# Patient Record
Sex: MALE | Race: WHITE | NOT HISPANIC OR LATINO | Employment: OTHER | URBAN - METROPOLITAN AREA
[De-identification: names, ages, dates, MRNs, and addresses within clinical notes are randomized per-mention and may not be internally consistent; named-entity substitution may affect disease eponyms.]

---

## 2018-02-05 ENCOUNTER — APPOINTMENT (EMERGENCY)
Dept: RADIOLOGY | Facility: HOSPITAL | Age: 83
End: 2018-02-05
Payer: MEDICARE

## 2018-02-05 ENCOUNTER — HOSPITAL ENCOUNTER (EMERGENCY)
Facility: HOSPITAL | Age: 83
Discharge: HOME | End: 2018-02-05
Attending: EMERGENCY MEDICINE | Admitting: EMERGENCY MEDICINE
Payer: MEDICARE

## 2018-02-05 VITALS
RESPIRATION RATE: 16 BRPM | OXYGEN SATURATION: 96 % | HEART RATE: 76 BPM | BODY MASS INDEX: 29.97 KG/M2 | SYSTOLIC BLOOD PRESSURE: 102 MMHG | DIASTOLIC BLOOD PRESSURE: 60 MMHG | TEMPERATURE: 98.8 F | WEIGHT: 221 LBS

## 2018-02-05 DIAGNOSIS — B34.9 VIRAL SYNDROME: Primary | ICD-10-CM

## 2018-02-05 LAB
ALBUMIN SERPL BCP-MCNC: 3.6 G/DL (ref 3.5–5)
ALP SERPL-CCNC: 73 U/L (ref 46–116)
ALT SERPL W P-5'-P-CCNC: 19 U/L (ref 12–78)
ANION GAP SERPL CALCULATED.3IONS-SCNC: 9 MMOL/L (ref 4–13)
APTT PPP: 29 SECONDS (ref 24–33)
AST SERPL W P-5'-P-CCNC: 16 U/L (ref 5–45)
BASOPHILS # BLD AUTO: 0 THOUSANDS/ΜL (ref 0–0.1)
BASOPHILS NFR BLD AUTO: 0 % (ref 0–1)
BILIRUB SERPL-MCNC: 0.7 MG/DL (ref 0.2–1)
BILIRUB UR QL STRIP: NEGATIVE
BUN SERPL-MCNC: 16 MG/DL (ref 5–25)
CALCIUM SERPL-MCNC: 9 MG/DL (ref 8.3–10.1)
CHLORIDE SERPL-SCNC: 100 MMOL/L (ref 100–108)
CLARITY UR: CLEAR
CO2 SERPL-SCNC: 26 MMOL/L (ref 21–32)
COLOR UR: YELLOW
CREAT SERPL-MCNC: 1.46 MG/DL (ref 0.6–1.3)
EOSINOPHIL # BLD AUTO: 0.1 THOUSAND/ΜL (ref 0–0.61)
EOSINOPHIL NFR BLD AUTO: 1 % (ref 0–6)
ERYTHROCYTE [DISTWIDTH] IN BLOOD BY AUTOMATED COUNT: 14.3 % (ref 11.6–15.1)
FLUAV AG SPEC QL IA: NEGATIVE
FLUBV AG SPEC QL IA: NEGATIVE
GFR SERPL CREATININE-BSD FRML MDRD: 45 ML/MIN/1.73SQ M
GLUCOSE SERPL-MCNC: 119 MG/DL (ref 65–140)
GLUCOSE UR STRIP-MCNC: NEGATIVE MG/DL
HCT VFR BLD AUTO: 38.7 % (ref 42–52)
HGB BLD-MCNC: 12.8 G/DL (ref 14–18)
HGB UR QL STRIP.AUTO: NEGATIVE
INR PPP: 1.04 (ref 0.86–1.16)
KETONES UR STRIP-MCNC: NEGATIVE MG/DL
LACTATE SERPL-SCNC: 1 MMOL/L (ref 0.5–2)
LEUKOCYTE ESTERASE UR QL STRIP: NEGATIVE
LIPASE SERPL-CCNC: 61 U/L (ref 73–393)
LYMPHOCYTES # BLD AUTO: 1.3 THOUSANDS/ΜL (ref 0.6–4.47)
LYMPHOCYTES NFR BLD AUTO: 13 % (ref 14–44)
MAGNESIUM SERPL-MCNC: 1.4 MG/DL (ref 1.6–2.6)
MCH RBC QN AUTO: 28 PG (ref 27–31)
MCHC RBC AUTO-ENTMCNC: 32.9 G/DL (ref 31.4–37.4)
MCV RBC AUTO: 85 FL (ref 82–98)
MONOCYTES # BLD AUTO: 0.8 THOUSAND/ΜL (ref 0.17–1.22)
MONOCYTES NFR BLD AUTO: 9 % (ref 4–12)
NEUTROPHILS # BLD AUTO: 7.4 THOUSANDS/ΜL (ref 1.85–7.62)
NEUTS SEG NFR BLD AUTO: 77 % (ref 43–75)
NITRITE UR QL STRIP: NEGATIVE
NRBC BLD AUTO-RTO: 0 /100 WBCS
NT-PROBNP SERPL-MCNC: 521 PG/ML
PH UR STRIP.AUTO: 5.5 [PH] (ref 5–9)
PHOSPHATE SERPL-MCNC: 2.4 MG/DL (ref 2.3–4.1)
PLATELET # BLD AUTO: 229 THOUSANDS/UL (ref 130–400)
PMV BLD AUTO: 7.3 FL (ref 8.9–12.7)
POTASSIUM SERPL-SCNC: 4.4 MMOL/L (ref 3.5–5.3)
PROT SERPL-MCNC: 7.8 G/DL (ref 6.4–8.2)
PROT UR STRIP-MCNC: NEGATIVE MG/DL
PROTHROMBIN TIME: 10.9 SECONDS (ref 9.4–11.7)
RBC # BLD AUTO: 4.56 MILLION/UL (ref 4.7–6.1)
SODIUM SERPL-SCNC: 135 MMOL/L (ref 136–145)
SP GR UR STRIP.AUTO: 1.01 (ref 1–1.03)
TROPONIN I SERPL-MCNC: 0.02 NG/ML
UROBILINOGEN UR QL STRIP.AUTO: 0.2 E.U./DL
WBC # BLD AUTO: 9.6 THOUSAND/UL (ref 4.8–10.8)

## 2018-02-05 PROCEDURE — 84100 ASSAY OF PHOSPHORUS: CPT | Performed by: EMERGENCY MEDICINE

## 2018-02-05 PROCEDURE — 96375 TX/PRO/DX INJ NEW DRUG ADDON: CPT

## 2018-02-05 PROCEDURE — 83880 ASSAY OF NATRIURETIC PEPTIDE: CPT | Performed by: EMERGENCY MEDICINE

## 2018-02-05 PROCEDURE — 85610 PROTHROMBIN TIME: CPT | Performed by: EMERGENCY MEDICINE

## 2018-02-05 PROCEDURE — 36415 COLL VENOUS BLD VENIPUNCTURE: CPT | Performed by: EMERGENCY MEDICINE

## 2018-02-05 PROCEDURE — 84484 ASSAY OF TROPONIN QUANT: CPT | Performed by: EMERGENCY MEDICINE

## 2018-02-05 PROCEDURE — 87400 INFLUENZA A/B EACH AG IA: CPT | Performed by: EMERGENCY MEDICINE

## 2018-02-05 PROCEDURE — 83690 ASSAY OF LIPASE: CPT | Performed by: EMERGENCY MEDICINE

## 2018-02-05 PROCEDURE — 99284 EMERGENCY DEPT VISIT MOD MDM: CPT

## 2018-02-05 PROCEDURE — 83605 ASSAY OF LACTIC ACID: CPT | Performed by: EMERGENCY MEDICINE

## 2018-02-05 PROCEDURE — 85025 COMPLETE CBC W/AUTO DIFF WBC: CPT | Performed by: EMERGENCY MEDICINE

## 2018-02-05 PROCEDURE — 87086 URINE CULTURE/COLONY COUNT: CPT | Performed by: EMERGENCY MEDICINE

## 2018-02-05 PROCEDURE — 71046 X-RAY EXAM CHEST 2 VIEWS: CPT

## 2018-02-05 PROCEDURE — 80053 COMPREHEN METABOLIC PANEL: CPT | Performed by: EMERGENCY MEDICINE

## 2018-02-05 PROCEDURE — 83735 ASSAY OF MAGNESIUM: CPT | Performed by: EMERGENCY MEDICINE

## 2018-02-05 PROCEDURE — 93005 ELECTROCARDIOGRAM TRACING: CPT

## 2018-02-05 PROCEDURE — 96361 HYDRATE IV INFUSION ADD-ON: CPT

## 2018-02-05 PROCEDURE — 85730 THROMBOPLASTIN TIME PARTIAL: CPT | Performed by: EMERGENCY MEDICINE

## 2018-02-05 PROCEDURE — 87798 DETECT AGENT NOS DNA AMP: CPT | Performed by: EMERGENCY MEDICINE

## 2018-02-05 PROCEDURE — 96365 THER/PROPH/DIAG IV INF INIT: CPT

## 2018-02-05 PROCEDURE — 81003 URINALYSIS AUTO W/O SCOPE: CPT | Performed by: EMERGENCY MEDICINE

## 2018-02-05 RX ORDER — ONDANSETRON 4 MG/1
4 TABLET, FILM COATED ORAL EVERY 6 HOURS
Qty: 12 TABLET | Refills: 0 | Status: SHIPPED | OUTPATIENT
Start: 2018-02-05 | End: 2018-12-26

## 2018-02-05 RX ORDER — SIMVASTATIN 10 MG
40 TABLET ORAL
COMMUNITY
End: 2020-08-31

## 2018-02-05 RX ORDER — ONDANSETRON 2 MG/ML
4 INJECTION INTRAMUSCULAR; INTRAVENOUS ONCE
Status: COMPLETED | OUTPATIENT
Start: 2018-02-05 | End: 2018-02-05

## 2018-02-05 RX ORDER — ACETAMINOPHEN 325 MG/1
650 TABLET ORAL ONCE
Status: DISCONTINUED | OUTPATIENT
Start: 2018-02-05 | End: 2018-02-06 | Stop reason: HOSPADM

## 2018-02-05 RX ORDER — ASPIRIN 81 MG/1
81 TABLET, CHEWABLE ORAL DAILY
COMMUNITY

## 2018-02-05 RX ORDER — MAGNESIUM SULFATE HEPTAHYDRATE 40 MG/ML
2 INJECTION, SOLUTION INTRAVENOUS ONCE
Status: COMPLETED | OUTPATIENT
Start: 2018-02-05 | End: 2018-02-05

## 2018-02-05 RX ADMIN — MAGNESIUM SULFATE HEPTAHYDRATE 2 G: 40 INJECTION, SOLUTION INTRAVENOUS at 21:54

## 2018-02-05 RX ADMIN — ONDANSETRON 4 MG: 2 INJECTION INTRAMUSCULAR; INTRAVENOUS at 20:32

## 2018-02-05 RX ADMIN — SODIUM CHLORIDE 500 ML: 0.9 INJECTION, SOLUTION INTRAVENOUS at 20:32

## 2018-02-06 LAB
ATRIAL RATE: 83 BPM
FLUAV AG SPEC QL: NORMAL
FLUBV AG SPEC QL: NORMAL
P AXIS: -16 DEGREES
PR INTERVAL: 176 MS
QRS AXIS: -52 DEGREES
QRSD INTERVAL: 104 MS
QT INTERVAL: 386 MS
QTC INTERVAL: 453 MS
RSV B RNA SPEC QL NAA+PROBE: NORMAL
T WAVE AXIS: 61 DEGREES
VENTRICULAR RATE: 83 BPM

## 2018-02-06 PROCEDURE — 93010 ELECTROCARDIOGRAM REPORT: CPT | Performed by: INTERNAL MEDICINE

## 2018-02-06 NOTE — DISCHARGE INSTRUCTIONS
Please take a list of all of your medications and discharge paperwork with you to all of your follow-up medical visits  Please take all of your medications as directed  Please call your family doctor or return to the ER if you have increased shortness of breath, chest pain, fevers, chills, nausea, vomiting, diarrhea, or any other worsening symptoms  Viral Syndrome, Ambulatory Care   GENERAL INFORMATION:   Viral syndrome  is a term healthcare providers use for general symptoms of a viral infection that has no clear cause  Common symptoms include the following:   · Fever and chills, or a rash    · Runny or stuffy nose     · Cough, sore throat, or hoarseness     · Headache, or pain and pressure around your eyes     · Muscle aches and joint pain     · Shortness of breath or wheezing     · Abdominal pain, cramps, and diarrhea     · Nausea, vomiting, or loss of appetite  Seek immediate care for the following symptoms:   · Continued vomiting and diarrhea    · Chest pain or trouble breathing  Treatment for a viral syndrome  may include medicines to decrease fever, cough, or stuffy nose  You may also need medicines to relieve a rash, itching, or help treat the viral infection  Manage your symptoms:  Drink liquids as directed to help prevent dehydration  Ask how much liquid to drink each day and which liquids are best for you  You may need to drink an oral rehydration solution (ORS)  An ORS has the right amounts of water, salts, and sugar you need to replace body fluids  Prevent the spread of viral syndrome:   · Wash your hands often  Use soap and water  Wash your hands after you use the bathroom, change a child's diapers, or sneeze  Wash your hands before you prepare or eat food  Use a gel hand  if soap and water are not available  · Wear a mask  to help prevent spreading the virus to others  · Cook and handle food properly  Cook food completely through   Clean food preparation surfaces with a disinfectant  · Ask about vaccinations  You may need an influenza (flu) vaccine, pneumococcal vaccine, or meningococcal vaccine  These vaccines help prevent the flu, pneumonia, and meningococcal disease  Follow up with your healthcare provider as directed:  Write down your questions so you remember to ask them during your visits  CARE AGREEMENT:   You have the right to help plan your care  Learn about your health condition and how it may be treated  Discuss treatment options with your caregivers to decide what care you want to receive  You always have the right to refuse treatment  The above information is an  only  It is not intended as medical advice for individual conditions or treatments  Talk to your doctor, nurse or pharmacist before following any medical regimen to see if it is safe and effective for you  © 2014 [address] is for End User's use only and may not be sold, redistributed or otherwise used for commercial purposes  All illustrations and images included in CareNotes® are the copyrighted property of [employer]  or [employer]

## 2018-02-06 NOTE — ED PROVIDER NOTES
History  Chief Complaint   Patient presents with    Flu Symptoms     states had hard time going to the bathroom this morning, then became nauseated, had chills developed fever, took tylenol about one and a half hours ago c/o muscle aches     44-year-old male with past medical history of diabetes, hyperlipidemia, hypertension, mi, presents to the ER with acute onset subjective fever, chills, nausea, vomiting, generalized diffuse body ache since this morning  Patient came to the ER to rule out influenza  Patient denies any sick contact  Patient denies any chest pain, shortness of breath, weakness, dizziness  History provided by:  Patient and relative  Flu Symptoms   Presenting symptoms: fever, nausea and vomiting    Presenting symptoms: no cough, no diarrhea, no fatigue, no headaches, no shortness of breath and no sore throat    Associated symptoms: chills    Associated symptoms: no congestion        Prior to Admission Medications   Prescriptions Last Dose Informant Patient Reported? Taking? METOPROLOL TARTRATE PO   Yes Yes   Sig: Take by mouth daily   Omega-3 Fatty Acids (FISH OIL PO)   Yes Yes   Sig: Take by mouth 3 (three) times a day   aspirin 81 mg chewable tablet   Yes Yes   Sig: Chew 81 mg daily   metFORMIN (GLUCOPHAGE) 1000 MG tablet   Yes Yes   Sig: Take 1,000 mg by mouth 2 (two) times a day with meals   simvastatin (ZOCOR) 10 mg tablet   Yes Yes   Sig: Take 10 mg by mouth daily at bedtime      Facility-Administered Medications: None       Past Medical History:   Diagnosis Date    Acute MI     Cardiac disease     Diabetes mellitus ([facility] [address] )     Hyperlipidemia     Hypertension        Past Surgical History:   Procedure Laterality Date    JOINT REPLACEMENT         History reviewed  No pertinent family history  I have reviewed and agree with the history as documented      Social History   Substance Use Topics    Smoking status: Never Smoker    Smokeless tobacco: Never Used    Alcohol use Yes Comment: rare        Review of Systems   Constitutional: Positive for chills and fever  Negative for activity change and fatigue  Body ache   HENT: Negative for congestion, ear discharge and sore throat  Eyes: Negative for pain and redness  Respiratory: Negative for cough, chest tightness, shortness of breath and wheezing  Cardiovascular: Negative for chest pain  Gastrointestinal: Positive for nausea and vomiting  Negative for abdominal pain and diarrhea  Endocrine: Negative for cold intolerance  Genitourinary: Negative for dysuria and urgency  Musculoskeletal: Negative for arthralgias and back pain  Neurological: Negative for dizziness, weakness and headaches  Psychiatric/Behavioral: Negative for agitation and behavioral problems  Physical Exam  ED Triage Vitals [02/05/18 2008]   Temperature Pulse Respirations Blood Pressure SpO2   (!) 100 7 °F (38 2 °C) 85 20 115/69 96 %      Temp Source Heart Rate Source Patient Position - Orthostatic VS BP Location FiO2 (%)   Tympanic Monitor Sitting Right arm --      Pain Score       5           Orthostatic Vital Signs  Vitals:    02/05/18 2008 02/05/18 2205 02/05/18 2243   BP: 115/69 101/66 102/60   Pulse: 85 73 76   Patient Position - Orthostatic VS: Sitting Lying Sitting       Physical Exam   Constitutional: He is oriented to person, place, and time  He appears well-developed and well-nourished  HENT:   Head: Normocephalic and atraumatic  Nose: Nose normal    Mouth/Throat: Oropharynx is clear and moist    Eyes: Conjunctivae and EOM are normal    Neck: Normal range of motion  Neck supple  Cardiovascular: Normal rate, regular rhythm and normal heart sounds  Pulmonary/Chest: Effort normal and breath sounds normal    Abdominal: Soft  Bowel sounds are normal  He exhibits no distension  There is no tenderness  Musculoskeletal: Normal range of motion  Neurological: He is alert and oriented to person, place, and time     Skin: Skin is warm  Psychiatric: He has a normal mood and affect  His behavior is normal  Judgment and thought content normal    Nursing note and vitals reviewed  ED Medications  Medications   sodium chloride 0 9 % bolus 500 mL (0 mL Intravenous Stopped 2/5/18 2152)   ondansetron (ZOFRAN) injection 4 mg (4 mg Intravenous Given 2/5/18 2032)   magnesium sulfate 2 g/50 mL IVPB (premix) 2 g (0 g Intravenous Stopped 2/5/18 2305)       Diagnostic Studies  Results Reviewed     Procedure Component Value Units Date/Time    Urine culture [[id number]] Collected:  02/05/18 2218    Lab Status:   In process Specimen:  Urine from Urine, Clean Catch Updated:  02/05/18 2224    UA w Reflex to Microscopic w Reflex to Culture [[id number]]  (Normal) Collected:  02/05/18 2218    Lab Status:  Final result Specimen:  Urine from Urine, Clean Catch Updated:  02/05/18 2223     Color, UA Yellow     Clarity, UA Clear     Specific Gravity, UA 1 015     pH, UA 5 5     Leukocytes, UA Negative     Nitrite, UA Negative     Protein, UA Negative mg/dl      Glucose, UA Negative mg/dl      Ketones, UA Negative mg/dl      Urobilinogen, UA 0 2 E U /dl      Bilirubin, UA Negative     Blood, UA Negative    Phosphorus [[id number]]  (Normal) Collected:  02/05/18 2027    Lab Status:  Final result Specimen:  Blood from Arm, Left Updated:  02/05/18 2057     Phosphorus 2 4 mg/dL     Magnesium [[id number]]  (Abnormal) Collected:  02/05/18 2027    Lab Status:  Final result Specimen:  Blood from Arm, Left Updated:  02/05/18 2057     Magnesium 1 4 (L) mg/dL     Lipase [[id number]]  (Abnormal) Collected:  02/05/18 2027    Lab Status:  Final result Specimen:  Blood from Arm, Left Updated:  02/05/18 2057     Lipase 61 (L) u/L     BNP [[id number]]  (Abnormal) Collected:  02/05/18 2027    Lab Status:  Final result Specimen:  Blood from Arm, Left Updated:  02/05/18 2057     NT-proBNP 521 (H) pg/mL     Troponin I [[id number]]  (Normal) Collected:  02/05/18 2027    Lab Status:  Final result Specimen:  Blood from Arm, Left Updated:  02/05/18 2054     Troponin I 0 02 ng/mL     Narrative:         [employer] Chemistry analyzer 99% cutoff is > 0 04 ng/mL in network labs    o cTnI 99% cutoff is useful only when applied to patients in the clinical setting of myocardial ischemia  o cTnI 99% cutoff should be interpreted in the context of clinical history, ECG findings and possibly cardiac imaging to establish correct diagnosis  o cTnI 99% cutoff may be suggestive but clearly not indicative of a coronary event without the clinical setting of myocardial ischemia  Lactic acid, plasma [[id number]]  (Normal) Collected:  02/05/18 2027    Lab Status:  Final result Specimen:  Blood from Arm, Left Updated:  02/05/18 2054     LACTIC ACID 1 0 mmol/L     Narrative:         Result may be elevated if tourniquet was used during collection  Bryan Rankin [[id number]]  (Normal) Collected:  02/05/18 2027    Lab Status:  Final result Specimen:  Blood from Arm, Left Updated:  02/05/18 2052     Protime 10 9 seconds      INR 1 04    APTT [[id number]]  (Normal) Collected:  02/05/18 2027    Lab Status:  Final result Specimen:  Blood from Arm, Left Updated:  02/05/18 2051     PTT 29 seconds     Narrative:          Therapeutic Heparin Range = 60-90 seconds    Comprehensive metabolic panel [[id number]]  (Abnormal) Collected:  02/05/18 2027    Lab Status:  Final result Specimen:  Blood from Arm, Left Updated:  02/05/18 2051     Sodium 135 (L) mmol/L      Potassium 4 4 mmol/L      Chloride 100 mmol/L      CO2 26 mmol/L      Anion Gap 9 mmol/L      BUN 16 mg/dL      Creatinine 1 46 (H) mg/dL      Glucose 119 mg/dL      Calcium 9 0 mg/dL      AST 16 U/L      ALT 19 U/L      Alkaline Phosphatase 73 U/L      Total Protein 7 8 g/dL      Albumin 3 6 g/dL      Total Bilirubin 0 70 mg/dL      eGFR 45 ml/min/1 73sq m     Narrative:         National Kidney Disease Education Program recommendations are as follows:  GFR calculation is accurate only with a steady state creatinine  Chronic Kidney disease less than 60 ml/min/1 73 sq  meters  Kidney failure less than 15 ml/min/1 73 sq  meters  Rapid Influenza Screen with Reflex PCR (indicated for patients <2mo of age) [[id number]]  (Normal) Collected:  02/05/18 2029    Lab Status:  Final result Specimen:  Nasopharyngeal from Nasopharyngeal Swab Updated:  02/05/18 2051     Rapid Influenza A Ag Negative     Rapid Influenza B Ag Negative    Influenza A/B and RSV by PCR (Indicated for patients > 2 mo of age) [[id number]] Collected:  02/05/18 2029    Lab Status:   In process Specimen:  Nasopharyngeal from Nasopharyngeal Swab Updated:  02/05/18 2051    CBC and differential [[id number]]  (Abnormal) Collected:  02/05/18 2027    Lab Status:  Final result Specimen:  Blood from Arm, Left Updated:  02/05/18 2035     WBC 9 60 Thousand/uL      RBC 4 56 (L) Million/uL      Hemoglobin 12 8 (L) g/dL      Hematocrit 38 7 (L) %      MCV 85 fL      MCH 28 0 pg      MCHC 32 9 g/dL      RDW 14 3 %      MPV 7 3 (L) fL      Platelets 006 Thousands/uL      nRBC 0 /100 WBCs      Neutrophils Relative 77 (H) %      Lymphocytes Relative 13 (L) %      Monocytes Relative 9 %      Eosinophils Relative 1 %      Basophils Relative 0 %      Neutrophils Absolute 7 40 Thousands/µL      Lymphocytes Absolute 1 30 Thousands/µL      Monocytes Absolute 0 80 Thousand/µL      Eosinophils Absolute 0 10 Thousand/µL      Basophils Absolute 0 00 Thousands/µL                  XR chest 2 views    (Results Pending)              Procedures  ECG 12 Lead Documentation  Date/Time: 2/5/2018 8:24 PM  Performed by: Jeanne Hutchinson  Authorized by: Jeanne Hutchinson     Indications / Diagnosis:   body ache  ECG reviewed by me, the ED Provider: yes    Patient location:  ED  Previous ECG:     Previous ECG:  Unavailable  Interpretation:     Interpretation: abnormal    Comments:       Sinus rhythm, rate 83, left axis deviation, Q-waves noted to lead 2, 3, AVF, no acute ST elevations noted, left anterior fascicular block, inferior infarct of undetermined age, no previous EKG available for comparison  Phone Contacts  ED Phone Contact    ED Course  ED Course                                MDM  Number of Diagnoses or Management Options  Viral syndrome: new and requires workup  Diagnosis management comments:   Obtain blood work, a rapid influenza, UA, EKG, chest x-ray   give IV fluids, antiemetics,  Tylenol for fever  Amount and/or Complexity of Data Reviewed  Clinical lab tests: ordered and reviewed  Tests in the radiology section of CPT®: ordered and reviewed  Tests in the medicine section of CPT®: ordered and reviewed  Independent visualization of images, tracings, or specimens: yes    Risk of Complications, Morbidity, and/or Mortality  General comments:   Patient's magnesium was low that was repleted in the ER  Patient felt significantly better with IV fluids, antiemetics, Tylenol in the ER  No acute abnormalities noted on UA or chest x-ray  Rapid flu is negative  Cultures are pending  At this point patient's symptoms are most likely secondary to viral syndrome  Discussed supportive care, brat diet, p r n  Tylenol/NSAIDs as needed for fevers  Patient is also discharged home on p r n  Zofran for nausea and vomiting  Patient to follow up with PCP in 2-3 days  Close return instructions given to return to the ER for any worsening symptoms  Patient agrees with discharge plan  Patient well appearing at time of discharge      Patient Progress  Patient progress: improved    CritCare Time    Disposition  Final diagnoses:   Viral syndrome     Time reflects when diagnosis was documented in both MDM as applicable and the Disposition within this note     Time User Action Codes Description Comment    2/5/2018 10:39 PM Vanda Ryan Add [B34 9] Viral syndrome       ED Disposition     ED Disposition Condition Comment    Discharge  Vandana Orellana discharge to home/self care     Condition at discharge: Good        Follow-up Information     Follow up With Specialties Details Why Contact Info    Marilia Colby MD  In 2 days  [address]  [phone]          Discharge Medication List as of 2/5/2018 10:40 PM      START taking these medications    Details   ondansetron (ZOFRAN) 4 mg tablet Take 1 tablet (4 mg total) by mouth every 6 (six) hours, Starting Mon 2/5/2018, Print         CONTINUE these medications which have NOT CHANGED    Details   aspirin 81 mg chewable tablet Chew 81 mg daily, Historical Med      metFORMIN (GLUCOPHAGE) 1000 MG tablet Take 1,000 mg by mouth 2 (two) times a day with meals, Historical Med      METOPROLOL TARTRATE PO Take by mouth daily, Historical Med      Omega-3 Fatty Acids (FISH OIL PO) Take by mouth 3 (three) times a day, Historical Med      simvastatin (ZOCOR) 10 mg tablet Take 10 mg by mouth daily at bedtime, Historical Med           No discharge procedures on file      ED Provider  Electronically Signed by           Juan Sandoval DO  02/06/18 [id number]

## 2018-02-07 LAB — BACTERIA UR CULT: ABNORMAL

## 2018-02-09 ENCOUNTER — APPOINTMENT (EMERGENCY)
Dept: RADIOLOGY | Facility: HOSPITAL | Age: 83
DRG: 682 | End: 2018-02-09
Payer: MEDICARE

## 2018-02-09 ENCOUNTER — HOSPITAL ENCOUNTER (INPATIENT)
Facility: HOSPITAL | Age: 83
LOS: 1 days | Discharge: HOME | DRG: 682 | End: 2018-02-10
Attending: EMERGENCY MEDICINE | Admitting: INTERNAL MEDICINE
Payer: MEDICARE

## 2018-02-09 DIAGNOSIS — N17.9 ACUTE KIDNEY INJURY (HCC): ICD-10-CM

## 2018-02-09 DIAGNOSIS — I48.91 NEW ONSET A-FIB (HCC): Primary | ICD-10-CM

## 2018-02-09 DIAGNOSIS — E86.0 DEHYDRATION: ICD-10-CM

## 2018-02-09 PROBLEM — K21.9 GERD (GASTROESOPHAGEAL REFLUX DISEASE): Status: ACTIVE | Noted: 2018-02-09

## 2018-02-09 PROBLEM — I10 ESSENTIAL HYPERTENSION: Status: ACTIVE | Noted: 2018-02-09

## 2018-02-09 PROBLEM — E13.9 DIABETES 1.5, MANAGED AS TYPE 2 (HCC): Status: ACTIVE | Noted: 2018-02-09

## 2018-02-09 PROBLEM — I25.10 CAD (CORONARY ARTERY DISEASE): Status: ACTIVE | Noted: 2018-02-09

## 2018-02-09 PROBLEM — E78.5 HLD (HYPERLIPIDEMIA): Status: ACTIVE | Noted: 2018-02-09

## 2018-02-09 LAB
ALBUMIN SERPL BCP-MCNC: 3.2 G/DL (ref 3.5–5)
ALP SERPL-CCNC: 74 U/L (ref 46–116)
ALT SERPL W P-5'-P-CCNC: 28 U/L (ref 12–78)
ANION GAP SERPL CALCULATED.3IONS-SCNC: 11 MMOL/L (ref 4–13)
APTT PPP: 31 SECONDS (ref 24–33)
AST SERPL W P-5'-P-CCNC: 29 U/L (ref 5–45)
BACTERIA UR QL AUTO: ABNORMAL /HPF
BASOPHILS # BLD AUTO: 0 THOUSANDS/ΜL (ref 0–0.1)
BASOPHILS NFR BLD AUTO: 0 % (ref 0–1)
BILIRUB SERPL-MCNC: 0.6 MG/DL (ref 0.2–1)
BILIRUB UR QL STRIP: ABNORMAL
BUN SERPL-MCNC: 35 MG/DL (ref 5–25)
CALCIUM SERPL-MCNC: 9.1 MG/DL (ref 8.3–10.1)
CHLORIDE SERPL-SCNC: 96 MMOL/L (ref 100–108)
CLARITY UR: ABNORMAL
CO2 SERPL-SCNC: 24 MMOL/L (ref 21–32)
COLOR UR: ABNORMAL
CREAT SERPL-MCNC: 2.13 MG/DL (ref 0.6–1.3)
EOSINOPHIL # BLD AUTO: 0.1 THOUSAND/ΜL (ref 0–0.61)
EOSINOPHIL NFR BLD AUTO: 1 % (ref 0–6)
ERYTHROCYTE [DISTWIDTH] IN BLOOD BY AUTOMATED COUNT: 14.9 % (ref 11.6–15.1)
EST. AVERAGE GLUCOSE BLD GHB EST-MCNC: 154 MG/DL
GFR SERPL CREATININE-BSD FRML MDRD: 28 ML/MIN/1.73SQ M
GLUCOSE SERPL-MCNC: 104 MG/DL (ref 65–140)
GLUCOSE SERPL-MCNC: 150 MG/DL (ref 65–140)
GLUCOSE SERPL-MCNC: 176 MG/DL (ref 65–140)
GLUCOSE UR STRIP-MCNC: NEGATIVE MG/DL
HBA1C MFR BLD: 7 % (ref 4.2–6.3)
HCT VFR BLD AUTO: 40.9 % (ref 42–52)
HGB BLD-MCNC: 13.5 G/DL (ref 14–18)
HGB UR QL STRIP.AUTO: NEGATIVE
HYALINE CASTS #/AREA URNS LPF: ABNORMAL /LPF
INR PPP: 1.08 (ref 0.86–1.16)
KETONES UR STRIP-MCNC: ABNORMAL MG/DL
LEUKOCYTE ESTERASE UR QL STRIP: NEGATIVE
LYMPHOCYTES # BLD AUTO: 0.9 THOUSANDS/ΜL (ref 0.6–4.47)
LYMPHOCYTES NFR BLD AUTO: 12 % (ref 14–44)
MCH RBC QN AUTO: 28 PG (ref 27–31)
MCHC RBC AUTO-ENTMCNC: 32.9 G/DL (ref 31.4–37.4)
MCV RBC AUTO: 85 FL (ref 82–98)
MONOCYTES # BLD AUTO: 0.5 THOUSAND/ΜL (ref 0.17–1.22)
MONOCYTES NFR BLD AUTO: 7 % (ref 4–12)
NEUTROPHILS # BLD AUTO: 5.9 THOUSANDS/ΜL (ref 1.85–7.62)
NEUTS SEG NFR BLD AUTO: 79 % (ref 43–75)
NITRITE UR QL STRIP: NEGATIVE
NON-SQ EPI CELLS URNS QL MICRO: ABNORMAL /HPF
NRBC BLD AUTO-RTO: 0 /100 WBCS
PH UR STRIP.AUTO: 5 [PH] (ref 5–9)
PLATELET # BLD AUTO: 175 THOUSANDS/UL (ref 130–400)
PMV BLD AUTO: 8.1 FL (ref 8.9–12.7)
POTASSIUM SERPL-SCNC: 4.6 MMOL/L (ref 3.5–5.3)
PROT SERPL-MCNC: 7.5 G/DL (ref 6.4–8.2)
PROT UR STRIP-MCNC: ABNORMAL MG/DL
PROTHROMBIN TIME: 11.3 SECONDS (ref 9.4–11.7)
RBC # BLD AUTO: 4.81 MILLION/UL (ref 4.7–6.1)
RBC #/AREA URNS AUTO: ABNORMAL /HPF
SODIUM SERPL-SCNC: 131 MMOL/L (ref 136–145)
SP GR UR STRIP.AUTO: >=1.03 (ref 1–1.03)
TROPONIN I SERPL-MCNC: 0.04 NG/ML
TROPONIN I SERPL-MCNC: 0.04 NG/ML
TROPONIN I SERPL-MCNC: 0.05 NG/ML
TSH SERPL DL<=0.05 MIU/L-ACNC: 3.14 UIU/ML (ref 0.36–3.74)
UROBILINOGEN UR QL STRIP.AUTO: 1 E.U./DL
WBC # BLD AUTO: 7.4 THOUSAND/UL (ref 4.8–10.8)
WBC #/AREA URNS AUTO: ABNORMAL /HPF

## 2018-02-09 PROCEDURE — 85025 COMPLETE CBC W/AUTO DIFF WBC: CPT | Performed by: EMERGENCY MEDICINE

## 2018-02-09 PROCEDURE — 84484 ASSAY OF TROPONIN QUANT: CPT | Performed by: EMERGENCY MEDICINE

## 2018-02-09 PROCEDURE — 99285 EMERGENCY DEPT VISIT HI MDM: CPT

## 2018-02-09 PROCEDURE — 80053 COMPREHEN METABOLIC PANEL: CPT | Performed by: EMERGENCY MEDICINE

## 2018-02-09 PROCEDURE — 83036 HEMOGLOBIN GLYCOSYLATED A1C: CPT | Performed by: NURSE PRACTITIONER

## 2018-02-09 PROCEDURE — 71045 X-RAY EXAM CHEST 1 VIEW: CPT

## 2018-02-09 PROCEDURE — 70450 CT HEAD/BRAIN W/O DYE: CPT

## 2018-02-09 PROCEDURE — 93005 ELECTROCARDIOGRAM TRACING: CPT | Performed by: EMERGENCY MEDICINE

## 2018-02-09 PROCEDURE — 87081 CULTURE SCREEN ONLY: CPT | Performed by: NURSE PRACTITIONER

## 2018-02-09 PROCEDURE — 96360 HYDRATION IV INFUSION INIT: CPT

## 2018-02-09 PROCEDURE — 36415 COLL VENOUS BLD VENIPUNCTURE: CPT | Performed by: EMERGENCY MEDICINE

## 2018-02-09 PROCEDURE — 93005 ELECTROCARDIOGRAM TRACING: CPT

## 2018-02-09 PROCEDURE — 84443 ASSAY THYROID STIM HORMONE: CPT | Performed by: NURSE PRACTITIONER

## 2018-02-09 PROCEDURE — 82948 REAGENT STRIP/BLOOD GLUCOSE: CPT

## 2018-02-09 PROCEDURE — 99223 1ST HOSP IP/OBS HIGH 75: CPT | Performed by: NURSE PRACTITIONER

## 2018-02-09 PROCEDURE — 85610 PROTHROMBIN TIME: CPT | Performed by: EMERGENCY MEDICINE

## 2018-02-09 PROCEDURE — 84484 ASSAY OF TROPONIN QUANT: CPT | Performed by: NURSE PRACTITIONER

## 2018-02-09 PROCEDURE — 96361 HYDRATE IV INFUSION ADD-ON: CPT

## 2018-02-09 PROCEDURE — 85730 THROMBOPLASTIN TIME PARTIAL: CPT | Performed by: EMERGENCY MEDICINE

## 2018-02-09 PROCEDURE — 81001 URINALYSIS AUTO W/SCOPE: CPT | Performed by: EMERGENCY MEDICINE

## 2018-02-09 RX ORDER — LOSARTAN POTASSIUM 25 MG/1
25 TABLET ORAL DAILY
COMMUNITY
End: 2018-12-26

## 2018-02-09 RX ORDER — ACETAMINOPHEN 325 MG/1
650 TABLET ORAL EVERY 6 HOURS PRN
Status: DISCONTINUED | OUTPATIENT
Start: 2018-02-09 | End: 2018-02-10 | Stop reason: HOSPADM

## 2018-02-09 RX ORDER — SODIUM CHLORIDE 9 MG/ML
50 INJECTION, SOLUTION INTRAVENOUS CONTINUOUS
Status: DISCONTINUED | OUTPATIENT
Start: 2018-02-09 | End: 2018-02-10 | Stop reason: HOSPADM

## 2018-02-09 RX ORDER — HEPARIN SODIUM 5000 [USP'U]/ML
5000 INJECTION, SOLUTION INTRAVENOUS; SUBCUTANEOUS EVERY 8 HOURS SCHEDULED
Status: DISCONTINUED | OUTPATIENT
Start: 2018-02-09 | End: 2018-02-10 | Stop reason: HOSPADM

## 2018-02-09 RX ORDER — ONDANSETRON 2 MG/ML
4 INJECTION INTRAMUSCULAR; INTRAVENOUS EVERY 6 HOURS PRN
Status: DISCONTINUED | OUTPATIENT
Start: 2018-02-09 | End: 2018-02-10 | Stop reason: HOSPADM

## 2018-02-09 RX ORDER — PIOGLITAZONEHYDROCHLORIDE 45 MG/1
45 TABLET ORAL DAILY
COMMUNITY
End: 2018-12-26

## 2018-02-09 RX ORDER — PANTOPRAZOLE SODIUM 40 MG/1
40 TABLET, DELAYED RELEASE ORAL DAILY
Status: DISCONTINUED | OUTPATIENT
Start: 2018-02-10 | End: 2018-02-10 | Stop reason: HOSPADM

## 2018-02-09 RX ORDER — PANTOPRAZOLE SODIUM 40 MG/1
40 TABLET, DELAYED RELEASE ORAL DAILY
COMMUNITY
End: 2020-06-02

## 2018-02-09 RX ORDER — LOSARTAN POTASSIUM 25 MG/1
25 TABLET ORAL DAILY
Status: DISCONTINUED | OUTPATIENT
Start: 2018-02-10 | End: 2018-02-10 | Stop reason: HOSPADM

## 2018-02-09 RX ORDER — METOPROLOL TARTRATE 5 MG/5ML
5 INJECTION INTRAVENOUS ONCE
Status: DISCONTINUED | OUTPATIENT
Start: 2018-02-09 | End: 2018-02-09

## 2018-02-09 RX ORDER — ASPIRIN 81 MG/1
81 TABLET, CHEWABLE ORAL DAILY
Status: DISCONTINUED | OUTPATIENT
Start: 2018-02-10 | End: 2018-02-10 | Stop reason: HOSPADM

## 2018-02-09 RX ADMIN — HEPARIN SODIUM 5000 UNITS: 5000 INJECTION, SOLUTION INTRAVENOUS; SUBCUTANEOUS at 21:17

## 2018-02-09 RX ADMIN — SODIUM CHLORIDE 50 ML/HR: 0.9 INJECTION, SOLUTION INTRAVENOUS at 18:28

## 2018-02-09 RX ADMIN — SODIUM CHLORIDE 1000 ML: 0.9 INJECTION, SOLUTION INTRAVENOUS at 14:55

## 2018-02-09 RX ADMIN — HEPARIN SODIUM 5000 UNITS: 5000 INJECTION, SOLUTION INTRAVENOUS; SUBCUTANEOUS at 18:23

## 2018-02-09 RX ADMIN — SODIUM CHLORIDE 1000 ML: 0.9 INJECTION, SOLUTION INTRAVENOUS at 13:29

## 2018-02-09 NOTE — ED PROCEDURE NOTE
PROCEDURE  ECG 12 Lead Documentation  Date/Time: 2/9/2018 3:51 PM  Performed by: Amisha Friend by: Kimi Billy     ECG reviewed by me, the ED Provider: yes    Patient location:  ED  Interpretation:     Interpretation: abnormal    Rate:     ECG rate:  75    ECG rate assessment: normal    Rhythm:     Rhythm: sinus rhythm    Ectopy:     Ectopy: none    QRS:     QRS axis:  Left  Conduction:     Conduction: abnormal      Abnormal conduction: LAFB    ST segments:     ST segments:  Normal  T waves:     T waves: normal           Liz Goode DO  02/09/18 1552

## 2018-02-09 NOTE — ASSESSMENT & PLAN NOTE
· Patient states seen at [facility] ER on Monday with fever and diagnosed with stomach flu was given IVF and sent home  · He has continued with poor appetite and decreased p o   Intake  · He received NSS 2000CC in ED  · Ordered orthostatic vitals

## 2018-02-09 NOTE — PLAN OF CARE
CARDIOVASCULAR - ADULT     Maintains optimal cardiac output and hemodynamic stability Progressing     Absence of cardiac dysrhythmias or at baseline rhythm Progressing        DISCHARGE PLANNING     Discharge to home or other facility with appropriate resources Progressing        GASTROINTESTINAL - ADULT     Minimal or absence of nausea and/or vomiting Progressing     Maintains or returns to baseline bowel function Progressing     Maintains adequate nutritional intake Progressing     Establish and maintain optimal ostomy function Progressing        INFECTION - ADULT     Absence or prevention of progression during hospitalization Progressing        Knowledge Deficit     Patient/family/caregiver demonstrates understanding of disease process, treatment plan, medications, and discharge instructions Progressing        MUSCULOSKELETAL - ADULT     Maintain or return mobility to safest level of function Progressing     Maintain proper alignment of affected body part Progressing        PAIN - ADULT     Verbalizes/displays adequate comfort level or baseline comfort level Progressing        RESPIRATORY - ADULT     Achieves optimal ventilation and oxygenation Progressing        SAFETY ADULT     Patient will remain free of falls Progressing     Maintain or return to baseline ADL function Progressing     Maintain or return mobility status to optimal level Progressing

## 2018-02-09 NOTE — ASSESSMENT & PLAN NOTE
· Likely 2/2 to dehydration  · Initial EKG in the ED showed Afib  which converted to NSR, he did receive a total of NSS 2000cc  · CT of head, no acute intracranial abnormality  · PCXR, no active pulmonary disease    · Continue home medication of metoprolol 25mg PO bid  · First troponin 0 04, complete serial troponins  · Ordered echocardiogram  · Consult Cardiology  · Ordered lipid profile, hemoglobin A1c, TSH  · Continue telemetry

## 2018-02-09 NOTE — ASSESSMENT & PLAN NOTE
· Ordered hemoglobin A1c  · Hold metformin    Continue Actos  · Ordered Lispro SSI tid with Accu-Cheks

## 2018-02-09 NOTE — H&P
H&P- Kittson Chute 1935, [de-identified] y o  male MRN: [id number]    Unit/Bed#: [address] Encounter: [id number]    Primary Care Provider: Elly Hernandez MD   Date and time admitted to hospital: 2/9/2018  1:07 PM        * New onset a-fib [facility])   Assessment & Plan    · Likely 2/2 to dehydration  · Initial EKG in the ED showed Afib  which converted to NSR, he did receive a total of NSS 2000cc  · CT of head, no acute intracranial abnormality  · PCXR, no active pulmonary disease  · Continue home medication of metoprolol 25mg PO bid  · First troponin 0 04, complete serial troponins  · Ordered echocardiogram  · Consult Cardiology  · Ordered lipid profile, hemoglobin A1c, TSH  · Continue telemetry        CAD (coronary artery disease)   Assessment & Plan    · Continue aspirin and metoprolol  · Hold simvastatin        GERD (gastroesophageal reflux disease)   Assessment & Plan    · Continue PPI        HLD (hyperlipidemia)   Assessment & Plan    · Ordered lipid profile  · Holding simvastatin 2/2 CHARLES        Essential hypertension   Assessment & Plan    · Current blood pressure 120/70  · Continue losartan and metoprolol with holding parameters        Diabetes 1 5, managed as type 2 (HCC)   Assessment & Plan    · Ordered hemoglobin A1c  · Hold metformin  Continue Actos  · Ordered Lispro SSI tid with Accu-Cheks        Acute kidney injury ([facility] [address] )   Assessment & Plan    · Likely prerenal from dehydration  · Current creatinine 2  13  After reviewing records from [facility] baseline creatinine 0 81-1 03   · He received NSS 2000cc in ED, check BMP in a m   · Ordered NSS 50cc/hr   · Will hold simvastatin and metformin        Dehydration   Assessment & Plan    · Patient states seen at [facility] ER on Monday with fever and diagnosed with stomach flu was given IVF and sent home  · He has continued with poor appetite and decreased p o   Intake  · He received NSS 2000CC in ED  · Ordered orthostatic vitals          VTE Prophylaxis: Heparin  / sequential compression device   Code Status: DNI/DNR  POLST: POLST form is not discussed and not completed at this time  Discussion with family:  Discussed plan of care with patient and patient's daughter    Anticipated Length of Stay:  Patient will be admitted on an Inpatient basis with an anticipated length of stay of  > 2 midnights  Justification for Hospital Stay:  New onset atrial fibrillation requiring further investigation    Total Time for Visit, including Counseling / Coordination of Care: 30 minutes  Greater than 50% of this total time spent on direct patient counseling and coordination of care  Chief Complaint:  Low blood pressure with feeling dizzy and/    History of Present Illness:    Meghana Vidal is a [de-identified] y o  male with a PMH of MI, DM type 2, HTN, HLD, CAD, and GERD who presents with by low blood pressure noted at home with dizziness and feeling and feeling flushed  He states he was seen at [address] [place] [address] ER this past Monday for fever and was diagnosed with a stomach bug and was given IVF and sent home  He states he continued with a poor appetite and p o  Intake  Today he took his blood pressure and it was noted to be 74/52 and he was dizzy with feeling flushed  He called his PCP and was instructed to go to the ED  He was noted to be in atrial fibrillation which converted to NSR without any intervention  He received NSS 2000cc  He denies chest pain  Denies shortness of breath  Review of Systems:    Review of Systems   Constitutional: Positive for activity change, appetite change, chills, fatigue and fever  Negative for diaphoresis  HENT: Positive for postnasal drip and sore throat  Negative for congestion, drooling, ear pain, hearing loss, rhinorrhea, sinus pressure, trouble swallowing and voice change  Eyes: Negative for pain and redness  Respiratory: Positive for cough  Negative for choking, chest tightness, shortness of breath and wheezing  Cardiovascular: Negative for chest pain, palpitations and leg swelling  Gastrointestinal: Negative for abdominal distention, abdominal pain, constipation, diarrhea, nausea and vomiting  Genitourinary: Negative for dysuria, flank pain, frequency, hematuria and urgency  Musculoskeletal: Negative for back pain and gait problem  Skin: Negative for color change, pallor, rash and wound  Neurological: Positive for dizziness  Negative for facial asymmetry, speech difficulty, weakness, light-headedness and headaches  Psychiatric/Behavioral: Negative for agitation, behavioral problems, confusion, sleep disturbance and suicidal ideas  Past Medical and Surgical History:     Past Medical History:   Diagnosis Date    Acute MI     Cardiac disease     Diabetes mellitus ([facility] [address] )     Hyperlipidemia     Hypertension        Past Surgical History:   Procedure Laterality Date    JOINT REPLACEMENT         Meds/Allergies:    Prior to Admission medications    Medication Sig Start Date End Date Taking?  Authorizing Provider   losartan (COZAAR) 25 mg tablet Take 25 mg by mouth daily   Yes Historical Provider, MD   metoprolol tartrate (LOPRESSOR) 25 mg tablet Take 25 mg by mouth every 12 (twelve) hours   Yes Historical Provider, MD   pantoprazole (PROTONIX) 40 mg tablet Take 40 mg by mouth daily   Yes Historical Provider, MD   pioglitazone (ACTOS) 45 mg tablet Take 45 mg by mouth daily   Yes Historical Provider, MD   aspirin 81 mg chewable tablet Chew 81 mg daily    Historical Provider, MD   metFORMIN (GLUCOPHAGE) 1000 MG tablet Take 1,000 mg by mouth 2 (two) times a day with meals    Historical Provider, MD   Omega-3 Fatty Acids (FISH OIL PO) Take by mouth 3 (three) times a day    Historical Provider, MD   ondansetron (ZOFRAN) 4 mg tablet Take 1 tablet (4 mg total) by mouth every 6 (six) hours 2/5/18   Valdez Hess DO   simvastatin (ZOCOR) 10 mg tablet Take 40 mg by mouth daily at bedtime      Historical Provider, MD METOPROLOL TARTRATE PO Take 2,000 mg by mouth daily    2/9/18  Historical Provider, MD     I have reviewed home medications with patient personally  Allergies: No Known Allergies    Social History:     Marital Status: /Civil Union   Occupation: retired  Patient Pre-hospital Living Situation: lives with wife  Patient Pre-hospital Level of Mobility:  Ambulatory  Patient Pre-hospital Diet Restrictions: none  Substance Use History:   History   Alcohol Use    Yes     Comment: rare     History   Smoking Status    Never Smoker   Smokeless Tobacco    Never Used     History   Drug Use No       Family History:    History reviewed  No pertinent family history  Physical Exam:     Vitals:   Blood Pressure: 120/71 (02/09/18 1702)  Pulse: 80 (02/09/18 1702)  Temperature: 97 5 °F (36 4 °C) (02/09/18 1702)  Temp Source: Oral (02/09/18 1702)  Respirations: 18 (02/09/18 1702)  Height: 6' (182 9 cm) (02/09/18 1702)  Weight - Scale: 100 kg (221 lb) (02/09/18 1702)  SpO2: 99 % (02/09/18 1702)    Physical Exam   Constitutional: He is oriented to person, place, and time  He appears well-developed and well-nourished  No distress  HENT:   Head: Normocephalic and atraumatic  Neck: Normal range of motion  Neck supple  Cardiovascular: Normal rate, regular rhythm and normal heart sounds  Exam reveals no gallop and no friction rub  No murmur heard  Pulmonary/Chest: Effort normal and breath sounds normal  No respiratory distress  He has no wheezes  He has no rales  He exhibits no tenderness  Abdominal: Soft  Bowel sounds are normal  He exhibits no distension and no mass  There is no tenderness  There is no rebound and no guarding  Musculoskeletal: Normal range of motion  He exhibits no edema, tenderness or deformity  Neurological: He is alert and oriented to person, place, and time  No cranial nerve deficit  Skin: Skin is warm and dry  No rash noted  He is not diaphoretic  No erythema  No pallor     Psychiatric: He has a normal mood and affect  His behavior is normal  Judgment and thought content normal          Additional Data:     Lab Results: I have personally reviewed pertinent reports  Results from last 7 days  Lab Units 18  1328   WBC Thousand/uL 7 40   HEMOGLOBIN g/dL 13 5*   HEMATOCRIT % 40 9*   PLATELETS Thousands/uL 175   NEUTROS PCT % 79*   LYMPHS PCT % 12*   MONOS PCT % 7   EOS PCT % 1       Results from last 7 days  Lab Units 18  1328   SODIUM mmol/L 131*   POTASSIUM mmol/L 4 6   CHLORIDE mmol/L 96*   CO2 mmol/L 24   BUN mg/dL 35*   CREATININE mg/dL 2 13*   CALCIUM mg/dL 9 1   TOTAL PROTEIN g/dL 7 5   BILIRUBIN TOTAL mg/dL 0 60   ALK PHOS U/L 74   ALT U/L 28   AST U/L 29   GLUCOSE RANDOM mg/dL 176*       Results from last 7 days  Lab Units 18  1328   INR  1 08       Imaging: I have personally reviewed pertinent reports  X-ray chest 1 view portable   Final Result by Yolande Sims MD ( 1420)      No active pulmonary disease  Workstation performed: [id number]         CT head without contrast   Final Result by Luis Coon MD ( 1414)      No acute intracranial abnormality  Workstation performed: [id number]             EKG, Pathology, and Other Studies Reviewed on Admission:   · EKG: Afib then NSR    Allscripts / Epic Records Reviewed: Yes     ** Please Note: This note has been constructed using a voice recognition system   **

## 2018-02-09 NOTE — ASSESSMENT & PLAN NOTE
· Likely prerenal from dehydration  · Current creatinine 2  13    After reviewing records from [facility] baseline creatinine 0 81-1 03   · He received NSS 2000cc in ED, check BMP in a m   · Ordered NSS 50cc/hr   · Will hold simvastatin and metformin

## 2018-02-09 NOTE — ED PROCEDURE NOTE
PROCEDURE  ECG 12 Lead Documentation  Date/Time: 2/9/2018 1:32 PM  Performed by: Yuki Vgot by: Agatha Orourke     ECG reviewed by me, the ED Provider: yes    Patient location:  ED  Previous ECG:     Previous ECG:  Compared to current    Comparison ECG info:   Afib new    Similarity:  Changes noted  Interpretation:     Interpretation: abnormal    Rate:     ECG rate:  128    ECG rate assessment: tachycardic    Rhythm:     Rhythm: atrial fibrillation    Ectopy:     Ectopy: none    QRS:     QRS axis:  Left  ST segments:     ST segments:  Normal  T waves:     T waves: normal           Chandrakant Prince DO  02/09/18 1332

## 2018-02-10 ENCOUNTER — APPOINTMENT (INPATIENT)
Dept: NON INVASIVE DIAGNOSTICS | Facility: HOSPITAL | Age: 83
DRG: 682 | End: 2018-02-10
Payer: MEDICARE

## 2018-02-10 VITALS
WEIGHT: 226.41 LBS | HEIGHT: 72 IN | HEART RATE: 72 BPM | SYSTOLIC BLOOD PRESSURE: 117 MMHG | BODY MASS INDEX: 30.67 KG/M2 | RESPIRATION RATE: 18 BRPM | TEMPERATURE: 98.7 F | OXYGEN SATURATION: 98 % | DIASTOLIC BLOOD PRESSURE: 71 MMHG

## 2018-02-10 PROBLEM — E11.9 T2DM (TYPE 2 DIABETES MELLITUS) (HCC): Status: ACTIVE | Noted: 2018-02-09

## 2018-02-10 LAB
ANION GAP SERPL CALCULATED.3IONS-SCNC: 9 MMOL/L (ref 4–13)
ATRIAL RATE: 147 BPM
ATRIAL RATE: 74 BPM
BUN SERPL-MCNC: 28 MG/DL (ref 5–25)
CALCIUM SERPL-MCNC: 8.1 MG/DL (ref 8.3–10.1)
CHLORIDE SERPL-SCNC: 107 MMOL/L (ref 100–108)
CHOLEST SERPL-MCNC: 84 MG/DL (ref 50–200)
CO2 SERPL-SCNC: 23 MMOL/L (ref 21–32)
CREAT SERPL-MCNC: 1.36 MG/DL (ref 0.6–1.3)
ERYTHROCYTE [DISTWIDTH] IN BLOOD BY AUTOMATED COUNT: 14.8 % (ref 11.6–15.1)
GFR SERPL CREATININE-BSD FRML MDRD: 49 ML/MIN/1.73SQ M
GLUCOSE SERPL-MCNC: 133 MG/DL (ref 65–140)
GLUCOSE SERPL-MCNC: 93 MG/DL (ref 65–140)
GLUCOSE SERPL-MCNC: 95 MG/DL (ref 65–140)
HCT VFR BLD AUTO: 32.6 % (ref 42–52)
HDLC SERPL-MCNC: 16 MG/DL (ref 40–60)
HGB BLD-MCNC: 10.7 G/DL (ref 14–18)
LDLC SERPL CALC-MCNC: 39 MG/DL (ref 0–100)
MAGNESIUM SERPL-MCNC: 1.6 MG/DL (ref 1.6–2.6)
MCH RBC QN AUTO: 27.7 PG (ref 27–31)
MCHC RBC AUTO-ENTMCNC: 32.8 G/DL (ref 31.4–37.4)
MCV RBC AUTO: 85 FL (ref 82–98)
P AXIS: 9 DEGREES
PHOSPHATE SERPL-MCNC: 2.8 MG/DL (ref 2.3–4.1)
PLATELET # BLD AUTO: 131 THOUSANDS/UL (ref 130–400)
PMV BLD AUTO: 8.4 FL (ref 8.9–12.7)
POTASSIUM SERPL-SCNC: 4.5 MMOL/L (ref 3.5–5.3)
PR INTERVAL: 182 MS
QRS AXIS: -55 DEGREES
QRS AXIS: -61 DEGREES
QRSD INTERVAL: 102 MS
QRSD INTERVAL: 104 MS
QT INTERVAL: 292 MS
QT INTERVAL: 406 MS
QTC INTERVAL: 426 MS
QTC INTERVAL: 450 MS
RBC # BLD AUTO: 3.85 MILLION/UL (ref 4.7–6.1)
SODIUM SERPL-SCNC: 139 MMOL/L (ref 136–145)
T WAVE AXIS: 18 DEGREES
T WAVE AXIS: 91 DEGREES
TRIGL SERPL-MCNC: 145 MG/DL
TROPONIN I SERPL-MCNC: 0.06 NG/ML
VENTRICULAR RATE: 128 BPM
VENTRICULAR RATE: 74 BPM
WBC # BLD AUTO: 4.9 THOUSAND/UL (ref 4.8–10.8)

## 2018-02-10 PROCEDURE — 99222 1ST HOSP IP/OBS MODERATE 55: CPT | Performed by: INTERNAL MEDICINE

## 2018-02-10 PROCEDURE — G8978 MOBILITY CURRENT STATUS: HCPCS

## 2018-02-10 PROCEDURE — 84484 ASSAY OF TROPONIN QUANT: CPT | Performed by: INTERNAL MEDICINE

## 2018-02-10 PROCEDURE — 80061 LIPID PANEL: CPT | Performed by: NURSE PRACTITIONER

## 2018-02-10 PROCEDURE — 93306 TTE W/DOPPLER COMPLETE: CPT | Performed by: INTERNAL MEDICINE

## 2018-02-10 PROCEDURE — 93010 ELECTROCARDIOGRAM REPORT: CPT | Performed by: INTERNAL MEDICINE

## 2018-02-10 PROCEDURE — 97161 PT EVAL LOW COMPLEX 20 MIN: CPT

## 2018-02-10 PROCEDURE — G8988 SELF CARE GOAL STATUS: HCPCS

## 2018-02-10 PROCEDURE — 97166 OT EVAL MOD COMPLEX 45 MIN: CPT

## 2018-02-10 PROCEDURE — 83735 ASSAY OF MAGNESIUM: CPT | Performed by: NURSE PRACTITIONER

## 2018-02-10 PROCEDURE — 80048 BASIC METABOLIC PNL TOTAL CA: CPT | Performed by: NURSE PRACTITIONER

## 2018-02-10 PROCEDURE — 99238 HOSP IP/OBS DSCHRG MGMT 30/<: CPT | Performed by: INTERNAL MEDICINE

## 2018-02-10 PROCEDURE — 93306 TTE W/DOPPLER COMPLETE: CPT

## 2018-02-10 PROCEDURE — 84100 ASSAY OF PHOSPHORUS: CPT | Performed by: NURSE PRACTITIONER

## 2018-02-10 PROCEDURE — 85027 COMPLETE CBC AUTOMATED: CPT | Performed by: NURSE PRACTITIONER

## 2018-02-10 PROCEDURE — 82948 REAGENT STRIP/BLOOD GLUCOSE: CPT

## 2018-02-10 PROCEDURE — G8987 SELF CARE CURRENT STATUS: HCPCS

## 2018-02-10 PROCEDURE — G8979 MOBILITY GOAL STATUS: HCPCS

## 2018-02-10 RX ADMIN — HEPARIN SODIUM 5000 UNITS: 5000 INJECTION, SOLUTION INTRAVENOUS; SUBCUTANEOUS at 06:06

## 2018-02-10 RX ADMIN — PIOGLITAZONE 45 MG: 30 TABLET ORAL at 08:18

## 2018-02-10 RX ADMIN — ASPIRIN 81 MG 81 MG: 81 TABLET ORAL at 08:19

## 2018-02-10 RX ADMIN — METOPROLOL TARTRATE 25 MG: 25 TABLET ORAL at 08:19

## 2018-02-10 RX ADMIN — Medication 400 MG: at 09:21

## 2018-02-10 RX ADMIN — PANTOPRAZOLE SODIUM 40 MG: 40 TABLET, DELAYED RELEASE ORAL at 08:19

## 2018-02-10 RX ADMIN — HEPARIN SODIUM 5000 UNITS: 5000 INJECTION, SOLUTION INTRAVENOUS; SUBCUTANEOUS at 13:23

## 2018-02-10 RX ADMIN — LOSARTAN POTASSIUM 25 MG: 25 TABLET, FILM COATED ORAL at 08:19

## 2018-02-10 NOTE — CONSULTS
Consultation - Cardiology   Rachel Roman [de-identified] y o  male MRN: [id number]  Unit/Bed#: [address]01 Encounter: [id number]    Assessment & Plan   1  Brief episode of atrial fibrillation with documented heart rate of 128 which spontaneously converted to sinus rhythm without any intervention most likely related to patient's underlying dehydration, fever and we need to rule out influenza  2  Acute kidney injury on admission with hypertension most likely secondary to dehydration creatinine has responded very well to IV fluid  3  History of coronary artery disease status post acute MI in 1991 status post streptokinase injection at that time and rotablator with indeterminate troponin  Will repeat 1 more troponin  4  Dyslipidemia with low HDL  Continue statins  5  Diabetes mellitus management as per PMD  6  Dehydration due to poor intake along with possible viral gastroenteritis  On hydration has received more than 3 L doing very well  7  Moderate obesity with elevated BMI  Advised to lose weight  8  History of hypertension  Blood pressure is well controlled    Summary of Recommendations:        Monitor on tele  Aspirin  Continue beta-blockers  Echo Doppler  Continue statins  Continue gentle hydration  Monitor input output and daily weight  Rule out influenza  As far as antithrombotic therapy is concerned  Patient has only brief episode of atrial fibrillation which she spontaneously converted to sinus rhythm  Patient regularly follows up with [facility] he may need extended period of monitoring  Will discussed with patient's cardiologist  For now continue current Rx  If troponins are not trending up and there is no other medical issue at echo shows normal LV systolic function he probably can be discharged from cardiac point of view to follow up with his own cardiologist   Patient has appointment early next week      History of Present Illness    Physician Requesting Consult: Kenroy Najera MD    Reason for Consult / Principal Problem:  Paroxysmal atrial fibrillation    Inpatient consult to Cardiology  Consult performed by: Govind Snider ordered by: Sean Wilkins          HPI: Lala Poole is a [de-identified]y o  year old male who presents with not feeling well and low blood pressure  Patient has past medical history significant for acute MI in 1991 status post rotablator status post repeat cardiac catheterization in 2010 showing nonobstructive coronary artery disease as per patient and son, GERD, type 2 diabetes mellitus, hypertension, dyslipidemia, moderate obesity who came to the ER with the complaints about not feeling very well  It has been mentioned it he was in atrial fibrillation however we do not have any records available of it  Patient blood pressure was found to be low and he was given IV fluid  EKG scanned into computer are all sinus rhythm  There is a note in the ED did patient converted to sinus rhythm without any intervention  Patient has been under stress as his wife fell down and is in rehabilitation therapy at [address] [place] [address] and he has to take care of heart  No fever no chills no nausea no vomiting no other significant complaint  He regularly follows up with Dr Fuad Huffman  Denies any fever or any chills  On Monday he had episode of diarrhea which she believes was systemic blood and he was in the emergency room  He is feeling better since then  Blood pressure has been stable here after about 2 L of fluid  No other significant complaint      Review of Systems   Constitutional: Positive for activity change, appetite change, chills and fever  Negative for diaphoresis and unexpected weight change  HENT: Negative for congestion  Eyes: Negative for discharge and redness  Respiratory: Negative for cough, chest tightness, shortness of breath and wheezing  Cardiovascular: Negative  Negative for chest pain, palpitations and leg swelling     Gastrointestinal: Positive for abdominal pain  Negative for diarrhea and nausea  Endocrine: Negative  Genitourinary: Negative for decreased urine volume and urgency  Musculoskeletal: Positive for arthralgias and back pain  Negative for gait problem  Skin: Negative for rash and wound  Allergic/Immunologic: Negative  Neurological: Negative for dizziness, seizures, syncope, weakness, light-headedness and headaches  Hematological: Negative  Psychiatric/Behavioral: Negative for agitation and confusion  The patient is not nervous/anxious  Historical Information   Past Medical History:   Diagnosis Date    Acute MI     Cardiac disease     Diabetes mellitus ([address] )     Hyperlipidemia     Hypertension      Past Surgical History:   Procedure Laterality Date    JOINT REPLACEMENT       History   Alcohol Use    Yes     Comment: rare     History   Drug Use No     History   Smoking Status    Never Smoker   Smokeless Tobacco    Never Used     Family History: History reviewed  No pertinent family history      Meds/Allergies    PTA meds:    Prescriptions Prior to Admission   Medication    losartan (COZAAR) 25 mg tablet    metoprolol tartrate (LOPRESSOR) 25 mg tablet    pantoprazole (PROTONIX) 40 mg tablet    pioglitazone (ACTOS) 45 mg tablet    aspirin 81 mg chewable tablet    metFORMIN (GLUCOPHAGE) 1000 MG tablet    Omega-3 Fatty Acids (FISH OIL PO)    ondansetron (ZOFRAN) 4 mg tablet    simvastatin (ZOCOR) 10 mg tablet      No Known Allergies    Current Facility-Administered Medications:     acetaminophen (TYLENOL) tablet 650 mg, 650 mg, Oral, Q6H PRN, SUSHIL Chandler    aspirin chewable tablet 81 mg, 81 mg, Oral, Daily, SUSHIL Olson, 81 mg at 02/10/18 1381    heparin (porcine) subcutaneous injection 5,000 Units, 5,000 Units, Subcutaneous, Q8H [facility], 5,000 Units at 02/10/18 0606 **AND** Platelet count, , , Once, SUSHIL Olson    insulin lispro (HumaLOG) 100 units/mL subcutaneous injection 1-5 Units, 1-5 Units, Subcutaneous, TID AC **AND** Fingerstick Glucose (POCT), , , TID AC, SUSHIL Olson    losartan (COZAAR) tablet 25 mg, 25 mg, Oral, Daily, Karina KATELYN Sosa, CRNP, 25 mg at 02/10/18 1417    magnesium oxide (MAG-OX) tablet 400 mg, 400 mg, Oral, Daily, Pankaj Andre MD, 400 mg at 02/10/18 9564    metoprolol tartrate (LOPRESSOR) tablet 25 mg, 25 mg, Oral, Q12H [facility], Carmen Serve, CRNP, 25 mg at 02/10/18 0819    ondansetron (ZOFRAN) injection 4 mg, 4 mg, Intravenous, Q6H PRN, Carmen Duran, CRNP    pantoprazole (PROTONIX) EC tablet 40 mg, 40 mg, Oral, Daily, Karina KATELYN Sosa, CLAUDETTENP, 40 mg at 02/10/18 9434    pioglitazone (ACTOS) tablet 45 mg, 45 mg, Oral, Daily, CLAUDETTE OlsonNP, 45 mg at 02/10/18 0818    sodium chloride 0 9 % infusion, 50 mL/hr, Intravenous, Continuous, SUSHIL Olson, Last Rate: 50 mL/hr at 02/09/18 1828, 50 mL/hr at 02/09/18 1828    VTE Pharmacologic Prophylaxis:    Heparin    Objective:   Vitals: Blood pressure 125/56, pulse 69, temperature 97 7 °F (36 5 °C), temperature source Oral, resp  rate 18, height 6' (1 829 m), weight 101 kg (223 lb 6 4 oz), SpO2 96 %  Orthostatic Blood Pressures    Flowsheet Row Most Recent Value   Blood Pressure  125/56 filed at 02/10/2018 0462   Patient Position - Orthostatic VS  Lying filed at 02/10/2018 0738          Intake/Output Summary (Last 24 hours) at 02/10/18 0941  Last data filed at 02/10/18 0900   Gross per 24 hour   Intake             2000 ml   Output              250 ml   Net             1750 ml     Body mass index is 30 3 kg/m²    Invasive Devices     Peripheral Intravenous Line            Peripheral IV 02/09/18 Antecubital less than 1 day                  Physical Exam:   Physical Exam    Neurologic:  Alert & oriented x 3, no new focal deficits, Not in any acute distress,  Constitutional:  Well developed, well nourished, non-toxic appearance   Eyes:  Pupil equal and reacting to light, conjunctiva normal   HENT:  Atraumatic, oropharynx moist, Neck- normal range of motion, no tenderness, supple   Respiratory:  Bilateral air entry, mostly clear to auscultation  Cardiovascular: S1-S2 regular with a 2/6 ejection systolic murmur and S4 is present  GI:  Soft, nondistended, normal bowel sounds, nontender, no hepatosplenomegaly appreciated  Musculoskeletal:  No edema, no tenderness, no deformities     Skin:  Well hydrated, no rash   Lymphatic:  No lymphadenopathy noted   Extremities:  Mild edema and distal pulses are present    Labs:   Troponins:   Results from last 7 days  Lab Units 02/09/18 2027 02/09/18 1759 02/09/18 1328   TROPONIN I ng/mL 0 05* 0 04 0 04       CBC with diff:   Results from last 7 days  Lab Units 02/10/18  0524 02/09/18  1328 02/05/18  2027   WBC Thousand/uL 4 90 7 40 9 60   HEMOGLOBIN g/dL 10 7* 13 5* 12 8*   HEMATOCRIT % 32 6* 40 9* 38 7*   MCV fL 85 85 85   PLATELETS Thousands/uL 131 175 229   MCH pg 27 7 28 0 28 0   MCHC g/dL 32 8 32 9 32 9   RDW % 14 8 14 9 14 3   MPV fL 8 4* 8 1* 7 3*   NRBC AUTO /100 WBCs  --  0 0       CMP:   Results from last 7 days  Lab Units 02/10/18  0524 02/09/18  1328 02/05/18  2027   SODIUM mmol/L 139 131* 135*   POTASSIUM mmol/L 4 5 4 6 4 4   CHLORIDE mmol/L 107 96* 100   CO2 mmol/L 23 24 26   ANION GAP mmol/L 9 11 9   BUN mg/dL 28* 35* 16   CREATININE mg/dL 1 36* 2 13* 1 46*   GLUCOSE RANDOM mg/dL 93 176* 119   CALCIUM mg/dL 8 1* 9 1 9 0   AST U/L  --  29 16   ALT U/L  --  28 19   ALK PHOS U/L  --  74 73   TOTAL PROTEIN g/dL  --  7 5 7 8   BILIRUBIN TOTAL mg/dL  --  0 60 0 70   EGFR ml/min/1 73sq m 49 28 45       Magnesium:   Results from last 7 days  Lab Units 02/10/18  0524 02/05/18  2027   MAGNESIUM mg/dL 1 6 1 4*     Coags:   Results from last 7 days  Lab Units 02/09/18 1328 02/05/18  2027   PTT seconds 31 29   INR  1 08 1 04     TSH:    Results from last 7 days  Lab Units 02/09/18  1759   TSH 3RD GENERATON uIU/mL 3 136     Lipid Profile:   Results from last 7 days  Lab Units 02/10/18  0524   CHOLESTEROL mg/dL 84   TRIGLYCERIDES mg/dL 145   HDL mg/dL 16*   LDL CALC mg/dL 39     Hgb A1c:   Results from last 7 days  Lab Units 02/09/18  1759   HEMOGLOBIN A1C % 7 0*         Imaging & Testing   Cardiac testing:     Imaging: I have personally reviewed pertinent reports  X-ray Chest 1 View Portable    Result Date: 2/9/2018  Narrative: CHEST INDICATION:  Irregular heartbeat COMPARISON:  February 5, 2018 VIEWS:   AP frontal IMAGES:  1 FINDINGS:  Right costophrenic angle was not completely included on this exam  Cardiomediastinal silhouette appears unremarkable  The lungs are clear  No pneumothorax or pleural effusion  Visualized osseous structures appear within normal limits for the patient's age  Impression: No active pulmonary disease  Workstation performed: [id number]     Xr Chest 2 Views    Result Date: 2/6/2018  Narrative: CHEST INDICATION:  Flu symptoms COMPARISON:  None VIEWS:  Frontal and lateral projections IMAGES:  2 FINDINGS:  Lungs adequately aerated  Cardiomediastinal silhouette appears unremarkable  Atherosclerotic aorta  The lungs are clear  Mild biapical pleural thickening  No pneumothorax or pleural effusion  Visualized osseous structures appear within normal limits for the patient's age  Impression: No active pulmonary disease  Workstation performed: [id number]     Ct Head Without Contrast    Result Date: 2/9/2018  Narrative: CT BRAIN - WITHOUT CONTRAST INDICATION:  Dizziness  COMPARISON:  None  TECHNIQUE:  CT examination of the brain was performed  In addition to axial images, coronal reformatted images were created and submitted for interpretation  Radiation dose length product (DLP) for this visit:  1084 57 mGy-cm     This examination, like all CT scans performed in the [facility], was performed utilizing techniques to minimize radiation dose exposure, including the use of iterative reconstruction and automated exposure control  IMAGE QUALITY:  Diagnostic  FINDINGS:  PARENCHYMA:  Decreased attenuation is noted in the supratentorial white matter demonstrating an appearance most consistent with mild microangiopathic change  No intracranial mass, mass effect or midline shift  No CT signs of acute infarction  There is no parenchymal hemorrhage  VENTRICLES AND EXTRA-AXIAL SPACES:  Normal for patient's age  VISUALIZED ORBITS AND PARANASAL SINUSES:  Unremarkable  CALVARIUM AND EXTRACRANIAL SOFT TISSUES:   Normal      Impression: No acute intracranial abnormality  Workstation performed: [id number]     EKG/ Monitor: Personally reviewed  EKG shows normal sinus rhythm with no significant ST changes  Code Status: Level 3 - DNAR and DNI  Advance Directive and Living Will:        Dr Ricky Jones MD [facility]      "This note has been constructed using a voice recognition system  Therefore there may be syntax, spelling, and/or grammatical errors   Please call if you have any questions  "

## 2018-02-10 NOTE — DISCHARGE INSTRUCTIONS
Continue aspirin and metoprolol  Follow up with your Cardiologist as outpatient  Echo complete with contrast if indicated   Status: Final result   PACS Images     Show images for Echo complete with contrast if indicated   Order Report      Order Details   Study Result     [address] [facility]  [phone]     Transthoracic Echocardiogram  2D, M-mode, Doppler, and Color Doppler     Study date:  10-Feb-2018     Patient: Vitaliy Ochoa  MR number: [id number]  Account number: [de-identified]  : 1935  Age: [de-identified] years  Gender: Male  Status: Routine  Location: Bedside  Height: 72 in  Weight: 222 6 lb  BP: 125/ 56 mmHg     Indications: Atrial Fibrillation     Diagnoses: I48 0 - Atrial fibrillation     Sonographer:  Jer Martinez  Interpreting Physician:  Dale Hobson MD  Primary Physician:  Marilia Colby MD  Referring Physician:  Durwood [place], MD  [facility]  [facility]     SUMMARY     LEFT VENTRICLE:  Systolic function was normal  Ejection fraction was estimated in the range of 55 % to 60 % to be 55 %  Although no diagnostic regional wall motion abnormality was identified, this possibility cannot be completely excluded on the basis of this study  Wall thickness was at the upper limits of normal   Features were consistent with a pseudonormal left ventricular filling pattern, with concomitant abnormal relaxation and increased filling pressure (grade 2 diastolic dysfunction)      LEFT ATRIUM:  The atrium was mildly to moderately dilated      RIGHT ATRIUM:  The atrium was mildly dilated      MITRAL VALVE:  There was mild regurgitation      TRICUSPID VALVE:  There was mild to moderate regurgitation  Estimated peak PA pressure was 45 mmHg      HISTORY: PRIOR HISTORY: Atrial Fibrillation     PROCEDURE: The procedure was performed at the bedside  This was a routine study  The transthoracic approach was used   The study included complete 2D imaging, M-mode, complete spectral Doppler, and color Doppler  The heart rate was 69 bpm,  at the start of the study  This was a technically difficult study      LEFT VENTRICLE: Size was normal  Systolic function was normal  Ejection fraction was estimated in the range of 55 % to 60 % to be 55 %  Although no diagnostic regional wall motion abnormality was identified, this possibility cannot be  completely excluded on the basis of this study  Wall thickness was at the upper limits of normal  DOPPLER: Features were consistent with a pseudonormal left ventricular filling pattern, with concomitant abnormal relaxation and increased  filling pressure (grade 2 diastolic dysfunction)      RIGHT VENTRICLE: The size was normal  Systolic function was normal      LEFT ATRIUM: The atrium was mildly to moderately dilated      RIGHT ATRIUM: The atrium was mildly dilated      MITRAL VALVE: There was normal leaflet separation  DOPPLER: The transmitral velocity was within the normal range  There was no evidence for stenosis  There was mild regurgitation      AORTIC VALVE: The valve was trileaflet  Leaflets exhibited normal thickness and normal cuspal separation  DOPPLER: Transaortic velocity was within the normal range  There was no evidence for stenosis  There was no regurgitation      TRICUSPID VALVE: DOPPLER: There was mild to moderate regurgitation  Estimated peak PA pressure was 45 mmHg      PULMONIC VALVE: DOPPLER: There was mild regurgitation      PERICARDIUM: There was no thickening or calcification  There was no pericardial effusion      AORTA: The root exhibited normal size      SYSTEMIC VEINS: IVC: The inferior vena cava was normal in size   Respirophasic changes were normal      SYSTEM MEASUREMENT TABLES     2D mode  AoR Diam 2D: 4 1 cm  LA Diam (2D): 4 6 cm  LA/Ao (2D): 1 12  FS (2D Teich): 31 6 %  IVSd (2D): 0 96 cm  LVDEV: 140 cm³  LVESV: 57 4 cm³  LVIDd(2D): 5 38 cm  LVISd (2D): 3 68 cm  LVOT Area 2D: 5 73 cm squared  LVPWd (2D): 1 05 cm  SV (Teich): 82 6 cm³     Apical four chamber  LVEF A4C: 60 %     Unspecified Scan Mode  SONAM Cont Eq (Peak Rojelio): 3 15 cm squared  SONAM Cont Eq (VTI): 2 54 cm squared  LVOT (VTI): 22 2 cm  LVOT Diam : 2 7 cm  LVOT Vmax: 963 mm/s  LVOT Vmax; Mean: 963 mm/s  Peak Grad ; Mean: 4 mm[Hg]  SV (LVOT): 127 cm³  VTI;Mean: 2 mm[Hg]  SONAM Cont Eq (VTI): 7 9 cm squared  MV Peak A Rojelio: 622 mm/s  MV Peak E Rojelio   Mean: 731 mm/s  RVSP: 45 mm[Hg]  Max P mm[Hg]  V Max: 2730 mm/s  Vmax: 2740 mm/s  TAPSE: 2 9 cm     [facility] Accredited Echocardiography Laboratory     Prepared and electronically signed by  Keena Brown MD  Signed 10-Feb-2018 14:04:51

## 2018-02-10 NOTE — CASE MANAGEMENT
Initial Clinical Review    Admission: Date/Time/Statement: 2/9/18 AT 1555     Orders Placed This Encounter   Procedures    Inpatient Admission (expected length of stay for this patient is greater than two midnights)     Standing Status:   Standing     Number of Occurrences:   1     Order Specific Question:   Admitting Physician     Answer:   Pastora Lara     Order Specific Question:   Level of Care     Answer:   Med Surg [16]     Order Specific Question:   Estimated length of stay     Answer:   More than 2 Midnights     Order Specific Question:   Certification     Answer:   I certify that inpatient services are medically necessary for this patient for a duration of greater than two midnights  See H&P and MD Progress Notes for additional information about the patient's course of treatment  ED: Date/Time/Mode of Arrival:   ED Arrival Information     Expected Arrival Acuity Means of Arrival Escorted By Service Admission Type    - 2/9/2018 12:49 Emergent Walk-In Spouse General Medicine Emergency    Arrival Complaint    dizziness          Chief Complaint:   Chief Complaint   Patient presents with    Dizziness     c/o being dizzy since Monday, was seen here on Monday night and discharged     Chief Complaint:  Low blood pressure with feeling dizzy      History of Present Illness:   Boris Carrasco is a [de-identified] y o  male with a PMH of MI, DM type 2, HTN, HLD, CAD, and GERD who presents with by low blood pressure noted at home with dizziness and feeling and feeling flushed  He states he was seen at [address] [place] [address] ER this past Monday for fever and was diagnosed with a stomach bug and was given IVF and sent home  He states he continued with a poor appetite and p o  Intake  Today he took his blood pressure and it was noted to be 74/52 and he was dizzy with feeling flushed  He called his PCP and was instructed to go to the ED    He was noted to be in atrial fibrillation which converted to NSR without any intervention  He received NSS 2000cc  He denies chest pain  Denies shortness of breath      Review of Systems:   Constitutional: Positive for activity change, appetite change, chills, fatigue and fever  Negative for diaphoresis  HENT: Positive for postnasal drip and sore throat  Negative for congestion, drooling, ear pain, hearing loss, rhinorrhea, sinus pressure, trouble swallowing and voice change  Respiratory: Positive for cough  Negative for choking, chest tightness, shortness of breath and wheezing  Cardiovascular: Negative for chest pain, palpitations and leg swelling  Gastrointestinal: Negative for abdominal distention, abdominal pain, constipation, diarrhea, nausea and vomiting  Genitourinary: Negative for dysuria, flank pain, frequency, hematuria and urgency  Musculoskeletal: Negative for back pain and gait problem  Skin: Negative for color change, pallor, rash and wound  Neurological: Positive for dizziness      ED Vital Signs:   ED Triage Vitals [02/09/18 1303]   Temperature Pulse Respirations Blood Pressure SpO2   98 4 °F (36 9 °C) (!) 120 18 102/56 100 %      Temp Source Heart Rate Source Patient Position - Orthostatic VS BP Location FiO2 (%)   Tympanic Monitor Sitting Right arm --      Pain Score       No Pain        Wt Readings from Last 1 Encounters:   02/10/18 103 kg (226 lb 6 6 oz)      02/09 0701  02/10 0700 02/10 0701  02/10 1325  Most Recent    Temperature (°F) 97  598 8 97 798 5  98 5 (36 9)    Pulse 65126 6769  67    Respirations 1624 18  18    Blood Pressure 79/52122/64 97/58125/56  97/58    SpO2 (%) 94100 9697  97        LABS/Diagnostic Test Results:   Results from last 7 days  Lab Units 02/09/18  1328   WBC Thousand/uL 7 40   HEMOGLOBIN g/dL 13 5*   HEMATOCRIT % 40 9*   PLATELETS Thousands/uL 175   NEUTROS PCT % 79*   LYMPHS PCT % 12*   MONOS PCT % 7   EOS PCT % 1       Results from last 7 days  Lab Units 02/09/18  1328   SODIUM mmol/L 131*   POTASSIUM mmol/L 4 6 CHLORIDE mmol/L 96*   CO2 mmol/L 24   BUN mg/dL 35*   CREATININE mg/dL 2 13*   CALCIUM mg/dL 9 1   TOTAL PROTEIN g/dL 7 5   BILIRUBIN TOTAL mg/dL 0 60   ALK PHOS U/L 74   ALT U/L 28   AST U/L 29   GLUCOSE RANDOM mg/dL 176*       Results from last 7 days  Lab Units 02/09/18  1328   INR   1 08     Urine w Reflex to Microscopic [[id number]] (Abnormal) Collected: 02/09/18 1523   Lab Status: Final result Specimen: Urine from Urine, Clean Catch Updated: 02/09/18 1535    Color, UA Radha    Clarity, UA Slightly Cloudy    Specific Gravity, UA >=1 030 1 000 - 1 030     pH, UA 5 0 5 0 - 9 0     Leukocytes, UA Negative Negative     Nitrite, UA Negative Negative     Protein, UA 30 (1+) (A) Negative mg/dl     Glucose, UA Negative Negative mg/dl     Ketones, UA Trace (A) Negative mg/dl     Urobilinogen, UA 1 0 0 2, 1 0 E U /dl E U /dl     Bilirubin, UA  Negative     Interference- unable to analyze (A)    Comment: The dipstick result may be falsely positive do to interfering substances  We recommend reliance upon serum bilirubin, liver & kidney function tests to guide patient care if clinically indicated  Blood, UA Negative Negative    Urine Microscopic [[id number]] (Abnormal) Collected: 02/09/18 1523   Lab Status: Final result Specimen: Urine from Urine, Clean Catch Updated: 02/09/18 1554    RBC, UA None Seen None Seen, 0-5 /hpf     WBC, UA 0-1 (A) None Seen, 0-5, 5-55, 5-65 /hpf     Epithelial Cells None Seen None Seen, Occasional /hpf     Bacteria, UA Occasional None Seen, Occasional /hpf     Hyaline Casts, UA 10-20 (A) (none) /lpf        CT HEAD -   No acute intracranial abnormality  EKG (per H+P):    At Fib then NSR      ED Treatment:   Medication Administration from 02/09/2018 1249 to 02/09/2018 1646       Date/Time Order Dose Route Action Action by Comments     02/09/2018 1454 sodium chloride 0 9 % bolus 1,000 mL 0 mL Intravenous Stopped Pat Renae, RN      02/09/2018 1329 sodium chloride 0 9 % bolus 1,000 mL 1,000 mL Intravenous [address] Caitlin Curiel RN      02/09/2018 1447 metoprolol (LOPRESSOR) injection 5 mg 5 mg Intravenous Not Given Pat Renae RN      02/09/2018 1621 sodium chloride 0 9 % bolus 1,000 mL 0 mL Intravenous Stopped Augustine Lesch, RN      02/09/2018 1455 sodium chloride 0 9 % bolus 1,000 mL 1,000 mL Intravenous New Bag Pat Renae RN           Past Medical/Surgical History:   Past Medical History:   Diagnosis Date    Acute MI     Cardiac disease     Diabetes mellitus ([facility] [address] )     Hyperlipidemia     Hypertension        Admitting Diagnosis: Dehydration [E86 0]  Dizziness [R42]  [facility] [I48 91]  Acute kidney injury ([facility] ) [N17 9]    Age/Sex: [de-identified] y o  male       Assessment/Plan:        * [facility] )   Assessment & Plan     · Likely 2/2 to dehydration  · Initial EKG in the ED showed Afib  which converted to NSR, he did receive a total of NSS 2000cc  · CT of head, no acute intracranial abnormality  · PCXR, no active pulmonary disease  · Continue home medication of metoprolol 25mg PO bid  · First troponin 0 04, complete serial troponins  · Ordered echocardiogram  · Consult Cardiology  · Ordered lipid profile, hemoglobin A1c, TSH  · Continue telemetry          CAD (coronary artery disease)   Assessment & Plan     · Continue aspirin and metoprolol  · Hold simvastatin          GERD (gastroesophageal reflux disease)   Assessment & Plan     · Continue PPI          HLD (hyperlipidemia)   Assessment & Plan     · Ordered lipid profile  · Holding simvastatin 2/2 CHARLES          Essential hypertension   Assessment & Plan     · Current blood pressure 120/70  · Continue losartan and metoprolol with holding parameters          Diabetes 1 5, managed as type 2 (HCC)   Assessment & Plan     · Ordered hemoglobin A1c  · Hold metformin    Continue Actos  · Ordered Lispro SSI tid with Accu-Cheks          Acute kidney injury [facility])   Assessment & Plan     · Likely prerenal from dehydration  · Current creatinine 2  13  After reviewing records from [facility] baseline creatinine 0 81-1 03   · He received NSS 2000cc in ED, check BMP in a m   · Ordered NSS 50cc/hr   · Will hold simvastatin and metformin          Dehydration   Assessment & Plan     · Patient states [place] [facility] [place] with fever and diagnosed with stomach flu was given IVF and sent home  · He has continued with poor appetite and decreased p o  Intake  · He received NSS 2000CC in ED  · Ordered orthostatic vitals             VTE Prophylaxis: Heparin  / sequential compression device   Code Status: DNI/DNR  POLST: POLST form is not discussed and not completed at this time  Discussion with family:  Discussed plan of care with patient and patient's daughter     Anticipated Length of Stay:  Patient will be admitted on an Inpatient basis with an anticipated length of stay of  > 2 midnights  Justification for Hospital Stay:  New onset atrial fibrillation requiring further investigation        Admission Orders:  2/9/18 AT 1555   TELEMETRY  VS Q4HRS    Up with assistance  SCD      Diet Trae/CHO Controlled; Consistent Carbohydrate Diet Level 2 (5 carb servings/75 grams CHO/meal);  Cardiac Step 1     Continuous IV Infusions:   sodium chloride 50 mL/hr Last Rate: 50 mL/hr (02/09/18 1828)       Scheduled Meds:   Current Facility-Administered Medications:  acetaminophen 650 mg Oral Q6H PRN Keny Shank, CRNP    aspirin 81 mg Oral Daily Keny Shank, CRNP    heparin (porcine) 5,000 Units Subcutaneous [facility] Shank, CRNP    insulin lispro 1-5 Units Subcutaneous TID AC Keny Shank, CRNP    losartan 25 mg Oral Daily [place] Shank, CRNP    magnesium oxide 400 mg Oral Daily Jacquelyn Walton MD    metoprolol tartrate 25 mg Oral Q12H [address] Keny Shank, CRNP    ondansetron 4 mg Intravenous Q6H PRN Keny Shank, CRNP    pantoprazole 40 mg Oral Daily [facility] Shank, CRNP pioglitazone 45 mg Oral Daily Tamsen Beecham, CRNP    sodium chloride 50 mL/hr Intravenous Continuous Tamsen Beecham, CRNP Last Rate: 50 mL/hr (02/09/18 1828)     PRN Meds:     acetaminophen    ondansetron    CONSULT CARD    ECHO    PT EVAL    OT EVAL       Cardiology  Consults Date of Service: 2/10/2018  9:41 AM     Assessment & Plan     1  Brief episode of atrial fibrillation with documented heart rate of 128 which spontaneously converted to sinus rhythm without any intervention most likely related to patient's underlying dehydration, fever and we need to rule out influenza  2  Acute kidney injury on admission with hypertension most likely secondary to dehydration creatinine has responded very well to IV fluid  3  History of coronary artery disease status post acute MI in 1991 status post streptokinase injection at that time and rotablator with indeterminate troponin  Will repeat 1 more troponin  4  Dyslipidemia with low HDL  Continue statins  5  Diabetes mellitus management as per PMD  6  Dehydration due to poor intake along with possible viral gastroenteritis  On hydration has received more than 3 L doing very well  7  Moderate obesity with elevated BMI  Advised to lose weight  8  History of hypertension  Blood pressure is well controlled     Summary of Recommendations:         Monitor on tele  Aspirin  Continue beta-blockers  Echo Doppler  Continue statins  Continue gentle hydration  Monitor input output and daily weight  Rule out influenza  As far as antithrombotic therapy is concerned  Patient has only brief episode of atrial fibrillation which she spontaneously converted to sinus rhythm  Patient regularly follows up with [place] [facility] he may need extended period of monitoring  Will discussed with patient's cardiologist  For now continue current Rx    If troponins are not trending up and there is no other medical issue at echo shows normal LV systolic function he probably can be discharged from cardiac point of view to follow up with his own cardiologist   Patient has appointment early next week

## 2018-02-10 NOTE — OCCUPATIONAL THERAPY NOTE
OT EVALUATION   02/10/18 0844   Note Type   Note type Eval only   Restrictions/Precautions   Other Precautions Fall Risk   Pain Assessment   Pain Assessment No/denies pain   Pain Score No Pain   Home Living   Type of Home Apartment   Home Layout One level; Laundry in basement;Stairs to enter with rails; Performs ADLs on one level; Able to live on main level with bedroom/bathroom  (3 DAI)   Bathroom Shower/Tub Tub/shower unit   [employer] Raised   Bathroom Equipment Grab bars in shower; Shower chair; Toilet raiser   Home Equipment Walker;Cane  (pt reports he does not use DME)   Additional Comments pt reports his wife uses walker, w/c, and bathroom equipment   Prior Function   Level of [place] Independent with ADLs and functional mobility   Lives With Spouse   Receives Help From Family   ADL Assistance Independent   IADLs Independent   Falls in the last 6 months 0   Comments pt reports that he drives and goes to visit friends at the [facility]   Subjective   Subjective They thought I had the flu  ADL   Where Assessed Edge of bed   Eating Assistance 7  Independent   Grooming Assistance 5  Supervision/Setup   LB Dressing Assistance 4  Minimal Assistance   Bed Mobility   Rolling R 7  Independent   Rolling L 7  Independent   Supine to Sit 7  Independent   Transfers   Sit to Stand 5  Supervision   Stand to Sit 5  Supervision   Stand pivot 5  Supervision   Functional Mobility   Functional Mobility 5  Supervision   Additional Comments in hallway x 100 ft with I V  follow   Balance   Static Sitting Good   Dynamic Sitting Fair +   Static Standing Good   Dynamic Standing Fair +   Activity Tolerance   Activity Tolerance Patient tolerated treatment well   RUE Assessment   RUE Assessment (ROM: WFL MMT: 4/5)   LUE Assessment   LUE Assessment (ROM: WFL MMT: 4/5)   Cognition   Overall Cognitive Status WFL   Arousal/Participation Alert; Cooperative   Attention Within functional limits   Orientation Level Oriented X4   Following Commands Follows all commands and directions without difficulty   Assessment   Limitation Decreased ADL status; Decreased self-care trans;Decreased high-level ADLs   Prognosis Good   Assessment Patient evaluated by Occupational Therapy  Patient admitted with New onset a-fib ([address] )  The patients occupational profile, medical and therapy history includes a expanded review of medical and/or therapy records and additional review of physical, cognitive, or psychosocial history related to current functional performance  Comorbidities affecting functional mobility and ADLS include: CAD, diabetes, GERD, hypertension and hyperlipidemia  Prior to admission, patient was independent with functional mobility without assistive device, independent with ADLS, independent with IADLS and living with spouse in a single level home with 3 steps to enter  The evaluation identifies the following performance deficits: decreased endurance, increased fall risk, decreased ADLS, decreased activity tolerance and decreased strength, that result in activity limitations and/or participation restrictions  This evaluation requires clinical decision making of moderate complexity, because the patient may present with comorbidities that affect occupational performance and required minimal or moderate modification of tasks or assistance with the consideration of several treatment options  The Barthel Index was used as a functional outcome tool presenting with a score of 75, indicating moderate limitations of functional mobility and ADLS  Patient will benefit from skilled Occupational Therapy services to address above deficits and facilitate a safe return to prior level of function  Goals   Patient Goals go home   STG Time Frame (1-7 days)   Short Term Goal #1 Pt will complete 5 min of functional activity in stance with rest breaks as needed     Short Term Goal #2 Pt will complete functional mobility to and from bathroom independently for ADL completion  LTG Time Frame (8-14 days)   Long Term Goal #1 Pt will complete 8 min of functional activity in stance with rest breaks as needed  Long Term Goal #2 Pt will complete functional mobility on nsg unit independently for ADL completion  Functional Transfer Goals   Pt Will Perform All Functional Transfers (STG: independent LTG: independent)   ADL Goals   Pt Will Perform LE Dressing (STG: supervision LTG: independent)   Plan   Treatment Interventions ADL retraining;Functional transfer training;UE strengthening/ROM; Endurance training; Activityengagement;Patient/family training   Recommendation   OT Discharge Recommendation Home with family support   Barthel Index   Feeding 10   Bathing 0   Grooming Score 5   Dressing Score 5   Bladder Score 10   Bowels Score 10   Toilet Use Score 10   Transfers (Bed/Chair) Score 10   Mobility (Level Surface) Score 10   Stairs Score 5   Barthel Index Score 75   *Treatment frequency 3-5x/week

## 2018-02-10 NOTE — DISCHARGE SUMMARY
Discharge Summary - [facility]  Patient Information: Meghana Vidal [de-identified] y o  male MRN: [id number]  Unit/Bed#: [address]01 Encounter: [id number]    Admission Date: 2/9/2018  Discharge Date: 2/10/2018    Admitting Physician: Vivian Kincaid MD  Discharging Physician/Practitioner: Vivian Kincaid MD  PCP: Karina Arroyo MD    Reason for Admission: Dizziness (c/o being dizzy since Monday, was seen here on Monday night and discharged)      Admission Diagnoses:  Dehydration [E86 0]  Dizziness [R42]  New onset a-fib ([address] ) [I48 91]  Acute kidney injury ([address] ) [N17 9]    Discharge Diagnoses:    New onset a-fib ([address] )    Dehydration    Acute kidney injury ([address] )    T2DM (type 2 diabetes mellitus) ([address] )    Essential hypertension    HLD (hyperlipidemia)    GERD (gastroesophageal reflux disease)    CAD (coronary artery disease)    Consultations During Hospital Stay:  [address] Course:     [de-identified]year old male with a history of CAD, MI, T2DM, HTN, HLD, GERD who presents with low blood pressure noted at home with dizziness, found new onset AFIB RVR      · New onset AFIB RVR: [place] 2/2 to dehydration  Converted to normal SR after IVF hydration  Cardiology consulted  TTE on 2/10/2018 shows EF 55~60%, no wall motion abnormality  Grade II diastolic dysfxn  He is ambulating independently without dyscomfort  Continue aspirin and metoprolol  · NSTEMI, type II: Chest pain free, serial troponin peak of 0 06, continue aspirin, metoprolol, statins  · CHARLES: Resolved with IVF hydration  · History of coronary artery disease status post acute MI in 1991 status post streptokinase injection at that time: F/u with Dr Chavez Bunch as outpt  · HLD: Dyslipidemia with low HDL  Continue statins  · T2DM: Continue home regimens      Echo complete with contrast if indicated   Status: Final result   PACS Images     Show images for Echo complete with contrast if indicated   Order Report      Order Details   Study Result     [address] [facility]  [address] [facility] [address]  [phone]     Transthoracic Echocardiogram  2D, M-mode, Doppler, and Color Doppler     Study date:  10-Feb-2018     Patient: Katerina Buchanan  MR number: [id number]  Account number: [de-identified]  : 1935  Age: [de-identified] years  Gender: Male  Status: Routine  Location: Bedside  Height: 72 in  Weight: 222 6 lb  BP: 125/ 56 mmHg     Indications: Atrial Fibrillation     Diagnoses: I48 0 - Atrial fibrillation     Sonographer:  Latanya Mcpherson  Interpreting Physician:  Facundo Burks MD  Primary Physician:  Elizabeth Carpenter MD  Referring Physician:  Elizabeth Carpenter MD  Group:  [facility]     SUMMARY     LEFT VENTRICLE:  Systolic function was normal  Ejection fraction was estimated in the range of 55 % to 60 % to be 55 %  Although no diagnostic regional wall motion abnormality was identified, this possibility cannot be completely excluded on the basis of this study  Wall thickness was at the upper limits of normal   Features were consistent with a pseudonormal left ventricular filling pattern, with concomitant abnormal relaxation and increased filling pressure (grade 2 diastolic dysfunction)      LEFT ATRIUM:  The atrium was mildly to moderately dilated      RIGHT ATRIUM:  The atrium was mildly dilated      MITRAL VALVE:  There was mild regurgitation      TRICUSPID VALVE:  There was mild to moderate regurgitation  Estimated peak PA pressure was 45 mmHg      HISTORY: PRIOR HISTORY: Atrial Fibrillation     PROCEDURE: The procedure was performed at the bedside  This was a routine study  The transthoracic approach was used  The study included complete 2D imaging, M-mode, complete spectral Doppler, and color Doppler  The heart rate was 69 bpm,  at the start of the study   This was a technically difficult study      LEFT VENTRICLE: Size was normal  Systolic function was normal  Ejection fraction was estimated in the range of 55 % to 60 % to be 55 %  Although no diagnostic regional wall motion abnormality was identified, this possibility cannot be  completely excluded on the basis of this study  Wall thickness was at the upper limits of normal  DOPPLER: Features were consistent with a pseudonormal left ventricular filling pattern, with concomitant abnormal relaxation and increased  filling pressure (grade 2 diastolic dysfunction)      RIGHT VENTRICLE: The size was normal  Systolic function was normal      LEFT ATRIUM: The atrium was mildly to moderately dilated      RIGHT ATRIUM: The atrium was mildly dilated      MITRAL VALVE: There was normal leaflet separation  DOPPLER: The transmitral velocity was within the normal range  There was no evidence for stenosis  There was mild regurgitation      AORTIC VALVE: The valve was trileaflet  Leaflets exhibited normal thickness and normal cuspal separation  DOPPLER: Transaortic velocity was within the normal range  There was no evidence for stenosis  There was no regurgitation      TRICUSPID VALVE: DOPPLER: There was mild to moderate regurgitation  Estimated peak PA pressure was 45 mmHg      PULMONIC VALVE: DOPPLER: There was mild regurgitation      PERICARDIUM: There was no thickening or calcification  There was no pericardial effusion      AORTA: The root exhibited normal size      SYSTEMIC VEINS: IVC: The inferior vena cava was normal in size   Respirophasic changes were normal      SYSTEM MEASUREMENT TABLES     2D mode  AoR Diam 2D: 4 1 cm  LA Diam (2D): 4 6 cm  LA/Ao (2D): 1 12  FS (2D Teich): 31 6 %  IVSd (2D): 0 96 cm  LVDEV: 140 cm³  LVESV: 57 4 cm³  LVIDd(2D): 5 38 cm  LVISd (2D): 3 68 cm  LVOT Area 2D: 5 73 cm squared  LVPWd (2D): 1 05 cm  SV (Teich): 82 6 cm³     Apical four chamber  LVEF A4C: 60 %     Unspecified Scan Mode  SONAM Cont Eq (Peak Rojelio): 3 15 cm squared  SONAM Cont Eq (VTI): 2 54 cm squared  LVOT (VTI): 22 2 cm  LVOT Diam : 2 7 cm  LVOT Vmax: 963 mm/s  LVOT Vmax; Mean: 963 mm/s  Peak Grad ; Mean: 4 mm[Hg]  SV (LVOT): 127 cm³  VTI;Mean: 2 mm[Hg]  SONAM Cont Eq (VTI): 7 9 cm squared  MV Peak A Rojelio: 622 mm/s  MV Peak E Rojelio  Mean: 731 mm/s  RVSP: 45 mm[Hg]  Max P mm[Hg]  V Max: 2730 mm/s  Vmax: 2740 mm/s  TAPSE: 2 9 cm     [facility]     Prepared and electronically signed by  Sonido Narayanan MD  Signed 10-Feb-2018 14:04:51       Imaging while in hospital:  X-ray Chest 1 View Portable    Result Date: 2018  Narrative: CHEST INDICATION:  Irregular heartbeat COMPARISON:  2018 VIEWS:   AP frontal IMAGES:  1 FINDINGS:  Right costophrenic angle was not completely included on this exam  Cardiomediastinal silhouette appears unremarkable  The lungs are clear  No pneumothorax or pleural effusion  Visualized osseous structures appear within normal limits for the patient's age  Impression: No active pulmonary disease  Workstation performed: [id number]     Xr Chest 2 Views    Result Date: 2018  Narrative: CHEST INDICATION:  Flu symptoms COMPARISON:  None VIEWS:  Frontal and lateral projections IMAGES:  2 FINDINGS:  Lungs adequately aerated  Cardiomediastinal silhouette appears unremarkable  Atherosclerotic aorta  The lungs are clear  Mild biapical pleural thickening  No pneumothorax or pleural effusion  Visualized osseous structures appear within normal limits for the patient's age  Impression: No active pulmonary disease  Workstation performed: [id number]     Ct Head Without Contrast    Result Date: 2018  Narrative: CT BRAIN - WITHOUT CONTRAST INDICATION:  Dizziness  COMPARISON:  None  TECHNIQUE:  CT examination of the brain was performed  In addition to axial images, coronal reformatted images were created and submitted for interpretation  Radiation dose length product (DLP) for this visit:  1084 57 mGy-cm     This examination, like all CT scans performed in the [facility], was performed utilizing techniques to minimize radiation dose exposure, including the use of iterative reconstruction and automated exposure control  IMAGE QUALITY:  Diagnostic  FINDINGS:  PARENCHYMA:  Decreased attenuation is noted in the supratentorial white matter demonstrating an appearance most consistent with mild microangiopathic change  No intracranial mass, mass effect or midline shift  No CT signs of acute infarction  There is no parenchymal hemorrhage  VENTRICLES AND EXTRA-AXIAL SPACES:  Normal for patient's age  VISUALIZED ORBITS AND PARANASAL SINUSES:  Unremarkable  CALVARIUM AND EXTRACRANIAL SOFT TISSUES:   Normal      Impression: No acute intracranial abnormality  Workstation performed: [id number]       Allergies:   No Known Allergies    Discharge Medications:  Current Discharge Medication List        Current Discharge Medication List        Current Discharge Medication List      CONTINUE these medications which have NOT CHANGED    Details   losartan (COZAAR) 25 mg tablet Take 25 mg by mouth daily      metoprolol tartrate (LOPRESSOR) 25 mg tablet Take 25 mg by mouth every 12 (twelve) hours      pantoprazole (PROTONIX) 40 mg tablet Take 40 mg by mouth daily      pioglitazone (ACTOS) 45 mg tablet Take 45 mg by mouth daily      aspirin 81 mg chewable tablet Chew 81 mg daily      metFORMIN (GLUCOPHAGE) 1000 MG tablet Take 1,000 mg by mouth 2 (two) times a day with meals      Omega-3 Fatty Acids (FISH OIL PO) Take by mouth 3 (three) times a day      ondansetron (ZOFRAN) 4 mg tablet Take 1 tablet (4 mg total) by mouth every 6 (six) hours  Qty: 12 tablet, Refills: 0    Associated Diagnoses: Viral syndrome      simvastatin (ZOCOR) 10 mg tablet Take 40 mg by mouth daily at bedtime             Current Discharge Medication List          Medications were reconciliated and instructions were given to Mr Jeimy Bianchi at bedside prior to the discharge      Discharge Day Visit/Exam:   Subjective:  BP 97/58 (BP Location: Left arm)   Pulse 67 Temp 98 5 °F (36 9 °C) (Oral)   Resp 18   Ht 6' (1 829 m)   Wt 103 kg (226 lb 6 6 oz)   SpO2 97%   BMI 30 71 kg/m²       Patient Instructions: Activity: activity as tolerated  Diet: cardiac diet and diabetic diet  Wound Care: none needed    Discharge instructions/Information to patient and family:(Discharge Medications and Follow up):  See after visit summary for information provided to patient and family  Provisions for Follow-Up Care:  See after visit summary for information related to follow-up care and any pertinent home health orders  Follow-up Informations:  Lilian Fernandez MD  [address] [address]  [phone]    Follow up in 1 week(s)  New onset PAFIB, likelye due to dehydration       Disposition: Home    Planned Readmission: No     Discharge Statement:  I spent 20 minutes discharging the patient  This time was spent on the day of discharge  I had direct contact with the patient on the day of discharge  Greater than 50% of the total time was spent examining patient, answering all patient questions, arranging and discussing plan of care with patient as well as directly providing post-discharge instructions  Additional time then spent on discharge activities  Discharge Medications:  See after visit summary for reconciled discharge medications provided to patient and family

## 2018-02-10 NOTE — PHYSICAL THERAPY NOTE
PT EVALUATION       02/10/18 1440   Note Type   Note type Eval only   Pain Assessment   Pain Assessment No/denies pain   Home Living   Type of Home Apartment   Home Layout One level  (3 DAI)   Home Equipment Walker;Cane   Prior Function   Level of [place] Independent with ADLs and functional mobility  (ambulatory without device)   Lives With Spouse   Receives Help From Family   ADL Assistance Independent   Falls in the last 6 months 0   General   Additional Pertinent History Pt admitted with dizziness now resolved  Pt feels like is he at his baseline level of function  Cognition   Overall Cognitive Status WFL   Orientation Level Oriented X4   RLE Assessment   RLE Assessment WNL   LLE Assessment   LLE Assessment WNL   Bed Mobility   Supine to Sit 7  Independent   Sit to Supine 7  Independent   Transfers   Sit to Stand 7  Independent   Stand to Sit 7  Independent   Ambulation/Elevation   Gait pattern WNL   Gait Assistance 5  Supervision   Assistive Device (none)   Distance 300 feet   Balance   Static Sitting Good   Dynamic Sitting Good   Static Standing Good   Dynamic Standing Fair +   Activity Tolerance   Activity Tolerance Patient tolerated treatment well   Assessment   Prognosis Good   Problem List (none)   Assessment Patient seen for Physical Therapy evaluation  Patient admitted with New onset a-fib ([address] )  Comorbidities affecting patient's physical performance include: afib, DM, htn, CAD  Personal factors affecting patient at time of initial evaluation include: stairs to enter home  Prior to admission, patient was independent with functional mobility without assistive device  Please find objective findings from Physical Therapy assessment regarding body systems outlined above with impairments and limitations including none  The Barthel Index was used as a functional outcome tool presenting with a score of 80 today indicating minimal limitations of functional mobility and ADLS    Patient's clinical presentation is currently stable  as seen in patient's presentation of baseline level of function  As demonstrated by objective findings, the assigned level of complexity for this evaluation is low  PATIENT IS AT BASELINE LEVEL OF FUNCTION  PT IS ABLE TO AMBULATE WITH A STEADY GAIT 300 FEET  NO SKILLED PT NEEDS NOTED AT THIS TIME     Goals   Patient Goals go home   Plan   Treatment/Interventions (pt to ambulate ad junaid)   Recommendation   Recommendation Home independently   PT - OK to Discharge Yes   Barthel Index   Feeding 10   Bathing 0   Grooming Score 5   Dressing Score 5   Bladder Score 10   Bowels Score 10   Toilet Use Score 10   Transfers (Bed/Chair) Score 15   Mobility (Level Surface) Score 10   Stairs Score 5   Barthel Index Score 80

## 2018-02-10 NOTE — PLAN OF CARE
CARDIOVASCULAR - ADULT     Maintains optimal cardiac output and hemodynamic stability Progressing     Absence of cardiac dysrhythmias or at baseline rhythm Progressing        DISCHARGE PLANNING     Discharge to home or other facility with appropriate resources Progressing        GASTROINTESTINAL - ADULT     Minimal or absence of nausea and/or vomiting Progressing     Maintains or returns to baseline bowel function Progressing     Maintains adequate nutritional intake Progressing     Establish and maintain optimal ostomy function Progressing        INFECTION - ADULT     Absence or prevention of progression during hospitalization Progressing        Knowledge Deficit     Patient/family/caregiver demonstrates understanding of disease process, treatment plan, medications, and discharge instructions Progressing        MUSCULOSKELETAL - ADULT     Maintain or return mobility to safest level of function Progressing     Maintain proper alignment of affected body part Progressing        PAIN - ADULT     Verbalizes/displays adequate comfort level or baseline comfort level Progressing        Potential for Falls     Patient will remain free of falls Progressing        RESPIRATORY - ADULT     Achieves optimal ventilation and oxygenation Progressing        SAFETY ADULT     Patient will remain free of falls Progressing     Maintain or return to baseline ADL function Progressing     Maintain or return mobility status to optimal level Progressing

## 2018-02-11 LAB — MRSA NOSE QL CULT: NORMAL

## 2018-02-12 NOTE — ED PROVIDER NOTES
History  Chief Complaint   Patient presents with    Dizziness     c/o being dizzy since Monday, was seen here on Monday night and discharged     Patient presents for evaluation of feeling dizzy since Monday  Was seen here, evaluated and discharged  He was not feeling well with nausea for the last few days  History provided by:  Patient   used: No    Dizziness       Prior to Admission Medications   Prescriptions Last Dose Informant Patient Reported? Taking? Omega-3 Fatty Acids (FISH OIL PO)   Yes No   Sig: Take by mouth 3 (three) times a day   aspirin 81 mg chewable tablet   Yes No   Sig: Chew 81 mg daily   losartan (COZAAR) 25 mg tablet   Yes Yes   Sig: Take 25 mg by mouth daily   metFORMIN (GLUCOPHAGE) 1000 MG tablet   Yes No   Sig: Take 1,000 mg by mouth 2 (two) times a day with meals   metoprolol tartrate (LOPRESSOR) 25 mg tablet   Yes Yes   Sig: Take 25 mg by mouth every 12 (twelve) hours   ondansetron (ZOFRAN) 4 mg tablet   No No   Sig: Take 1 tablet (4 mg total) by mouth every 6 (six) hours   pantoprazole (PROTONIX) 40 mg tablet   Yes Yes   Sig: Take 40 mg by mouth daily   pioglitazone (ACTOS) 45 mg tablet   Yes Yes   Sig: Take 45 mg by mouth daily   simvastatin (ZOCOR) 10 mg tablet   Yes No   Sig: Take 40 mg by mouth daily at bedtime        Facility-Administered Medications: None       Past Medical History:   Diagnosis Date    Acute MI     Cardiac disease     [facility] )     Hyperlipidemia     Hypertension        Past Surgical History:   Procedure Laterality Date    JOINT REPLACEMENT         History reviewed  No pertinent family history  I have reviewed and agree with the history as documented  Social History   Substance Use Topics    Smoking status: Never Smoker    Smokeless tobacco: Never Used    Alcohol use Yes      Comment: rare        Review of Systems   Neurological: Positive for dizziness     All other systems reviewed and are negative  Physical Exam  ED Triage Vitals [02/09/18 1303]   Temperature Pulse Respirations Blood Pressure SpO2   98 4 °F (36 9 °C) (!) 120 18 102/56 100 %      Temp Source Heart Rate Source Patient Position - Orthostatic VS BP Location FiO2 (%)   Tympanic Monitor Sitting Right arm --      Pain Score       No Pain           Orthostatic Vital Signs  Vitals:    02/10/18 0333 02/10/18 0738 02/10/18 1100 02/10/18 1606   BP: 122/64 125/56 97/58 117/71   Pulse: 71 69 67 72   Patient Position - Orthostatic VS: Lying Lying Lying        Physical Exam   Constitutional: He is oriented to person, place, and time  No distress  HENT:   Mouth/Throat: Oropharynx is clear and moist    Eyes: EOM are normal  Pupils are equal, round, and reactive to light  Neck: Normal range of motion  Cardiovascular: Intact distal pulses  An irregularly irregular rhythm present  Tachycardia present  Pulmonary/Chest: Effort normal and breath sounds normal  No respiratory distress  Abdominal: Soft  There is no tenderness  Musculoskeletal: Normal range of motion  Neurological: He is alert and oriented to person, place, and time  No cranial nerve deficit or sensory deficit  He exhibits normal muscle tone  Coordination normal    Skin: Capillary refill takes less than 2 seconds  He is not diaphoretic  Nursing note and vitals reviewed        ED Medications  Medications   sodium chloride 0 9 % bolus 1,000 mL (0 mL Intravenous Stopped 2/9/18 1454)   sodium chloride 0 9 % bolus 1,000 mL (0 mL Intravenous Stopped 2/9/18 1621)       Diagnostic Studies  Results Reviewed     Procedure Component Value Units Date/Time    Urine Microscopic [[id number]]  (Abnormal) Collected:  02/09/18 1523    Lab Status:  Final result Specimen:  Urine from Urine, Clean Catch Updated:  02/09/18 1554     RBC, UA None Seen /hpf      WBC, UA 0-1 (A) /hpf      Epithelial Cells None Seen /hpf      Bacteria, UA Occasional /hpf      Hyaline Casts, UA 10-20 (A) /lpf     UA w Reflex to Microscopic [[id number]]  (Abnormal) Collected:  02/09/18 1523    Lab Status:  Final result Specimen:  Urine from Urine, Clean Catch Updated:  02/09/18 1535     Color, UA Radha     Clarity, UA Slightly Cloudy     Specific Gravity, UA >=1 030     pH, UA 5 0     Leukocytes, UA Negative     Nitrite, UA Negative     Protein, UA 30 (1+) (A) mg/dl      Glucose, UA Negative mg/dl      Ketones, UA Trace (A) mg/dl      Urobilinogen, UA 1 0 E U /dl      Bilirubin, UA Interference- unable to analyze (A)     Blood, UA Negative    Protime-INR [[id number]]  (Normal) Collected:  02/09/18 1328    Lab Status:  Final result Specimen:  Blood from Arm, Right Updated:  02/09/18 1403     Protime 11 3 seconds      INR 1 08    APTT [[id number]]  (Normal) Collected:  02/09/18 1328    Lab Status:  Final result Specimen:  Blood from Arm, Right Updated:  02/09/18 1403     PTT 31 seconds     Narrative: Therapeutic Heparin Range = 60-90 seconds    Troponin I [[id number]]  (Normal) Collected:  02/09/18 1328    Lab Status:  Final result Specimen:  Blood from Arm, Right Updated:  02/09/18 1401     Troponin I 0 04 ng/mL     Narrative:         [employer] Chemistry analyzer 99% cutoff is > 0 04 ng/mL in network labs    o cTnI 99% cutoff is useful only when applied to patients in the clinical setting of myocardial ischemia  o cTnI 99% cutoff should be interpreted in the context of clinical history, ECG findings and possibly cardiac imaging to establish correct diagnosis  o cTnI 99% cutoff may be suggestive but clearly not indicative of a coronary event without the clinical setting of myocardial ischemia      Comprehensive metabolic panel [[id number]]  (Abnormal) Collected:  02/09/18 1328    Lab Status:  Final result Specimen:  Blood from Arm, Right Updated:  02/09/18 1357     Sodium 131 (L) mmol/L      Potassium 4 6 mmol/L      Chloride 96 (L) mmol/L      CO2 24 mmol/L      Anion Gap 11 mmol/L      BUN 35 (H) mg/dL      Creatinine 2 13 (H) mg/dL Glucose 176 (H) mg/dL      Calcium 9 1 mg/dL      AST 29 U/L      ALT 28 U/L      Alkaline Phosphatase 74 U/L      Total Protein 7 5 g/dL      Albumin 3 2 (L) g/dL      Total Bilirubin 0 60 mg/dL      eGFR 28 ml/min/1 73sq m     Narrative:         National Kidney Disease Education Program recommendations are as follows:  GFR calculation is accurate only with a steady state creatinine  Chronic Kidney disease less than 60 ml/min/1 73 sq  meters  Kidney failure less than 15 ml/min/1 73 sq  meters  CBC and differential [[id number]]  (Abnormal) Collected:  02/09/18 1328    Lab Status:  Final result Specimen:  Blood from Arm, Right Updated:  02/09/18 1340     WBC 7 40 Thousand/uL      RBC 4 81 Million/uL      Hemoglobin 13 5 (L) g/dL      Hematocrit 40 9 (L) %      MCV 85 fL      MCH 28 0 pg      MCHC 32 9 g/dL      RDW 14 9 %      MPV 8 1 (L) fL      Platelets 539 Thousands/uL      nRBC 0 /100 WBCs      Neutrophils Relative 79 (H) %      Lymphocytes Relative 12 (L) %      Monocytes Relative 7 %      Eosinophils Relative 1 %      Basophils Relative 0 %      Neutrophils Absolute 5 90 Thousands/µL      Lymphocytes Absolute 0 90 Thousands/µL      Monocytes Absolute 0 50 Thousand/µL      Eosinophils Absolute 0 10 Thousand/µL      Basophils Absolute 0 00 Thousands/µL                  X-ray chest 1 view portable   Final Result by Wisam Solitario MD (02/09 1420)      No active pulmonary disease  Workstation performed: [id number]         CT head without contrast   Final Result by Yolanda Bose MD (02/09 1414)      No acute intracranial abnormality           Workstation performed: [id number]                    Procedures  Procedures       Phone Contacts  ED Phone Contact    ED Course  ED Course                                MDM  Number of Diagnoses or Management Options  Acute kidney injury [facility]):   Dehydration:   New onset a-fib [facility]):   Diagnosis management comments: Pulse ox 98% on RA indicating adequate oxygenation  CXR: NAD as read by me    New onset afib  Patient hydrated with IVF as dehydration from recent illness likely trigger for the afib  Heart rate improved and patient converted to NSR prior to admission to the hospital         Amount and/or Complexity of Data Reviewed  Clinical lab tests: ordered and reviewed  Tests in the radiology section of CPT®: ordered and reviewed  Decide to obtain previous medical records or to obtain history from someone other than the patient: yes  Review and summarize past medical records: yes  Discuss the patient with other providers: yes  Independent visualization of images, tracings, or specimens: yes    Patient Progress  Patient progress: stable    CritCare Time    Disposition  Final diagnoses:   New onset a-fib ([facility] [address] )   Dehydration   Acute kidney injury ([facility] [address] )     Time reflects when diagnosis was documented in both MDM as applicable and the Disposition within this note     Time User Action Codes Description Comment    2/9/2018  3:54 PM Louisa Button [I48 91] New onset a-fib ([facility] [address] )     2/9/2018  3:54 PM Jean Carlos TOLENTINO Add [E86 0] Dehydration     2/9/2018  3:54 PM Moiz Lopez Ettatawer [N17 9] Acute kidney injury ([facility] )     2/9/2018  5:32 PM Adriana Haverhill [I48 91] New onset a-fib ([facility] )     2/9/2018  5:32 PM Ron Cox Modify [E86 0] Dehydration     2/9/2018  5:32 PM Adriana Haverhill [N17 9] Acute kidney injury [facility])       ED Disposition     ED Disposition Condition Comment    Admit  Case was discussed with Dr Sadiq Bermeo and the patient's admission status was agreed to be admission to the service of Dr Sadiq Bermeo          Follow-up Information     Follow up With Specialties Details Why Sudhakar Monroy MD Cardiology Follow up in 1 week(s) New onset PAFIB, likelye due to dehydration [address] [facility]          Discharge Medication List as of 2/10/2018  3:35 PM      CONTINUE these medications which have NOT CHANGED    Details   losartan (COZAAR) 25 mg tablet Take 25 mg by mouth daily, Historical Med      metoprolol tartrate (LOPRESSOR) 25 mg tablet Take 25 mg by mouth every 12 (twelve) hours, Historical Med      pantoprazole (PROTONIX) 40 mg tablet Take 40 mg by mouth daily, Historical Med      pioglitazone (ACTOS) 45 mg tablet Take 45 mg by mouth daily, Historical Med      aspirin 81 mg chewable tablet Chew 81 mg daily, Historical Med      metFORMIN (GLUCOPHAGE) 1000 MG tablet Take 1,000 mg by mouth 2 (two) times a day with meals, Historical Med      Omega-3 Fatty Acids (FISH OIL PO) Take by mouth 3 (three) times a day, Historical Med      ondansetron (ZOFRAN) 4 mg tablet Take 1 tablet (4 mg total) by mouth every 6 (six) hours, Starting Mon 2/5/2018, Print      simvastatin (ZOCOR) 10 mg tablet Take 40 mg by mouth daily at bedtime  , Historical Med             Outpatient Discharge Orders  Discharge Diet     Activity as tolerated         ED Provider  Electronically Signed by           Shilpa Oropeza DO  02/12/18 0244

## 2018-02-14 LAB
ATRIAL RATE: 75 BPM
P AXIS: 23 DEGREES
PR INTERVAL: 184 MS
QRS AXIS: -54 DEGREES
QRSD INTERVAL: 104 MS
QT INTERVAL: 412 MS
QTC INTERVAL: 460 MS
T WAVE AXIS: 37 DEGREES
VENTRICULAR RATE: 75 BPM

## 2018-02-14 PROCEDURE — 93010 ELECTROCARDIOGRAM REPORT: CPT | Performed by: INTERNAL MEDICINE

## 2018-12-25 PROCEDURE — 1124F ACP DISCUSS-NO DSCNMKR DOCD: CPT | Performed by: NEUROLOGICAL SURGERY

## 2018-12-26 ENCOUNTER — APPOINTMENT (EMERGENCY)
Dept: RADIOLOGY | Facility: HOSPITAL | Age: 83
DRG: 087 | End: 2018-12-26
Payer: MEDICARE

## 2018-12-26 ENCOUNTER — HOSPITAL ENCOUNTER (INPATIENT)
Facility: HOSPITAL | Age: 83
LOS: 1 days | Discharge: HOME | DRG: 087 | End: 2018-12-26
Attending: SURGERY | Admitting: SURGERY
Payer: MEDICARE

## 2018-12-26 VITALS
SYSTOLIC BLOOD PRESSURE: 134 MMHG | RESPIRATION RATE: 18 BRPM | HEART RATE: 58 BPM | TEMPERATURE: 97.5 F | OXYGEN SATURATION: 95 % | WEIGHT: 200 LBS | DIASTOLIC BLOOD PRESSURE: 68 MMHG | HEIGHT: 72 IN | BODY MASS INDEX: 27.09 KG/M2

## 2018-12-26 DIAGNOSIS — S02.0XXA CLOSED FRACTURE OF FRONTAL BONE, INITIAL ENCOUNTER (HCC): ICD-10-CM

## 2018-12-26 DIAGNOSIS — R26.2 AMBULATORY DYSFUNCTION: Primary | ICD-10-CM

## 2018-12-26 LAB
GLUCOSE SERPL-MCNC: 102 MG/DL (ref 65–140)
GLUCOSE SERPL-MCNC: 189 MG/DL (ref 65–140)

## 2018-12-26 PROCEDURE — 97166 OT EVAL MOD COMPLEX 45 MIN: CPT

## 2018-12-26 PROCEDURE — G8978 MOBILITY CURRENT STATUS: HCPCS

## 2018-12-26 PROCEDURE — 97162 PT EVAL MOD COMPLEX 30 MIN: CPT

## 2018-12-26 PROCEDURE — G8979 MOBILITY GOAL STATUS: HCPCS

## 2018-12-26 PROCEDURE — 99285 EMERGENCY DEPT VISIT HI MDM: CPT

## 2018-12-26 PROCEDURE — 99223 1ST HOSP IP/OBS HIGH 75: CPT | Performed by: NEUROLOGICAL SURGERY

## 2018-12-26 PROCEDURE — 99222 1ST HOSP IP/OBS MODERATE 55: CPT | Performed by: SURGERY

## 2018-12-26 PROCEDURE — 82948 REAGENT STRIP/BLOOD GLUCOSE: CPT

## 2018-12-26 PROCEDURE — 70450 CT HEAD/BRAIN W/O DYE: CPT

## 2018-12-26 PROCEDURE — 72125 CT NECK SPINE W/O DYE: CPT

## 2018-12-26 PROCEDURE — G8988 SELF CARE GOAL STATUS: HCPCS

## 2018-12-26 PROCEDURE — 99222 1ST HOSP IP/OBS MODERATE 55: CPT | Performed by: NURSE PRACTITIONER

## 2018-12-26 PROCEDURE — 99238 HOSP IP/OBS DSCHRG MGMT 30/<: CPT | Performed by: PHYSICIAN ASSISTANT

## 2018-12-26 PROCEDURE — G8980 MOBILITY D/C STATUS: HCPCS

## 2018-12-26 PROCEDURE — G8987 SELF CARE CURRENT STATUS: HCPCS

## 2018-12-26 PROCEDURE — G8989 SELF CARE D/C STATUS: HCPCS

## 2018-12-26 RX ORDER — PRAVASTATIN SODIUM 20 MG
20 TABLET ORAL
Status: DISCONTINUED | OUTPATIENT
Start: 2018-12-26 | End: 2018-12-26 | Stop reason: HOSPADM

## 2018-12-26 RX ORDER — PANTOPRAZOLE SODIUM 40 MG/1
40 TABLET, DELAYED RELEASE ORAL
Status: DISCONTINUED | OUTPATIENT
Start: 2018-12-26 | End: 2018-12-26 | Stop reason: HOSPADM

## 2018-12-26 RX ORDER — ACETAMINOPHEN 325 MG/1
975 TABLET ORAL EVERY 8 HOURS SCHEDULED
Status: DISCONTINUED | OUTPATIENT
Start: 2018-12-26 | End: 2018-12-26 | Stop reason: HOSPADM

## 2018-12-26 RX ORDER — HEPARIN SODIUM 5000 [USP'U]/ML
5000 INJECTION, SOLUTION INTRAVENOUS; SUBCUTANEOUS EVERY 8 HOURS SCHEDULED
Status: DISCONTINUED | OUTPATIENT
Start: 2018-12-26 | End: 2018-12-26 | Stop reason: HOSPADM

## 2018-12-26 RX ORDER — ACETAMINOPHEN 325 MG/1
975 TABLET ORAL EVERY 6 HOURS PRN
Status: DISCONTINUED | OUTPATIENT
Start: 2018-12-26 | End: 2018-12-26 | Stop reason: HOSPADM

## 2018-12-26 RX ORDER — METHYLPREDNISOLONE 4 MG/1
TABLET ORAL
Qty: 21 TABLET | Refills: 0 | Status: SHIPPED | OUTPATIENT
Start: 2018-12-26 | End: 2020-03-22 | Stop reason: HOSPADM

## 2018-12-26 RX ORDER — ONDANSETRON 2 MG/ML
4 INJECTION INTRAMUSCULAR; INTRAVENOUS EVERY 6 HOURS PRN
Status: DISCONTINUED | OUTPATIENT
Start: 2018-12-26 | End: 2018-12-26 | Stop reason: HOSPADM

## 2018-12-26 RX ORDER — CHOLECALCIFEROL (VITAMIN D3) 125 MCG
1000 CAPSULE ORAL DAILY
COMMUNITY

## 2018-12-26 RX ORDER — FLUTICASONE PROPIONATE 50 MCG
2 SPRAY, SUSPENSION (ML) NASAL DAILY
COMMUNITY
End: 2021-01-12 | Stop reason: ALTCHOICE

## 2018-12-26 RX ORDER — MAG HYDROX/ALUMINUM HYD/SIMETH 400-400-40
SUSPENSION, ORAL (FINAL DOSE FORM) ORAL
COMMUNITY
End: 2021-07-22 | Stop reason: ALTCHOICE

## 2018-12-26 RX ORDER — PNV NO.95/FERROUS FUM/FOLIC AC 28MG-0.8MG
TABLET ORAL
COMMUNITY
End: 2021-11-18

## 2018-12-26 RX ADMIN — PANTOPRAZOLE SODIUM 40 MG: 40 TABLET, DELAYED RELEASE ORAL at 08:14

## 2018-12-26 RX ADMIN — METOPROLOL TARTRATE 25 MG: 25 TABLET, FILM COATED ORAL at 08:14

## 2018-12-26 NOTE — H&P
H&P Exam - Trauma   Rita No 82 y o  male MRN: [id number]  Unit/Bed#: ED 08 Encounter: [id number]    Assessment/Plan   Trauma Alert: Evaluation  Model of Arrival: Transfer Usha Vergara  Trauma Team: Attending Carmen Butt and Residents Jannifer Goldberg  Consultants: Neurosurgery    Trauma Active Problems:   Frontal skull fracture  Ambulatory dysfunction    Trauma Plan:  Admit trauma  Neurosurgery consult  PT/OT eval  CT head  CT Cspine  F/u CT read of outside films    Chief Complaint: s/p fall 2 days ago    History of Present Illness   HPI:  Rita No is a 82 y o  male who presents following a fall on ASA 2 days prior  Patient states that he was working in his garage around 10:00 a m , at that time he lost his balance, falling forward with head strike and no LOC  Patient states that he had a similar episode yesterday, went to fall again and caught his daughter to regain his balance  States he uses a cane to ambulate in consistently  Lives with his wife and is very independent  Does endorse a history of more frequent falls over the past 2-3 weeks accompanied with feeling of generalized weakness  Patient was initially seen at [facility] where CT imaging was concerning for a skull fracture and subsequently transferred here for further evaluation  Mechanism:Fall, +LOC:  no    Review of Systems   Constitutional: Negative for chills and fever  HENT: Negative for hearing loss  Eyes: Negative for visual disturbance  Respiratory: Negative for shortness of breath  Cardiovascular: Negative for chest pain, palpitations and leg swelling  Gastrointestinal: Negative for abdominal pain, constipation, diarrhea, nausea and vomiting  Endocrine: Negative  Genitourinary: Negative for difficulty urinating and hematuria  Musculoskeletal: Positive for back pain and gait problem  Negative for neck pain and neck stiffness  Skin:        Knee and forehead abrasion   Allergic/Immunologic: Negative      Neurological: Positive for weakness  Negative for dizziness, syncope, speech difficulty, light-headedness, numbness and headaches  Hematological: Negative  Psychiatric/Behavioral: Negative  Historical Information     Past Medical History:   Diagnosis Date    Acute MI ([facility] )     Cardiac disease     Diabetes mellitus ([facility] )     Hyperlipidemia     Hypertension      Past Surgical History:   Procedure Laterality Date    JOINT REPLACEMENT       Social History   History   Alcohol Use    Yes     Comment: rare     History   Drug Use No     History   Smoking Status    Never Smoker   Smokeless Tobacco    Never Used       There is no immunization history on file for this patient  Last Tetanus: unknown  Family History: Non-contributory    Meds/Allergies   PTA meds:   Prior to Admission Medications   Prescriptions Last Dose Informant Patient Reported? Taking?    Cholecalciferol (VITAMIN D3) 5000 units CAPS 2018 at Unknown time  Yes Yes   Sig: Take by mouth   Ferrous Sulfate (IRON) 325 (65 Fe) MG TABS 2018 at Unknown time  Yes Yes   Sig: Take by mouth   aspirin 81 mg chewable tablet 2018 at Unknown time  Yes Yes   Sig: Chew 81 mg daily   cyanocobalamin (VITAMIN B-12) 500 mcg tablet 2018 at Unknown time  Yes Yes   Sig: Take 1,000 mcg by mouth daily   fluticasone (FLONASE ALLERGY RELIEF) 50 mcg/act nasal spray 2018 at Unknown time  Yes Yes   Si sprays into each nostril daily   metFORMIN (GLUCOPHAGE) 1000 MG tablet 2018 at Unknown time  Yes Yes   Sig: Take 1,000 mg by mouth 2 (two) times a day with meals   metoprolol tartrate (LOPRESSOR) 25 mg tablet 2018 at Unknown time  Yes Yes   Sig: Take 25 mg by mouth every 12 (twelve) hours   pantoprazole (PROTONIX) 40 mg tablet 2018 at Unknown time  Yes Yes   Sig: Take 40 mg by mouth daily   simvastatin (ZOCOR) 10 mg tablet 2018 at Unknown time  Yes Yes   Sig: Take 40 mg by mouth daily at bedtime        Facility-Administered Medications: None       No Known Allergies      PHYSICAL EXAM      Objective   Vitals:   First set: Temperature: 97 5 °F (36 4 °C) (12/26/18 0107)  Pulse: 58 (12/26/18 0107)  Respirations: 20 (12/26/18 0107)  Blood Pressure: 153/77 (12/26/18 0107)    Primary Survey:   (A) Airway: Intact  (B) Breathing: Breath sounds equal bilaterally  (C) Circulation: Pulses:   pedal  2/4 and radial  2/4  (D) Disabliity:  GCS Total:  15, Eye Opening:   Spontaneous = 4, Motor Response: Obeys commands = 6 and Verbal Response:  Oriented = 5  (E) Expose:  Completed    Secondary Survey: (Click on Physical Exam tab above)  Physical Exam   Constitutional: He is oriented to person, place, and time  He appears well-developed and well-nourished  No distress  HENT:   Head: Normocephalic and atraumatic  Right Ear: External ear normal    Left Ear: External ear normal    Small abrasion over left forehead   Eyes:   3 mm and reactive bilaterally   Neck: Normal range of motion  Cardiovascular: Normal rate, regular rhythm and intact distal pulses  2+ radial/DP pulses bilaterally   Pulmonary/Chest: Effort normal and breath sounds normal  No respiratory distress  He exhibits tenderness  No crepitus  Abdominal: Soft  Bowel sounds are normal  He exhibits no distension  There is no tenderness  There is no rebound and no guarding  Musculoskeletal: Normal range of motion  He exhibits no edema, tenderness or deformity  No C/T/L-spine tenderness to palpation  No step-offs  Neurological: He is alert and oriented to person, place, and time  Moving all 4 extremities equally  Motor/sensory grossly intact  Lower extremity strength 5/5 bilaterally  Skin: Skin is warm and dry  Psychiatric: He has a normal mood and affect         Invasive Devices          No matching active lines, drains, or airways          Lab Results: BMP/CMP: No results found for: SODIUM, K, CL, CO2, ANIONGAP, BUN, CREATININE, GLUCOSE, CALCIUM, AST, ALT, ALKPHOS, PROT, BILITOT, EGFR, CBC: No results found for: WBC, HGB, HCT, MCV, PLT, ADJUSTEDWBC, MCH, MCHC, RDW, MPV, NRBC, Coagulation: No results found for: PT, INR, APTT and ISTAT: No components found for: VBG     Imaging/EKG Studies:   CT head wo contrast   Final Result by Tacos Cardenas DO (12/26 0402)      Comminuted and depressed fracture of the anterior wall of the left frontal sinus with associated small to moderate sized left frontal scalp hematoma  The posterior wall of the left frontal sinus appears intact  No acute intracranial process is seen  Other findings as above  Workstation performed: [id number]         CT spine cervical wo contrast   Final Result by Tacos Cardenas DO (12/26 0401)      Degenerative changes as described but no evidence of acute cervical spine injury              Workstation performed: [id number]         CT outside images need dictation    (Results Pending)   CT outside images need dictation    (Results Pending)       Code Status: Prior  Advance Directive and Living Will:      Power of :    POLST:

## 2018-12-26 NOTE — PHYSICIAN ADVISOR
Current patient class: Inpatient  The patient is currently on Hospital Day: 1 at [facility]      The patient was admitted to the hospital  on 12/26/18 at [address] [place] [address] for the following diagnosis:  Frontal skull fracture ([facility] ) [S02  0XXA]  Ambulatory dysfunction [R26 2]  Closed fracture of frontal bone, initial encounter ([facility] [S02  0XXA]     After review of the relevant documentation, labs, vital signs and test results, the patient is a provider liable case and is most appropriate for OBSERVATION STATUS  In this particular case the patient was admitted to the hospital as an inpatient  The patient however fails to satisfy the 2 midnight benchmark and closer scrutiny of the case is warranted  After review of the patient presentation and relevant labs the patient was most appropriate for observation status on admission  Given that this patient has already been discharged prior to this review they become a provider liable case  Rationale is as follows: The patient is a 82 yrs   Male who presented to the ED at 12/26/2018 12:58 AM with a chief complaint of Fall with frontal skull fracture  There is no documented expected length of stay and there is no documentation of unexpected early recovery  Based on the documentation present in the H&P the patient was most appropriate for observation status      The patients vitals on arrival were ED Triage Vitals   Temperature Pulse Respirations Blood Pressure SpO2   12/26/18 0107 12/26/18 0107 12/26/18 0107 12/26/18 0107 12/26/18 0207   97 5 °F (36 4 °C) 58 20 153/77 96 %      Temp Source Heart Rate Source Patient Position - Orthostatic VS BP Location FiO2 (%)   12/26/18 0107 12/26/18 0107 12/26/18 0107 -- --   Oral Monitor Lying        Pain Score       12/26/18 1108       No Pain           Past Medical History:   Diagnosis Date    Acute MI ([facility] )     Cardiac disease     Diabetes mellitus ([facility] )     Hyperlipidemia     Hypertension Past Surgical History:   Procedure Laterality Date    JOINT REPLACEMENT             Consults have been placed to:   IP CONSULT TO GERONTOLOGY  IP CONSULT TO NEUROSURGERY    Vitals:    12/26/18 0207 12/26/18 0307 12/26/18 0407 12/26/18 0607   BP: 136/92 143/77 134/68    Pulse: 58 58 58    Resp: 20 18 18    Temp:       TempSrc:       SpO2: 96% 95% 95% 95%   Weight:    90 7 kg (200 lb)   Height:    6' (1 829 m)       Most recent labs:    No results for input(s): WBC, HGB, HCT, PLT, K, NA, CALCIUM, BUN, CREATININE, LIPASE, AMYLASE, INR, TROPONINI, CKTOTAL, AST, ALT, ALKPHOS, BILITOT in the last 72 hours  Scheduled Meds:  Continuous Infusions:  No current facility-administered medications for this encounter  PRN Meds:      Surgical procedures (if appropriate):

## 2018-12-26 NOTE — PHYSICAL THERAPY NOTE
Physical Therapy Evaluation     Patient's Name: Helga Boateng    Admitting Diagnosis  Frontal skull fracture ([place] [address] ) [S02  0XXA]  Ambulatory dysfunction [R26 2]  Closed fracture of frontal bone, initial encounter ([place] [address] ) [S02  0XXA]    Problem List  Patient Active Problem List   Diagnosis    Dehydration    New onset a-fib ([place] [address] )    Acute kidney injury ([place] [address] )    T2DM (type 2 diabetes mellitus) ([place] [address] )    Essential hypertension    HLD (hyperlipidemia)    GERD (gastroesophageal reflux disease)    CAD (coronary artery disease)    Frontal skull fracture ([place] [address] )    Ambulatory dysfunction       Past Medical History  Past Medical History:   Diagnosis Date    Acute MI ([place] [address] )     Cardiac disease     Diabetes mellitus ([place] [address] )     Hyperlipidemia     Hypertension        Past Surgical History  Past Surgical History:   Procedure Laterality Date    JOINT REPLACEMENT          12/26/18 1108   Note Type   Note type Eval only   Pain Assessment   Pain Assessment No/denies pain   Pain Score No Pain   Home Living   Type of [address] [place] [address] One level  (0 DAI)   Bathroom Shower/Tub Walk-in shower   Bathroom Toilet Raised   Bathroom Equipment Grab bars in shower;Built-in shower seat; Shower chair;Grab bars around toilet; Tub transfer bench   Bathroom Accessibility Accessible   Home Equipment Walker;Cane   Additional Comments Pt resides in a 54 and older community in a handicap accessible home   Prior Function   Level of [place] Independent with ADLs and functional mobility   Lives With Spouse   Receives Help From Family   ADL Assistance Independent   IADLs Independent   Falls in the last 6 months 1 to 4  (at least 2)   Vocational Retired   Comments PTA, pt was I with ADLs and functional mobility  He had recently started using a rolling walker due to multiple falls  His wife has a recent SCI with sensation deficits and he aides her in ADLs and ambulation  Their daughter provides meals and family checks in on them daily    He enjoys working in his garage on projects  Restrictions/Precautions   Weight Bearing Precautions Per Order No   Other Precautions Fall Risk;Pain   General   Family/Caregiver Present Yes  (daugther)   Cognition   Overall Cognitive Status WFL   Arousal/Participation Alert   Attention Within functional limits   Orientation Level Oriented X4   Memory Within functional limits   Following Commands Follows all commands and directions without difficulty   RLE Assessment   RLE Assessment WFL   LLE Assessment   LLE Assessment WFL   Bed Mobility   Supine to Sit 5  Supervision   Additional items Assist x 1; Increased time required;Verbal cues  (VC for deep breathing to prevent valsalva)   Transfers   Sit to Stand 5  Supervision   Additional items Trapeze bar;Verbal cues   Stand to Sit 5  Supervision   Additional items Increased time required;Verbal cues   Ambulation/Elevation   Gait pattern Wide DESIREE; Improper Weight shift; Short stride;Decreased foot clearance   Gait Assistance 5  Supervision   Assistive Device [facility]   Distance 120'   Balance   Static Sitting Fair +   Dynamic Sitting Fair +   Static Standing Fair   Dynamic Standing Fair   Ambulatory Fair -   Endurance Deficit   Endurance Deficit No   Activity Tolerance   Activity Tolerance Patient tolerated treatment well   Nurse Made Aware Yes, RN cleared pt for PT eval   Assessment   Prognosis Good   Problem List Pain;Decreased safety awareness; Impaired balance;Decreased mobility   Assessment Pt is 82 y o  male seen for moderate complexity PT evaluation s/p admit to [facility] on 12/26/2018 w/ Frontal skull fracture ([address] )  Pt presented following a fall in his garage with a head strike and no LOC  PT consulted to assess pt's functional mobility and d/c needs  Order placed for PT eval and tx, w/ up w/ A order  Comorbidities affecting pt's physical performance at time of assessment include: DM II, CAD, GERD, HLD, HTN   PTA, pt was independent w/ all functional mobility w/ SPC PRN, 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  Personal factors affecting pt at time of IE include: ambulating w/ assistive device, inability to navigate community distances, limited home support, positive fall history, inability to perform IADLs, inability to perform ADLs and inability to live alone  Please find objective findings from PT assessment regarding body systems outlined above with impairments and limitations including weakness, impaired balance, decreased endurance, gait deviations, decreased activity tolerance, decreased functional mobility tolerance and fall risk  The following objective measures performed on IE also reveal limitations: Barthel Index: 80/100  Pt tolerated ambulation short distances with the use of a SPC and demonstrated head turning without LOB  From PT/mobility standpoint, recommendation at time of d/c would be Home PT with family support pending progress in order to facilitate return to PLOF  Barriers to Discharge None   Goals   Patient Goals To return home today   Recommendation   Recommendation Home PT; Home with family support   Equipment Recommended Cane   PT - OK to Discharge Yes   Additional Comments When medically stable   Barthel Index   Feeding 10   Bathing 5   Grooming Score 5   Dressing Score 10   Bladder Score 10   Bowels Score 10   Toilet Use Score 10   Transfers (Bed/Chair) Score 10   Mobility (Level Surface) Score 10   Stairs Score 0   Barthel Index Score 80         Giancarlo Hughes, PT, DPT

## 2018-12-26 NOTE — CONSULTS
Consultation - Neurosurgery   Sumaya Ibrahim 82 y o  male MRN: [id number]  Unit/Bed#: [facility] 912-01 Encounter: [id number]      Inpatient consult to Neurosurgery  Consult performed by: Rhianna Lung  Consult ordered by: Leonarda Domingo          Assessment/Plan     Assessment:  1  Left comminuted and depressed frontal sinus fracture  2  S/p fall  3  Hypertension  4  Diabetes  5  Previous myocardial infarction  6  Hyperlipidemia    Plan:  · Exam AAO x3, 5/5 strength throughout with sensation intact to light touch and pinprick  Reflexes 1+ and symmetric  Cranial nerves intact  · Imaging reviewed personally and by attending  Final results as below  · CT head w/o contrast 12/26/18:  Left comminuted and displaced frontal sinus fracture  No posterior or inner table involvement  No intracranial abnormality seen or pneumocephalus present     · Pain control and medical management per primary team  · Mobilize with PT/OT as a with assistance  · DVT PPX:  Heparin and SCDs  · Could trial steroids for gait dysfunction  Risk vs benefit  · Patient known to [facility] for history of lumbar spine injections and "extra lumbar vertebrae"  For which they will plan follow up there for possible further imaging for gait instability work up  · No neurosurgical intervention at this time due to intact posterior elements of frontal sinus and no intracranial abnormality  We will follow-up as needed during remainder of hospitalization  · No outpatient follow-up needed due to no planned surgical intervention to be done  Patient and patients family agree and understand plan and have no further questions  · History of Present Illness   HPI: Sumaya Ibrahim is a 82 y o  male with PMH including HTN, HLD, DM, myocardial infarction who presents s/p fall 2 days prior  Patient states he takes ASA 81 mg daily  This time he was working in his garage when lost his balance and fell striking the front of his head    Daughter states she works for [employer] in the medical field and knew the signs to watch for with a head injury  This is why he had a delayed presentation  Denies a pain that makes his symptoms better or worse  Now he has no complaints of headaches, vision changes, auditory changes  States he at no point had any headaches present  Denies any neck pains, chest pains, shortness of breath, abdominal pain, nausea, vomiting, diarrhea, constipation  Denies any tingling or numbness in the upper extremities  States he has been having some weakness in his lower extremities that have resulted in falls recently  States he had will want something fall and his body will fall with that  When it happens he feels as though he cannot catch himself and falls  He admits to this occurring for possibly the past 1 month with it increasing in the last few weeks and more shuffling in his gait  States he are he has a spine doctor associated with [employer] that he is known to and follows up with due to chronic lumbar spine issues  Review of Systems   Constitutional: Negative for appetite change, diaphoresis and fatigue  HENT: Negative for congestion, facial swelling and voice change  Eyes: Negative for visual disturbance  Respiratory: Negative for cough and chest tightness  Cardiovascular: Negative for chest pain and leg swelling  Gastrointestinal: Negative for abdominal pain, diarrhea, nausea and vomiting  Genitourinary: Negative for difficulty urinating  Musculoskeletal: Positive for back pain (chronic low back pain)  Negative for neck pain and neck stiffness  Skin: Negative for rash  Neurological: Positive for weakness (Lower extremities right>left)  Negative for dizziness, tremors, light-headedness, numbness and headaches  Psychiatric/Behavioral: Negative for agitation, behavioral problems and confusion         Historical Information   Past Medical History:   Diagnosis Date    Acute MI [facility])     Cardiac disease     Diabetes mellitus ([facility] [address] )     Hyperlipidemia     Hypertension      Past Surgical History:   Procedure Laterality Date    JOINT REPLACEMENT       History   Alcohol Use    Yes     Comment: rare     History   Drug Use No     History   Smoking Status    Never Smoker   Smokeless Tobacco    Never Used     History reviewed  No pertinent family history  Meds/Allergies   all current active meds have been reviewed, current meds:   Current Facility-Administered Medications   Medication Dose Route Frequency    acetaminophen (TYLENOL) tablet 975 mg  975 mg Oral Q6H PRN    heparin (porcine) subcutaneous injection 5,000 Units  5,000 Units Subcutaneous Q8H [facility]    metoprolol tartrate (LOPRESSOR) tablet 25 mg  25 mg Oral Q12H [facility]    ondansetron (ZOFRAN) injection 4 mg  4 mg Intravenous Q6H PRN    pantoprazole (PROTONIX) EC tablet 40 mg  40 mg Oral Early Morning    pravastatin (PRAVACHOL) tablet 20 mg  20 mg Oral Daily With Dinner    and PTA meds:   Prior to Admission Medications   Prescriptions Last Dose Informant Patient Reported? Taking?    Cholecalciferol (VITAMIN D3) 5000 units CAPS 2018 at Unknown time  Yes Yes   Sig: Take by mouth   Ferrous Sulfate (IRON) 325 (65 Fe) MG TABS 2018 at Unknown time  Yes Yes   Sig: Take by mouth   aspirin 81 mg chewable tablet 2018 at Unknown time  Yes Yes   Sig: Chew 81 mg daily   cyanocobalamin (VITAMIN B-12) 500 mcg tablet 2018 at Unknown time  Yes Yes   Sig: Take 1,000 mcg by mouth daily   fluticasone (FLONASE ALLERGY RELIEF) 50 mcg/act nasal spray 2018 at Unknown time  Yes Yes   Si sprays into each nostril daily   metFORMIN (GLUCOPHAGE) 1000 MG tablet 2018 at Unknown time  Yes Yes   Sig: Take 1,000 mg by mouth 2 (two) times a day with meals   metoprolol tartrate (LOPRESSOR) 25 mg tablet 2018 at Unknown time  Yes Yes   Sig: Take 25 mg by mouth every 12 (twelve) hours   pantoprazole (PROTONIX) 40 mg tablet 2018 at Unknown time  Yes Yes   Sig: Take 40 mg by mouth daily   simvastatin (ZOCOR) 10 mg tablet 12/25/2018 at Unknown time  Yes Yes   Sig: Take 40 mg by mouth daily at bedtime        Facility-Administered Medications: None     No Known Allergies    Objective   I/O       12/24 0701 - 12/25 0700 12/25 0701 - 12/26 0700 12/26 0701 - 12/27 0700    P  O    240    Total Intake(mL/kg)   240 (2 6)    Net     +240                 Physical Exam   Constitutional: He is oriented to person, place, and time  He appears well-developed and well-nourished  HENT:   Head: Normocephalic  Eyes: Pupils are equal, round, and reactive to light  Conjunctivae and EOM are normal  Right eye exhibits no discharge  Neck: Normal range of motion  Neck supple  No JVD present  Cardiovascular: Normal rate  Pulmonary/Chest: Effort normal    Abdominal: Soft  He exhibits no distension  There is no tenderness  Musculoskeletal: Normal range of motion  He exhibits no edema or deformity  Neurological: He is alert and oriented to person, place, and time  No cranial nerve deficit  He has a normal Finger-Nose-Finger Test    Reflex Scores:       Bicep reflexes are 1+ on the right side and 1+ on the left side  Brachioradialis reflexes are 1+ on the right side and 1+ on the left side  Patellar reflexes are 1+ on the right side and 1+ on the left side  Achilles reflexes are 1+ on the right side and 1+ on the left side  Skin: Skin is warm and dry  Psychiatric: He has a normal mood and affect  His speech is normal and behavior is normal  Thought content normal      Neurologic Exam     Mental Status   Oriented to person, place, and time  Registration: recalls 3 of 3 objects  Follows 2 step commands  Attention: normal    Speech: speech is normal   Level of consciousness: alert  Knowledge: good  Able to name object  Normal comprehension  Cranial Nerves     CN II   Visual fields full to confrontation       CN III, IV, VI   Pupils are equal, round, and reactive to light  Extraocular motions are normal    CN III: no CN III palsy  CN VI: no CN VI palsy  Nystagmus: none   Diplopia: none  Ophthalmoparesis: none  Upgaze: normal  Downgaze: normal    CN V   Facial sensation intact  CN VII   Facial expression full, symmetric  CN VIII   CN VIII normal    Hearing: intact    CN IX, X   CN IX normal    CN X normal      CN XI   CN XI normal      CN XII   CN XII normal    Tongue: not atrophic  Tongue deviation: none    Motor Exam   Muscle bulk: normal  Overall muscle tone: normal  Right arm tone: normal  Left arm tone: normal  Right arm pronator drift: absent  Left arm pronator drift: absent  Right leg tone: normal  Left leg tone: normal    Strength   Right deltoid: 5/5  Left deltoid: 5/5  Right biceps: 5/5  Left biceps: 5/5  Right triceps: 5/5  Left triceps: 5/5  Right wrist flexion: 5/5  Left wrist flexion: 5/5  Right wrist extension: 5/5  Left wrist extension: 5/5  Right iliopsoas: 5/5  Left iliopsoas: 5/5  Right quadriceps: 5/5  Left quadriceps: 5/5  Right hamstrin/5  Left hamstrin/5  Right glutei: 5/5  Left glutei: 5/5  Right anterior tibial: 5/5  Left anterior tibial: 5/5  Right gastroc: 5/5  Left gastroc: 5/5    Sensory Exam   Light touch normal    Proprioception normal    Pinprick normal      Gait, Coordination, and Reflexes     Coordination   Finger to nose coordination: normal    Tremor   Resting tremor: absent  Action tremor: absent    Reflexes   Right brachioradialis: 1+  Left brachioradialis: 1+  Right biceps: 1+  Left biceps: 1+  Right patellar: 1+  Left patellar: 1+  Right achilles: 1+  Left achilles: 1+  Right Sifuentes: absent  Left Sifuentes: absent  Right ankle clonus: absent  Left ankle clonus: absent      Vitals:Blood pressure 134/68, pulse 58, temperature 97 5 °F (36 4 °C), temperature source Oral, resp  rate 18, height 6' (1 829 m), weight 90 7 kg (200 lb), SpO2 95 %  ,Body mass index is 27 12 kg/m²       Lab Results: Invalid input(s):  EOSPCT        Invalid input(s): LABALBU              No results found for: TROPONINT  ABG:No results found for: PHART, TQK6RHF, PO2ART, SKQ0POB, W5THKMRB, BEART, SOURCE    Imaging Studies: I have personally reviewed pertinent reports  and I have personally reviewed pertinent films in PACS    Ct Head Wo Contrast    Result Date: 12/26/2018  Impression: Comminuted and depressed fracture of the anterior wall of the left frontal sinus with associated small to moderate sized left frontal scalp hematoma  The posterior wall of the left frontal sinus appears intact  No acute intracranial process is seen  Other findings as above  Workstation performed: [id number]     Ct Spine Cervical Wo Contrast    Result Date: 12/26/2018  Impression: Degenerative changes as described but no evidence of acute cervical spine injury  Workstation performed: [id number]       EKG, Pathology, and Other Studies: I have personally reviewed pertinent reports  VTE Prophylaxis: Heparin    Code Status: Level 1 - Full Code  Advance Directive and Living Will:      Power of :    POLST:      Counseling / Coordination of Care  I spent 30 minutes with the patient

## 2018-12-26 NOTE — UTILIZATION REVIEW
Initial Clinical Review    Admission: Date/Time/Statement: 12/26/18 @ 0423     Orders Placed This Encounter   Procedures    Inpatient Admission     Standing Status:   Standing     Number of Occurrences:   1     Order Specific Question:   Admitting Physician     Answer:   Roman Ramos     Order Specific Question:   Level of Care     Answer:   Med Surg [16]     Order Specific Question:   Bed Type     Answer:   Trauma [7]     Order Specific Question:   Estimated length of stay     Answer:   More than 2 Midnights     Order Specific Question:   Certification     Answer:   I certify that inpatient services are medically necessary for this patient for a duration of greater than two midnights  See H&P and MD Progress Notes for additional information about the patient's course of treatment  ED: Date/Time/Mode of Arrival:   ED Arrival Information     Expected Arrival Acuity Means of Arrival Escorted By Service Admission Type    12/26/2018 01:00 12/26/2018 00:58 Emergent Ambulance [facility] Trauma Urgent    Arrival Complaint    Fall with head injury          Chief Complaint:   Chief Complaint   Patient presents with    Fall       History of Illness: 82 y o  male who presents following a fall on ASA 2 days prior  ED Vital Signs:   ED Triage Vitals   Temperature Pulse Respirations Blood Pressure SpO2   12/26/18 0107 12/26/18 0107 12/26/18 0107 12/26/18 0107 12/26/18 0207   97 5 °F (36 4 °C) 58 20 153/77 96 %      Temp Source Heart Rate Source Patient Position - Orthostatic VS BP Location FiO2 (%)   12/26/18 0107 12/26/18 0107 12/26/18 0107 -- --   Oral Monitor Lying        Pain Score       12/26/18 1115       No Pain        Wt Readings from Last 1 Encounters:   12/26/18 90 7 kg (200 lb)       Vital Signs (abnormal):  WNL    Abnormal Labs/Diagnostic Test Results:   CT Head - Comminuted and depressed fracture of the anterior wall of the left frontal sinus with associated small to moderate sized left frontal scalp hematoma  ED Treatment:   Medication Administration from 12/25/2018 2351 to 12/26/2018 0541     None          Past Medical/Surgical History: Active Ambulatory Problems     Diagnosis Date Noted    Dehydration 02/09/2018    New onset a-fib ([place] [address] ) 02/09/2018    Acute kidney injury ([place] [address] ) 02/09/2018    T2DM (type 2 diabetes mellitus) ([place] [address] ) 02/09/2018    Essential hypertension 02/09/2018    HLD (hyperlipidemia) 02/09/2018    GERD (gastroesophageal reflux disease) 02/09/2018    CAD (coronary artery disease) 02/09/2018     Resolved Ambulatory Problems     Diagnosis Date Noted    No Resolved Ambulatory Problems     Past Medical History:   Diagnosis Date    Acute MI [facility])     Cardiac disease     Diabetes mellitus ([place] [address] )     Hyperlipidemia     Hypertension        Admitting Diagnosis: Frontal skull fracture ([place] [address] ) [S02  0XXA]  Ambulatory dysfunction [R26 2]  Closed fracture of frontal bone, initial encounter ([place] [address] ) [S02  0XXA]    Age/Sex: 82 y o  male    Assessment/Plan:   82y Male to ED with S/P Fall 2 days ago  Patient states that he was working in his garage around 10:00 a m , at that time he lost his balance, falling forward with head strike and no LOC  Patient states that he had a similar episode yesterday, went to fall again and caught his daughter to regain his balance  States he uses a cane to ambulate in consistently  Lives with his wife and is very independent  Does endorse a history of more frequent falls over the past 2-3 weeks accompanied with feeling of generalized weakness     Patient was initially seen at [facility] where CT imaging was concerning for a skull fracture and subsequently transferred here for further evaluation    Trauma Active Problems:   Frontal skull fracture  Ambulatory dysfunction     Plan:  Neurosurgery consult  Imaging  PT/OT eval and treat    12/26 Per Neurosurgery:  No neurosurgical intervention at this time due to intact posterior elements of frontal sinus and no intracranial abnormality          Admission Orders:  Gerontology cons  Neurosurgery cons  PT/OT eval and treat    Scheduled Meds:   Current Facility-Administered Medications:  acetaminophen 975 mg Oral Q8H [address]   heparin (porcine) 5,000 Units Subcutaneous Q8H [address]   metoprolol tartrate 25 mg Oral Q12H [facility]   pantoprazole 40 mg Oral Early Morning   pravastatin 20 mg Oral Daily With Dinner     Continuous Infusions:    PRN Meds:   acetaminophen    ondansetron

## 2018-12-26 NOTE — PROGRESS NOTES
Trauma Tertiary Progress Note Kelly Pace 1935, 82 y o  male MRN: [id number]    Unit/Bed#: [facility] 912-01 Encounter: [id number]    Primary Care Provider: Mirlande Daniels MD   Date and time admitted to hospital: 12/26/2018 12:58 AM        Ambulatory dysfunction   Assessment & Plan    - PT/OT cleared for discharge     * Frontal skull fracture [facility])   Assessment & Plan    - no surgical intervention per Neurosurgery  - discharge to home         Subjective:  Patient is currently comfortable  Denies headache, nausea, dizziness  Patient has had some recent weakness in his lower extremities  Patient's daughter states that he has chronic Degenerative disc disease  She is inquiring about a medrol patel for his symptoms    Summary of Diagnosed Injuries:   See above    Clinical Plan:   Trial medrol dose patel as discussed with patient's daughter, for his DDD and lower extremity weakness  Mechanism of Injury: Fall    Transfer from: n/a  Outside Films Received: not applicable  Tertiary Exam Due on: today    Vitals: Blood pressure 134/68, pulse 58, temperature 97 5 °F (36 4 °C), temperature source Oral, resp  rate 18, height 6' (1 829 m), weight 90 7 kg (200 lb), SpO2 95 %  ,Body mass index is 27 12 kg/m²  Ct Head Wo Contrast    Result Date: 12/26/2018  Impression: Comminuted and depressed fracture of the anterior wall of the left frontal sinus with associated small to moderate sized left frontal scalp hematoma  The posterior wall of the left frontal sinus appears intact  No acute intracranial process is seen  Other findings as above  Workstation performed: [id number]     Ct Spine Cervical Wo Contrast    Result Date: 12/26/2018  Impression: Degenerative changes as described but no evidence of acute cervical spine injury   Workstation performed: [id number]       Consultants - List Service/ Faculty and Date: Neurosurgery    Active medications:           Current Facility-Administered Medications:     acetaminophen (TYLENOL) tablet 975 mg, 975 mg, Oral, Q6H PRN    acetaminophen (TYLENOL) tablet 975 mg, 975 mg, Oral, Q8H DOLORES    heparin (porcine) subcutaneous injection 5,000 Units, 5,000 Units, Subcutaneous, Q8H [facility], Stopped at 12/26/18 0807    metoprolol tartrate (LOPRESSOR) tablet 25 mg, 25 mg, Oral, Q12H DOLORES, 25 mg at 12/26/18 0814    ondansetron (ZOFRAN) injection 4 mg, 4 mg, Intravenous, Q6H PRN    pantoprazole (PROTONIX) EC tablet 40 mg, 40 mg, Oral, Early Morning, 40 mg at 12/26/18 8777    pravastatin (PRAVACHOL) tablet 20 mg, 20 mg, Oral, Daily With Dinner      Intake/Output Summary (Last 24 hours) at 12/26/18 1212  Last data filed at 12/26/18 0900   Gross per 24 hour   Intake              240 ml   Output                0 ml   Net              240 ml       Invasive Devices     Peripheral Intravenous Line            Peripheral IV 12/25/18 Left Antecubital less than 1 day                CAGE-AID Questionnaire:    Was the patient able to participate in the CAGE-AID screening questions on admission? Yes    Is the patient 65 years or older: YES:    1  Before the illness or injury that brought you to the Emergency, did you need someone to help you on a regular basis? 1=Yes   2  Since the illness or injury that brought you to the Emergency, have you needed more help than usual to take care of yourself? 1=Yes   3  Have you been hospitalized for one or more nights during the past 6 months (excluding a stay in the Emergency Department)? 0=No   4  In general, do you see well? 0=Yes   5  In general, do you have serious problems with your memory? 0=No   6  Do you take more than three different medications everyday? 1=Yes   TOTAL   3     Did you order a geriatric consult if the score was 2 or greater?: yes    1  GCS:  GCS Total:  15, Eye Opening:   Spontaneous = 4, Motor Response: Obeys commands = 6 and Verbal Response:  Oriented = 5  2  Head:   WNL  3  Neck:   WNL  4  Chest:   WNL  5  Abdomen/Pelvis:   WNL  6   Back (log roll with spinal immobilization unless cleared radiographically): WNL  7  Extremities:   Lacs, abrasions, swelling, ecchymosis: none   Tenderness, pain with motor, instability: none  8  Peripheral Nerves: WNL    Do NOT use the following abbreviations: DTO, gr, Neena, MS, MSO4, MgSO4, Nitro, QD, QID, QOD, u, , ?, ?g or trailing zeros   Always use a zero before a decimal     Nurse provider rounds completed with Neri Rice

## 2018-12-26 NOTE — OCCUPATIONAL THERAPY NOTE
[address] Evaluation     Patient Name: Ting Nowak  NDGOD'V Date: 12/26/2018  Problem List  Patient Active Problem List   Diagnosis    Dehydration    [facility] onset [facility]fib ([facility] )    Acute kidney injury ([facility] )    T2DM (type 2 diabetes mellitus) ([place] [address] )    Essential hypertension    HLD (hyperlipidemia)    GERD (gastroesophageal reflux disease)    CAD (coronary artery disease)    Frontal skull fracture ([place] [address] )    Ambulatory dysfunction     Past Medical History  Past Medical History:   Diagnosis Date    Acute MI ([facility] )     Cardiac disease     Diabetes mellitus ([place] [address] )     Hyperlipidemia     Hypertension      Past Surgical History  Past Surgical History:   Procedure Laterality Date    JOINT REPLACEMENT           12/26/18 1115   Note Type   Note type Eval only   Restrictions/Precautions   Weight Bearing Precautions Per Order No   Other Precautions Pain; Fall Risk  (OCCASIONAL PAIN)   Pain Assessment   Pain Assessment No/denies pain   Pain Score No Pain   Home Living   Type of Home House   Home Layout One level  (0 DAI )   Bathroom Shower/Tub Walk-in shower   Bathroom Toilet Raised   Bathroom Equipment Grab bars in shower;Built-in shower seat;Grab bars around toilet; Tub transfer bench   Bathroom Accessibility Accessible   Home Equipment Walker;Cane   Additional Comments PT AND DAUGHTER REPORTS PT LIVES IN A 55+ COMMUNITY WITH A HANDICAP ACCESSIBLE HOME  RECENTLY USING SPC    Prior Function   Level of [place] Independent with ADLs and functional mobility   Lives With Spouse   Receives Help From Family   ADL Assistance Independent   IADLs Independent   Falls in the last 6 months 1 to 4  (AT LEAST 2)   Vocational Retired   Lifestyle   Autonomy  [facility] ADLS/LT IADLS/DRIVING PTA  PT IS THE PRIMARY CAREGIVER FOR HIS SPOUSE WHO HAS A MILD SCI  FAMILY PROVIDES MOST MEALS AND ASSISTS WITH HEAVY IADLS  Reciprocal Relationships LIVES WITH SPOUSE WHO IS DISABLED   SUPPORTIVE DAUGHTER PRESENT WHO REPORTS FAMILY IS ABLE TO ASSIST MORE WITH PT'S SPOUSE AND IADLS- DAUGHTER ALSO REPORTS FAMILY IS ABLE OT CHECK IN DAILY TO ASSIST AS NEEDED  Service to Others RETIRED   Intrinsic Gratification ENJOYS "TINKERING AROUND IN THE GARAGE"- DAUGHTER REPORTS THIS WILL NOW BE "OFF LIMITS"    Psychosocial   Psychosocial (WDL) WDL   ADL   Eating Assistance 7  Independent   Grooming Assistance 6  Modified Independent   UB Bathing Assistance 5  Supervision/Setup   LB Bathing Assistance 5  Supervision/Setup   UB Dressing Assistance 5  Supervision/Setup   LB Dressing Assistance 5  Supervision/Setup   Toileting Assistance  5  Supervision/Setup   Functional Assistance 5  Supervision/Setup   Bed Mobility   Supine to Sit 5  Supervision   Additional items Assist x 1; Increased time required;Verbal cues   Sit to Supine Unable to assess  (PT LEFT OOB WITH ALL NEEDS IN REACH + DAUGHTER PRESENT )   Transfers   Sit to Stand 5  Supervision   Additional items Assist x 1; Increased time required;Verbal cues   Stand to Sit 5  Supervision   Additional items Assist x 1; Increased time required;Verbal cues   Functional Mobility   Functional Mobility 5  Supervision   Additional items [facility]   Balance   Static Sitting Good   Static Standing Fair   Ambulatory Fair -   Activity Tolerance   Activity Tolerance Patient tolerated treatment well   Medical Staff Made Aware CM/TRAUMA AWARE OF D/C PLAN    RUE Assessment   RUE Assessment WFL   LUE Assessment   LUE Assessment WFL   Hand Function   Gross Motor Coordination Functional   Fine Motor Coordination Functional   Sensation   Light Touch No apparent deficits   Cognition   Overall Cognitive Status WFL   Arousal/Participation Alert; Cooperative   Attention Within functional limits   Orientation Level Oriented X4   Memory Within functional limits   Following Commands Follows all commands and directions without difficulty   Comments PT IS PLEASANT, COOPERATIVE AND A+OX4   PT/DAUGHTER REPORTS NO COGNITIVE CHANGES COMPARED TO BASELINE  NO CONCERNS FOR SAFETY DUIRNG EVALUATION  Assessment   Assessment 81 YO Male SEEN FOR INITIAL OCCUPATIONAL THERAPY EVALUATION FOLLOWING TRANSFER TO [facility] S/P FALL X2 WITH +HEADSTRIKE, -LOC  CT OF HEAD  [facility] Se REQUIRED AT THIS TIME  DAUGHTER PRESENT FOR EVALUATION AND REPORTS PT WITH RECENT OVER-WORK AND REPORTS THIS IS THE CAUSE OF PT'S FALL  PROBLEMS LIST INCLUDES H/O MULTIPLE FALLS, LUMBAR STENOSIS, HTN  DM AND DANITA MI  PT IS FROM HANDICAP ACCESSIBLE HOME WITH DISABLED SPOUSE WHERE  REPORTS BEING INDEPENDENT WITH ADLS/LT IADLS/DRIVING PTA  PT IS THE PRIMARY CAREGIVER FOR SPOUSE HOWEVER DAUGHTER REPORTS FAMILY IS ABLE TO PROVIDE ADDITIONAL ASSIST TO PT/PT'S SPOUSE UPON D/C  PT CURRENTLY REQUIRES OVERALL SUPERVISION WITH ADLS, TRANSFERS AND FUNCTIONAL MOBILITY WITH USE OF SPC  PT IS LIMITED 2' OCCASIONAL PAIN, IMPAIRED BALANCE, FALL RISK , OVERALL WEAKNESS/DECONDITIONING  and OVERALL LIMITED ACTIVITY TOLERANCE  PT DENIES DIZZINESS AND PT/DAUGHTER DENY ANY COGNITIVE CHANGES COMPARED TO BASELINE  PT EDUCATED ON DEEP BREATHING TECHNIQUES T/O ACTIVITY, SLOWING OF PACE, ENERGY CONSERVATION TECHNIQUES FOR CARRY OVER UPON D/C, INCREASED FAMILY SUPPORT and CONTINUE PARTICIPATION IN SELF-CARE/MOBILITY WITH STAFF  [facility]  FROM AN OCCUPATIONAL THERAPY PERSPECTIVE, PT CAN RETURN HOME WITH INCREASED FAMILY SUPPORT + HOME OT SERVICES UPON D/C  ALL CURRENT QUESTIONS/CONCERNS ADDRESSED  NO ADDITIONAL ACUTE CARE OT NEEDS  D/C OT      Goals   Patient Goals TO RETURN HOME TODAY    Recommendation   OT Discharge Recommendation Home OT  (+ INCREASED FAMILY SUPPORT )   Equipment Recommended (REC USE OF SPC/SC/RAISED TOILET-PT AGREEABLE )   OT - OK to Discharge Yes   Barthel Index   Feeding 10   Bathing 5   Grooming Score 5   Dressing Score 10   Bladder Score 10   Bowels Score 10   Toilet Use Score 10   Transfers (Bed/Chair) Score 10   Mobility (Level Surface) Score 0   Stairs Score 0   Barthel Index Score 70   Modified [place] Scale   Modified Joe Scale 3       Documentation completed by Dano Dias, VICKEY, OTR/L

## 2018-12-26 NOTE — CONSULTS
Consultation - Geriatrics   Sabino Benson 82 y o  male MRN: [id number]  Unit/Bed#: [facility] 912-01 Encounter: [id number]      Assessment/Plan  1  Ambulatory dysfunction/fall  Multifactorial  Secondary to underlying chronic illnesses, visual impairment, pain, impairment with ADLs,   poor balance, deconditioning, anemia,  environmental  Fall precautions  PT/OT  Use of assistive device  Recommend rehab post hospitalization  continue Vitamin D3 1000 iu daily    2  Pain  Will schedule acetaminophen 875 mg pt q8 hr  Refer to geriatric pain protocol for further reccomendations    3  Weight loss  Patient states he lost 30 pounds in the last year intentionally through diet and excersize  Albumin 3 2  Recommend adding additional protein in diet    4  Anemia  Continue vitamin b12 supplementation  Follow up with PCP    5  Lumbar stenosis  Patient with hx of stenosis   Neuro surgery following  Started on prenisone    6  Frontal skull fx  Trauma following            History of Present Illness   Physician Requesting Consult: Rafael Patel MD  Reason for Consult / Principal Problem:  fall  Hx and PE limited by: n/a  HPI: Sabino Benson is a 82y o  year old male who presents with Fall 2 days ago  Patient has a history of hypertension, hyperlipidemia, DM, myocardial infarction  Patient states he was working in his garage when he lost his balance and hit the front of his head  He states that last couple weeks he has had increase in feeling weak in his legs  Per his daughter, they are moving and he has had an increased in activity with more lifting and feels that her dad may have aggravated an old injury to his back  He is alert and oriented x3 he lives at home with his wife  He still drives  He is able to perform all this executive in independent activities of daily living  He wears glasses and dentures, denies issues with hearing  He denies constipation, urinary frequency, difficulty, insomnia   He states that he has had an intentional weight loss of 30 pounds in the last year with diet and exercise  Inpatient consult to Gerontology  Consult performed by: Deo Stark  Consult ordered by: Fadia Kaur          Review of Systems   Constitutional: Negative for activity change and unexpected weight change  HENT: Negative for dental problem and hearing loss  Eyes: Positive for visual disturbance  Wears glasses   Respiratory: Negative for cough  Cardiovascular: Negative for chest pain  Gastrointestinal: Negative for constipation, diarrhea and nausea  Genitourinary: Negative for difficulty urinating and urgency  Musculoskeletal: Positive for gait problem  Skin: Negative for color change  Neurological: Negative for dizziness, seizures, light-headedness and numbness  Psychiatric/Behavioral: Negative for agitation and sleep disturbance         Historical Information   Past Medical History:   Diagnosis Date    [facility] )     Cardiac disease     Diabetes mellitus ([facility] )     Hyperlipidemia     Hypertension      Past Surgical History:   Procedure Laterality Date    JOINT REPLACEMENT       Social History   History   Alcohol Use    Yes     Comment: rare     History   Drug Use No     History   Smoking Status    Never Smoker   Smokeless Tobacco    Never Used         Family History: non-contributory    Meds/Allergies   Current meds:   Current Facility-Administered Medications   Medication Dose Route Frequency    acetaminophen (TYLENOL) tablet 975 mg  975 mg Oral Q6H PRN    heparin (porcine) subcutaneous injection 5,000 Units  5,000 Units Subcutaneous Q8H [facility]    metoprolol tartrate (LOPRESSOR) tablet 25 mg  25 mg Oral Q12H [facility]    ondansetron (ZOFRAN) injection 4 mg  4 mg Intravenous Q6H PRN    pantoprazole (PROTONIX) EC tablet 40 mg  40 mg Oral Early Morning    pravastatin (PRAVACHOL) tablet 20 mg  20 mg Oral Daily With Dinner      Current PTA meds:  Prescriptions Prior to Admission Medication    aspirin 81 mg chewable tablet    Cholecalciferol (VITAMIN D3) 5000 units CAPS    cyanocobalamin (VITAMIN B-12) 500 mcg tablet    Ferrous Sulfate (IRON) 325 (65 Fe) MG TABS    fluticasone (FLONASE ALLERGY RELIEF) 50 mcg/act nasal spray    metFORMIN (GLUCOPHAGE) 1000 MG tablet    metoprolol tartrate (LOPRESSOR) 25 mg tablet    pantoprazole (PROTONIX) 40 mg tablet    simvastatin (ZOCOR) 10 mg tablet        No Known Allergies    Objective   Vitals: Blood pressure 134/68, pulse 58, temperature 97 5 °F (36 4 °C), temperature source Oral, resp  rate 18, height 6' (1 829 m), weight 90 7 kg (200 lb), SpO2 95 %  ,Body mass index is 27 12 kg/m²  Physical Exam   Constitutional: He is oriented to person, place, and time  He appears well-developed and well-nourished  HENT:   Head: Normocephalic  Eyes: Right eye exhibits no discharge  Left eye exhibits no discharge  Neck: Normal range of motion  Cardiovascular: Normal rate, regular rhythm and normal heart sounds  Exam reveals no gallop and no friction rub  No murmur heard  Abdominal: Soft  Bowel sounds are normal  He exhibits no distension  There is no tenderness  There is no rebound  Musculoskeletal: Normal range of motion  Neurological: He is alert and oriented to person, place, and time  Skin: Skin is warm and dry  Psychiatric: He has a normal mood and affect  Nursing note and vitals reviewed  Lab Results:            Invalid input(s): LABALBU    Imaging Studies: I have personally reviewed pertinent reports  EKG, Pathology, and Other Studies: I have personally reviewed pertinent reports  VTE Prophylaxis: Sequential compression device (Venodyne)     Code Status: Level 1 - Full Code      Counseling/Coordination of Care: Total floor / unit time spent today 35 minutes  Greater than 50% of total time was spent with the patient and / or family counseling and / or coordination of care   A description of the counseling / coordination of care: Geriatric consult

## 2018-12-26 NOTE — DISCHARGE SUMMARY
Discharge Summary - Zak Jensen 82 y o  male MRN: [id number]    Unit/Bed#: [facility] 388-74 Encounter: [id number]    Admission Date:   Admission Orders     Ordered        12/26/18 0423  Inpatient Admission  Once               Admitting Diagnosis: Frontal skull fracture ([facility] ) [S02  0XXA]  Ambulatory dysfunction [R26 2]  Closed fracture of frontal bone, initial encounter ([facility] ) [S02  0XXA]    HPI: Per resident Socorro Mosley MD, "Zak Jensen is a 82 y o  male who presents following a fall on ASA 2 days prior  Patient states that he was working in his garage around 10:00 a m , at that time he lost his balance, falling forward with head strike and no LOC  Patient states that he had a similar episode yesterday, went to fall again and caught his daughter to regain his balance  States he uses a cane to ambulate in consistently  Lives with his wife and is very independent  Does endorse a history of more frequent falls over the past 2-3 weeks accompanied with feeling of generalized weakness       Patient was initially seen at [facility] where CT imaging was concerning for a skull fracture and subsequently transferred here for further evaluation "    Procedures Performed: No orders of the defined types were placed in this encounter  Summary of Hospital Course:   Patient was admitted to the Trauma service on 12/26/18 with frontal skull fracture  Neurosurgery was consulted and no surgical intervention was required  Patient worked with physical and occupational therapy and was cleared for home PT  He was discharged to home later that day  Significant Findings, Care, Treatment and Services Provided:     Ct Head Wo Contrast    Result Date: 12/26/2018  Impression: Comminuted and depressed fracture of the anterior wall of the left frontal sinus with associated small to moderate sized left frontal scalp hematoma  The posterior wall of the left frontal sinus appears intact  No acute intracranial process is seen  Other findings as above  Workstation performed: [id number]     Ct Spine Cervical Wo Contrast    Result Date: 12/26/2018  Impression: Degenerative changes as described but no evidence of acute cervical spine injury  Workstation performed: [id number]     Complications:   none    Discharge Diagnosis:   1  Left comminuted and depressed frontal sinus fracture    Resolved Problems  Date Reviewed: 12/26/2018    None          Condition at Discharge: good         Discharge instructions/Information to patient and family:   See after visit summary for information provided to patient and family  Provisions for Follow-Up Care:  See after visit summary for information related to follow-up care and any pertinent home health orders  PCP: Heri Soler MD    Disposition: Home    Planned Readmission: No    Discharge Statement   I spent 22 minutes discharging the patient  This time was spent on the day of discharge  I had direct contact with the patient on the day of discharge  Additional documentation is required if more than 30 minutes were spent on discharge  Discharge Medications:  See after visit summary for reconciled discharge medications provided to patient and family

## 2018-12-26 NOTE — DISCHARGE INSTRUCTIONS
Facial Fracture   WHAT YOU NEED TO KNOW:   A facial fracture is a break in one or more of the bones in your face  The bones in your face include those around your eye, your cheekbones, and the bones of your nose and jaw  A facial fracture may also cause damage to nearby tissue  DISCHARGE INSTRUCTIONS:   Medicines:   · Decongestant medicine:  Decongestants help decrease swelling in your nose and sinuses  This medicine may also help you breathe easier  · Pain medicine: You may be given a prescription medicine to decrease pain  Do not wait until the pain is severe before you take this medicine  · Steroid medicine: This medicine helps decrease swelling in your face  · Antibiotic medicine:  Antibiotic medicine helps treat an infection caused by bacteria  This medicine may be given if you have an open wound  · Take your medicine as directed  Contact your healthcare provider if you think your medicine is not helping or if you have side effects  Tell him of her if you are allergic to any medicine  Keep a list of the medicines, vitamins, and herbs you take  Include the amounts, and when and why you take them  Bring the list or the pill bottles to follow-up visits  Carry your medicine list with you in case of an emergency  Follow up with your healthcare provider as directed:  Write down your questions so you remember to ask them during your visits  Nutrition:  You may not be able to eat solid food for a period of time  You may first be started on a liquid diet  Examples of liquids you may be able to have include, water, broth, gelatin, apple juice, or lemon-lime soda pop  After a few days, you may be allowed to eat soft foods, such as applesauce, bananas, cooked cereal, cottage cheese, pudding, and yogurt  Ask for more information about the type of foods you can eat  Rehabilitation:  If you had surgery to fix your facial fracture, you may need oral and facial rehabilitation   This is done to restore normal use and movement of your facial muscles  Ask for more information about rehabilitation  Help prevent a facial fracture:   · Wear a helmet when you ride a bicycle or a motorcycle  · Wear a seatbelt at all times when you are inside a motor vehicle  · Wear protective headgear and eyewear during sporting activities  Self-care:   · Apply ice:  Ice helps decrease swelling and pain  Ice may also help prevent tissue damage  Use an ice pack or put crushed ice in a plastic bag  Cover it with a towel and place it on your face for 15 to 20 minutes every hour as directed  · Keep your head elevated:  Keep you head above the level of your heart as often as you can  This will help decrease swelling and pain  Prop your head on pillows or blankets to keep it elevated comfortably  · Avoid putting pressure on your face:      ¨ Do not sleep on the injured side of your face  Pressure on the area of your injury may cause further damage  ¨ Sneeze with your mouth open to decrease pressure on your broken facial bones  Too much pressure from a sneeze may cause your broken bones to move and cause more damage  ¨ Try not to blow your nose because it may cause more damage if you have a fracture near your eye  The pressure from blowing your nose may pinch the nerve of your eye and cause permanent damage  · Clean your mouth carefully: It may be hard to clean your teeth if have an injury or fracture near your mouth  Your will be shown the best way to do this so you do not hurt yourself  A water pick or a child-sized soft toothbrush may work well to clean your mouth  Contact your healthcare provider if:   · You are bleeding from a wound on your face  · You have double vision or you suddenly have problems with your eyesight  · You have questions or concerns about your condition or care  Return to the emergency department if:   · You have clear or pinkish fluid draining from your nose or mouth      · You have numbness in your face  · You have worsening pain in your eye or face  · You suddenly have trouble chewing or swallowing  · You suddenly feel lightheaded and short of breath  · You have chest pain when you take a deep breath or cough  You may cough up blood  · Your arm or leg feels warm, tender, and painful  It may look swollen and red  © 2017 [address] [employer] Information is for End User's use only and may not be sold, redistributed or otherwise used for commercial purposes  All illustrations and images included in CareNotes® are the copyrighted property of [employer] , [employer]  or [employer]  The above information is an  only  It is not intended as medical advice for individual conditions or treatments  Talk to your doctor, nurse or pharmacist before following any medical regimen to see if it is safe and effective for you

## 2018-12-26 NOTE — SOCIAL WORK
CM met with the patient to review the CM role and discuss possible dc needs  CM met with pt at bedside who is AAOx4 recently moved to [place] and lives in a 1 story ranch home with his wife with whom he cares for  Pt states he is independent with all ADL and IADL  States that up until 1 day ago he never used a cane but has been feeling like his "equilibrium" has been off and got a cane x1 day ago  Pt states he has a tub/ shower and grab bars  Pt denies any use of DME  Denies any use of VNA and IP Rehab  He denies any ETOH or Drug abuse he also denies any mental illness  Pt states he does not have a POA/ LW  Main contact: Lj Masterson (daughter) [phone]  Preferred [facility]acy: [facility] but will change at the beginning of the year  PCP: Rolando Gutierrez    CM to follow for discharge disposition  CM reviewed d/c planning process including the following: identifying help at home, patient preference for d/c planning needs, Discharge Lounge, [facility] Meds to Bed program, availability of treatment team to discuss questions or concerns patient and/or family may have regarding understanding medications and recognizing signs and symptoms once discharged  CM also encouraged patient to follow up with all recommended appointments after discharge  Patient advised of importance for patient and family to participate in managing patients medical well being

## 2020-03-20 ENCOUNTER — HOSPITAL ENCOUNTER (INPATIENT)
Facility: HOSPITAL | Age: 85
LOS: 1 days | Discharge: HOME HEALTH | DRG: 552 | End: 2020-03-22
Attending: EMERGENCY MEDICINE | Admitting: SURGERY
Payer: MEDICARE

## 2020-03-20 ENCOUNTER — APPOINTMENT (EMERGENCY)
Dept: RADIOLOGY | Facility: HOSPITAL | Age: 85
DRG: 552 | End: 2020-03-20
Payer: MEDICARE

## 2020-03-20 DIAGNOSIS — S22.089A CLOSED T12 FRACTURE (HCC): Primary | ICD-10-CM

## 2020-03-20 DIAGNOSIS — R26.2 AMBULATORY DYSFUNCTION: ICD-10-CM

## 2020-03-20 DIAGNOSIS — S01.01XA LACERATION OF SCALP, INITIAL ENCOUNTER: ICD-10-CM
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
## 2020-03-20 LAB
ANION GAP SERPL CALCULATED.3IONS-SCNC: 8 MMOL/L (ref 4–13)
BASOPHILS # BLD AUTO: 0.03 THOUSANDS/ΜL (ref 0–0.1)
BASOPHILS NFR BLD AUTO: 0 % (ref 0–1)
BUN SERPL-MCNC: 18 MG/DL (ref 5–25)
CALCIUM SERPL-MCNC: 8.7 MG/DL (ref 8.3–10.1)
CHLORIDE SERPL-SCNC: 109 MMOL/L (ref 100–108)
CO2 SERPL-SCNC: 23 MMOL/L (ref 21–32)
CREAT SERPL-MCNC: 1.54 MG/DL (ref 0.6–1.3)
EOSINOPHIL # BLD AUTO: 0.26 THOUSAND/ΜL (ref 0–0.61)
EOSINOPHIL NFR BLD AUTO: 3 % (ref 0–6)
ERYTHROCYTE [DISTWIDTH] IN BLOOD BY AUTOMATED COUNT: 14.8 % (ref 11.6–15.1)
GFR SERPL CREATININE-BSD FRML MDRD: 41 ML/MIN/1.73SQ M
GLUCOSE SERPL-MCNC: 147 MG/DL (ref 65–140)
HCT VFR BLD AUTO: 37.1 % (ref 36.5–49.3)
HGB BLD-MCNC: 11.8 G/DL (ref 12–17)
IMM GRANULOCYTES # BLD AUTO: 0.06 THOUSAND/UL (ref 0–0.2)
IMM GRANULOCYTES NFR BLD AUTO: 1 % (ref 0–2)
LYMPHOCYTES # BLD AUTO: 1.95 THOUSANDS/ΜL (ref 0.6–4.47)
LYMPHOCYTES NFR BLD AUTO: 23 % (ref 14–44)
MCH RBC QN AUTO: 27.3 PG (ref 26.8–34.3)
MCHC RBC AUTO-ENTMCNC: 31.8 G/DL (ref 31.4–37.4)
MCV RBC AUTO: 86 FL (ref 82–98)
MONOCYTES # BLD AUTO: 0.71 THOUSAND/ΜL (ref 0.17–1.22)
MONOCYTES NFR BLD AUTO: 8 % (ref 4–12)
NEUTROPHILS # BLD AUTO: 5.52 THOUSANDS/ΜL (ref 1.85–7.62)
NEUTS SEG NFR BLD AUTO: 65 % (ref 43–75)
NRBC BLD AUTO-RTO: 0 /100 WBCS
PLATELET # BLD AUTO: 190 THOUSANDS/UL (ref 149–390)
PMV BLD AUTO: 10.2 FL (ref 8.9–12.7)
POTASSIUM SERPL-SCNC: 5 MMOL/L (ref 3.5–5.3)
RBC # BLD AUTO: 4.33 MILLION/UL (ref 3.88–5.62)
SODIUM SERPL-SCNC: 140 MMOL/L (ref 136–145)
WBC # BLD AUTO: 8.53 THOUSAND/UL (ref 4.31–10.16)

## 2020-03-20 PROCEDURE — 72131 CT LUMBAR SPINE W/O DYE: CPT

## 2020-03-20 PROCEDURE — 99285 EMERGENCY DEPT VISIT HI MDM: CPT

## 2020-03-20 PROCEDURE — 99222 1ST HOSP IP/OBS MODERATE 55: CPT | Performed by: EMERGENCY MEDICINE

## 2020-03-20 PROCEDURE — 72128 CT CHEST SPINE W/O DYE: CPT

## 2020-03-20 PROCEDURE — 0HQ0XZZ REPAIR SCALP SKIN, EXTERNAL APPROACH: ICD-10-PCS | Performed by: EMERGENCY MEDICINE

## 2020-03-20 PROCEDURE — 85025 COMPLETE CBC W/AUTO DIFF WBC: CPT | Performed by: EMERGENCY MEDICINE

## 2020-03-20 PROCEDURE — 93005 ELECTROCARDIOGRAM TRACING: CPT

## 2020-03-20 PROCEDURE — 70450 CT HEAD/BRAIN W/O DYE: CPT

## 2020-03-20 PROCEDURE — 36415 COLL VENOUS BLD VENIPUNCTURE: CPT | Performed by: EMERGENCY MEDICINE

## 2020-03-20 PROCEDURE — 80048 BASIC METABOLIC PNL TOTAL CA: CPT | Performed by: EMERGENCY MEDICINE

## 2020-03-20 RX ORDER — ACETAMINOPHEN 325 MG/1
975 TABLET ORAL ONCE
Status: COMPLETED | OUTPATIENT
Start: 2020-03-21 | End: 2020-03-21

## 2020-03-21 ENCOUNTER — APPOINTMENT (INPATIENT)
Dept: RADIOLOGY | Facility: HOSPITAL | Age: 85
DRG: 552 | End: 2020-03-21
Payer: MEDICARE

## 2020-03-21 PROBLEM — S22.089A CLOSED T12 FRACTURE (HCC): Status: ACTIVE | Noted: 2020-03-21

## 2020-03-21 PROBLEM — R29.6 FREQUENT FALLS: Status: ACTIVE | Noted: 2020-03-21

## 2020-03-21 PROBLEM — N17.9 ACUTE KIDNEY INJURY (HCC): Status: RESOLVED | Noted: 2018-02-09 | Resolved: 2020-03-21

## 2020-03-21 LAB
ANION GAP SERPL CALCULATED.3IONS-SCNC: 5 MMOL/L (ref 4–13)
ATRIAL RATE: 76 BPM
BASOPHILS # BLD AUTO: 0.01 THOUSANDS/ΜL (ref 0–0.1)
BASOPHILS NFR BLD AUTO: 0 % (ref 0–1)
BUN SERPL-MCNC: 18 MG/DL (ref 5–25)
CALCIUM SERPL-MCNC: 8.2 MG/DL (ref 8.3–10.1)
CHLORIDE SERPL-SCNC: 108 MMOL/L (ref 100–108)
CO2 SERPL-SCNC: 26 MMOL/L (ref 21–32)
CREAT SERPL-MCNC: 1.29 MG/DL (ref 0.6–1.3)
EOSINOPHIL # BLD AUTO: 0.13 THOUSAND/ΜL (ref 0–0.61)
EOSINOPHIL NFR BLD AUTO: 2 % (ref 0–6)
ERYTHROCYTE [DISTWIDTH] IN BLOOD BY AUTOMATED COUNT: 14.6 % (ref 11.6–15.1)
GFR SERPL CREATININE-BSD FRML MDRD: 51 ML/MIN/1.73SQ M
GLUCOSE SERPL-MCNC: 109 MG/DL (ref 65–140)
GLUCOSE SERPL-MCNC: 117 MG/DL (ref 65–140)
GLUCOSE SERPL-MCNC: 139 MG/DL (ref 65–140)
GLUCOSE SERPL-MCNC: 146 MG/DL (ref 65–140)
HCT VFR BLD AUTO: 34.9 % (ref 36.5–49.3)
HGB BLD-MCNC: 11 G/DL (ref 12–17)
IMM GRANULOCYTES # BLD AUTO: 0.04 THOUSAND/UL (ref 0–0.2)
IMM GRANULOCYTES NFR BLD AUTO: 1 % (ref 0–2)
LYMPHOCYTES # BLD AUTO: 1.42 THOUSANDS/ΜL (ref 0.6–4.47)
LYMPHOCYTES NFR BLD AUTO: 18 % (ref 14–44)
MCH RBC QN AUTO: 27.1 PG (ref 26.8–34.3)
MCHC RBC AUTO-ENTMCNC: 31.5 G/DL (ref 31.4–37.4)
MCV RBC AUTO: 86 FL (ref 82–98)
MONOCYTES # BLD AUTO: 0.67 THOUSAND/ΜL (ref 0.17–1.22)
MONOCYTES NFR BLD AUTO: 9 % (ref 4–12)
NEUTROPHILS # BLD AUTO: 5.54 THOUSANDS/ΜL (ref 1.85–7.62)
NEUTS SEG NFR BLD AUTO: 70 % (ref 43–75)
NRBC BLD AUTO-RTO: 0 /100 WBCS
P AXIS: 32 DEGREES
PLATELET # BLD AUTO: 168 THOUSANDS/UL (ref 149–390)
PMV BLD AUTO: 10 FL (ref 8.9–12.7)
POTASSIUM SERPL-SCNC: 4.7 MMOL/L (ref 3.5–5.3)
PR INTERVAL: 196 MS
QRS AXIS: -53 DEGREES
QRSD INTERVAL: 106 MS
QT INTERVAL: 380 MS
QTC INTERVAL: 427 MS
RBC # BLD AUTO: 4.06 MILLION/UL (ref 3.88–5.62)
SODIUM SERPL-SCNC: 139 MMOL/L (ref 136–145)
T WAVE AXIS: 47 DEGREES
VENTRICULAR RATE: 76 BPM
WBC # BLD AUTO: 7.81 THOUSAND/UL (ref 4.31–10.16)

## 2020-03-21 PROCEDURE — 97167 OT EVAL HIGH COMPLEX 60 MIN: CPT

## 2020-03-21 PROCEDURE — 80048 BASIC METABOLIC PNL TOTAL CA: CPT | Performed by: ORTHOPAEDIC SURGERY

## 2020-03-21 PROCEDURE — 93010 ELECTROCARDIOGRAM REPORT: CPT | Performed by: INTERNAL MEDICINE

## 2020-03-21 PROCEDURE — 99222 1ST HOSP IP/OBS MODERATE 55: CPT | Performed by: PHYSICIAN ASSISTANT

## 2020-03-21 PROCEDURE — 72080 X-RAY EXAM THORACOLMB 2/> VW: CPT

## 2020-03-21 PROCEDURE — 99222 1ST HOSP IP/OBS MODERATE 55: CPT | Performed by: SURGERY

## 2020-03-21 PROCEDURE — NC001 PR NO CHARGE: Performed by: SURGERY

## 2020-03-21 PROCEDURE — 97163 PT EVAL HIGH COMPLEX 45 MIN: CPT

## 2020-03-21 PROCEDURE — 82948 REAGENT STRIP/BLOOD GLUCOSE: CPT

## 2020-03-21 PROCEDURE — 85025 COMPLETE CBC W/AUTO DIFF WBC: CPT | Performed by: ORTHOPAEDIC SURGERY

## 2020-03-21 RX ORDER — DOCUSATE SODIUM 100 MG/1
100 CAPSULE, LIQUID FILLED ORAL 2 TIMES DAILY
Status: DISCONTINUED | OUTPATIENT
Start: 2020-03-21 | End: 2020-03-22 | Stop reason: HOSPADM

## 2020-03-21 RX ORDER — ASPIRIN 81 MG/1
81 TABLET, CHEWABLE ORAL DAILY
Status: DISCONTINUED | OUTPATIENT
Start: 2020-03-21 | End: 2020-03-22 | Stop reason: HOSPADM

## 2020-03-21 RX ORDER — ONDANSETRON 2 MG/ML
4 INJECTION INTRAMUSCULAR; INTRAVENOUS EVERY 6 HOURS PRN
Status: DISCONTINUED | OUTPATIENT
Start: 2020-03-21 | End: 2020-03-22

## 2020-03-21 RX ORDER — SODIUM CHLORIDE, SODIUM GLUCONATE, SODIUM ACETATE, POTASSIUM CHLORIDE, MAGNESIUM CHLORIDE, SODIUM PHOSPHATE, DIBASIC, AND POTASSIUM PHOSPHATE .53; .5; .37; .037; .03; .012; .00082 G/100ML; G/100ML; G/100ML; G/100ML; G/100ML; G/100ML; G/100ML
75 INJECTION, SOLUTION INTRAVENOUS CONTINUOUS
Status: DISPENSED | OUTPATIENT
Start: 2020-03-21 | End: 2020-03-21

## 2020-03-21 RX ORDER — HYDROMORPHONE HCL/PF 1 MG/ML
0.2 SYRINGE (ML) INJECTION EVERY 4 HOURS PRN
Status: DISCONTINUED | OUTPATIENT
Start: 2020-03-21 | End: 2020-03-22 | Stop reason: HOSPADM

## 2020-03-21 RX ORDER — ACETAMINOPHEN 325 MG/1
650 TABLET ORAL EVERY 6 HOURS SCHEDULED
Status: DISCONTINUED | OUTPATIENT
Start: 2020-03-21 | End: 2020-03-22 | Stop reason: HOSPADM

## 2020-03-21 RX ORDER — OXYCODONE HYDROCHLORIDE 5 MG/1
2.5 TABLET ORAL EVERY 4 HOURS PRN
Status: DISCONTINUED | OUTPATIENT
Start: 2020-03-21 | End: 2020-03-22 | Stop reason: HOSPADM

## 2020-03-21 RX ORDER — PANTOPRAZOLE SODIUM 40 MG/1
40 TABLET, DELAYED RELEASE ORAL
Status: DISCONTINUED | OUTPATIENT
Start: 2020-03-21 | End: 2020-03-22 | Stop reason: HOSPADM

## 2020-03-21 RX ORDER — OXYCODONE HYDROCHLORIDE 5 MG/1
5 TABLET ORAL EVERY 4 HOURS PRN
Status: DISCONTINUED | OUTPATIENT
Start: 2020-03-21 | End: 2020-03-22 | Stop reason: HOSPADM

## 2020-03-21 RX ORDER — ATORVASTATIN CALCIUM 40 MG/1
40 TABLET, FILM COATED ORAL
Status: DISCONTINUED | OUTPATIENT
Start: 2020-03-21 | End: 2020-03-22 | Stop reason: HOSPADM

## 2020-03-21 RX ADMIN — ACETAMINOPHEN 650 MG: 325 TABLET ORAL at 17:30

## 2020-03-21 RX ADMIN — METOPROLOL TARTRATE 25 MG: 25 TABLET, FILM COATED ORAL at 21:12

## 2020-03-21 RX ADMIN — ENOXAPARIN SODIUM 30 MG: 30 INJECTION SUBCUTANEOUS at 21:12

## 2020-03-21 RX ADMIN — ATORVASTATIN CALCIUM 40 MG: 40 TABLET, FILM COATED ORAL at 17:30

## 2020-03-21 RX ADMIN — ACETAMINOPHEN 975 MG: 325 TABLET ORAL at 01:33

## 2020-03-21 RX ADMIN — PANTOPRAZOLE SODIUM 40 MG: 40 TABLET, DELAYED RELEASE ORAL at 06:03

## 2020-03-21 RX ADMIN — ACETAMINOPHEN 650 MG: 325 TABLET ORAL at 12:03

## 2020-03-21 RX ADMIN — DOCUSATE SODIUM 100 MG: 100 CAPSULE, LIQUID FILLED ORAL at 08:36

## 2020-03-21 RX ADMIN — METOPROLOL TARTRATE 25 MG: 25 TABLET, FILM COATED ORAL at 06:06

## 2020-03-21 RX ADMIN — SODIUM CHLORIDE, SODIUM GLUCONATE, SODIUM ACETATE, POTASSIUM CHLORIDE, MAGNESIUM CHLORIDE, SODIUM PHOSPHATE, DIBASIC, AND POTASSIUM PHOSPHATE 75 ML/HR: .53; .5; .37; .037; .03; .012; .00082 INJECTION, SOLUTION INTRAVENOUS at 02:33

## 2020-03-21 RX ADMIN — ASPIRIN 81 MG 81 MG: 81 TABLET ORAL at 08:36

## 2020-03-21 RX ADMIN — ENOXAPARIN SODIUM 30 MG: 30 INJECTION SUBCUTANEOUS at 12:03

## 2020-03-21 RX ADMIN — DOCUSATE SODIUM 100 MG: 100 CAPSULE, LIQUID FILLED ORAL at 17:30

## 2020-03-21 RX ADMIN — CYANOCOBALAMIN TAB 500 MCG 1000 MCG: 500 TAB at 08:36

## 2020-03-21 RX ADMIN — METFORMIN HYDROCHLORIDE 1000 MG: 500 TABLET ORAL at 17:30

## 2020-03-21 RX ADMIN — ACETAMINOPHEN 650 MG: 325 TABLET ORAL at 06:03

## 2020-03-21 NOTE — PROGRESS NOTES
Progress Note - Perfecto Carter 1935, 82 y o  male MRN: [id number]    Unit/Bed#: [facility] 886-17 Encounter: [id number]    Primary Care Provider: Fabio Colon MD   Date and time admitted to hospital: 3/20/2020 10:14 PM        Closed T12 fracture ([facility] [address] )  Assessment & Plan  T12 fracture through vertebral body noted on imaging  TLSO brace when sitting up in bed and when standing/walking  Neurologically intact in lower extremities       Ambulatory dysfunction  Assessment & Plan  PT/OT evaluation    CAD (coronary artery disease)  Assessment & Plan  Continue home aspirin    HLD (hyperlipidemia)  Assessment & Plan  Continue home statin    Essential hypertension  Assessment & Plan  Continue home metoprolol    T2DM (type 2 diabetes mellitus) ([facility] [address] )  Assessment & Plan  Sliding-scale insulin algorithm 3        Disposition: Med Surg      SUBJECTIVE:  Chief Complaint:  Back pain    Subjective:  Patient presented overnight with mechanical fall found have T12 vertebral body fracture  No other traumatic injuries were noted  Patient does endorse mid thoracic back pain  He denies any neurologic deficits  Denies any headache, altered mental status, nausea vomiting or chest pain, dyspnea abdominal pain associated with symptoms  Patient's pain is well controlled when he is not moving but is exacerbated by sitting up and moving        OBJECTIVE:     Meds/Allergies     Current Facility-Administered Medications:     acetaminophen (TYLENOL) tablet 650 mg, 650 mg, Oral, Q6H [facility], Jame Blankenship MD, 650 mg at 03/21/20 0603    aspirin chewable tablet 81 mg, 81 mg, Oral, Daily, Jame Blankenship MD, 81 mg at 03/21/20 0836    atorvastatin (LIPITOR) tablet 40 mg, 40 mg, Oral, Daily With Dinner, Jame Blankenship MD    cyanocobalamin (VITAMIN B-12) tablet 1,000 mcg, 1,000 mcg, Oral, Daily, Jame Blankenship MD, 1,000 mcg at 03/21/20 0836    docusate sodium (COLACE) capsule 100 mg, 100 mg, Oral, BID, Jame Blankenship MD, 100 mg at 03/21/20 0836    enoxaparin (LOVENOX) subcutaneous injection 30 mg, 30 mg, Subcutaneous, Q12H [facility], Chandra Marino MD    HYDROmorphone (DILAUDID) injection 0 2 mg, 0 2 mg, Intravenous, Q4H PRN, Pan Baez MD    metFORMIN (GLUCOPHAGE) tablet 1,000 mg, 1,000 mg, Oral, BID With Meals, Chandra Marino MD    metoprolol tartrate (LOPRESSOR) tablet 25 mg, 25 mg, Oral, Q12H [facility], Pan Baez MD, 25 mg at 03/21/20 0606    ondansetron (ZOFRAN) injection 4 mg, 4 mg, Intravenous, Q6H PRN, Pan Baez MD    oxyCODONE (ROXICODONE) IR tablet 2 5 mg, 2 5 mg, Oral, Q4H PRN, Pan Baez MD    oxyCODONE (ROXICODONE) IR tablet 5 mg, 5 mg, Oral, Q4H PRN, Pan Baez MD    pantoprazole (PROTONIX) EC tablet 40 mg, 40 mg, Oral, Early Morning, Pan Baez MD, 40 mg at 03/21/20 0603     Vitals:   Vitals:    03/21/20 0606   BP: 132/80   Pulse: 74   Resp:    Temp:    SpO2: 93%       Intake/Output:  I/O       03/19 0701 - 03/20 0700 03/20 0701 - 03/21 0700 03/21 0701 - 03/22 0700    P  O   0 240    Total Intake(mL/kg)  0 (0) 240 (2 6)    Urine (mL/kg/hr)  500     Total Output  500     Net  -500 +240                  Nutrition/GI Proph/Bowel Reg: colace    Physical Exam:   GENERAL APPEARANCE:  No acute distress  NEURO:  Alert oriented x3, GCS 15, following commands in all 4 extremities  Strength 5/5 in all 4 extremities  HEENT:  Atraumatic  CV:  No murmurs auscultated  LUNGS:  Lungs are clear bilaterally  GI: Abdomen is soft, nondistended, nontender  No rebound tenderness or guarding is noted  No masses palpated  Normal bowel sounds  MSK:  Patient with thoracic point tenderness  No step-offs or deformity noted  SKIN:  No rashes noted    Invasive Devices     Peripheral Intravenous Line            Peripheral IV 03/20/20 Right Forearm less than 1 day                 Lab Results: Results: I have personally reviewed pertinent reports      Imaging/EKG Studies: Results: I have personally reviewed pertinent reports      Other Studies: NA  VTE Prophylaxis: Enoxaparin (Lovenox)

## 2020-03-21 NOTE — H&P
H&P Exam - Trauma   Bing Blackwell 82 y o  male MRN: [id number]  Unit/Bed#: ED 11 Encounter: [id number]    Assessment/Plan   Trauma Alert: Evaluation  Model of Arrival: Ambulance  Trauma Team: Attending Eddy Medellin and Residents Jatin  Consultants: Neurosurgery    Trauma Active Problems:   -status post mechanical fall  -T12 vertebral body fracture  -scalp laceration  -CHARLES  -right knee abrasion    Trauma Plan:   -Admit to medsurg  -Neurosurgery consult  -PT OT  -neuro checks  -Pain control  -Local wound care  -Case management  -geriatrics consult    Chief Complaint:      History of Present Illness   HPI:  Bing Blackwell is a 82 y o  male who presents after a fall at home  Today he slipped and hit his head against the refrigerator and then fell to the ground  He states he had a fall 2 days ago Wednesday and since then has been having increased back pain  He states he has been feeling with an unsteady gait and off balance for a while  Has mild headache, denies chest pain, SOB, nausea, vomiting, leg pain coma bowel incontinence, urinary incontinence  He takes aspirin daily, no blood thinners  Mechanism:Fall    Review of Systems   Constitutional: Negative  HENT:        Headache and scalp laceration   Eyes: Negative  Respiratory: Negative  Cardiovascular: Negative  Gastrointestinal: Negative  Endocrine: Negative  Genitourinary: Negative  Nocturia   Musculoskeletal: Positive for back pain and gait problem  Negative for myalgias, neck pain and neck stiffness  Skin: Negative  Allergic/Immunologic: Negative  Neurological: Positive for headaches  Hematological: Negative  Psychiatric/Behavioral: Negative  All other systems reviewed and are negative  12-point, complete review of systems was reviewed and negative except as stated above         Historical Information   Efforts to obtain history included the following sources: other medical personnel, Patient    Past Medical History: Diagnosis Date    Acute MI [facility])     Cardiac disease     Diabetes mellitus ([address] )     Hyperlipidemia     Hypertension      Past Surgical History:   Procedure Laterality Date    JOINT REPLACEMENT       Social History   Social History     Substance and Sexual Activity   Alcohol Use Yes    Comment: rare     Social History     Substance and Sexual Activity   Drug Use No     Social History     Tobacco Use   Smoking Status Never Smoker   Smokeless Tobacco Never Used     E-Cigarette/Vaping     E-Cigarette/Vaping Substances       There is no immunization history on file for this patient  Last Tetanus: Will update  Family History: Non-contributory      Meds/Allergies   PTA meds:   Prior to Admission Medications   Prescriptions Last Dose Informant Patient Reported? Taking?    Cholecalciferol (VITAMIN D3) 5000 units CAPS   Yes No   Sig: Take by mouth   Ferrous Sulfate (IRON) 325 (65 Fe) MG TABS   Yes No   Sig: Take by mouth   Methylprednisolone 4 MG TBPK   No No   Sig: Use as directed on package   aspirin 81 mg chewable tablet   Yes No   Sig: Chew 81 mg daily   cyanocobalamin (VITAMIN B-12) 500 mcg tablet   Yes No   Sig: Take 1,000 mcg by mouth daily   fluticasone (FLONASE ALLERGY RELIEF) 50 mcg/act nasal spray   Yes No   Si sprays into each nostril daily   metFORMIN (GLUCOPHAGE) 1000 MG tablet   Yes No   Sig: Take 1,000 mg by mouth 2 (two) times a day with meals   metoprolol tartrate (LOPRESSOR) 25 mg tablet   Yes No   Sig: Take 25 mg by mouth every 12 (twelve) hours   pantoprazole (PROTONIX) 40 mg tablet   Yes No   Sig: Take 40 mg by mouth daily   simvastatin (ZOCOR) 10 mg tablet   Yes No   Sig: Take 40 mg by mouth daily at bedtime        Facility-Administered Medications: None       No Known Allergies      PHYSICAL EXAM        Objective   Vitals:   First set: Temperature: 97 9 °F (36 6 °C) (20)  Pulse: 73 (20)  Respirations: 18 (20)  Blood Pressure: 154/86 (20 7942)    Primary Survey:   (A) Airway:  Intact  (B) Breathing:  Clear to auscultation bilaterally  (C) Circulation: Pulses:   pedal  2/4 and radial  2/4  (D) Disabliity:  GCS Total:  15  (E) Expose:  Completed    Secondary Survey: (Click on Physical Exam tab above)  Physical Exam   Constitutional: He is oriented to person, place, and time  He appears well-developed and well-nourished  No distress  HENT:   Head: Normocephalic  Right Ear: External ear normal    Left Ear: External ear normal    Nose: Nose normal    Mouth/Throat: Oropharynx is clear and moist    Scalp laceration with 3 staples, no active bleeding   Eyes: Pupils are equal, round, and reactive to light  Conjunctivae and EOM are normal    Neck: Normal range of motion  Neck supple  No C-spine tenderness   Cardiovascular: Normal rate and intact distal pulses  Pulmonary/Chest: Effort normal and breath sounds normal    Abdominal: Soft  Bowel sounds are normal  There is no tenderness  Musculoskeletal: He exhibits tenderness  T spine tenderness  Right knee abrasion   Neurological: He is alert and oriented to person, place, and time  Skin: Skin is warm and dry  He is not diaphoretic  Nursing note and vitals reviewed        Invasive Devices     Peripheral Intravenous Line            Peripheral IV 03/20/20 Right Forearm less than 1 day                Lab Results:   BMP/CMP:   Lab Results   Component Value Date    SODIUM 140 03/20/2020    K 5 0 03/20/2020     (H) 03/20/2020    CO2 23 03/20/2020    BUN 18 03/20/2020    CREATININE 1 54 (H) 03/20/2020    CALCIUM 8 7 03/20/2020    EGFR 41 03/20/2020    and CBC:   Lab Results   Component Value Date    WBC 8 53 03/20/2020    HGB 11 8 (L) 03/20/2020    HCT 37 1 03/20/2020    MCV 86 03/20/2020     03/20/2020    MCH 27 3 03/20/2020    MCHC 31 8 03/20/2020    RDW 14 8 03/20/2020    MPV 10 2 03/20/2020    NRBC 0 03/20/2020     Imaging/EKG Studies: Results: I have personally reviewed pertinent reports  and I have personally reviewed pertinent films in PACS  Other Studies: Trauma - Ct Head Wo Contrast    Result Date: 3/20/2020  Impression: No intracranial hemorrhage or calvarial fracture  Workstation performed: [id number]     Ct Spine Thoracic & Lumbar Wo Contrast    Result Date: 3/20/2020  Impression: Transverse fracture through the T12 vertebral body   Workstation performed: [id number]       Code Status: Level 1 - Full Code  Advance Directive and Living Will:      Power of :    POLST:

## 2020-03-21 NOTE — PLAN OF CARE
Problem: Potential for Falls  Goal: Patient will remain free of falls  Description  INTERVENTIONS:  - Assess patient frequently for physical needs  -  Identify cognitive and physical deficits and behaviors that affect risk of falls    -  [place] fall precautions as indicated by assessment   - Educate patient/family on patient safety including physical limitations  - Instruct patient to call for assistance with activity based on assessment  - Modify environment to reduce risk of injury  - Consider OT/PT consult to assist with strengthening/mobility  Outcome: Progressing     Problem: PAIN - ADULT  Goal: Verbalizes/displays adequate comfort level or baseline comfort level  Description  Interventions:  - Encourage patient to monitor pain and request assistance  - Assess pain using appropriate pain scale  - Administer analgesics based on type and severity of pain and evaluate response  - Implement non-pharmacological measures as appropriate and evaluate response  - Consider cultural and social influences on pain and pain management  - Notify physician/advanced practitioner if interventions unsuccessful or patient reports new pain  Outcome: Progressing     Problem: INFECTION - ADULT  Goal: Absence or prevention of progression during hospitalization  Description  INTERVENTIONS:  - Assess and monitor for signs and symptoms of infection  - Monitor lab/diagnostic results  - Monitor all insertion sites, i e  indwelling lines, tubes, and drains  - Monitor endotracheal if appropriate and nasal secretions for changes in amount and color  - [place] appropriate cooling/warming therapies per order  - Administer medications as ordered  - Instruct and encourage patient and family to use good hand hygiene technique  - Identify and instruct in appropriate isolation precautions for identified infection/condition  Outcome: Progressing     Problem: SAFETY ADULT  Goal: Maintain or return to baseline ADL function  Description  INTERVENTIONS:  -  Assess patient's ability to carry out ADLs; assess patient's baseline for ADL function and identify physical deficits which impact ability to perform ADLs (bathing, care of mouth/teeth, toileting, grooming, dressing, etc )  - Assess/evaluate cause of self-care deficits   - Assess range of motion  - Assess patient's mobility; develop plan if impaired  - Assess patient's need for assistive devices and provide as appropriate  - Encourage maximum independence but intervene and supervise when necessary  - Involve family in performance of ADLs  - Assess for home care needs following discharge   - Consider OT consult to assist with ADL evaluation and planning for discharge  - Provide patient education as appropriate  Outcome: Progressing  Goal: Maintain or return mobility status to optimal level  Description  INTERVENTIONS:  - Assess patient's baseline mobility status (ambulation, transfers, stairs, etc )    - Identify cognitive and physical deficits and behaviors that affect mobility  - Identify mobility aids required to assist with transfers and/or ambulation (gait belt, sit-to-stand, lift, walker, cane, etc )  - [place] fall precautions as indicated by assessment  - Record patient progress and toleration of activity level on Mobility SBAR; progress patient to next Phase/Stage  - Instruct patient to call for assistance with activity based on assessment  - Consider rehabilitation consult to assist with strengthening/weightbearing, etc   Outcome: Progressing     Problem: DISCHARGE PLANNING  Goal: Discharge to home or other facility with appropriate resources  Description  INTERVENTIONS:  - Identify barriers to discharge w/patient and caregiver  - Arrange for needed discharge resources and transportation as appropriate  - Identify discharge learning needs (meds, wound care, etc )  - Arrange for interpretive services to assist at discharge as needed  - Refer to Case Management Department for coordinating discharge planning if the patient needs post-hospital services based on physician/advanced practitioner order or complex needs related to functional status, cognitive ability, or social support system  Outcome: Progressing

## 2020-03-21 NOTE — ED ATTENDING ATTESTATION
Marian Gastelum MD, saw and evaluated the patient  All available labs and X-rays were ordered by me or the resident and have been reviewed by myself  I discussed the patient with the resident / non-physician and agree with the resident's / non-physician practitioner's findings and plan as documented in the resident's / non-physician practicitioner's note, except where noted  At this point, I agree with the current assessment done in the ED  I was present during key portions of all procedures performed unless otherwise stated  Chief Complaint   Patient presents with    Trauma     see trauma documentaiton     This is a 82 y o  male presenting for evaluation of fall  The patient is chronically unstable on his feet  He is supposed to use a cane and walk around but doesn't do so  He had a fall yesterday; he had another today but during this fall when he wasn't using his cane, he hit his head  EMS called  Found with a "pint of blood" near the lac  No double vision / blurring vision  No f/ch/n/v/cp/sob  Back pain mildly  Denies any urinary tract infection symptoms (burning, itching, pain, blood, frequency)  No sick contacts  +ASA;  No other AP/AC    Trauma Alert: Trauma Acuity: C  Model of Arrival: Mode of Arrival: BLS via Tra[facility] and [facility]  Trauma Team: Current Providers  Attending Provider: Kimberly Marinelli MD  Attending Provider: Jonas Cheadle, MD  Resident: Vimal Stiles MD  ED Technician: Rodriguez Hobson  Registered Nurse: Geraldine Pagan RN  Registered Nurse: Nicole Zeng, RN  Resident: Kira Xavier MD  Registered Nurse: Colonel Lucia, RN  Patient Care Assistant: Keren Arreola  Registered Nurse: Anahi Trujillo, ZARINA  Occupational Therapist: Wili Manning OT  Consulting Physician: Kapil Cowan MD  Consulting Physician: Crispin Jade MD  Patient Care Assistant: Jae Funk  Registered Nurse: Maria Esther Herrera, ZARINA  Registered Nurse: Jonas Mondragon Clara Nash, RN  Registered Nurse: Ting Mercado, RN  Patient Care Assistant: Jim Espinal  Registered Nurse: Aldo Angel, RN  Patient Care Assistant: Evy Atkins  Patient Care Assistant: Scotty Lazar    Chief Complaint   Patient presents with    Trauma     see trauma documentaiton       Mechanism:  Details of Incident: slipped and fell in kitchen from standing height  Negative LOC  ASA daily  per ems "pint of blood on the floor"  Injury Date: 03/20/20          Temperature: 97 9 °F (36 6 °C) (03/20/20 2214)  Pulse: 73 (03/20/20 2214)  Respirations: 18 (03/20/20 2214)  Blood Pressure: 154/86 (03/20/20 2214)  SpO2: 96 % (03/20/20 2214)    Primary Survey:   (A) Airway: intact  (B) Breathing: bilateral breath sounds   (C) Circulation: Pulses:   carotid  4/4, pedal  4/4 and radial  4/4  (D) Disabliity:  GCS Total:  15  (E) Expose:  Completed    Secondary Survey:  General: VSS, NAD, awake, alert  Well-nourished, well-developed  Appears stated age  Speaking normally in full sentences  Head: Normocephalic, traumatic, nontender  Eyes: PERRL, EOM-I  No diplopia  No hyphema  No subconjunctival hemorrhages  Symmetrical lids  ENT: Atraumatic external nose and ears  MMM  No malocclusion  No stridor  Normal phonation  No drooling  Normal swallowing  Neck: Symmetric, trachea midline  No JVD  No midline C-spine tenderness  CV: RRR  +S1/S2  No murmurs or gallops  Peripheral pulses +2 throughout  No chest wall tenderness  Lungs:   Unlabored No retractions  CTAB, lungs sounds equal bilateral    No tachypnea  Abd: +BS, soft, NT/ND    MSK:   FROM   Back:   No rashes  Skin: Dry, laceration on occiput on right side  Squeezing it projects blood, doesn't seem like arterial bleed  Will need 2-3 staples  Neuro: AAOx3, GCS 15, CN II-XII grossly intact  Motor grossly intact    Sensory grossly intact  Psychiatric/Behavioral: Appropriate mood and affect   Exam: deferred  Vitals:    03/21/20 1915 03/21/20 2109 20 2203 20 0636   BP:  133/80  117/67   BP Location:       Pulse:  71 73 67   Resp:    18   Temp:   98 3 °F (36 8 °C) 98 4 °F (36 9 °C)   TempSrc:       SpO2: 94% 98% 93% 97%   Weight:       Height:       A:  - Fall  - Head strike  - Laceration  P:  - local wound care  - CTH ? fails [place]  - The patient has none of the followin  Focal neurologic deficit  2  Midline spinal tenderness  3  AMS  4  Intoxication  5  Distracting injury  The C-Spine can be cleared clinically by these criteria; imaging is not required  - Will do imaging of areas that hurt  - small abrasion on the right anterior knee  - 13 point ROS was performed and all are normal unless stated in the history above  - Nursing note reviewed  Vitals reviewed  - Orders placed by myself and/or advanced practitioner / resident     - Previous chart was reviewed  - No language barrier    - History obtained from patient EMS crew  - There are no limitations to the history obtained  - Critical care time: Not applicable for this patient  Code Status: Prior  Advance Directive and Living Will:      Power of :    POLST:      Final Diagnosis:  1  Closed T12 fracture ([facility] [address] )    2  Ambulatory dysfunction    3  Fall, initial encounter    4  Laceration of scalp, initial encounter           Medications   multi-electrolyte (PLASMALYTE-A/ISOLYTE-S PH 7 4) IV solution (0 mL/hr Intravenous Stopped 3/21/20 1037)   acetaminophen (TYLENOL) tablet 975 mg (975 mg Oral Given 3/21/20 0133)   senna (SENOKOT) tablet 8 6 mg (8 6 mg Oral Given 3/22/20 0849)     XR spine thoracolumbar 2 vw   Final Result      1  T12 vertebral body fracture as noted above  Workstation performed: [id number]         TRAUMA - CT head wo contrast   Final Result      No intracranial hemorrhage or calvarial fracture                    Workstation performed: [id number]         CT spine thoracic & lumbar wo contrast   Final Result      Transverse fracture through the T12 vertebral body              Workstation performed: [id number]         XR spine thoracolumbar 2 vw    (Results Pending)     Orders Placed This Encounter   Procedures    Walker Rolling    TRAUMA - CT head wo contrast    CT spine thoracic & lumbar wo contrast    XR spine thoracolumbar 2 vw    XR spine thoracolumbar 2 vw    CBC and differential    Basic metabolic panel    Basic metabolic panel    CBC and differential    CBC and differential    Basic metabolic panel    Ambulatory Referral to [address] [place] [address] [address] [place] Discharge Diet    Notify admitting physician    Notify admitting physician on arrival    Lifting restrictions    No strenuous exercise    Shower Daily    No driving until   Marlene Seller Call provider for:  persistent nausea or vomiting    Call provider for:  severe uncontrolled pain    Call provider for:  difficulty breathing, headache or visual disturbances    Call provider for:  hives    Call provider for:  persistent dizziness or light-headedness    Call provider for:  extreme fatigue    No dressing needed    Other restrictions (specify):    Inpatient consult to Neurosurgery    Inpatient consult to Case Management    EKG RESULTS    ECG 12 lead    ECG 12 lead    Inpatient Admission     Labs Reviewed   CBC AND DIFFERENTIAL - Abnormal       Result Value Ref Range Status    WBC 8 53  4 31 - 10 16 Thousand/uL Final    RBC 4 33  3 88 - 5 62 Million/uL Final    Hemoglobin 11 8 (*) 12 0 - 17 0 g/dL Final    Hematocrit 37 1  36 5 - 49 3 % Final    MCV 86  82 - 98 fL Final    MCH 27 3  26 8 - 34 3 pg Final    MCHC 31 8  31 4 - 37 4 g/dL Final    RDW 14 8  11 6 - 15 1 % Final    MPV 10 2  8 9 - 12 7 fL Final    Platelets 274  855 - 390 Thousands/uL Final    nRBC 0  /100 WBCs Final    Neutrophils Relative 65  43 - 75 % Final    Immat GRANS % 1  0 - 2 % Final    Lymphocytes Relative 23  14 - 44 % Final    Monocytes Relative 8  4 - 12 % Final    Eosinophils Relative 3  0 - 6 % Final    Basophils Relative 0  0 - 1 % Final    Neutrophils Absolute 5 52  1 85 - 7 62 Thousands/µL Final    Immature Grans Absolute 0 06  0 00 - 0 20 Thousand/uL Final    Lymphocytes Absolute 1 95  0 60 - 4 47 Thousands/µL Final    Monocytes Absolute 0 71  0 17 - 1 22 Thousand/µL Final    Eosinophils Absolute 0 26  0 00 - 0 61 Thousand/µL Final    Basophils Absolute 0 03  0 00 - 0 10 Thousands/µL Final   BASIC METABOLIC PANEL - Abnormal    Sodium 140  136 - 145 mmol/L Final    Potassium 5 0  3 5 - 5 3 mmol/L Final    Chloride 109 (*) 100 - 108 mmol/L Final    CO2 23  21 - 32 mmol/L Final    ANION GAP 8  4 - 13 mmol/L Final    BUN 18  5 - 25 mg/dL Final    Creatinine 1 54 (*) 0 60 - 1 30 mg/dL Final    Comment: Standardized to IDMS reference method    Glucose 147 (*) 65 - 140 mg/dL Final    Comment:   If the patient is fasting, the ADA then defines impaired fasting glucose as > 100 mg/dL and diabetes as > or equal to 123 mg/dL  Specimen collection should occur prior to Sulfasalazine administration due to the potential for falsely depressed results  Specimen collection should occur prior to Sulfapyridine administration due to the potential for falsely elevated results      Calcium 8 7  8 3 - 10 1 mg/dL Final    eGFR 41  ml/min/1 73sq m Final    Narrative:     [place] guidelines for Chronic Kidney Disease (CKD):     Stage 1 with normal or high GFR (GFR > 90 mL/min/1 73 square meters)    Stage 2 Mild CKD (GFR = 60-89 mL/min/1 73 square meters)    Stage 3A Moderate CKD (GFR = 45-59 mL/min/1 73 square meters)    Stage 3B Moderate CKD (GFR = 30-44 mL/min/1 73 square meters)    Stage 4 Severe CKD (GFR = 15-29 mL/min/1 73 square meters)    Stage 5 End Stage CKD (GFR <15 mL/min/1 73 square meters)  Note: GFR calculation is accurate only with a steady state creatinine     Time reflects when diagnosis was documented in both MDM as applicable and the Disposition within this note     Time User Action Codes Description Comment 3/20/2020 11:40 PM Mattie Krill Add [R26 2] Ambulatory dysfunction     3/20/2020 11:40 PM Mattie Krill Add [H65  SQZW] Fall, initial encounter     3/20/2020 11:40 PM Mattie Krill Add [H90 861V] Closed T12 fracture ([address] )     3/20/2020 11:40 PM Mattie Krill Modify [R26 2] Ambulatory dysfunction     3/20/2020 11:40 PM Mattie Krill Modify [S22 089A] Closed T12 fracture ([address] )     3/20/2020 11:40 PM Mattie Krill Add [S01 01XA] Laceration of scalp, initial encounter       ED Disposition     ED Disposition Condition Date/Time Comment    Admit Stable Fri Mar 20, 2020 11:40 PM Case was discussed with Trauma and the patient's admission status was agreed to be Admission Status: inpatient status to the service of Dr Nabor Beard   Follow-up Information     Follow up With Specialties Details Why Contact Info Additional [id number] Neurosurgery Follow up Please call office to set up 2 weeks follow-up  appointment with PA-C/NP  Please complete your upright thoracolumbar spine x-rays in brace 1-2 days before your appointment date [address] [id number]  [facility], [address], [address] [place] [address], [address] [place] [address] Trauma Surgery Call Please call with concerns regarding your hospital stay  No f/u needed    [facility], [address], [facility] [address] [place], [address] [place] [address], [address]        Discharge Medication List as of 3/22/2020 10:19 AM      START taking these medications    Details   acetaminophen (TYLENOL) 325 mg tablet Take 2 tablets (650 mg total) by mouth every 6 (six) hours, Starting Sun 3/22/2020, Normal      docusate sodium (COLACE) 100 mg capsule Take 1 capsule (100 mg total) by mouth 2 (two) times a day, Starting Sun 3/22/2020, Normal      oxyCODONE (ROXICODONE) 5 mg immediate release tablet 2 5 mg to 5 mg p o  Every 6 hours as needed for moderate to severe pain, Normal         CONTINUE these medications which have NOT CHANGED    Details   aspirin 81 mg chewable tablet Chew 81 mg daily, Historical Med      Cholecalciferol (VITAMIN D3) 5000 units CAPS Take by mouth, Historical Med      cyanocobalamin (VITAMIN B-12) 500 mcg tablet Take 1,000 mcg by mouth daily, Historical Med      Ferrous Sulfate (IRON) 325 (65 Fe) MG TABS Take by mouth, Historical Med      fluticasone (FLONASE ALLERGY RELIEF) 50 mcg/act nasal spray 2 sprays into each nostril daily, Historical Med      metFORMIN (GLUCOPHAGE) 1000 MG tablet Take 1,000 mg by mouth 2 (two) times a day with meals, Historical Med      metoprolol tartrate (LOPRESSOR) 25 mg tablet Take 25 mg by mouth every 12 (twelve) hours, Historical Med      pantoprazole (PROTONIX) 40 mg tablet Take 40 mg by mouth daily, Historical Med      simvastatin (ZOCOR) 10 mg tablet Take 40 mg by mouth daily at bedtime  , Historical Med         STOP taking these medications       Methylprednisolone 4 MG TBPK Comments:   Reason for Stopping:             Outpatient Discharge Orders   Walker Rolling     XR spine thoracolumbar 2 vw   Standing Status: Future Standing Exp  Date: 03/22/24     Ambulatory Referral to Home Health   Standing Status: Future Standing Exp   Date: 03/22/21      Discharge Diet     Lifting restrictions     No strenuous exercise     Shower Daily     No driving until     Call provider for:  persistent nausea or vomiting     Call provider for:  severe uncontrolled pain     Call provider for:  difficulty breathing, headache or visual disturbances     Call provider for:  hives     Call provider for:  persistent dizziness or light-headedness     Call provider for:  extreme fatigue     No dressing needed     Other restrictions (specify):     Prior to Admission Medications Prescriptions Last Dose Informant Patient Reported? Taking? Cholecalciferol (VITAMIN D3) 5000 units CAPS   Yes No   Sig: Take by mouth   Ferrous Sulfate (IRON) 325 (65 Fe) MG TABS   Yes No   Sig: Take by mouth   Methylprednisolone 4 MG TBPK   No No   Sig: Use as directed on package   aspirin 81 mg chewable tablet   Yes No   Sig: Chew 81 mg daily   cyanocobalamin (VITAMIN B-12) 500 mcg tablet   Yes No   Sig: Take 1,000 mcg by mouth daily   fluticasone (FLONASE ALLERGY RELIEF) 50 mcg/act nasal spray   Yes No   Si sprays into each nostril daily   metFORMIN (GLUCOPHAGE) 1000 MG tablet   Yes No   Sig: Take 1,000 mg by mouth 2 (two) times a day with meals   metoprolol tartrate (LOPRESSOR) 25 mg tablet   Yes No   Sig: Take 25 mg by mouth every 12 (twelve) hours   pantoprazole (PROTONIX) 40 mg tablet   Yes No   Sig: Take 40 mg by mouth daily   simvastatin (ZOCOR) 10 mg tablet   Yes No   Sig: Take 40 mg by mouth daily at bedtime        Facility-Administered Medications: None       Portions of the record may have been created with voice recognition software  Occasional wrong word or "sound a like" substitutions may have occurred due to the inherent limitations of voice recognition software  Read the chart carefully and recognize, using context, where substitutions have occurred      Electronically signed by:  Patsy Joya

## 2020-03-21 NOTE — PLAN OF CARE
Problem: Potential for Falls  Goal: Patient will remain free of falls  Description  INTERVENTIONS:  - Assess patient frequently for physical needs  -  Identify cognitive and physical deficits and behaviors that affect risk of falls    -  [place] fall precautions as indicated by assessment   - Educate patient/family on patient safety including physical limitations  - Instruct patient to call for assistance with activity based on assessment  - Modify environment to reduce risk of injury  - Consider OT/PT consult to assist with strengthening/mobility  Outcome: Progressing     Problem: PAIN - ADULT  Goal: Verbalizes/displays adequate comfort level or baseline comfort level  Description  Interventions:  - Encourage patient to monitor pain and request assistance  - Assess pain using appropriate pain scale  - Administer analgesics based on type and severity of pain and evaluate response  - Implement non-pharmacological measures as appropriate and evaluate response  - Consider cultural and social influences on pain and pain management  - Notify physician/advanced practitioner if interventions unsuccessful or patient reports new pain  Outcome: Progressing     Problem: INFECTION - ADULT  Goal: Absence or prevention of progression during hospitalization  Description  INTERVENTIONS:  - Assess and monitor for signs and symptoms of infection  - Monitor lab/diagnostic results  - Monitor all insertion sites, i e  indwelling lines, tubes, and drains  - Monitor endotracheal if appropriate and nasal secretions for changes in amount and color  - [place] appropriate cooling/warming therapies per order  - Administer medications as ordered  - Instruct and encourage patient and family to use good hand hygiene technique  - Identify and instruct in appropriate isolation precautions for identified infection/condition  Outcome: Progressing     Problem: SAFETY ADULT  Goal: Maintain or return to baseline ADL function  Description  INTERVENTIONS:  -  Assess patient's ability to carry out ADLs; assess patient's baseline for ADL function and identify physical deficits which impact ability to perform ADLs (bathing, care of mouth/teeth, toileting, grooming, dressing, etc )  - Assess/evaluate cause of self-care deficits   - Assess range of motion  - Assess patient's mobility; develop plan if impaired  - Assess patient's need for assistive devices and provide as appropriate  - Encourage maximum independence but intervene and supervise when necessary  - Involve family in performance of ADLs  - Assess for home care needs following discharge   - Consider OT consult to assist with ADL evaluation and planning for discharge  - Provide patient education as appropriate  Outcome: Progressing Statement Selected Statement Selected

## 2020-03-21 NOTE — PLAN OF CARE
Problem: PHYSICAL THERAPY ADULT  Goal: Performs mobility at highest level of function for planned discharge setting  See evaluation for individualized goals  Description  Treatment/Interventions: Functional transfer training, LE strengthening/ROM, Therapeutic exercise, Endurance training, Patient/family training, Equipment eval/education, Bed mobility, Gait training, Spoke to nursing, Spoke to case management  Equipment Recommended: [place]       See flowsheet documentation for full assessment, interventions and recommendations  Note:   Prognosis: Good  Problem List: Decreased strength, Decreased range of motion, Decreased endurance, Impaired balance, Decreased mobility, Pain, Orthopedic restrictions  Assessment: Pt is 82 y o  male seen for PT evaluation s/p admit to [facility] on 3/20/2020 w/ fall on Wednesday 3/18/20 w/ back pain  CT thoracic & lumbar wo: transverse fracture through the T12 vertebral body  Per neurosurgery, no surgical intervention warranted  Pt is to wear TLSO backpack when OOB and HOB >45 degrees  PT consulted to assess pt's functional mobility and d/c needs  Order placed for PT eval and tx, w/ up in chair order  Comorbidities affecting pt's physical performance at time of assessment include: frequent falls, Type 2 DM, HTN, CAD, ambulatory dysfunction, a-fib  PTA, pt was independent w/ all functional mobility w/ SPC and lives w/ spouse in one level house w/ ramped entrance  Pt reports he assists wife with all ADLs and IADLs  Pt reports local children who will be assisting with wife upon d/c  Personal factors affecting pt at time of IE include: ambulating w/ assistive device, inability to navigate community distances, positive fall history and inability to perform IADLs   Please find objective findings from PT assessment regarding body systems outlined above with impairments and limitations including weakness, decreased ROM, impaired balance, decreased endurance, gait deviations, pain, decreased activity tolerance, decreased functional mobility tolerance, fall risk and orthopedic restrictions  The following objective measures performed on IE also reveal limitations: Modified [place]: 4 (moderate/severe disability)  Pt's clinical presentation is currently unstable/unpredictable seen in pt's presentation of recent admission for T12 fracture requiring medical attention, recent decline in function as compared to baseline, multiple lines, fall risk, pain, increased reliance on assistance for functional mobility as compared to baseline  Pt to benefit from continued PT tx to address deficits as defined above and maximize level of functional independent mobility and consistency  From PT/mobility standpoint, recommendation at time of d/c would be Home PT with family support pending progress in order to facilitate return to PLOF  Recommendation: Home PT, Home with family support     PT - OK to Discharge: Yes    See flowsheet documentation for full assessment

## 2020-03-21 NOTE — CONSULTS
Gricelda Brooke 1935, 82 y o  male MRN: [id number]    Unit/Bed#: [facility] 727-61 Encounter: [id number]    Primary Care Provider: Kristal Mills MD   Date and time admitted to hospital: 3/20/2020 10:14 PM   Consult completed 3/21/2020 at 08:15am      Inpatient consult to Neurosurgery  Consult performed by: Isabella Bruce PA-C  Consult ordered by: Eli Bhardwaj MD          Closed T12 fracture [facility])  Assessment & Plan  Imaging:  CT thoracic & lumbar wo: transverse fracture through the T12 vertebral body    Plan:  TLSO backpack when OOB and HOB >45 degrees  Upright thoracolumbar x-rays ordered for baseline  PT/OT evaluation  Pain control per primary team  No anticipated neurosurgical intervention  Ambulatory dysfunction  Assessment & Plan  PT/OT evaluation     Essential hypertension  Assessment & Plan  Continue home med    T2DM (type 2 diabetes mellitus) ([facility] [address] )  Assessment & Plan  Lab Results   Component Value Date    HGBA1C 7 0 (H) 02/09/2018       Recent Labs     03/21/20  0746   POCGLU 109       Blood Sugar Average: Last 72 hrs:  (P) 109    * Frequent falls  Assessment & Plan  PT/OT evaluation  Geriatric consult        History of Present Illness   HPI: Kathy Jordan is a 82y o  year old male with PMH including hypertension, hyperlipidemia, diabetes mellitus, cardiac disease who presents with complaint of back pain  He had a fall on Wednesday night and immediately had low back pain  He states his pain was constant, worse with movement  The pain rated 10 out of 10 when attempting to get up  He denies any radiation into his legs or groin  He denies any complication with urine/bowel  He admits to having an additional fall forward with positive head strike yesterday  He was evaluated in the ED, his CT thoracolumbar showed a transverse fracture of T12  Review of Systems   Constitutional: Negative for activity change and fever  HENT: Positive for tinnitus  Negative for trouble swallowing      Eyes: Negative for visual disturbance  Respiratory: Negative for shortness of breath  Cardiovascular: Negative for chest pain  Gastrointestinal: Negative for abdominal pain, constipation, diarrhea, nausea and vomiting  Genitourinary: Negative for difficulty urinating  Musculoskeletal: Positive for back pain  Negative for neck pain  Skin: Positive for wound  Negative for rash  Allergic/Immunologic: Positive for environmental allergies  Negative for food allergies  Neurological: Negative for dizziness, weakness, numbness and headaches  Hematological: Does not bruise/bleed easily  Psychiatric/Behavioral: Negative for confusion  The patient is not nervous/anxious  Historical Information   Past Medical History:   Diagnosis Date    Acute MI ([facility] )     Cardiac disease     Diabetes mellitus ([facility] )     Hyperlipidemia     Hypertension      Past Surgical History:   Procedure Laterality Date    JOINT REPLACEMENT       Social History     Substance and Sexual Activity   Alcohol Use Yes    Comment: rare     Social History     Substance and Sexual Activity   Drug Use No     Social History     Tobacco Use   Smoking Status Never Smoker   Smokeless Tobacco Never Used     History reviewed  No pertinent family history      Meds/Allergies   all current active meds have been reviewed and current meds:   Current Facility-Administered Medications   Medication Dose Route Frequency    acetaminophen (TYLENOL) tablet 650 mg  650 mg Oral Q6H [facility]    aspirin chewable tablet 81 mg  81 mg Oral Daily    atorvastatin (LIPITOR) tablet 40 mg  40 mg Oral Daily With Dinner    cyanocobalamin (VITAMIN B-12) tablet 1,000 mcg  1,000 mcg Oral Daily    docusate sodium (COLACE) capsule 100 mg  100 mg Oral BID    HYDROmorphone (DILAUDID) injection 0 2 mg  0 2 mg Intravenous Q4H PRN    insulin lispro (HumaLOG) 100 units/mL subcutaneous injection 1-6 Units  1-6 Units Subcutaneous TID AC    metoprolol tartrate (LOPRESSOR) tablet 25 mg  25 mg Oral Q12H [address]    multi-electrolyte (PLASMALYTE-A/ISOLYTE-S PH 7 4) IV solution  75 mL/hr Intravenous Continuous    ondansetron (ZOFRAN) injection 4 mg  4 mg Intravenous Q6H PRN    oxyCODONE (ROXICODONE) IR tablet 2 5 mg  2 5 mg Oral Q4H PRN    oxyCODONE (ROXICODONE) IR tablet 5 mg  5 mg Oral Q4H PRN    pantoprazole (PROTONIX) EC tablet 40 mg  40 mg Oral Early Morning     No Known Allergies    Objective   I/O       03/19 0701 - 03/20 0700 03/20 0701 - 03/21 0700 03/21 0701 - 03/22 0700    P  O   0     Total Intake(mL/kg)  0 (0)     Urine (mL/kg/hr)  500     Total Output  500     Net  -500                  Physical Exam   Constitutional: He is oriented to person, place, and time  He appears well-developed and well-nourished  HENT:   Head: Normocephalic  Right scalp laceration with staples in place   Eyes: EOM are normal    Cardiovascular: Normal rate  Pulmonary/Chest: Effort normal  No respiratory distress  Abdominal: Soft  There is no tenderness  There is no rebound  Musculoskeletal:        Cervical back: He exhibits no bony tenderness  Thoracic back: He exhibits bony tenderness  Lumbar back: He exhibits no bony tenderness  Midline and left paraspinal T12 tenderness on palpation  Neurological: He is alert and oriented to person, place, and time  He has normal strength  He has a normal Finger-Nose-Finger Test    Reflex Scores:       Bicep reflexes are 1+ on the right side and 1+ on the left side  Brachioradialis reflexes are 1+ on the right side and 1+ on the left side  Patellar reflexes are 1+ on the right side and 1+ on the left side  Achilles reflexes are 1+ on the right side and 1+ on the left side  Skin: Skin is warm  Psychiatric: His speech is normal and behavior is normal  Thought content normal      Neurologic Exam     Mental Status   Oriented to person, place, and time  Registration: recalls 3 of 3 objects   Recall at 5 minutes: recalls 2 of 3 objects  Follows 2 step commands  Attention: normal  Concentration: normal    Speech: speech is normal   Level of consciousness: alert  Knowledge: good  Able to perform simple calculations  Able to name object  Able to repeat  Normal comprehension  Cranial Nerves     CN II   Visual fields full to confrontation  CN III, IV, VI   Extraocular motions are normal    CN III: no CN III palsy  CN VI: no CN VI palsy  Nystagmus: none   Diplopia: none  Ophthalmoparesis: none  Upgaze: normal  Downgaze: normal  Conjugate gaze: present    CN V   Facial sensation intact  CN VII   Facial expression full, symmetric  CN VIII   CN VIII normal      CN XII   Tongue deviation: none    Motor Exam   Muscle bulk: normal  Overall muscle tone: normal  Right arm pronator drift: absent  Left arm pronator drift: absent    Strength   Strength 5/5 throughout  Sensory Exam   Light touch normal    Proprioception normal      Gait, Coordination, and Reflexes     Coordination   Finger to nose coordination: normal    Tremor   Resting tremor: absent    Reflexes   Right brachioradialis: 1+  Left brachioradialis: 1+  Right biceps: 1+  Left biceps: 1+  Right patellar: 1+  Left patellar: 1+  Right achilles: 1+  Left achilles: 1+  Right Sifuentes: absent  Left Sifuentes: absent  Right ankle clonus: absent  Left ankle clonus: absent        Vitals:Blood pressure 132/80, pulse 74, temperature 98 2 °F (36 8 °C), temperature source Oral, resp  rate 18, height 6' (1 829 m), weight 93 9 kg (207 lb), SpO2 93 %  ,Body mass index is 28 07 kg/m²       Lab Results:   Results from last 7 days   Lab Units 03/21/20  0555 03/20/20  2248   WBC Thousand/uL 7 81 8 53   HEMOGLOBIN g/dL 11 0* 11 8*   HEMATOCRIT % 34 9* 37 1   PLATELETS Thousands/uL 168 190   NEUTROS PCT % 70 65   MONOS PCT % 9 8     Results from last 7 days   Lab Units 03/21/20  0555 03/20/20  2248   POTASSIUM mmol/L 4 7 5 0   CHLORIDE mmol/L 108 109*   CO2 mmol/L 26 23   BUN mg/dL 18 18 CREATININE mg/dL 1 29 1 54*   CALCIUM mg/dL 8 2* 8 7                 No results found for: TROPONINT  ABG:No results found for: PHART, QFK7BHD, PO2ART, NLX8OPL, Y4ASMWTA, BEART, SOURCE    Imaging Studies: I have personally reviewed pertinent reports  and I have personally reviewed pertinent films in PACS    Trauma - Ct Head Wo Contrast    Result Date: 3/20/2020  Narrative: CT BRAIN - WITHOUT CONTRAST INDICATION:   trauma  COMPARISON:  None  TECHNIQUE:  CT examination of the brain was performed  In addition to axial images, coronal 2D reformatted images were created and submitted for interpretation  Radiation dose length product (DLP) for this visit:  853 41 mGy-cm   This examination, like all CT scans performed in the [facility], was performed utilizing techniques to minimize radiation dose exposure, including the use of iterative  reconstruction and automated exposure control  IMAGE QUALITY:  Diagnostic  FINDINGS: PARENCHYMA:  No intracranial mass, mass effect or midline shift  No CT signs of acute infarction  No acute parenchymal hemorrhage  VENTRICLES AND EXTRA-AXIAL SPACES:  Normal for the patient's age  VISUALIZED ORBITS AND PARANASAL SINUSES:  Unremarkable  CALVARIUM AND EXTRACRANIAL SOFT TISSUES:  Healing fracture along the anterior wall and left frontal sinus  Status post bilateral mitral antrectomy  Impression: No intracranial hemorrhage or calvarial fracture  Workstation performed: [id number]     Ct Spine Thoracic & Lumbar Wo Contrast    Result Date: 3/20/2020  Narrative: CT THORACIC AND LUMBAR SPINE INDICATION:   trauma  COMPARISON:  None TECHNIQUE:  Contiguous axial images were obtained  Sagittal and coronal reconstructions were performed  Radiation dose length product (DLP) for this visit:  1217 53 mGy-cm     This examination, like all CT scans performed in the [facility], was performed utilizing techniques to minimize radiation dose exposure, including the use of iterative reconstruction and automated exposure control  IMAGE QUALITY:  Diagnostic  FINDINGS: ALIGNMENT:  Normal alignment of the thoracolumbar spine  No subluxation  VERTEBRAL BODIES: Transverse fracture through the T12 vertebral body  Left sacralization of the L5 vertebral body   DEGENERATIVE CHANGES:  Mild multilevel degenerative disc disease  PARASPINAL SOFT TISSUES: Normal      Impression: Transverse fracture through the T12 vertebral body  Workstation performed: [id number]     EKG, Pathology, and Other Studies: I have personally reviewed pertinent reports     and I have personally reviewed pertinent films in PACS    VTE Prophylaxis: Sequential compression device [facility] Siad)     Code Status: Level 1 - Full Code  Advance Directive and Living Will:      Power of :    POLST:

## 2020-03-21 NOTE — ORTHOTIC NOTE
Orthotic Note            Date: 3/21/2020      Patient Name: Nabeel Reich     Back pack TLSO ordered by Leanna Perry PA-C        Reason for Consult:  Patient Active Problem List   Diagnosis    Dehydration    New onset a-fib ([facility] [address] )    T2DM (type 2 diabetes mellitus) ([facility] [address] )    Essential hypertension    HLD (hyperlipidemia)    GERD (gastroesophageal reflux disease)    CAD (coronary artery disease)    [facility]    Ambulatory dysfunction    Frequent falls    Closed T12 fracture ([facility] [address] )     [place] backpack TLSO sized to an XL and the back portion extended higher for optimal fit  PT/OT present and assisted pt to the edge of the bed where the brace was then donned  Verbal instructions given to patient to tighten the side parachute straps, but he required physical assistance with this task  PT/OT then assisted pt with ambulation  Instruction sheet left with the patient and I will follow up with him tomorrow in regards to the donning/doffing process  Pt's nurse, [employer], is aware the patient has been fit for the TLSO  Recommendations:  Please call the ortho tech, ext [phone], with any bracing questions and/or adjustments       [employer],

## 2020-03-21 NOTE — ASSESSMENT & PLAN NOTE
Lab Results   Component Value Date    HGBA1C 7 0 (H) 02/09/2018       Recent Labs     03/21/20  0746   POCGLU 109       Blood Sugar Average: Last 72 hrs:  (P) 109

## 2020-03-21 NOTE — ED PROVIDER NOTES
H&P Exam - Trauma   Erasmo Hines 82 y o  male MRN: [id number]  Unit/Bed#: ED 11 Encounter: [id number]    Assessment/Plan   Trauma Alert: Other Level C  Model of Arrival: Ambulance  Trauma Team: Attending Monet Torrez and Residents Betina Fair  Consultants: Neurosurgery: Did not contact  Time Called not    Trauma Active Problems: Fall, scalp laceration, back pain    Trauma Plan: CT head and spine, admit for ambulatory dysfunction  Chief Complaint: headache, back pain    History of Present Illness   HPI:  Erasmo Hines is a 82 y o  male who presents with after a fall at home  Patient fell 2 days previously as well he denies any prodromal symptoms, he has been off balance now for quite some time today he slipped struck his head against a refrigerator then fell to the ground  Patient has also been complaining of low back pain for the past 2 days since his previous fall  He denies any chest pain, shortness of breath, nausea, vomiting recent cough travel or illness  Nemo Prince He is on aspirin but no other thinners  Mechanism:Fall    Review of Systems   Constitutional: Negative for fever  Respiratory: Negative for chest tightness and shortness of breath  Cardiovascular: Negative for chest pain  Gastrointestinal: Negative for abdominal pain and nausea  Musculoskeletal: Positive for back pain  Skin: Positive for wound  Neurological: Positive for weakness and headaches  12-point, complete review of systems was reviewed and negative except as stated above  Historical Information   History is unobtainable from the patient due to NA    Efforts to obtain history included the following sources: other medical personnel    Past Medical History:   Diagnosis Date    Acute MI [facility])     Cardiac disease     Diabetes mellitus ([facility] [address] )     Hyperlipidemia     Hypertension      Past Surgical History:   Procedure Laterality Date    JOINT REPLACEMENT       Social History   Social History     Substance and Sexual Activity Alcohol Use Yes    Comment: rare     Social History     Substance and Sexual Activity   Drug Use No     Social History     Tobacco Use   Smoking Status Never Smoker   Smokeless Tobacco Never Used     E-Cigarette/Vaping     E-Cigarette/Vaping Substances       There is no immunization history on file for this patient  Last Tetanus: unknown  Family History: Non-contributory  Unable to obtain/limited by NA      Meds/Allergies   all current active meds have been reviewed    No Known Allergies      PHYSICAL EXAM    PE limited by: NA    Objective   Vitals:   First set: Temperature: 97 9 °F (36 6 °C) (03/20/20 2214)  Pulse: 73 (03/20/20 2214)  Respirations: 18 (03/20/20 2214)  Blood Pressure: 154/86 (03/20/20 2214)    Primary Survey:   (A) Airway: patent  (B) Breathing: ctab  (C) Circulation: Pulses:   normal  (D) Disabliity:  GCS Total:  15  (E) Expose:  Completed    Secondary Survey: (Click on Physical Exam tab above)  Physical Exam   Constitutional: He is oriented to person, place, and time  He appears well-developed and well-nourished  HENT:   Head: Normocephalic and atraumatic  Eyes: Conjunctivae and EOM are normal  No scleral icterus  Neck: Normal range of motion  Neck supple  Cardiovascular: Normal rate and regular rhythm  No murmur heard  Pulmonary/Chest: Effort normal and breath sounds normal    Abdominal: Soft  Bowel sounds are normal  There is no tenderness  Musculoskeletal: Normal range of motion  He exhibits tenderness  Low thoracic high lumbar back pain midline  Neurological: He is alert and oriented to person, place, and time  Skin: Skin is warm and dry  2 cm laceration to right top of scalp  Psychiatric: He has a normal mood and affect  His behavior is normal    Nursing note and vitals reviewed        Invasive Devices     Peripheral Intravenous Line            Peripheral IV 03/20/20 Right Forearm less than 1 day                Lab Results: Results: I have personally reviewed pertinent reports      Imaging/EKG Studies: T-Spine: T12 fracture, L-Spine: wnl, CT Scan Head: wnl, CT Scan C-Spine: wnl  Other Studies: NA    Code Status: Prior  Advance Directive and Living Will:      Power of :    POLST:           Rafael Navarro MD  03/20/20 4225

## 2020-03-21 NOTE — PLAN OF CARE
Problem: OCCUPATIONAL THERAPY ADULT  Goal: Performs self-care activities at highest level of function for planned discharge setting  See evaluation for individualized goals  Description  Treatment Interventions: ADL retraining, Functional transfer training, Endurance training, Patient/family training, Compensatory technique education, Continued evaluation          See flowsheet documentation for full assessment, interventions and recommendations  Note:   Limitation: Decreased ADL status, Decreased endurance, Decreased self-care trans, Decreased high-level ADLs  Prognosis: Good  Assessment: Pt is a 82 y o  YO  male admitted to [facility] on 3/20/2020 w/ frequent falls  Pt w/ transverse fx of T12  Per neurosx no surgical intervention, TLSO when OOB and HOB >45  Pt educated on spinal precautions prior to beginning evaluation  Comorbidities include a h/o  has a past medical history of Acute MI ([facility] [address] ), Cardiac disease, Diabetes mellitus ([facility] [address] ), Hyperlipidemia, and Hypertension    Pt with active OT orders and up in chair orders    Pt resides in a ranch style home with spouse  Pt reports he assists spouse w/ ADLs but local kids can come stay and assist PRN  Pt was I w/  ADLS and IADLS, (+) drove, & required no use of DME PTA  Currently pt is Min A for ADLs and functional mobility  Pt is limited at this time 2*: endurance, activity tolerance, functional mobility, unsupportive home environment, decreased I w/ ADLS/IADLS and decreased safety awareness  The following Occupational Performance Areas to address include: grooming, bathing/shower, toilet hygiene, dressing, household maintenance and care of others  Based on the aforementioned OT evaluation, functional performance deficits, and assessments, pt has been identified as a high complexity evaluation  From OT standpoint, anticipate d/c home OT  Pt to continue to benefit from acute immediate OT services to address the following goals 3-5x/week to  w/in 7-10 days:   OT Discharge Recommendation: Home OT  OT - OK to Discharge:  Yes

## 2020-03-21 NOTE — ASSESSMENT & PLAN NOTE
Imaging:  CT thoracic & lumbar wo: transverse fracture through the T12 vertebral body    Plan:  TLSO backpack when OOB and HOB >45 degrees  Upright thoracolumbar x-rays ordered for baseline  PT/OT evaluation  Pain control per primary team  No anticipated neurosurgical intervention

## 2020-03-21 NOTE — PHYSICAL THERAPY NOTE
Physical Therapy Evaluation     Patient's Name: Gelacio Lynch    Admitting Diagnosis  Head injury [S09 90XA]  Closed T12 fracture ([facility] ) [J83 927G]  Fall, initial encounter [W19  XXXA]  Laceration of scalp, initial encounter [S01 01XA]  Ambulatory dysfunction [R26 2]    Problem List  Patient Active Problem List   Diagnosis    Dehydration    New onset a-fib ([facility] )    T2DM (type 2 diabetes mellitus) ([place] [address] )    Essential hypertension    HLD (hyperlipidemia)    GERD (gastroesophageal reflux disease)    CAD (coronary artery disease)    Frontal skull fracture (HCC)    Ambulatory dysfunction    Frequent falls    Closed T12 fracture ([facility] )       Past Medical History  Past Medical History:   Diagnosis Date    Acute MI ([place] [address] )     Cardiac disease     Diabetes mellitus ([place] [address] )     Hyperlipidemia     Hypertension        Past Surgical History  Past Surgical History:   Procedure Laterality Date    JOINT REPLACEMENT          03/21/20 0958   Note Type   Note type Eval only   Pain Assessment   Pain Assessment Tool 0-10   Pain Score 3   Pain Location/Orientation Location: Back   Hospital Pain Intervention(s) Repositioned; Ambulation/increased activity; Emotional support   Home Living   Type of [address] [place] [address] One level;Performs ADLs on one level;Ramped entrance   Bathroom Shower/Tub Walk-in shower  (& tub/shower)   Bathroom Toilet Raised   Bathroom Equipment Grab bars in shower; Shower chair   Home Equipment [place];Cane;Wheelchair-manual  (wife uses all DME; pt used [facility] PTA for ambulation)   Additional Comments Pt resides in [facility] w/ ramped entrance and was ambulating w/ SPC PTA   Prior Function   Level of [place] Independent with ADLs and functional mobility   Lives With Spouse   Receives Help From Family   ADL Assistance Independent   IADLs Independent   Falls in the last 6 months 1 to 4  (2, per pt)   Comments Pt reports he has to assist wife w/ all ADLs and IADLs   Pt reports local children who can assist w/ wife upon d/c   Restrictions/Precautions   Weight Bearing Precautions Per Order No   Braces or Orthoses TLSO  (backpack)   Other Precautions Spinal precautions;Multiple lines; Fall Risk;Pain   General   Family/Caregiver Present No   Cognition   Overall Cognitive Status WFL   Arousal/Participation Alert   Orientation Level Oriented X4   Memory Decreased recall of precautions   Following Commands Follows one step commands without difficulty   Comments Pt pleasant and cooperative to work w/ therapy  Pt educated on spinal precautions w/ good carryover throughout functional mobility   RLE Assessment   RLE Assessment WFL   LLE Assessment   LLE Assessment WFL   Coordination   Movements are Fluid and Coordinated 1   Light Touch   RLE Light Touch Grossly intact   LLE Light Touch Grossly intact   Bed Mobility   Supine to Sit 4  Minimal assistance   Additional items Assist x 1; Increased time required;Verbal cues   Sit to Supine Unable to assess   Additional Comments Pt lying supine upon PT arrival  Pt returned seated OOB in chair at end of session w/ all needs within reach   Transfers   Sit to Stand 4  Minimal assistance   Additional items Assist x 1; Increased time required;Verbal cues   Stand to Sit 4  Minimal assistance   Additional items Assist x 1; Increased time required;Verbal cues   Additional Comments Transfers w/ RW; VCs for safe hand placement   Ambulation/Elevation   Gait pattern Excessively slow; Short stride; Inconsistent christine   Gait Assistance 4  Minimal assist   Additional items Assist x 1;Verbal cues   Assistive Device Rolling walker   Distance 40ft X2   Stair Management Assistance Not tested   Balance   Static Sitting Fair   Dynamic Sitting Fair -   Static Standing Fair -   Dynamic Standing Poor +   Ambulatory Poor +   Endurance Deficit   Endurance Deficit Yes   Endurance Deficit Description fatigue, pain   Activity Tolerance   Activity Tolerance Patient limited by fatigue;Patient limited by pain   Medical Staff Made Aware OT, CM   Nurse Made Aware RN cleared pt for therapy   Assessment   Prognosis Good   Problem List Decreased strength;Decreased range of motion;Decreased endurance; Impaired balance;Decreased mobility;Pain;Orthopedic restrictions   Assessment Pt is 82 y o  male seen for PT evaluation s/p admit to [facility] on 3/20/2020 w/ fall on Wednesday 3/18/20 w/ back pain  CT thoracic & lumbar wo: transverse fracture through the T12 vertebral body  Per neurosurgery, no surgical intervention warranted  Pt is to wear TLSO backpack when OOB and HOB >45 degrees  PT consulted to assess pt's functional mobility and d/c needs  Order placed for PT eval and tx, w/ up in chair order  Comorbidities affecting pt's physical performance at time of assessment include: frequent falls, Type 2 DM, HTN, CAD, ambulatory dysfunction, a-fib  PTA, pt was independent w/ all functional mobility w/ SPC and lives w/ spouse in one level house w/ ramped entrance  Pt reports he assists wife with all ADLs and IADLs  Pt reports local children who will be assisting with wife upon d/c  Personal factors affecting pt at time of IE include: ambulating w/ assistive device, inability to navigate community distances, positive fall history and inability to perform IADLs  Please find objective findings from PT assessment regarding body systems outlined above with impairments and limitations including weakness, decreased ROM, impaired balance, decreased endurance, gait deviations, pain, decreased activity tolerance, decreased functional mobility tolerance, fall risk and orthopedic restrictions  Pt performed bed mobility at [address]  Pt educated on logroll technique for bed mobility and spinal precautions  TLSO backpack donned sitting EOB  Pt performed STS at [address]  Pt c/o of dizziness upon standing that resolved ~1 min standing rest break  Pt ambulated 40ft X2 w/ RW at min A   The following objective measures performed on IE also reveal limitations: Modified Joe: 4 (moderate/severe disability)  Pt's clinical presentation is currently unstable/unpredictable seen in pt's presentation of recent admission for T12 fracture requiring medical attention, recent decline in function as compared to baseline, multiple lines, fall risk, pain, increased reliance on assistance for functional mobility as compared to baseline  Pt to benefit from continued PT tx to address deficits as defined above and maximize level of functional independent mobility and consistency  From PT/mobility standpoint, recommendation at time of d/c would be Home PT with family support pending progress in order to facilitate return to PLOF  Goals   Patient Goals to return home   STG Expiration Date 04/04/20   Short Term Goal #1 1  Pt will demonstrate the ability to perform all aspects of bed mobility at Mod I in onder to maximize functional independence and decrease burden on caregivers  2 Pt will demonstrate the ability to perform all functional transfers at Mod I in order to maximize functional independence and decrease burden on caregivers  3 Pt will demonstrate the ability to ambulate at least 150ft with least restrictive device at Mod I in order to maximize functional independence  4 Pt will demonstrate the ability to negotiate 3 steps with HR and supervision in order to return to household/community mobility  5 Pt will demonstrate improved balance by one grade in order to decrease risk of falls  6 Pt will increase b/l Le strength by 1 grade in order to increase ease of functional mobility and transfers   PT Treatment Day 0   Plan   Treatment/Interventions Functional transfer training;LE strengthening/ROM; Therapeutic exercise; Endurance training;Patient/family training;Equipment eval/education; Bed mobility;Gait training;Spoke to nursing;Spoke to case management   PT Frequency   (3-6x/week)   Recommendation   Recommendation Home PT; Home with family support   Equipment Recommended Huber Costa   PT - OK to Discharge Yes   Additional Comments when medically cleared   Modified [place] Scale   Modified [place] Scale 4     Dmitry Evans, PT, DPT

## 2020-03-21 NOTE — OCCUPATIONAL THERAPY NOTE
Occupational Therapy Evaluation     Patient Name: Nabeel Reich  JZGYR'A Date: 3/21/2020  Problem List  Principal Problem:    Frequent falls  Active Problems:    T2DM (type 2 diabetes mellitus) ([facility] )    Essential hypertension    HLD (hyperlipidemia)    CAD (coronary artery disease)    Ambulatory dysfunction    Closed T12 fracture [facility])    Past Medical History  Past Medical History:   Diagnosis Date    Acute MI ([facility] )     Cardiac disease     Diabetes mellitus ([place] [address] )     Hyperlipidemia     Hypertension      Past Surgical History  Past Surgical History:   Procedure Laterality Date    JOINT REPLACEMENT           03/21/20 0959   Note Type   Note type Eval/Treat   Restrictions/Precautions   Weight Bearing Precautions Per Order No   Braces or Orthoses TLSO   Other Precautions Spinal precautions;Multiple lines;Telemetry; Fall Risk;Pain   Pain Assessment   Pain Assessment Tool 0-10   Pain Score 3   Pain Location/Orientation Location: Back   Hospital Pain Intervention(s) Repositioned; Ambulation/increased activity; Emotional support   Home Living   Type of [address] One level;Ramped entrance   [address] bars in shower;Grab bars around toilet; Shower chair   Home Equipment [place];Cane   Prior Function   Level of [place] Independent with ADLs and functional mobility   Lives With Spouse   Receives Help From Family   ADL Assistance Independent   IADLs Independent   Falls in the last 6 months 1 to 4  (2)   Vocational Retired   Lifestyle   Autonomy pta pt reports I in ADLs/IADLs/functional mobility   Reciprocal Relationships reports he normally assists wife w/ adls- children allowed to help   Service to Others retired   [address] enjoys spending time w/ his family   Psychosocial   Psychosocial (WDL) WDL   Subjective   Subjective "My kids are going to come stay with us"   ADL   Where [address] 5 Supervision/Setup   Grooming Assistance 5  Supervision/Setup   UB Bathing Assistance 4  Minimal Assistance   LB Bathing Assistance 4  Minimal Assistance   UB Dressing Assistance 4  Minimal Assistance   LB Dressing Assistance 4  Minimal Assistance   Bed Mobility   Supine to Sit 4  Minimal assistance   Additional items Assist x 1   Transfers   Sit to Stand 4  Minimal assistance   Additional items Assist x 1   Stand to Sit 4  Minimal assistance   Additional items Assist x 1   Functional Mobility   Functional Mobility 4  Minimal assistance   Additional items Rolling walker   Balance   Static Sitting Fair   Dynamic Sitting Fair -   Static Standing Fair -   Dynamic Standing Poor +   Ambulatory Poor +   Activity Tolerance   Activity Tolerance Patient limited by fatigue;Patient limited by pain   Medical Staff Made Aware PT, CM   Nurse Made Aware okay to see per RN Nicole   RUE Assessment   RUE Assessment WFL   LUE Assessment   LUE Assessment WFL   Hand Function   Gross Motor Coordination Functional   Fine Motor Coordination Functional   Cognition   Overall Cognitive Status WFL   Arousal/Participation Cooperative   Attention Within functional limits   Orientation Level Oriented X4   Memory Within functional limits;Decreased recall of precautions   Following Commands Follows one step commands without difficulty   Comments pt pleasant and cooperative, G carryover of spinal precautions   Assessment   Limitation Decreased ADL status; Decreased endurance;Decreased self-care trans;Decreased high-level ADLs   Prognosis Good   Assessment Pt is a 82 y o  YO  male admitted to [facility] on 3/20/2020 w/ frequent falls  Pt w/ transverse fx of T12  Per neurosx no surgical intervention, TLSO when OOB and HOB >45  Pt educated on spinal precautions prior to beginning evaluation  Comorbidities include a h/o  has a past medical history of Acute [facility] ([facility] ), Cardiac disease, Diabetes mellitus ([facility] ), Hyperlipidemia, and Hypertension     Pt with active OT orders and up in chair orders    Pt resides in a ranch style home with spouse  Pt reports he assists spouse w/ ADLs but local kids can come stay and assist PRN  Pt was I w/  ADLS and IADLS, (+) drove, & required no use of DME PTA  Currently pt is Min A for ADLs and functional mobility  Pt is limited at this time 2*: endurance, activity tolerance, functional mobility, unsupportive home environment, decreased I w/ ADLS/IADLS and decreased safety awareness  The following Occupational Performance Areas to address include: grooming, bathing/shower, toilet hygiene, dressing, household maintenance and care of others  Based on the aforementioned OT evaluation, functional performance deficits, and assessments, pt has been identified as a high complexity evaluation  From OT standpoint, anticipate d/c home OT  Pt to continue to benefit from acute immediate OT services to address the following goals 3-5x/week to  w/in 7-10 days: Loc Guy Goals   Patient Goals go home   LTG Time Frame 7-10   Plan   Treatment Interventions ADL retraining;Functional transfer training; Endurance training;Patient/family training; Compensatory technique education;Continued evaluation   Goal Expiration Date 20   OT Frequency 3-5x/wk   Recommendation   OT Discharge Recommendation Home OT   OT - OK to Discharge Yes   Modified [place] Scale   Modified Joe Scale 4     GOALS    1) Pt will increase activity tolerance to G for 30 min txment sessions    2) Pt will complete UB/LB dressing/self care w/ mod I using adaptive device and DME as needed    3) Pt will complete bathing w/ Mod I w/ use of AE and DME as needed    4) Pt will complete toileting w/ mod I w/ G hygiene/thoroughness using DME as needed    5) Pt will improve functional transfers to Mod I on/off all surfaces using DME as needed w/ G balance/safety     6) Pt will improve functional mobility during ADL/IADL/leisure tasks to Mod I using DME as needed w/ G balance/safety     7) Pt will participate in simulated IADL management task to increase independence to  w/ G safety and endurance    8) Pt will demonstrate G carryover of pt/caregiver education and training as appropriate      9) Pt will demonstrate 100% carryover of spinal precautions t/o functional I/ADL/leisure tasks w/o cues s/p skilled education    Aneta Horton MS, OTR/L

## 2020-03-21 NOTE — ASSESSMENT & PLAN NOTE
T12 fracture through vertebral body noted on imaging  TLSO brace when sitting up in bed and when standing/walking  Neurologically intact in lower extremities

## 2020-03-22 VITALS
HEIGHT: 72 IN | WEIGHT: 207 LBS | HEART RATE: 67 BPM | BODY MASS INDEX: 28.04 KG/M2 | TEMPERATURE: 98.4 F | DIASTOLIC BLOOD PRESSURE: 67 MMHG | RESPIRATION RATE: 18 BRPM | OXYGEN SATURATION: 97 % | SYSTOLIC BLOOD PRESSURE: 117 MMHG

## 2020-03-22 LAB
ANION GAP SERPL CALCULATED.3IONS-SCNC: 5 MMOL/L (ref 4–13)
BASOPHILS # BLD AUTO: 0.03 THOUSANDS/ΜL (ref 0–0.1)
BASOPHILS NFR BLD AUTO: 0 % (ref 0–1)
BUN SERPL-MCNC: 17 MG/DL (ref 5–25)
CALCIUM SERPL-MCNC: 8.6 MG/DL (ref 8.3–10.1)
CHLORIDE SERPL-SCNC: 108 MMOL/L (ref 100–108)
CO2 SERPL-SCNC: 25 MMOL/L (ref 21–32)
CREAT SERPL-MCNC: 1.19 MG/DL (ref 0.6–1.3)
EOSINOPHIL # BLD AUTO: 0.35 THOUSAND/ΜL (ref 0–0.61)
EOSINOPHIL NFR BLD AUTO: 5 % (ref 0–6)
ERYTHROCYTE [DISTWIDTH] IN BLOOD BY AUTOMATED COUNT: 14.6 % (ref 11.6–15.1)
GFR SERPL CREATININE-BSD FRML MDRD: 57 ML/MIN/1.73SQ M
GLUCOSE SERPL-MCNC: 117 MG/DL (ref 65–140)
HCT VFR BLD AUTO: 36.3 % (ref 36.5–49.3)
HGB BLD-MCNC: 11.6 G/DL (ref 12–17)
IMM GRANULOCYTES # BLD AUTO: 0.05 THOUSAND/UL (ref 0–0.2)
IMM GRANULOCYTES NFR BLD AUTO: 1 % (ref 0–2)
LYMPHOCYTES # BLD AUTO: 1.84 THOUSANDS/ΜL (ref 0.6–4.47)
LYMPHOCYTES NFR BLD AUTO: 24 % (ref 14–44)
MCH RBC QN AUTO: 27.2 PG (ref 26.8–34.3)
MCHC RBC AUTO-ENTMCNC: 32 G/DL (ref 31.4–37.4)
MCV RBC AUTO: 85 FL (ref 82–98)
MONOCYTES # BLD AUTO: 0.67 THOUSAND/ΜL (ref 0.17–1.22)
MONOCYTES NFR BLD AUTO: 9 % (ref 4–12)
NEUTROPHILS # BLD AUTO: 4.62 THOUSANDS/ΜL (ref 1.85–7.62)
NEUTS SEG NFR BLD AUTO: 61 % (ref 43–75)
NRBC BLD AUTO-RTO: 0 /100 WBCS
PLATELET # BLD AUTO: 170 THOUSANDS/UL (ref 149–390)
PMV BLD AUTO: 10.1 FL (ref 8.9–12.7)
POTASSIUM SERPL-SCNC: 4.4 MMOL/L (ref 3.5–5.3)
RBC # BLD AUTO: 4.26 MILLION/UL (ref 3.88–5.62)
SODIUM SERPL-SCNC: 138 MMOL/L (ref 136–145)
WBC # BLD AUTO: 7.56 THOUSAND/UL (ref 4.31–10.16)

## 2020-03-22 PROCEDURE — NC001 PR NO CHARGE: Performed by: SURGERY

## 2020-03-22 PROCEDURE — 85025 COMPLETE CBC W/AUTO DIFF WBC: CPT | Performed by: EMERGENCY MEDICINE

## 2020-03-22 PROCEDURE — 99238 HOSP IP/OBS DSCHRG MGMT 30/<: CPT | Performed by: PHYSICIAN ASSISTANT

## 2020-03-22 PROCEDURE — 80048 BASIC METABOLIC PNL TOTAL CA: CPT | Performed by: EMERGENCY MEDICINE

## 2020-03-22 PROCEDURE — 99232 SBSQ HOSP IP/OBS MODERATE 35: CPT | Performed by: PHYSICIAN ASSISTANT

## 2020-03-22 RX ORDER — OXYCODONE HYDROCHLORIDE 5 MG/1
TABLET ORAL
Qty: 20 TABLET | Refills: 0 | Status: SHIPPED | OUTPATIENT
Start: 2020-03-22 | End: 2020-08-31

## 2020-03-22 RX ORDER — ONDANSETRON 4 MG/1
4 TABLET, ORALLY DISINTEGRATING ORAL EVERY 6 HOURS PRN
Status: DISCONTINUED | OUTPATIENT
Start: 2020-03-22 | End: 2020-03-22 | Stop reason: HOSPADM

## 2020-03-22 RX ORDER — DOCUSATE SODIUM 100 MG/1
100 CAPSULE, LIQUID FILLED ORAL 2 TIMES DAILY
Qty: 10 CAPSULE | Refills: 0 | Status: SHIPPED | OUTPATIENT
Start: 2020-03-22

## 2020-03-22 RX ORDER — ACETAMINOPHEN 325 MG/1
650 TABLET ORAL EVERY 6 HOURS SCHEDULED
Qty: 30 TABLET | Refills: 0 | Status: SHIPPED | OUTPATIENT
Start: 2020-03-22

## 2020-03-22 RX ORDER — SENNOSIDES 8.6 MG
1 TABLET ORAL ONCE
Status: COMPLETED | OUTPATIENT
Start: 2020-03-22 | End: 2020-03-22

## 2020-03-22 RX ADMIN — OXYCODONE HYDROCHLORIDE 5 MG: 5 TABLET ORAL at 08:48

## 2020-03-22 RX ADMIN — ACETAMINOPHEN 650 MG: 325 TABLET ORAL at 05:48

## 2020-03-22 RX ADMIN — ASPIRIN 81 MG 81 MG: 81 TABLET ORAL at 08:49

## 2020-03-22 RX ADMIN — SENNOSIDES 8.6 MG: 8.6 TABLET, FILM COATED ORAL at 08:49

## 2020-03-22 RX ADMIN — DOCUSATE SODIUM 100 MG: 100 CAPSULE, LIQUID FILLED ORAL at 08:49

## 2020-03-22 RX ADMIN — PANTOPRAZOLE SODIUM 40 MG: 40 TABLET, DELAYED RELEASE ORAL at 05:48

## 2020-03-22 RX ADMIN — ENOXAPARIN SODIUM 30 MG: 30 INJECTION SUBCUTANEOUS at 08:48

## 2020-03-22 RX ADMIN — ACETAMINOPHEN 650 MG: 325 TABLET ORAL at 00:31

## 2020-03-22 RX ADMIN — ONDANSETRON 4 MG: 4 TABLET, ORALLY DISINTEGRATING ORAL at 12:22

## 2020-03-22 RX ADMIN — METOPROLOL TARTRATE 25 MG: 25 TABLET, FILM COATED ORAL at 08:49

## 2020-03-22 RX ADMIN — METFORMIN HYDROCHLORIDE 1000 MG: 500 TABLET ORAL at 08:49

## 2020-03-22 RX ADMIN — CYANOCOBALAMIN TAB 500 MCG 1000 MCG: 500 TAB at 08:49

## 2020-03-22 NOTE — ASSESSMENT & PLAN NOTE
- fall sustaining the below stated injury  - Home PT OT recommended  - d/c home today with Home therapies

## 2020-03-22 NOTE — ASSESSMENT & PLAN NOTE
Restart home metformin  Patient believes that he is on Januvia though he does not of the dose, will hold for now

## 2020-03-22 NOTE — PROGRESS NOTES
Patients daughter arrived to pick patient up  After patient got into wheelchair to transport downstairs, he began to vomit  Per trauma, keeping patient for now to monitor if he is able to keep food or fluids down  Called patients daughter to inform her of the new circumstances and postponed discharge

## 2020-03-22 NOTE — PROGRESS NOTES
Progress Note - Neurosurgery   Yina Gong 82 y o  male MRN: [id number]  [facility] Encounter: [id number]              Assessment:  1  T12 compression deformity -TLICS-1  2  History of fall      Plan:   · Exam: A&OX3, communicates well, Marylu, strength is 5/5 bilaterally, sensation to light touch intact throughout  DTR 2+ without clonus and Babinski negative bilaterally  Slight tenderness at T12 region on palpation  Wearing TLSO brace  · Imagings reviewed personally and by attending is as follows: · Baseline upright Thoracolumbar spine x-rays on 03/21/2020 demonstrates stable T12 compression deformity  · Pain control:  Per primary team  · DVT ppx:   · SCDs bilateral legs  · Continue Lovenox  · Activity:  As tolerated  · PT/OT evaluation & treatment  · Brace:  Continue wearing TLSO brace when upright and at 45 degree head of bed  · Medical Mx:  Primary team  · Neurocheck:  Routine  · NSx reviewed the images  Thoracolumbar spine x-rays demonstrates stable T12 compression deformity  No neurosurgical procedure is anticipated  Patient can continue with his conservative treatment including wearing TLSO brace, pain control, PT/OT  He Can follow up at outpatient Neurosurgery Clinic with PA-C/NP in 2 weeks with upright thoracolumbar spine x-rays in brace  Call 4 question or concern  Subjective/Objective   Chief Complaint: "My back is hurting"/T12 compression fracture follow-up progress note    Subjective:  Patient reports 8/10 lower back pain  He reports  localized in the back and position or move Independent  Denies any numbness, weakness or paresthesia in the lower extremities  Patient has history of gait instability with frequent falls in the past this cane for walking  Denies any bowel/bladder dysfunction  Denies fever, chills, rigors, cough or chest pain  He is wearing TLSO brace, doing physical therapy      Objective:  Patient sitting on the edge of the bed, wearing TLSO brace and in no pain distress    I/O       03/20 0701 - 03/21 0700 03/21 0701 - 03/22 0700 03/22 0701 - 03/23 0700    P  O  0 1320     Total Intake(mL/kg) 0 (0) 1320 (14 1)     Urine (mL/kg/hr) 500 2075 (0 9)     Total Output 500 2075     Net -500 -755            Unmeasured Urine Occurrence  1 x           Invasive Devices     Peripheral Intravenous Line            Peripheral IV 03/20/20 Right Forearm 1 day                Physical Exam:  Vitals: Blood pressure 117/67, pulse 67, temperature 98 4 °F (36 9 °C), resp  rate 18, height 6' (1 829 m), weight 93 9 kg (207 lb), SpO2 97 %  ,Body mass index is 28 07 kg/m²  General appearance: alert, appears stated age, cooperative and no distress  Head: Normocephalic, without obvious abnormality, atraumatic  Eyes: EOMI, 2 mm conjugate  Neck: supple, symmetrical, trachea midline and NT  Back:  Tenderness at T12 region on palpation  Lungs: non labored breathing  Heart: regular heart rate  Neurologic:   Mental status: Alert, oriented x3, thought content appropriate  Cranial nerves: grossly intact (Cranial nerves II-XII)  Sensory: normal to light touch throughout  Motor: moving all extremities without focal weakness; strength is 5/5 bilaterally  Reflexes: 2+ and symmetric    No clonus  Coordination: finger to nose normal bilaterally, no drift bilaterally      Lab Results:  Results from last 7 days   Lab Units 03/22/20  0454 03/21/20  0555 03/20/20  2248   WBC Thousand/uL 7 56 7 81 8 53   HEMOGLOBIN g/dL 11 6* 11 0* 11 8*   HEMATOCRIT % 36 3* 34 9* 37 1   PLATELETS Thousands/uL 170 168 190   NEUTROS PCT % 61 70 65   MONOS PCT % 9 9 8     Results from last 7 days   Lab Units 03/22/20  0454 03/21/20  0555 03/20/20  2248   POTASSIUM mmol/L 4 4 4 7 5 0   CHLORIDE mmol/L 108 108 109*   CO2 mmol/L 25 26 23   BUN mg/dL 17 18 18   CREATININE mg/dL 1 19 1 29 1 54*   CALCIUM mg/dL 8 6 8 2* 8 7                 No results found for: TROPONINT  ABG:No results found for: PHART, [id number], PO2ART, JLR3CQO, [id number], BEART, SOURCE    Imaging Studies: I have personally reviewed pertinent reports  and I have personally reviewed pertinent films in PACS    EKG, Pathology, and Other Studies: I have personally reviewed pertinent reports        VTE Pharmacologic Prophylaxis: Enoxaparin (Lovenox)    VTE Mechanical Prophylaxis: sequential compression device

## 2020-03-22 NOTE — DISCHARGE SUMMARY
Discharge- Sivakumar Bustamante 1935, 82 y o  male MRN: [id number]    Unit/Bed#: [facility] 614-01 Encounter: [id number]    Primary Care Provider: Benja Mackenzie MD   Date and time admitted to hospital: 3/20/2020 10:14 PM        * Frequent falls  Assessment & Plan  - fall sustaining the below stated injury  - Home PT OT recommended  - d/c home today with Home therapies    Closed T12 fracture (HCC)  Assessment & Plan  T12 fracture through vertebral body noted on imaging  TLSO brace when sitting up in bed and when standing/walking recommended by neurosurgery  Upright x-rays show stability  Neurologically intact in lower extremities   D/c home and f/u with neurosurgery in 2 weeks    Ambulatory dysfunction  Assessment & Plan  PT/OT recommendations appreciated  Will have home therapy upon discharge    CAD (coronary artery disease)  Assessment & Plan  Continue home aspirin    Essential hypertension  Assessment & Plan  Continue home metoprolol    T2DM (type 2 diabetes mellitus) ([facility] )  Assessment & Plan  Continue home metformin  Blood sugars under adequate control    HLD (hyperlipidemia)  Assessment & Plan  Continue home statin                Resolved Problems  Date Reviewed: 3/22/2020          Resolved    Acute kidney injury ([facility] ) 3/21/2020     Resolved by  Miri Duron MD          Admission Date:   Admission Orders (From admission, onward)     Ordered        03/21/20 0009  Inpatient Admission  Once                     Admitting Diagnosis: Head injury [S09 90XA]  Closed T12 fracture ([facility] ) Fannie Due  Fall, initial encounter [W19  XXXA]  Laceration of scalp, initial encounter [S01 01XA]  Ambulatory dysfunction [R26 2]    HPI: As documented by Dr Lashell Phillips who evaluated the patient on admission, Sivakumar Bustamante is a 82 y o  male who presents after a fall at home  Today he slipped and hit his head against the refrigerator and then fell to the ground    He states he had a fall 2 days ago Wednesday and since then has been having increased back pain  He states he has been feeling with an unsteady gait and off balance for a while  Has mild headache, denies chest pain, SOB, nausea, vomiting, leg pain coma bowel incontinence, urinary incontinence  He takes aspirin daily, no blood thinners "    Procedures Performed: No orders of the defined types were placed in this encounter  Summary of Hospital Course: Patient was placed on the trauma service following fall when he sustained a T12 fracture  His pain was controlled  He was seen by neurosurgery who recommended conservative management in a TLSO brace which he was fitted for  He worked with PT/OT who recommended discharge home with home PT/OT  He was feeling well and stable for discharge home on 3/22  Home therapies were arranged  His upright x-rays were stable in his brace and he was cleared by neurosurgery for discharge  He will f/u in their office in 2 weeks with repeat upright x-rays  He will be discharged home today as stated  Significant Findings, Care, Treatment and Services Provided:   Xr Spine Thoracolumbar 2 Vw    Result Date: 3/21/2020  Impression: 1  T12 vertebral body fracture as noted above  Workstation performed: [id number]     Trauma - Ct Head Wo Contrast    Result Date: 3/20/2020  Impression: No intracranial hemorrhage or calvarial fracture  Workstation performed: [id number]     Ct Spine Thoracic & Lumbar Wo Contrast    Result Date: 3/20/2020  Impression: Transverse fracture through the T12 vertebral body  Workstation performed: [id number]       Complications: none    Condition at Discharge: good         Discharge instructions/Information to patient and family:   See after visit summary for information provided to patient and family  Provisions for Follow-Up Care:  See after visit summary for information related to follow-up care and any pertinent home health orders        PCP: Rita Szymanski MD    Disposition: Home    Planned Readmission: No    Discharge Statement   I spent 30 minutes discharging the patient  This time was spent on the day of discharge  I had direct contact with the patient on the day of discharge  Additional documentation is required if more than 30 minutes were spent on discharge  Discharge Medications:  See after visit summary for reconciled discharge medications provided to patient and family

## 2020-03-22 NOTE — SOCIAL WORK
CHRISTI met with Pt to discuss the post acute care recommendation was made by your care team for [place] [address]  Discussed Freedom of Choice with patient  List of agencies given to patient via in person  patient aware the list is custom filtered for them by preference  and that [facility] post acute providers are designated  Pt reported being agreeable to [place] [address] services and requested a referral to [facility] who he had in the past  CM made the requested referral     CHRISTI made a DME referral to [facility] for a roller walker, and delivered one to the Pt's room prior to his d/c today  CHRISTI was in contact with Pt's daughter Farida Calvert [phone] per the Pt's request to discuss Pt's d/c today and need for transportation  Woomeg Nehal reported she will  the Pt today between 11:30 amd 12pm  CHRISTI made Pt's RN aware of the above

## 2020-03-22 NOTE — PLAN OF CARE
Problem: Potential for Falls  Goal: Patient will remain free of falls  Description  INTERVENTIONS:  - Assess patient frequently for physical needs  -  Identify cognitive and physical deficits and behaviors that affect risk of falls    -  [place] fall precautions as indicated by assessment   - Educate patient/family on patient safety including physical limitations  - Instruct patient to call for assistance with activity based on assessment  - Modify environment to reduce risk of injury  - Consider OT/PT consult to assist with strengthening/mobility  Outcome: Progressing     Problem: PAIN - ADULT  Goal: Verbalizes/displays adequate comfort level or baseline comfort level  Description  Interventions:  - Encourage patient to monitor pain and request assistance  - Assess pain using appropriate pain scale  - Administer analgesics based on type and severity of pain and evaluate response  - Implement non-pharmacological measures as appropriate and evaluate response  - Consider cultural and social influences on pain and pain management  - Notify physician/advanced practitioner if interventions unsuccessful or patient reports new pain  Outcome: Progressing     Problem: INFECTION - ADULT  Goal: Absence or prevention of progression during hospitalization  Description  INTERVENTIONS:  - Assess and monitor for signs and symptoms of infection  - Monitor lab/diagnostic results  - Monitor all insertion sites, i e  indwelling lines, tubes, and drains  - Monitor endotracheal if appropriate and nasal secretions for changes in amount and color  - [place] appropriate cooling/warming therapies per order  - Administer medications as ordered  - Instruct and encourage patient and family to use good hand hygiene technique  - Identify and instruct in appropriate isolation precautions for identified infection/condition  Outcome: Progressing     Problem: SAFETY ADULT  Goal: Maintain or return to baseline ADL function  Description  INTERVENTIONS:  -  Assess patient's ability to carry out ADLs; assess patient's baseline for ADL function and identify physical deficits which impact ability to perform ADLs (bathing, care of mouth/teeth, toileting, grooming, dressing, etc )  - Assess/evaluate cause of self-care deficits   - Assess range of motion  - Assess patient's mobility; develop plan if impaired  - Assess patient's need for assistive devices and provide as appropriate  - Encourage maximum independence but intervene and supervise when necessary  - Involve family in performance of ADLs  - Assess for home care needs following discharge   - Consider OT consult to assist with ADL evaluation and planning for discharge  - Provide patient education as appropriate  Outcome: Progressing  Goal: Maintain or return mobility status to optimal level  Description  INTERVENTIONS:  - Assess patient's baseline mobility status (ambulation, transfers, stairs, etc )    - Identify cognitive and physical deficits and behaviors that affect mobility  - Identify mobility aids required to assist with transfers and/or ambulation (gait belt, sit-to-stand, lift, walker, cane, etc )  - [place] fall precautions as indicated by assessment  - Record patient progress and toleration of activity level on Mobility SBAR; progress patient to next Phase/Stage  - Instruct patient to call for assistance with activity based on assessment  - Consider rehabilitation consult to assist with strengthening/weightbearing, etc   Outcome: Progressing     Problem: DISCHARGE PLANNING  Goal: Discharge to home or other facility with appropriate resources  Description  INTERVENTIONS:  - Identify barriers to discharge w/patient and caregiver  - Arrange for needed discharge resources and transportation as appropriate  - Identify discharge learning needs (meds, wound care, etc )  - Arrange for interpretive services to assist at discharge as needed  - Refer to Case Management Department for coordinating discharge planning if the patient needs post-hospital services based on physician/advanced practitioner order or complex needs related to functional status, cognitive ability, or social support system  Outcome: Progressing

## 2020-03-22 NOTE — ASSESSMENT & PLAN NOTE
T12 fracture through vertebral body noted on imaging  TLSO brace when sitting up in bed and when standing/walking recommended by neurosurgery  Upright x-rays show stability  Neurologically intact in lower extremities   D/c home and f/u with neurosurgery in 2 weeks

## 2020-03-22 NOTE — DISCHARGE INSTRUCTIONS
Neurosurgery discharge instructions following spine fracture:      TLSO brace to be worn when out of bed of head of bed greater than 45 degrees  May place brace on while sitting on edge of bed  May be removed for showering   No bending, twisting or heavy lifting  No strenuous activities  No Driving   Follow-up as scheduled in two weeks with repeat spine x-rays to be completed 2-3 days prior to visit  Prescription has been entered electronically  **Please notify MD immediately if you have increased back or leg pain  New numbness, tingling and/or weakness in your legs  **

## 2020-03-22 NOTE — ORTHOTIC NOTE
Orthotic Note            Date: 3/22/2020      Patient Name: Melanie Early        Reason for Consult:  Patient Active Problem List   Diagnosis    Dehydration    New onset [facility]fib ([facility] )    [facility]    Essential hypertension    HLD (hyperlipidemia)    GERD (gastroesophageal reflux disease)    CAD (coronary artery disease)    [facility]    Ambulatory dysfunction    Frequent falls    Closed T12 fracture ([facility] )     Follow up with backpack TLSO that was fit on the patient yesterday  Pt is currently sitting at the edge of the bed with his brace on  Pt states he put the brace on himself and that it feels comfortable  The brace is in correct placement  Pt states he will be leaving to go home today  Recommendations:  Please call the ortho tech, ext [phone], with any bracing questions and/or adjustments       [employer],

## 2020-03-22 NOTE — PROGRESS NOTES
Progress Note - Neha Lozada 1935, 82 y o  male MRN: [id number]    [facility] Encounter: [id number]    Primary Care Provider: Zhane Hoskins MD   Date and time admitted to hospital: 3/20/2020 10:14 PM        Closed T12 fracture ([facility] )  Assessment & Plan  T12 fracture through vertebral body noted on imaging  TLSO brace when sitting up in bed and when standing/walking  Neurologically intact in lower extremities       Ambulatory dysfunction  Assessment & Plan  PT/OT evaluation    CAD (coronary artery disease)  Assessment & Plan  Continue home aspirin    HLD (hyperlipidemia)  Assessment & Plan  Continue home statin    Essential hypertension  Assessment & Plan  Continue home metoprolol    T2DM (type 2 diabetes mellitus) ([facility] )  Assessment & Plan  Restart home metformin  Patient believes that he is on Januvia though he does not of the dose, will hold for now    * Frequent falls  Assessment & Plan  Home PT OT        Disposition: DC home today      SUBJECTIVE:  Chief Complaint:  None, feeling well    Subjective:  Patient says back pain is significantly improved from yesterday  He did well yesterday with his TLSO brace  He denies any new complaints  Excited to go home        OBJECTIVE:     Meds/Allergies     Current Facility-Administered Medications:     acetaminophen (TYLENOL) tablet 650 mg, 650 mg, Oral, Q6H [address], Bj Collier MD, 650 mg at 03/22/20 0548    aspirin chewable tablet 81 mg, 81 mg, Oral, Daily, Bj Collier MD, 81 mg at 03/21/20 0836    atorvastatin (LIPITOR) tablet 40 mg, 40 mg, Oral, Daily With Tripp Tejada MD, 40 mg at 03/21/20 1730    cyanocobalamin (VITAMIN B-12) tablet 1,000 mcg, 1,000 mcg, Oral, Daily, Bj Collier MD, 1,000 mcg at 03/21/20 0836    docusate sodium (COLACE) capsule 100 mg, 100 mg, Oral, BID, Bj Collier MD, 100 mg at 03/21/20 1730    enoxaparin (LOVENOX) subcutaneous injection 30 mg, 30 mg, Subcutaneous, Q12H [address], Adis Dowell MD Jayce, 30 mg at 03/21/20 2112    HYDROmorphone (DILAUDID) injection 0 2 mg, 0 2 mg, Intravenous, Q4H PRN, Ernie Latif MD    metFORMIN (GLUCOPHAGE) tablet 1,000 mg, 1,000 mg, Oral, BID With Meals, Ashwin Myers MD, 1,000 mg at 03/21/20 1730    metoprolol tartrate (LOPRESSOR) tablet 25 mg, 25 mg, Oral, Q12H [facility], Ernie Latif MD, 25 mg at 03/21/20 2112    ondansetron (ZOFRAN) injection 4 mg, 4 mg, Intravenous, Q6H PRN, Ernie Latif MD    oxyCODONE (ROXICODONE) IR tablet 2 5 mg, 2 5 mg, Oral, Q4H PRN, Ernie Latif MD    oxyCODONE (ROXICODONE) IR tablet 5 mg, 5 mg, Oral, Q4H PRN, Ernie Latif MD    pantoprazole (PROTONIX) EC tablet 40 mg, 40 mg, Oral, Early Morning, Ernie Latif MD, 40 mg at 03/22/20 0548    senna (SENOKOT) tablet 8 6 mg, 1 tablet, Oral, Once, Ashwin Myers MD     Vitals:   Vitals:    03/22/20 0636   BP: 117/67   Pulse: 67   Resp: 18   Temp: 98 4 °F (36 9 °C)   SpO2: 97%       Intake/Output:  I/O       03/20 0701 - 03/21 0700 03/21 0701 - 03/22 0700 03/22 0701 - 03/23 0700    P  O  0 1320     Total Intake(mL/kg) 0 (0) 1320 (14 1)     Urine (mL/kg/hr) 500 2075 (0 9)     Total Output 500 2075     Net -500 -755            Unmeasured Urine Occurrence  1 x            Nutrition/GI Proph/Bowel Reg:  Senna/Colace    Physical Exam:   GENERAL APPEARANCE:  No acute distress  NEURO:  Alert oriented x3, following commands in all 4 extremities, GCS 15  Strength 5/5 in lower extremities   HEENT:  Atraumatic  CV:  No murmurs auscultated  LUNGS:  Lungs are clear bilaterally  GI: Abdomen is soft, nondistended, nontender  No rebound tenderness or guarding is noted  No masses palpated  Normal bowel sounds    MSK:  Full range of motion no tenderness of all 4 extremities  SKIN:  No rashes noted    Invasive Devices     Peripheral Intravenous Line            Peripheral IV 03/20/20 Right Forearm 1 day                 Lab Results: Results: I have personally reviewed pertinent reports  Imaging/EKG Studies: Results: I have personally reviewed pertinent reports      Other Studies: NA  VTE Prophylaxis: Enoxaparin (Lovenox)

## 2020-03-24 ENCOUNTER — TELEPHONE (OUTPATIENT)
Dept: SURGERY | Facility: CLINIC | Age: 85
End: 2020-03-24

## 2020-03-24 ENCOUNTER — TELEPHONE (OUTPATIENT)
Dept: NEUROSURGERY | Facility: CLINIC | Age: 85
End: 2020-03-24

## 2020-03-24 NOTE — TELEPHONE ENCOUNTER
03/24/2020-CALLED PT, SCHEDULED 04/07/2020 APT WITH PT AND ADVISED PT TO HAVE XRAY COMPLETED PRIOR TO APT     03/22/2020-([facility])PLAN: CONTINUE WEARING [place], PT/OT, PAIN CONTROL  SIGN OFF  FOLLOW-UP I 2 WEEKS WITH PA-C/NP UPRIGHT THORACOLUMBAR SPINE X-RAYS I BRACE

## 2020-03-26 ENCOUNTER — TELEPHONE (OUTPATIENT)
Dept: SURGERY | Facility: CLINIC | Age: 85
End: 2020-03-26

## 2020-03-26 NOTE — TELEPHONE ENCOUNTER
Patient's daughter called and stated that her dad was d/c without instructions for suture removal   She called and asked if she could take him to the [facility]'s walk in office that is right by their house to get them removed  I told her to call the office and ask them if they would take them out  She also asked if she might take him to the [facility] ED  I told her to call and check if they would take them out and if not she should call back and make an appointment to have them taken out here in the office

## 2020-03-30 NOTE — PHYSICIAN ADVISOR
Current patient class: Inpatient  The patient is currently on Hospital Day: 3 at [address] [place] [address]      The patient was admitted to the hospital at [place] [address] on 3/21/20 for the following diagnosis:  Injury, unspecified, initial encounter [T14 90XA]  Closed T12 fracture ([address] ) [A80 359Y]  Ambulatory dysfunction [R26 2]       There was documentation in the medical record of an expected length of stay of at least 2 midnights  The patient was therefore expected to satisfy the 2 midnight benchmark and given the 2 midnight presumption is appropriate for INPATIENT ADMISSION  Given this expectation of a satisfying stay, CMS instructs us that the patient is most often appropriate for inpatient admission under part A provided medical necessity is documented in the chart  After review of the relevant documentation, labs, vital signs and test results, the patient is appropriate for INPATIENT ADMISSION  Admission to the hospital as an inpatient is a complex decision making process which requires the practitioner to consider the patients presenting complaint, history and physical examination and all relevant testing  With this in mind, in this case, the patient was deemed appropriate for INPATIENT ADMISSION  After review of the documentation and testing available at the time of the admission I concur with this clinical determination of medical necessity  Rationale is as follows: The 82year-old patient was evaluated in the emergency department on March 20, 2020  He presented as a trauma evaluation after mechanical fall x 2 at home  He was found to have T12 vertebral body fracture and acute kidney injury  Inpatient class was chosen given anticipation of two midnight hospitalization which he had  He cross the first midnight in the emergency department, was ordered inpatient class on March 21th, and discharged on March 22nd  The patient was evaluated by neurosurgery    TLSO brace was recommended  Upright x-ray showed stability of the fracture  He required evaluations by physical therapy and occupational therapy because of ambulatory dysfunction  Acute kidney injury improved to baseline  Inpatient class was appropriate  The patients vitals on arrival were ED Triage Vitals   Temperature Pulse Respirations Blood Pressure SpO2   03/20/20 2214 03/20/20 2214 03/20/20 2214 03/20/20 2214 03/20/20 2214   97 9 °F (36 6 °C) 73 18 154/86 96 %      Temp Source Heart Rate Source Patient Position - Orthostatic VS BP Location FiO2 (%)   03/20/20 2214 03/20/20 2214 03/20/20 2214 03/21/20 0209 --   Oral Monitor Lying Left arm       Pain Score       03/20/20 2220       3           Past Medical History:   Diagnosis Date    Acute [facility] )     Cardiac disease     Diabetes mellitus [facility] )     Hyperlipidemia     Hypertension      Past Surgical History:   Procedure Laterality Date    JOINT REPLACEMENT             Consults have been placed to:   IP CONSULT TO NEUROSURGERY  IP CONSULT TO CASE MANAGEMENT    Vitals:    03/21/20 1915 03/21/20 2109 03/21/20 2203 03/22/20 0636   BP:  133/80  117/67   BP Location:       Pulse:  71 73 67   Resp:    18   Temp:   98 3 °F (36 8 °C) 98 4 °F (36 9 °C)   TempSrc:       SpO2: 94% 98% 93% 97%   Weight:       Height:           Most recent labs:    No results for input(s): WBC, HGB, HCT, PLT, K, NA, CALCIUM, BUN, CREATININE, LIPASE, AMYLASE, INR, TROPONINI, CKTOTAL, AST, ALT, ALKPHOS, BILITOT in the last 72 hours  Scheduled Meds:  Continuous Infusions:  No current facility-administered medications for this encounter  PRN Meds:      Surgical procedures (if appropriate):

## 2020-03-31 ENCOUNTER — OFFICE VISIT (OUTPATIENT)
Dept: URGENT CARE | Facility: CLINIC | Age: 85
End: 2020-03-31
Payer: MEDICARE

## 2020-03-31 VITALS
HEART RATE: 74 BPM | WEIGHT: 207 LBS | HEIGHT: 72 IN | SYSTOLIC BLOOD PRESSURE: 128 MMHG | TEMPERATURE: 97.5 F | BODY MASS INDEX: 28.04 KG/M2 | OXYGEN SATURATION: 98 % | DIASTOLIC BLOOD PRESSURE: 66 MMHG | RESPIRATION RATE: 16 BRPM

## 2020-03-31 DIAGNOSIS — Z48.02 ENCOUNTER FOR STAPLE REMOVAL: Primary | ICD-10-CM

## 2020-03-31 PROCEDURE — G0463 HOSPITAL OUTPT CLINIC VISIT: HCPCS | Performed by: PHYSICIAN ASSISTANT

## 2020-03-31 PROCEDURE — 99213 OFFICE O/P EST LOW 20 MIN: CPT | Performed by: PHYSICIAN ASSISTANT

## 2020-03-31 NOTE — PATIENT INSTRUCTIONS
Total of 3 staples removed from the patient's scalp without difficulty  The patient tolerated the procedure well  Keep area clean and dry  Closely monitor for signs of infection  Proceed to  ER if symptoms worsen  Staple Care   WHAT YOU NEED TO KNOW:   Staples are often used to close a wound  Your staples may be placed for 3 to 14 days, depending on the location of your wound  DISCHARGE INSTRUCTIONS:   Care for your wound:   · Clean:      ¨ You may be able to shower in 24 hours  Do not soak your wound under water  ¨ Gently wash your wound with soap and warm water daily  Lightly pat it dry  Do not cover your wound unless your healthcare provider tells you to  ¨ You may also need to clean your wound with a mixture of hydrogen peroxide and water  Ask how to do this  ¨ Do not apply ointment or cream to the wound unless your healthcare provider tells you to  · Elevate:      ¨ Rest any arm or leg that has a wound on pillows above the level of your heart  Do this as often as possible for 2 days  This will help decrease swelling and pain, and help you heal faster  · Minimize scarring:      ¨ Avoid sunshine on your wound to reduce scarring  Follow up with your healthcare provider as directed: You may need to return for a wound checkup 3 days after your staples are placed  Ask when you should return to get your staples removed  Staple removal:   · A medical staple remover  will be used to take out your staples  Your healthcare provider will slide the tool under each staple, squeeze the handle, and gently pull the staple out  · Medical tape  will be placed on your wound once your staples are removed  This will help keep your wound closed  The medical tape will fall off on its own after several days  Contact your healthcare provider if:   · You have redness, pain, swelling, and pus draining from your wound  · Your pain medicine does not relieve your pain      · You have a fever of 101°F (38 5°C) or higher  · You have an odor coming from your wound  · You have questions or concerns about your condition or care  Return to the emergency department if:   · Your wound reopens  · You have red streaks in your skin that spread out from your wound  · You have severe pain or vomiting  © 2017 [employer] Information is for End User's use only and may not be sold, redistributed or otherwise used for commercial purposes  All illustrations and images included in CareNotes® are the copyrighted property of [employer]  or [employer]  The above information is an  only  It is not intended as medical advice for individual conditions or treatments  Talk to your doctor, nurse or pharmacist before following any medical regimen to see if it is safe and effective for you

## 2020-03-31 NOTE — PROGRESS NOTES
[facility]        NAME: Amirah Orr is a 82 y o  male  : 1935    MRN: [id number]  DATE: 2020  TIME: 11:52 AM    Assessment and Plan   Encounter for staple removal [Z48 02]  1  Encounter for staple removal           Patient Instructions   Total of 3 staples removed from the patient's scalp without difficulty  The patient tolerated the procedure well  Keep area clean and dry  Closely monitor for signs of infection  Proceed to  ER if symptoms worsen  Chief Complaint     Chief Complaint   Patient presents with    Suture / Staple Removal     Pt reports  he has 3 staples to b reomved from his scalp         History of Present Illness   The patient is an 24-year-old male who presents for staple removal from a scalp wound that occurred after a fall on 3/20/2020  There is an approximate 0 5 cm laceration on the right upper scalp with a total of 3 staples present  He denies any drainage from the wound  Minimal pain and swelling  Negative fever or chills  Negative headache  Negative dizziness  Negative vision changes  He states that he was diagnosed with a thoracic fracture from the fall and has a back brace on  HPI    Review of Systems   Review of Systems   Constitutional: Negative for chills and fever  Skin: Positive for wound (Scalp laceration  )  All other systems reviewed and are negative          Current Medications       Current Outpatient Medications:     acetaminophen (TYLENOL) 325 mg tablet, Take 2 tablets (650 mg total) by mouth every 6 (six) hours, Disp: 30 tablet, Rfl: 0    aspirin 81 mg chewable tablet, Chew 81 mg daily, Disp: , Rfl:     Cholecalciferol (VITAMIN D3) 5000 units CAPS, Take by mouth, Disp: , Rfl:     cyanocobalamin (VITAMIN B-12) 500 mcg tablet, Take 1,000 mcg by mouth daily, Disp: , Rfl:     docusate sodium (COLACE) 100 mg capsule, Take 1 capsule (100 mg total) by mouth 2 (two) times a day, Disp: 10 capsule, Rfl: 0    Ferrous Sulfate (IRON) 325 (65 Fe) MG TABS, Take by mouth, Disp: , Rfl:     fluticasone (FLONASE ALLERGY RELIEF) 50 mcg/act nasal spray, 2 sprays into each nostril daily, Disp: , Rfl:     metFORMIN (GLUCOPHAGE) 1000 MG tablet, Take 1,000 mg by mouth 2 (two) times a day with meals, Disp: , Rfl:     metoprolol tartrate (LOPRESSOR) 25 mg tablet, Take 25 mg by mouth every 12 (twelve) hours, Disp: , Rfl:     oxyCODONE (ROXICODONE) 5 mg immediate release tablet, 2 5 mg to 5 mg p o  Every 6 hours as needed for moderate to severe pain, Disp: 20 tablet, Rfl: 0    pantoprazole (PROTONIX) 40 mg tablet, Take 40 mg by mouth daily, Disp: , Rfl:     simvastatin (ZOCOR) 10 mg tablet, Take 40 mg by mouth daily at bedtime  , Disp: , Rfl:     Current Allergies     Allergies as of 03/31/2020    (No Known Allergies)            The following portions of the patient's history were reviewed and updated as appropriate: allergies, current medications, past family history, past medical history, past social history, past surgical history and problem list      Past Medical History:   Diagnosis Date    [facility] [address] [facility]     Cardiac disease     Diabetes mellitus ([facility] [facility]     Hyperlipidemia     Hypertension        Past Surgical History:   Procedure Laterality Date    JOINT REPLACEMENT         No family history on file  Medications have been verified  Objective   /66   Pulse 74   Temp 97 5 °F (36 4 °C)   Resp 16   Ht 6' (1 829 m)   Wt 93 9 kg (207 lb)   SpO2 98%   BMI 28 07 kg/m²          Physical Exam     Physical Exam   Constitutional: He appears well-developed and well-nourished  No distress  HENT:   Head:       Right Ear: External ear normal    Left Ear: External ear normal    Nose: Nose normal    Mouth/Throat: Oropharynx is clear and moist    Pulmonary/Chest: Effort normal  No respiratory distress  Neurological: He is alert  Skin: Skin is warm and dry  He is not diaphoretic  Nursing note and vitals reviewed        Suture removal  Date/Time: 3/31/2020 11:51 AM  Performed by: Olayinka Velasco PA-C  Authorized by: Olayinka Velasco PA-C     Patient location:  Clinic  Consent:     Consent obtained:  Verbal    Consent given by:  Patient    Risks discussed:  Bleeding, pain and wound separation  Universal protocol:     Procedure explained and questions answered to patient or proxy's satisfaction: yes      Relevant documents present and verified: yes      Patient identity confirmed:  Verbally with patient  Location:     Laterality:  Right    Location:  Head/neck    Head/neck location:  Scalp  Procedure details:     Nail bed suture material: Staple removal total     Number of staples removed:  3  Comments: The patient tolerated the procedure well with no apparent complications

## 2020-04-06 ENCOUNTER — APPOINTMENT (OUTPATIENT)
Dept: RADIOLOGY | Facility: CLINIC | Age: 85
End: 2020-04-06
Payer: MEDICARE

## 2020-04-06 DIAGNOSIS — S22.089A CLOSED T12 FRACTURE (HCC): ICD-10-CM

## 2020-04-06 PROCEDURE — 72080 X-RAY EXAM THORACOLMB 2/> VW: CPT

## 2020-04-08 ENCOUNTER — TELEMEDICINE (OUTPATIENT)
Dept: NEUROSURGERY | Facility: CLINIC | Age: 85
End: 2020-04-08
Payer: MEDICARE

## 2020-04-08 ENCOUNTER — TELEPHONE (OUTPATIENT)
Dept: FAMILY MEDICINE CLINIC | Facility: CLINIC | Age: 85
End: 2020-04-08

## 2020-04-08 DIAGNOSIS — M62.830 BACK SPASM: Primary | ICD-10-CM

## 2020-04-08 DIAGNOSIS — S22.080D COMPRESSION FRACTURE OF T12 VERTEBRA WITH ROUTINE HEALING, SUBSEQUENT ENCOUNTER: Primary | ICD-10-CM

## 2020-04-08 PROCEDURE — 1036F TOBACCO NON-USER: CPT | Performed by: PHYSICIAN ASSISTANT

## 2020-04-08 PROCEDURE — 99442 PR PHYS/QHP TELEPHONE EVALUATION 11-20 MIN: CPT | Performed by: PHYSICIAN ASSISTANT

## 2020-04-08 PROCEDURE — 3066F NEPHROPATHY DOC TX: CPT | Performed by: PHYSICIAN ASSISTANT

## 2020-04-08 PROCEDURE — 1160F RVW MEDS BY RX/DR IN RCRD: CPT | Performed by: PHYSICIAN ASSISTANT

## 2020-04-08 PROCEDURE — 3074F SYST BP LT 130 MM HG: CPT | Performed by: PHYSICIAN ASSISTANT

## 2020-04-08 PROCEDURE — 3078F DIAST BP <80 MM HG: CPT | Performed by: PHYSICIAN ASSISTANT

## 2020-04-08 PROCEDURE — 1111F DSCHRG MED/CURRENT MED MERGE: CPT | Performed by: PHYSICIAN ASSISTANT

## 2020-04-08 RX ORDER — METHYLPREDNISOLONE 4 MG/1
TABLET ORAL
Qty: 21 EACH | Refills: 0 | Status: SHIPPED | OUTPATIENT
Start: 2020-04-08 | End: 2020-04-09 | Stop reason: SDUPTHER

## 2020-04-08 RX ORDER — TIZANIDINE 2 MG/1
2 TABLET ORAL EVERY 8 HOURS PRN
Qty: 30 TABLET | Refills: 0 | Status: SHIPPED | OUTPATIENT
Start: 2020-04-08 | End: 2020-04-09 | Stop reason: SDUPTHER

## 2020-04-09 DIAGNOSIS — M62.830 BACK SPASM: ICD-10-CM

## 2020-04-09 RX ORDER — TIZANIDINE 2 MG/1
2 TABLET ORAL EVERY 8 HOURS PRN
Qty: 30 TABLET | Refills: 0 | Status: SHIPPED | OUTPATIENT
Start: 2020-04-09 | End: 2020-08-31

## 2020-04-09 RX ORDER — METHYLPREDNISOLONE 4 MG/1
TABLET ORAL
Qty: 21 EACH | Refills: 0 | Status: SHIPPED | OUTPATIENT
Start: 2020-04-09 | End: 2020-08-31

## 2020-04-27 ENCOUNTER — TELEMEDICINE (OUTPATIENT)
Dept: FAMILY MEDICINE CLINIC | Facility: CLINIC | Age: 85
End: 2020-04-27
Payer: MEDICARE

## 2020-04-27 VITALS — HEIGHT: 72 IN | WEIGHT: 207 LBS | BODY MASS INDEX: 28.04 KG/M2

## 2020-04-27 DIAGNOSIS — L03.011 CELLULITIS OF FINGER OF RIGHT HAND: ICD-10-CM

## 2020-04-27 DIAGNOSIS — G89.29 CHRONIC MIDLINE LOW BACK PAIN WITHOUT SCIATICA: ICD-10-CM

## 2020-04-27 DIAGNOSIS — M54.50 CHRONIC MIDLINE LOW BACK PAIN WITHOUT SCIATICA: ICD-10-CM

## 2020-04-27 DIAGNOSIS — E11.22 TYPE 2 DIABETES MELLITUS WITH STAGE 3 CHRONIC KIDNEY DISEASE, WITHOUT LONG-TERM CURRENT USE OF INSULIN (HCC): Primary | ICD-10-CM

## 2020-04-27 DIAGNOSIS — N18.30 TYPE 2 DIABETES MELLITUS WITH STAGE 3 CHRONIC KIDNEY DISEASE, WITHOUT LONG-TERM CURRENT USE OF INSULIN (HCC): Primary | ICD-10-CM

## 2020-04-27 PROCEDURE — 99214 OFFICE O/P EST MOD 30 MIN: CPT | Performed by: FAMILY MEDICINE

## 2020-04-27 RX ORDER — TRAMADOL HYDROCHLORIDE 50 MG/1
50 TABLET ORAL EVERY 8 HOURS PRN
Qty: 21 TABLET | Refills: 0 | Status: SHIPPED | OUTPATIENT
Start: 2020-04-27 | End: 2020-05-04

## 2020-04-27 RX ORDER — DOXYCYCLINE 100 MG/1
100 CAPSULE ORAL 2 TIMES DAILY
Qty: 20 CAPSULE | Refills: 0 | Status: SHIPPED | OUTPATIENT
Start: 2020-04-27 | End: 2020-05-04 | Stop reason: SDUPTHER

## 2020-05-04 ENCOUNTER — TELEMEDICINE (OUTPATIENT)
Dept: FAMILY MEDICINE CLINIC | Facility: CLINIC | Age: 85
End: 2020-05-04
Payer: MEDICARE

## 2020-05-04 VITALS — WEIGHT: 207 LBS | BODY MASS INDEX: 28.04 KG/M2 | HEIGHT: 72 IN

## 2020-05-04 DIAGNOSIS — L03.011 CELLULITIS OF FINGER OF RIGHT HAND: ICD-10-CM

## 2020-05-04 PROCEDURE — 4040F PNEUMOC VAC/ADMIN/RCVD: CPT | Performed by: FAMILY MEDICINE

## 2020-05-04 PROCEDURE — 1160F RVW MEDS BY RX/DR IN RCRD: CPT | Performed by: FAMILY MEDICINE

## 2020-05-04 PROCEDURE — 99213 OFFICE O/P EST LOW 20 MIN: CPT | Performed by: FAMILY MEDICINE

## 2020-05-04 PROCEDURE — 3074F SYST BP LT 130 MM HG: CPT | Performed by: FAMILY MEDICINE

## 2020-05-04 PROCEDURE — 3078F DIAST BP <80 MM HG: CPT | Performed by: FAMILY MEDICINE

## 2020-05-04 PROCEDURE — 1111F DSCHRG MED/CURRENT MED MERGE: CPT | Performed by: FAMILY MEDICINE

## 2020-05-04 PROCEDURE — 3008F BODY MASS INDEX DOCD: CPT | Performed by: FAMILY MEDICINE

## 2020-05-04 PROCEDURE — 1036F TOBACCO NON-USER: CPT | Performed by: FAMILY MEDICINE

## 2020-05-04 PROCEDURE — 3066F NEPHROPATHY DOC TX: CPT | Performed by: FAMILY MEDICINE

## 2020-05-04 RX ORDER — SIMVASTATIN 40 MG
40 TABLET ORAL DAILY
COMMUNITY
Start: 2020-03-08 | End: 2020-06-02

## 2020-05-04 RX ORDER — DOXYCYCLINE 100 MG/1
100 CAPSULE ORAL 2 TIMES DAILY
Qty: 8 CAPSULE | Refills: 0 | Status: SHIPPED | OUTPATIENT
Start: 2020-05-04 | End: 2020-05-08

## 2020-05-11 ENCOUNTER — APPOINTMENT (OUTPATIENT)
Dept: RADIOLOGY | Facility: CLINIC | Age: 85
End: 2020-05-11
Payer: MEDICARE

## 2020-05-11 DIAGNOSIS — S22.080D COMPRESSION FRACTURE OF T12 VERTEBRA WITH ROUTINE HEALING, SUBSEQUENT ENCOUNTER: ICD-10-CM

## 2020-05-11 PROCEDURE — 72080 X-RAY EXAM THORACOLMB 2/> VW: CPT

## 2020-05-14 ENCOUNTER — TELEMEDICINE (OUTPATIENT)
Dept: NEUROSURGERY | Facility: CLINIC | Age: 85
End: 2020-05-14
Payer: MEDICARE

## 2020-05-14 DIAGNOSIS — S22.080D CLOSED WEDGE COMPRESSION FRACTURE OF TWELFTH THORACIC VERTEBRA WITH ROUTINE HEALING, SUBSEQUENT ENCOUNTER: Primary | ICD-10-CM

## 2020-05-14 PROCEDURE — 3074F SYST BP LT 130 MM HG: CPT | Performed by: PHYSICIAN ASSISTANT

## 2020-05-14 PROCEDURE — 3066F NEPHROPATHY DOC TX: CPT | Performed by: PHYSICIAN ASSISTANT

## 2020-05-14 PROCEDURE — 3078F DIAST BP <80 MM HG: CPT | Performed by: PHYSICIAN ASSISTANT

## 2020-05-14 PROCEDURE — 4040F PNEUMOC VAC/ADMIN/RCVD: CPT | Performed by: PHYSICIAN ASSISTANT

## 2020-05-14 PROCEDURE — 1036F TOBACCO NON-USER: CPT | Performed by: PHYSICIAN ASSISTANT

## 2020-05-14 PROCEDURE — 99443 PR PHYS/QHP TELEPHONE EVALUATION 21-30 MIN: CPT | Performed by: PHYSICIAN ASSISTANT

## 2020-05-14 PROCEDURE — 1160F RVW MEDS BY RX/DR IN RCRD: CPT | Performed by: PHYSICIAN ASSISTANT

## 2020-05-14 PROCEDURE — 1111F DSCHRG MED/CURRENT MED MERGE: CPT | Performed by: PHYSICIAN ASSISTANT

## 2020-05-18 ENCOUNTER — OFFICE VISIT (OUTPATIENT)
Dept: FAMILY MEDICINE CLINIC | Facility: CLINIC | Age: 85
End: 2020-05-18
Payer: MEDICARE

## 2020-05-18 VITALS
WEIGHT: 205 LBS | RESPIRATION RATE: 16 BRPM | DIASTOLIC BLOOD PRESSURE: 72 MMHG | HEIGHT: 72 IN | BODY MASS INDEX: 27.77 KG/M2 | OXYGEN SATURATION: 98 % | HEART RATE: 86 BPM | TEMPERATURE: 97.1 F | SYSTOLIC BLOOD PRESSURE: 118 MMHG

## 2020-05-18 DIAGNOSIS — E55.9 VITAMIN D DEFICIENCY: ICD-10-CM

## 2020-05-18 DIAGNOSIS — E11.22 TYPE 2 DIABETES MELLITUS WITH STAGE 3 CHRONIC KIDNEY DISEASE, WITHOUT LONG-TERM CURRENT USE OF INSULIN (HCC): ICD-10-CM

## 2020-05-18 DIAGNOSIS — R26.2 AMBULATORY DYSFUNCTION: ICD-10-CM

## 2020-05-18 DIAGNOSIS — R29.6 FREQUENT FALLS: ICD-10-CM

## 2020-05-18 DIAGNOSIS — S22.088D OTHER CLOSED FRACTURE OF TWELFTH THORACIC VERTEBRA WITH ROUTINE HEALING, SUBSEQUENT ENCOUNTER: Primary | ICD-10-CM

## 2020-05-18 DIAGNOSIS — N18.30 TYPE 2 DIABETES MELLITUS WITH STAGE 3 CHRONIC KIDNEY DISEASE, WITHOUT LONG-TERM CURRENT USE OF INSULIN (HCC): ICD-10-CM

## 2020-05-18 DIAGNOSIS — I48.91 ATRIAL FIBRILLATION, UNSPECIFIED TYPE (HCC): ICD-10-CM

## 2020-05-18 PROCEDURE — 3008F BODY MASS INDEX DOCD: CPT | Performed by: FAMILY MEDICINE

## 2020-05-18 PROCEDURE — 3078F DIAST BP <80 MM HG: CPT | Performed by: FAMILY MEDICINE

## 2020-05-18 PROCEDURE — 3066F NEPHROPATHY DOC TX: CPT | Performed by: FAMILY MEDICINE

## 2020-05-18 PROCEDURE — 3074F SYST BP LT 130 MM HG: CPT | Performed by: FAMILY MEDICINE

## 2020-05-18 PROCEDURE — 99214 OFFICE O/P EST MOD 30 MIN: CPT | Performed by: FAMILY MEDICINE

## 2020-05-18 PROCEDURE — 1111F DSCHRG MED/CURRENT MED MERGE: CPT | Performed by: FAMILY MEDICINE

## 2020-05-18 PROCEDURE — 1036F TOBACCO NON-USER: CPT | Performed by: FAMILY MEDICINE

## 2020-05-18 PROCEDURE — 1160F RVW MEDS BY RX/DR IN RCRD: CPT | Performed by: FAMILY MEDICINE

## 2020-05-18 PROCEDURE — 4040F PNEUMOC VAC/ADMIN/RCVD: CPT | Performed by: FAMILY MEDICINE

## 2020-05-27 ENCOUNTER — EVALUATION (OUTPATIENT)
Dept: PHYSICAL THERAPY | Facility: CLINIC | Age: 85
End: 2020-05-27
Payer: MEDICARE

## 2020-05-27 DIAGNOSIS — R26.2 AMBULATORY DYSFUNCTION: ICD-10-CM

## 2020-05-27 DIAGNOSIS — S22.088D OTHER CLOSED FRACTURE OF TWELFTH THORACIC VERTEBRA WITH ROUTINE HEALING, SUBSEQUENT ENCOUNTER: Primary | ICD-10-CM

## 2020-05-27 DIAGNOSIS — R29.6 FREQUENT FALLS: ICD-10-CM

## 2020-05-27 PROCEDURE — 97162 PT EVAL MOD COMPLEX 30 MIN: CPT | Performed by: PHYSICAL THERAPIST

## 2020-06-02 ENCOUNTER — APPOINTMENT (OUTPATIENT)
Dept: LAB | Facility: CLINIC | Age: 85
End: 2020-06-02
Payer: MEDICARE

## 2020-06-02 DIAGNOSIS — K21.9 GASTROESOPHAGEAL REFLUX DISEASE WITHOUT ESOPHAGITIS: Primary | ICD-10-CM

## 2020-06-02 DIAGNOSIS — I48.91 ATRIAL FIBRILLATION, UNSPECIFIED TYPE (HCC): ICD-10-CM

## 2020-06-02 DIAGNOSIS — E11.22 TYPE 2 DIABETES MELLITUS WITH STAGE 3 CHRONIC KIDNEY DISEASE, WITHOUT LONG-TERM CURRENT USE OF INSULIN (HCC): ICD-10-CM

## 2020-06-02 DIAGNOSIS — E55.9 VITAMIN D DEFICIENCY: ICD-10-CM

## 2020-06-02 DIAGNOSIS — N18.30 TYPE 2 DIABETES MELLITUS WITH STAGE 3 CHRONIC KIDNEY DISEASE, WITHOUT LONG-TERM CURRENT USE OF INSULIN (HCC): ICD-10-CM

## 2020-06-02 DIAGNOSIS — E78.5 HYPERLIPIDEMIA, UNSPECIFIED HYPERLIPIDEMIA TYPE: ICD-10-CM

## 2020-06-02 LAB
25(OH)D3 SERPL-MCNC: 28.9 NG/ML (ref 30–100)
ALBUMIN SERPL BCP-MCNC: 4 G/DL (ref 3.5–5)
ALP SERPL-CCNC: 90 U/L (ref 46–116)
ALT SERPL W P-5'-P-CCNC: 38 U/L (ref 12–78)
ANION GAP SERPL CALCULATED.3IONS-SCNC: 7 MMOL/L (ref 4–13)
AST SERPL W P-5'-P-CCNC: 26 U/L (ref 5–45)
BILIRUB SERPL-MCNC: 0.47 MG/DL (ref 0.2–1)
BUN SERPL-MCNC: 13 MG/DL (ref 5–25)
CALCIUM SERPL-MCNC: 9.6 MG/DL (ref 8.3–10.1)
CHLORIDE SERPL-SCNC: 104 MMOL/L (ref 100–108)
CHOLEST SERPL-MCNC: 154 MG/DL (ref 50–200)
CO2 SERPL-SCNC: 24 MMOL/L (ref 21–32)
CREAT SERPL-MCNC: 1.22 MG/DL (ref 0.6–1.3)
ERYTHROCYTE [DISTWIDTH] IN BLOOD BY AUTOMATED COUNT: 16.2 % (ref 11.6–15.1)
EST. AVERAGE GLUCOSE BLD GHB EST-MCNC: 154 MG/DL
GFR SERPL CREATININE-BSD FRML MDRD: 55 ML/MIN/1.73SQ M
GLUCOSE P FAST SERPL-MCNC: 116 MG/DL (ref 65–99)
HBA1C MFR BLD: 7 %
HCT VFR BLD AUTO: 41.8 % (ref 36.5–49.3)
HDLC SERPL-MCNC: 35 MG/DL
HGB BLD-MCNC: 12.9 G/DL (ref 12–17)
LDLC SERPL CALC-MCNC: 70 MG/DL (ref 0–100)
MCH RBC QN AUTO: 27.4 PG (ref 26.8–34.3)
MCHC RBC AUTO-ENTMCNC: 30.9 G/DL (ref 31.4–37.4)
MCV RBC AUTO: 89 FL (ref 82–98)
NONHDLC SERPL-MCNC: 119 MG/DL
PLATELET # BLD AUTO: 295 THOUSANDS/UL (ref 149–390)
PMV BLD AUTO: 10.3 FL (ref 8.9–12.7)
POTASSIUM SERPL-SCNC: 5 MMOL/L (ref 3.5–5.3)
PROT SERPL-MCNC: 8 G/DL (ref 6.4–8.2)
RBC # BLD AUTO: 4.71 MILLION/UL (ref 3.88–5.62)
SODIUM SERPL-SCNC: 135 MMOL/L (ref 136–145)
TRIGL SERPL-MCNC: 246 MG/DL
WBC # BLD AUTO: 10.61 THOUSAND/UL (ref 4.31–10.16)

## 2020-06-02 PROCEDURE — 82306 VITAMIN D 25 HYDROXY: CPT

## 2020-06-02 PROCEDURE — 85027 COMPLETE CBC AUTOMATED: CPT

## 2020-06-02 PROCEDURE — 80061 LIPID PANEL: CPT

## 2020-06-02 PROCEDURE — 36415 COLL VENOUS BLD VENIPUNCTURE: CPT

## 2020-06-02 PROCEDURE — 83036 HEMOGLOBIN GLYCOSYLATED A1C: CPT

## 2020-06-02 PROCEDURE — 80053 COMPREHEN METABOLIC PANEL: CPT

## 2020-06-02 RX ORDER — SIMVASTATIN 40 MG
TABLET ORAL
Qty: 90 TABLET | Refills: 1 | Status: SHIPPED | OUTPATIENT
Start: 2020-06-02 | End: 2021-01-11

## 2020-06-02 RX ORDER — PANTOPRAZOLE SODIUM 40 MG/1
TABLET, DELAYED RELEASE ORAL
Qty: 90 TABLET | Refills: 1 | Status: SHIPPED | OUTPATIENT
Start: 2020-06-02 | End: 2021-01-11

## 2020-06-03 ENCOUNTER — OFFICE VISIT (OUTPATIENT)
Dept: PHYSICAL THERAPY | Facility: CLINIC | Age: 85
End: 2020-06-03
Payer: MEDICARE

## 2020-06-03 DIAGNOSIS — S22.088D OTHER CLOSED FRACTURE OF TWELFTH THORACIC VERTEBRA WITH ROUTINE HEALING, SUBSEQUENT ENCOUNTER: Primary | ICD-10-CM

## 2020-06-03 DIAGNOSIS — R29.6 FREQUENT FALLS: ICD-10-CM

## 2020-06-03 DIAGNOSIS — R26.2 AMBULATORY DYSFUNCTION: ICD-10-CM

## 2020-06-03 PROCEDURE — 97110 THERAPEUTIC EXERCISES: CPT | Performed by: PHYSICAL THERAPIST

## 2020-06-03 PROCEDURE — 97112 NEUROMUSCULAR REEDUCATION: CPT | Performed by: PHYSICAL THERAPIST

## 2020-06-03 PROCEDURE — 97530 THERAPEUTIC ACTIVITIES: CPT | Performed by: PHYSICAL THERAPIST

## 2020-06-05 ENCOUNTER — OFFICE VISIT (OUTPATIENT)
Dept: PHYSICAL THERAPY | Facility: CLINIC | Age: 85
End: 2020-06-05
Payer: MEDICARE

## 2020-06-05 DIAGNOSIS — R29.6 FREQUENT FALLS: ICD-10-CM

## 2020-06-05 DIAGNOSIS — S22.088D OTHER CLOSED FRACTURE OF TWELFTH THORACIC VERTEBRA WITH ROUTINE HEALING, SUBSEQUENT ENCOUNTER: Primary | ICD-10-CM

## 2020-06-05 DIAGNOSIS — R26.2 AMBULATORY DYSFUNCTION: ICD-10-CM

## 2020-06-05 PROCEDURE — 97112 NEUROMUSCULAR REEDUCATION: CPT

## 2020-06-05 PROCEDURE — 97110 THERAPEUTIC EXERCISES: CPT

## 2020-06-10 ENCOUNTER — APPOINTMENT (OUTPATIENT)
Dept: RADIOLOGY | Facility: CLINIC | Age: 85
End: 2020-06-10
Payer: MEDICARE

## 2020-06-10 ENCOUNTER — OFFICE VISIT (OUTPATIENT)
Dept: PHYSICAL THERAPY | Facility: CLINIC | Age: 85
End: 2020-06-10
Payer: MEDICARE

## 2020-06-10 DIAGNOSIS — R29.6 FREQUENT FALLS: ICD-10-CM

## 2020-06-10 DIAGNOSIS — S22.080D CLOSED WEDGE COMPRESSION FRACTURE OF TWELFTH THORACIC VERTEBRA WITH ROUTINE HEALING, SUBSEQUENT ENCOUNTER: ICD-10-CM

## 2020-06-10 DIAGNOSIS — S22.088D OTHER CLOSED FRACTURE OF TWELFTH THORACIC VERTEBRA WITH ROUTINE HEALING, SUBSEQUENT ENCOUNTER: Primary | ICD-10-CM

## 2020-06-10 DIAGNOSIS — R26.2 AMBULATORY DYSFUNCTION: ICD-10-CM

## 2020-06-10 PROCEDURE — 97112 NEUROMUSCULAR REEDUCATION: CPT

## 2020-06-10 PROCEDURE — 72080 X-RAY EXAM THORACOLMB 2/> VW: CPT

## 2020-06-10 PROCEDURE — 97110 THERAPEUTIC EXERCISES: CPT

## 2020-06-11 ENCOUNTER — OFFICE VISIT (OUTPATIENT)
Dept: NEUROSURGERY | Facility: CLINIC | Age: 85
End: 2020-06-11
Payer: MEDICARE

## 2020-06-11 ENCOUNTER — TELEPHONE (OUTPATIENT)
Dept: NEUROSURGERY | Facility: CLINIC | Age: 85
End: 2020-06-11

## 2020-06-11 VITALS
DIASTOLIC BLOOD PRESSURE: 78 MMHG | TEMPERATURE: 98.2 F | SYSTOLIC BLOOD PRESSURE: 132 MMHG | HEIGHT: 72 IN | BODY MASS INDEX: 28.04 KG/M2 | WEIGHT: 207 LBS | HEART RATE: 84 BPM | RESPIRATION RATE: 16 BRPM

## 2020-06-11 DIAGNOSIS — S22.080G CLOSED WEDGE COMPRESSION FRACTURE OF TWELFTH THORACIC VERTEBRA WITH DELAYED HEALING, SUBSEQUENT ENCOUNTER: Primary | ICD-10-CM

## 2020-06-11 PROCEDURE — 99214 OFFICE O/P EST MOD 30 MIN: CPT | Performed by: PHYSICIAN ASSISTANT

## 2020-06-11 PROCEDURE — 3008F BODY MASS INDEX DOCD: CPT | Performed by: PHYSICIAN ASSISTANT

## 2020-06-11 PROCEDURE — 3051F HG A1C>EQUAL 7.0%<8.0%: CPT | Performed by: PHYSICIAN ASSISTANT

## 2020-06-11 PROCEDURE — 1036F TOBACCO NON-USER: CPT | Performed by: PHYSICIAN ASSISTANT

## 2020-06-11 PROCEDURE — 1160F RVW MEDS BY RX/DR IN RCRD: CPT | Performed by: PHYSICIAN ASSISTANT

## 2020-06-11 PROCEDURE — 3078F DIAST BP <80 MM HG: CPT | Performed by: PHYSICIAN ASSISTANT

## 2020-06-11 PROCEDURE — 3066F NEPHROPATHY DOC TX: CPT | Performed by: PHYSICIAN ASSISTANT

## 2020-06-11 PROCEDURE — 4040F PNEUMOC VAC/ADMIN/RCVD: CPT | Performed by: PHYSICIAN ASSISTANT

## 2020-06-11 PROCEDURE — 3075F SYST BP GE 130 - 139MM HG: CPT | Performed by: PHYSICIAN ASSISTANT

## 2020-06-12 ENCOUNTER — OFFICE VISIT (OUTPATIENT)
Dept: PHYSICAL THERAPY | Facility: CLINIC | Age: 85
End: 2020-06-12
Payer: MEDICARE

## 2020-06-12 DIAGNOSIS — R29.6 FREQUENT FALLS: ICD-10-CM

## 2020-06-12 DIAGNOSIS — R26.2 AMBULATORY DYSFUNCTION: ICD-10-CM

## 2020-06-12 DIAGNOSIS — S22.088D OTHER CLOSED FRACTURE OF TWELFTH THORACIC VERTEBRA WITH ROUTINE HEALING, SUBSEQUENT ENCOUNTER: Primary | ICD-10-CM

## 2020-06-12 PROCEDURE — 97110 THERAPEUTIC EXERCISES: CPT

## 2020-06-12 PROCEDURE — 97112 NEUROMUSCULAR REEDUCATION: CPT

## 2020-06-17 ENCOUNTER — OFFICE VISIT (OUTPATIENT)
Dept: PHYSICAL THERAPY | Facility: CLINIC | Age: 85
End: 2020-06-17
Payer: MEDICARE

## 2020-06-17 DIAGNOSIS — R26.2 AMBULATORY DYSFUNCTION: ICD-10-CM

## 2020-06-17 DIAGNOSIS — R29.6 FREQUENT FALLS: ICD-10-CM

## 2020-06-17 DIAGNOSIS — S22.088D OTHER CLOSED FRACTURE OF TWELFTH THORACIC VERTEBRA WITH ROUTINE HEALING, SUBSEQUENT ENCOUNTER: Primary | ICD-10-CM

## 2020-06-17 PROCEDURE — 97116 GAIT TRAINING THERAPY: CPT | Performed by: PHYSICAL THERAPIST

## 2020-06-17 PROCEDURE — 97110 THERAPEUTIC EXERCISES: CPT | Performed by: PHYSICAL THERAPIST

## 2020-06-17 PROCEDURE — 97112 NEUROMUSCULAR REEDUCATION: CPT | Performed by: PHYSICAL THERAPIST

## 2020-06-19 ENCOUNTER — OFFICE VISIT (OUTPATIENT)
Dept: PHYSICAL THERAPY | Facility: CLINIC | Age: 85
End: 2020-06-19
Payer: MEDICARE

## 2020-06-19 DIAGNOSIS — R26.2 AMBULATORY DYSFUNCTION: ICD-10-CM

## 2020-06-19 DIAGNOSIS — R29.6 FREQUENT FALLS: ICD-10-CM

## 2020-06-19 DIAGNOSIS — S22.088D OTHER CLOSED FRACTURE OF TWELFTH THORACIC VERTEBRA WITH ROUTINE HEALING, SUBSEQUENT ENCOUNTER: Primary | ICD-10-CM

## 2020-06-19 PROCEDURE — 97112 NEUROMUSCULAR REEDUCATION: CPT

## 2020-06-19 PROCEDURE — 97110 THERAPEUTIC EXERCISES: CPT

## 2020-06-24 ENCOUNTER — OFFICE VISIT (OUTPATIENT)
Dept: PHYSICAL THERAPY | Facility: CLINIC | Age: 85
End: 2020-06-24
Payer: MEDICARE

## 2020-06-24 DIAGNOSIS — R29.6 FREQUENT FALLS: ICD-10-CM

## 2020-06-24 DIAGNOSIS — R26.2 AMBULATORY DYSFUNCTION: ICD-10-CM

## 2020-06-24 DIAGNOSIS — S22.088D OTHER CLOSED FRACTURE OF TWELFTH THORACIC VERTEBRA WITH ROUTINE HEALING, SUBSEQUENT ENCOUNTER: Primary | ICD-10-CM

## 2020-06-24 PROCEDURE — 97110 THERAPEUTIC EXERCISES: CPT

## 2020-06-24 PROCEDURE — 97112 NEUROMUSCULAR REEDUCATION: CPT

## 2020-06-26 ENCOUNTER — APPOINTMENT (OUTPATIENT)
Dept: PHYSICAL THERAPY | Facility: CLINIC | Age: 85
End: 2020-06-26
Payer: MEDICARE

## 2020-07-01 ENCOUNTER — EVALUATION (OUTPATIENT)
Dept: PHYSICAL THERAPY | Facility: CLINIC | Age: 85
End: 2020-07-01
Payer: MEDICARE

## 2020-07-01 DIAGNOSIS — R29.6 FREQUENT FALLS: ICD-10-CM

## 2020-07-01 DIAGNOSIS — R26.2 AMBULATORY DYSFUNCTION: ICD-10-CM

## 2020-07-01 DIAGNOSIS — S22.088D OTHER CLOSED FRACTURE OF TWELFTH THORACIC VERTEBRA WITH ROUTINE HEALING, SUBSEQUENT ENCOUNTER: Primary | ICD-10-CM

## 2020-07-01 PROCEDURE — 97110 THERAPEUTIC EXERCISES: CPT | Performed by: PHYSICAL THERAPIST

## 2020-07-01 PROCEDURE — 97530 THERAPEUTIC ACTIVITIES: CPT | Performed by: PHYSICAL THERAPIST

## 2020-07-01 PROCEDURE — 97112 NEUROMUSCULAR REEDUCATION: CPT | Performed by: PHYSICAL THERAPIST

## 2020-07-01 NOTE — PROGRESS NOTES
PT Re-Evaluation     Today's date: 2020  Patient name: Rita No  : 1935  MRN: [id number]  Referring provider: Sil Jimenez MD  Dx:   Encounter Diagnosis     ICD-10-CM    1  Other closed fracture of twelfth thoracic vertebra with routine healing, subsequent encounter S22 088D    2  Frequent falls R29 6    3  Ambulatory dysfunction R26 2                   Assessment  Assessment details: Patient demonstrates decreased pain, improved L/S ROM, improved strength, and improved gait and balance since beginning PT treatment  Patient states he feels that he is 25-50% improved since beginning PT treatment  Patient would benefit from continued skilled PT treatment to address remaining deficits and to facilitate return to prior level of function        Goals  Impairment Goals 4-6 weeks  - Decrease pain to <3/10 - partially met  - Improve lumbar AROM to >50% throughout - partially met  - Increase hip strength to 4+/5 throughout - partially met  - Increase core strength to be able to sit straight up without deviation - partially met  - Patient will improve TUG to < 13 seconds to demonstrate low risk for falls - met  - Patient will improve 30 sec STS to WNL for his age group - met  - Pateint will improve mCTSIB by  5 to demonstrate improved balance - NT    Functional Goals 6-12 weeks  - Increase Functional Status Measure (FOTO) to: > 51 - partially met  - Patient will be independent with HEP - partially met  - Patient will be able to sit without increased pain/compensation/difficulty - partially met  - Patient will be able to perform sit to stand without increased pain/compensation/difficulty - partially met  - Patient will be able to walk without increased pain/compensation/difficulty - partially met  - Patient will be able to ambulate with cane with confidence - met    Plan  Frequency: 2x week  Duration in visits: 12  Duration in weeks: 6  Plan of Care beginning date: 2020  Plan of Care expiration date: 2020  Treatment plan discussed with: patient        Subjective Evaluation    History of Present Illness  Mechanism of injury: Patient states he feels that he is 25-50% improved since beginning PT treatment  Patient is currently ambulating indoors and outdoors with a single point cane  Patient states his balance is improved, but he stills feels somewhat unsteady      Pain  Current pain ratin  At best pain ratin  At worst pain ratin          Objective     General Comments:      Lumbar Comments  Active Range of Motion     Additional Active Range of Motion Details  LS AROM:   Flexion: 75%  Extension: 10%  SideBending right: 40%  SideBending left: 40%  Rotation right: 40%  Rotation left: 40%       Strength/Myotome Testing      Left Hip   Planes of Motion   Flexion: 4  Abduction: 4  External rotation: 4+  Internal rotation: 5     Right Hip   Planes of Motion   Flexion: 4  Abduction: 4  External rotation: 4+  Internal rotation: 5     Left Knee   Flexion: 5  Extension: 5     Right Knee   Flexion: 5  Extension: 5     Left Ankle/Foot   Dorsiflexion: 5     Right Ankle/Foot   Dorsiflexion: 5     General Comments:        Lumbar Comments  Ambulation: straight cane, minimal decrease in gait speed with ambulation  Sit to stand: Minimal use of hands  TU sec without assistive device  30 sec STS: 8 reps      Flowsheet Rows      Most Recent Value   PT/OT G-Codes   Current Score  45   Projected Score  51             Diagnosis: T12 compression fx, fall risk, poor balance   Precautions: Fall risk   Primary Goals: Core and hip strength, balance, gait   *asterisks by exercise = given for HEP   Manuals     Re-eval        KK                                                             There Ex             Thoracic rot in chair* X 20 5 sec holds 5 sec hold x20  5 sec hold x20   np x10 ea side    SB in chair X 20 5 sec holds 5 sec hold x20  5 sec hold x20  np x10 ea side                 FF in chair             TA in chair* HEP       x15 x5 sec holds   Kegel in chair* HEP       x15 x5 sec holds   Glute set in chair* HEP       15 x5 sec holds   Standing marches   3# 20x ea 3# 25x ea  3# 25x ea 3# 20x ea    Standing HS curls   3# 20x ea 3# 25x ea  3# 25x ea 3# 20x ea   Supine SLR flex     0# 2x10       Hip abd/ext   3# 20x ea 3# 25x ea   3# 25x ea  3# 20x ea   Heel raises  20x 20x  30x   30x 20x    LTR             Neuro Re-Ed             Biodex LOS x2, 20%  Random control 1 min 66% LOS x2 19 % 34%  Random control 1 min 67%  LOS x2, 9% 19%  LOS x 2 - 23%, 30% LOS x2: 8, 25%    SLS         30sec x2 ea   Ambulation over obstacles x2 fwd and lateral  x2 fwd and 2 lateral  x2 fwd and 2 lateral   np x2 fwd and lateral    Tandem stance x30 sec EC x30 sec EC  X30 sec EC   x30" EC 30 secx2    Clamshells     RTB 2x10  np     FT balance FT EC 30 sec x 2 FT EC 30 sec x2  FT EO Foam 30 secx2  FT EO Foam 30 secx2 FT EC 30 sec x2   Multifidus press BTB x 20 ea BTB x20 ea  BTB x20 ea  BTB x20 ea                                                                            Ther Act             Sit to stand   BMB x 25 x25  2x15   np   BMB x25   Gait training with SPC    With head turns at rail Right left at rail  Head turns at rail, 4 laps   np     Modalities             Heat

## 2020-07-02 ENCOUNTER — OFFICE VISIT (OUTPATIENT)
Dept: PHYSICAL THERAPY | Facility: CLINIC | Age: 85
End: 2020-07-02
Payer: MEDICARE

## 2020-07-02 DIAGNOSIS — R26.2 AMBULATORY DYSFUNCTION: ICD-10-CM

## 2020-07-02 DIAGNOSIS — S22.088D OTHER CLOSED FRACTURE OF TWELFTH THORACIC VERTEBRA WITH ROUTINE HEALING, SUBSEQUENT ENCOUNTER: Primary | ICD-10-CM

## 2020-07-02 DIAGNOSIS — R29.6 FREQUENT FALLS: ICD-10-CM

## 2020-07-02 PROCEDURE — 97110 THERAPEUTIC EXERCISES: CPT

## 2020-07-02 PROCEDURE — 97112 NEUROMUSCULAR REEDUCATION: CPT

## 2020-07-02 NOTE — PROGRESS NOTES
Daily Note     Today's date: 2020  Patient name: Robe Rausch  : 1935  MRN: [id number]  Referring provider: Velia Gloria MD  Dx:   Encounter Diagnosis     ICD-10-CM    1  Other closed fracture of twelfth thoracic vertebra with routine healing, subsequent encounter S22 088D    2  Frequent falls R29 6    3  Ambulatory dysfunction R26 2                   Subjective: Patient states he is feeling pretty good today  Objective: See treatment diary below      Assessment: Continued with outlined program  Patient demonstrates improved tolerance to LE strengthening exercises  Challenged with balance and proprioception, but continues to progress with navigating obstacles on static and dynamic surfaces  No exacerbating pain symptoms or near falls throughout session  *requested to leave 5 min early due to other commitment*      Plan: Progress treatment as tolerated         Diagnosis: T12 compression fx, fall risk, poor balance   Precautions: Fall risk   Primary Goals: Core and hip strength, balance, gait   *asterisks by exercise = given for HEP   Manuals     Re-eval        KK                                                        There Ex            Thoracic rot in chair* X 20 5 sec holds 5 sec hold x20  5 sec hold x20   np    SB in chair X 20 5 sec holds 5 sec hold x20  5 sec hold x20  np                 FF in chair            TA in chair* HEP          Kegel in chair* HEP          Glute set in chair* HEP          Standing marches   3# 20x ea 3# 25x ea  3# 25x ea 4# 3x10   Standing HS curls   3# 20x ea 3# 25x ea  3# 25x ea 4# 3x10   Supine SLR flex     0# 2x10   Standing 4# 3x10   Hip abd/ext   3# 20x ea 3# 25x ea   3# 25x ea  4# 3x10   Heel raises  20x 20x  30x   30x 30x   LTR            Neuro Re-Ed            Biodex LOS x2, 20%  Random control 1 min 66% LOS x2 19 % 34%  Random control 1 min 67%  LOS x2, 9% 19%  LOS x 2 - 23%, 30% LOS x2-   19% 41%   SLS            Ambulation over obstacles x2 fwd and lateral  x2 fwd and 2 lateral  x2 fwd and 2 lateral   np x2 fwd/2 lateral    Tandem stance x30 sec EC x30 sec EC  X30 sec EC   x30" EC 30 sec x2   Clamshells     RTB 2x10  np    FT balance FT EC 30 sec x 2 FT EC 30 sec x2  FT EO Foam 30 secx2  FT EO Foam 30 secx2 FT EO foam 30 sexc2    Multifidus press BTB x 20 ea BTB x20 ea  BTB x20 ea  BTB x20 ea BTB x20 ea                                                                      Ther Act            Sit to stand   BMB x 25 x25  2x15   np 2x15    Gait training with SPC    With head turns at rail Right left at rail  Head turns at rail, 4 laps   np    Modalities            Heat

## 2020-07-03 ENCOUNTER — APPOINTMENT (OUTPATIENT)
Dept: PHYSICAL THERAPY | Facility: CLINIC | Age: 85
End: 2020-07-03
Payer: MEDICARE

## 2020-07-08 ENCOUNTER — OFFICE VISIT (OUTPATIENT)
Dept: PHYSICAL THERAPY | Facility: CLINIC | Age: 85
End: 2020-07-08
Payer: MEDICARE

## 2020-07-08 ENCOUNTER — APPOINTMENT (OUTPATIENT)
Dept: RADIOLOGY | Facility: CLINIC | Age: 85
End: 2020-07-08
Payer: MEDICARE

## 2020-07-08 DIAGNOSIS — S22.080G CLOSED WEDGE COMPRESSION FRACTURE OF TWELFTH THORACIC VERTEBRA WITH DELAYED HEALING, SUBSEQUENT ENCOUNTER: ICD-10-CM

## 2020-07-08 DIAGNOSIS — R29.6 FREQUENT FALLS: ICD-10-CM

## 2020-07-08 DIAGNOSIS — R26.2 AMBULATORY DYSFUNCTION: ICD-10-CM

## 2020-07-08 DIAGNOSIS — S22.088D OTHER CLOSED FRACTURE OF TWELFTH THORACIC VERTEBRA WITH ROUTINE HEALING, SUBSEQUENT ENCOUNTER: Primary | ICD-10-CM

## 2020-07-08 PROCEDURE — 72080 X-RAY EXAM THORACOLMB 2/> VW: CPT

## 2020-07-08 PROCEDURE — 97112 NEUROMUSCULAR REEDUCATION: CPT | Performed by: PHYSICAL THERAPIST

## 2020-07-08 PROCEDURE — 97110 THERAPEUTIC EXERCISES: CPT | Performed by: PHYSICAL THERAPIST

## 2020-07-08 NOTE — PROGRESS NOTES
Daily Note     Today's date: 2020  Patient name: Meghana Vidal  : 1935  MRN: [id number]  Referring provider: Walter Kellogg MD  Dx:   Encounter Diagnosis     ICD-10-CM    1  Other closed fracture of twelfth thoracic vertebra with routine healing, subsequent encounter S22 088D    2  Frequent falls R29 6    3  Ambulatory dysfunction R26 2        Start Time: 1130  Stop Time: 1210  Total time in clinic (min): 40 minutes    Subjective: Patient notes he is nearing the level he was prior to hurting his back  He likes doing the exercises at home  Objective: See treatment diary below    Assessment: Tolerated treatment well and session progressed to include core stability exercises in supine, as patient was not performing TA correctly  Patient demonstrated fatigue post treatment and exhibited good technique with therapeutic exercises  Plan: Continue per plan of care        Diagnosis: T12 compression fx, fall risk, poor balance   Precautions: Fall risk   Primary Goals: Core and hip strength, balance, gait   *asterisks by exercise = given for HEP   Manuals     Re-eval       KK                                                    There Ex           Thoracic rot in chair*  5 sec hold x20  5 sec hold x20   np    SB in chair  5 sec hold x20  5 sec hold x20  np     TA with marches X 20          Dead bug X 10       FF in chair           TA in chair*           Kegel in chair*           Glute set in chair*           Standing marches 5# 3x10 3# 20x ea 3# 25x ea  3# 25x ea 4# 3x10   Standing HS curls 5# 3x10 3# 20x ea 3# 25x ea  3# 25x ea 4# 3x10   Supine SLR flex 2 5# with TA focus   0# 2x10   Standing 4# 3x10   Hip abd/ext 5# 3x10 3# 20x ea 3# 25x ea   3# 25x ea  4# 3x10   Heel raises  5# x30 20x  30x   30x 30x   LTR           Neuro Re-Ed           Biodex LOSx2 27%, 38% LOS x2 19 % 34%  Random control 1 min 67%  LOS x2, 9% 19%  LOS x 2 - 23%, 30% LOS x2-   19% 41%   SLS           Ambulation over obstacles  x2 fwd and 2 lateral  x2 fwd and 2 lateral   np x2 fwd/2 lateral    Tandem stance  x30 sec EC  X30 sec EC   x30" EC 30 sec x2   Clamshells    RTB 2x10  np    FT balance  FT EC 30 sec x2  FT EO Foam 30 secx2  FT EO Foam 30 secx2 FT EO foam 30 sexc2    Multifidus press BTB x 30 ea BTB x20 ea  BTB x20 ea  BTB x20 ea BTB x20 ea                                                                 Ther Act           Sit to stand  x25  2x15   np 2x15    Gait training with SPC   Right left at rail  Head turns at rail, 4 laps   np    Modalities           Heat

## 2020-07-09 ENCOUNTER — OFFICE VISIT (OUTPATIENT)
Dept: NEUROSURGERY | Facility: CLINIC | Age: 85
End: 2020-07-09
Payer: MEDICARE

## 2020-07-09 VITALS
DIASTOLIC BLOOD PRESSURE: 77 MMHG | SYSTOLIC BLOOD PRESSURE: 137 MMHG | HEIGHT: 72 IN | BODY MASS INDEX: 29.12 KG/M2 | RESPIRATION RATE: 16 BRPM | HEART RATE: 74 BPM | TEMPERATURE: 98.7 F | WEIGHT: 215 LBS

## 2020-07-09 DIAGNOSIS — S22.080D COMPRESSION FRACTURE OF T12 VERTEBRA WITH ROUTINE HEALING, SUBSEQUENT ENCOUNTER: Primary | ICD-10-CM

## 2020-07-09 PROCEDURE — 3051F HG A1C>EQUAL 7.0%<8.0%: CPT | Performed by: PHYSICIAN ASSISTANT

## 2020-07-09 PROCEDURE — 4040F PNEUMOC VAC/ADMIN/RCVD: CPT | Performed by: PHYSICIAN ASSISTANT

## 2020-07-09 PROCEDURE — 3075F SYST BP GE 130 - 139MM HG: CPT | Performed by: PHYSICIAN ASSISTANT

## 2020-07-09 PROCEDURE — 99214 OFFICE O/P EST MOD 30 MIN: CPT | Performed by: PHYSICIAN ASSISTANT

## 2020-07-09 PROCEDURE — 3066F NEPHROPATHY DOC TX: CPT | Performed by: PHYSICIAN ASSISTANT

## 2020-07-09 PROCEDURE — 1036F TOBACCO NON-USER: CPT | Performed by: PHYSICIAN ASSISTANT

## 2020-07-09 PROCEDURE — 3078F DIAST BP <80 MM HG: CPT | Performed by: PHYSICIAN ASSISTANT

## 2020-07-09 PROCEDURE — 3008F BODY MASS INDEX DOCD: CPT | Performed by: PHYSICIAN ASSISTANT

## 2020-07-09 PROCEDURE — 1160F RVW MEDS BY RX/DR IN RCRD: CPT | Performed by: PHYSICIAN ASSISTANT

## 2020-07-09 RX ORDER — METFORMIN HYDROCHLORIDE 500 MG/1
1000 TABLET, EXTENDED RELEASE ORAL 2 TIMES DAILY
COMMUNITY
Start: 2020-06-04 | End: 2020-08-31

## 2020-07-09 NOTE — PROGRESS NOTES
Neurosurgery Office Note  Meghana Vidal 82 y o  male MRN: [id number]      Assessment/Plan   Patient is [de-identified]years old gentleman here for 11 weeks follow-up of T12 compression deformity, had upright thoracolumbar spine x-rays on 07/08/2020 which demonstrate stable compression deformity of T12 with mild loss of height otherwise without retropulsion or malalignment  Patient wearing TLSO brace  Reports 2/10 back pain, taking Tylenol  Doing physical therapy  Reports he is feeling much better  Patient has history of chronic lower back pain even before fall accident and he reports the pain is at the lower back, history of arthritis  Denies any radiculopathy lower extremity pain, numbness, weakness or paresthesia  Denies bowel/bladder dysfunction  Neuro exam-alert and oriented x3, moves all extremities   strengths and flexion-extension upper extremity muscle strength 5/5 bilaterally  Lower extremity strength is 5/5  Sensation to light touch intact throughout  DTR 2+ without clonus  Hx, PE, images, management plan and prognosis discussed with the patient and his daughter  Follow-up flexion-extension lumbar spine x-rays including T12 in 2 weeks  Advised to wean of TLSO brace  Fall precaution  Avoid lifting heavy objects, stress activities, bending or twisting  Questions and concerns were answered  Patient and daughter understands the instructions and agreed with the plan  Plan:  1  Follow up in 2 weeks with flexion-extension lumbar spine x-rays spine x-rays  2  Continue physical therapy and Tylenol  3  Wean of TLSO brace  4  Fall precaution  5  Avoid stress activities, lifting heavy objects or excessive bending or twisting  6   Call 4 question or concern    Chief Complaint   Patient presents with    Follow-up         C/c:  Follow-up of T12 fracture 11th week    History of Present Illness     82y o  year old male  [de-identified] here today with his daughter for his 11 week follow-up of T12 compression deformity  Patient with history of arthritis and chronic lumbar pain and history of falls about 11 weeks ago  Had  Follow-up upright thoracolumbar spine x-rays which demonstrates stable T12 deformity  Wearing TLSO brace  Reports the pain is improving 2 to 3/10  Takes Tylenol for pain  Doing physical therapy  Denies any radiculopathy lower extremity pain, numbness, weakness  Or paresthesia denies bowel/ bladder dysfunction  Denies gait instability or tendency to fall but he uses cane for safety  Denies fever, chills, rigors, cough or chest pain  REVIEW OF SYSTEMS   personally reviewed and updated  Review of Systems   Constitutional: Negative  HENT: Negative  Eyes: Negative  Respiratory: Negative  Cardiovascular: Negative  Gastrointestinal: Negative  Endocrine: Negative  Genitourinary: Negative  Musculoskeletal: Positive for back pain (non radiating) and gait problem (uses walker for assistace)  Patient states PT is helping him   Skin: Negative  Allergic/Immunologic: Negative  Neurological: Positive for weakness (b/l legs) and numbness (b/l legs)  Hematological: Bruises/bleeds easily (On Aspirin)  Psychiatric/Behavioral: Negative  All other systems reviewed and are negative          Meds/Allergies     Current Outpatient Medications   Medication Sig Dispense Refill    acetaminophen (TYLENOL) 325 mg tablet Take 2 tablets (650 mg total) by mouth every 6 (six) hours 30 tablet 0    aspirin 81 mg chewable tablet Chew 81 mg daily      Cholecalciferol (VITAMIN D3) 5000 units CAPS Take by mouth      cyanocobalamin (VITAMIN B-12) 500 mcg tablet Take 1,000 mcg by mouth daily      docusate sodium (COLACE) 100 mg capsule Take 1 capsule (100 mg total) by mouth 2 (two) times a day 10 capsule 0    Ferrous Sulfate (IRON) 325 (65 Fe) MG TABS Take by mouth      fluticasone (FLONASE ALLERGY RELIEF) 50 mcg/act nasal spray 2 sprays into each nostril daily      metFORMIN (GLUCOPHAGE) 1000 MG tablet Take 1,000 mg by mouth 2 (two) times a day with meals      methylPREDNISolone 4 MG tablet therapy pack Use as directed on package 21 each 0    metoprolol tartrate (LOPRESSOR) 25 mg tablet Take 25 mg by mouth every 12 (twelve) hours      oxyCODONE (ROXICODONE) 5 mg immediate release tablet 2 5 mg to 5 mg p o  Every 6 hours as needed for moderate to severe pain 20 tablet 0    pantoprazole (PROTONIX) 40 mg tablet TAKE 1 TABLET BY MOUTH EVERY DAY 90 tablet 1    simvastatin (ZOCOR) 10 mg tablet Take 40 mg by mouth daily at bedtime        simvastatin (ZOCOR) 40 mg tablet TAKE 1 TABLET BY MOUTH EVERY DAY 90 tablet 1    sitaGLIPtin (JANUVIA) 50 mg tablet Take 1 tablet (50 mg total) by mouth daily 90 tablet 3    tiZANidine (ZANAFLEX) 2 mg tablet Take 1 tablet (2 mg total) by mouth every 8 (eight) hours as needed for muscle spasms 30 tablet 0     No current facility-administered medications for this visit  No Known Allergies    PAST HISTORY    Past Medical History:   Diagnosis Date    [facility]     [facility]rial fibrillation ([facility] )     Cardiac disease     Diabetes mellitus ([facility] )     Heart murmur     Hyperlipidemia     Hypertension        Past Surgical History:   Procedure Laterality Date    CARDIAC CATHETERIZATION      s/p MI    HERNIA REPAIR Left     X5    JOINT REPLACEMENT      TOTAL HIP ARTHROPLASTY         Social History     Tobacco Use    Smoking status: Never Smoker    Smokeless tobacco: Never Used   Substance Use Topics    Alcohol use: Yes     Comment: rare    Drug use: No       Family History   Problem Relation Age of Onset    No Known Problems Mother     No Known Problems Father          Above history personally reviewed  EXAM    Vitals:Blood pressure 137/77, pulse 74, temperature 98 7 °F (37 1 °C), temperature source Tympanic, resp  rate 16, height 6' (1 829 m), weight 97 5 kg (215 lb)  ,Body mass index is 29 16 kg/m²       Physical Exam   Constitutional: He is oriented to person, place, and time  He appears well-developed and well-nourished  HENT:   Head: Normocephalic and atraumatic  Eyes: EOM are normal    Neck: Normal range of motion  Cardiovascular: Normal rate  Pulmonary/Chest: Effort normal    Musculoskeletal: He exhibits tenderness  Neurological: He is alert and oriented to person, place, and time  He has normal strength and normal reflexes  No cranial nerve deficit or sensory deficit  GCS eye subscore is 4  GCS verbal subscore is 5  GCS motor subscore is 6  Psychiatric: His speech is normal        Neurologic Exam     Mental Status   Oriented to person, place, and time  Speech: speech is normal   Level of consciousness: alert    Cranial Nerves     CN III, IV, VI   Extraocular motions are normal    Nystagmus: none     CN XI   CN XI normal      Motor Exam   Muscle bulk: normal  Overall muscle tone: normal  Right arm tone: normal  Left arm tone: normal  Right arm pronator drift: absent  Left arm pronator drift: absent  Right leg tone: normal  Left leg tone: normal    Strength   Strength 5/5 throughout  Sensory Exam   Light touch normal          MEDICAL DECISION MAKING    Imaging Studies:     X-ray Thoracolumbar Spine 2 Views    Result Date: 6/13/2020  Narrative: THORACOLUMBAR SPINE INDICATION:   S22 080D: Wedge compression fracture of t11-T12 vertebra, subsequent encounter for fracture with routine healing   "patient states follow up from compression fracture of T12 about 2 months ago, feeling better but still in some pain " COMPARISON: 5/11/2020 VIEWS:  XR SPINE THORACOLUMBAR 2 VW FINDINGS: There is no acute fracture or pathologic bone lesion  No change in appearance of the known moderate T12 compression deformity  Mild dextrolumbar scoliosis  Prominent marginal endplate osteophyte formation throughout the lumbar spine which are bridging at most levels  There is no displacement of the paraspinal line  The pedicles appear intact  Impression: No change in appearance of the known moderate T12 compression deformity   Workstation performed: [id number]       I have personally reviewed pertinent reports   , I have personally reviewed pertinent films in PACS and I have personally reviewed pertinent films in PACS with a Radiologist

## 2020-07-10 ENCOUNTER — OFFICE VISIT (OUTPATIENT)
Dept: PHYSICAL THERAPY | Facility: CLINIC | Age: 85
End: 2020-07-10
Payer: MEDICARE

## 2020-07-10 DIAGNOSIS — R29.6 FREQUENT FALLS: ICD-10-CM

## 2020-07-10 DIAGNOSIS — R26.2 AMBULATORY DYSFUNCTION: ICD-10-CM

## 2020-07-10 DIAGNOSIS — S22.088D OTHER CLOSED FRACTURE OF TWELFTH THORACIC VERTEBRA WITH ROUTINE HEALING, SUBSEQUENT ENCOUNTER: Primary | ICD-10-CM

## 2020-07-10 PROCEDURE — 97112 NEUROMUSCULAR REEDUCATION: CPT

## 2020-07-10 PROCEDURE — 97110 THERAPEUTIC EXERCISES: CPT

## 2020-07-10 NOTE — PROGRESS NOTES
Daily Note     Today's date: 7/10/2020  Patient name: Rita No  : 1935  MRN: [id number]  Referring provider: Sil Jimenez MD  Dx:   Encounter Diagnosis     ICD-10-CM    1  Other closed fracture of twelfth thoracic vertebra with routine healing, subsequent encounter S22 088D    2  Frequent falls R29 6    3  Ambulatory dysfunction R26 2                   Subjective: Pt states he is pretty sore form last session and he would like to use the lighter weights today  Pt states the back of his left leg and his low back are bothering him today  Objective: See treatment diary below    Assessment: Pt progressed through exercises well with the decreased weight today  Pt required VC for core stability to help maintain an upright position with standing exercises  Pt continues to demonstrates difficulty with balance seen with the BIODEX  Pt would continue to benefit  from PT  Plan: Continue per plan of care        Diagnosis: T12 compression fx, fall risk, poor balance   Precautions: Fall risk   Primary Goals: Core and hip strength, balance, gait   *asterisks by exercise = given for HEP   Manuals 7/8 7/10    7/1 7/2    Re-eval     KK                                            There Ex         Thoracic rot in chair*     np    SB in chair     np     TA with marches X 20 20x        Dead bug X 10       FF in chair         TA in chair*         Kegel in chair*         Glute set in chair*         Standing marches 5# 3x10 4# 3 x 10    3# 25x ea 4# 3x10   Standing HS curls 5# 3x10 4# 3 x 10    3# 25x ea 4# 3x10   Supine SLR flex 2 5# with TA focus 2 5# with TA focus x 20 ea    Standing 4# 3x10   Hip abd/ext 5# 3x10 4# x 30    3# 25x ea  4# 3x10   Heel raises  5# x30 4# x 30    30x 30x   LTR         Neuro Re-Ed         Biodex LOSx2 27%, 38% LOS x 3 33%, 32%,  37%    LOS x 2 - 23%, 30% LOS x2-   19% 41%   SLS         Ambulation over obstacles     np x2 fwd/2 lateral    Tandem stance     x30" EC 30 sec x2   Clamshells np    FT balance     FT EO Foam 30 secx2 FT EO foam 30 sexc2    Multifidus press BTB x 30 ea BTB x 30 ea   BTB x20 ea BTB x20 ea                                                       Ther Act         Sit to stand  10x    np 2x15    Gait training with SPC      np    Modalities           Heat

## 2020-07-15 ENCOUNTER — OFFICE VISIT (OUTPATIENT)
Dept: PHYSICAL THERAPY | Facility: CLINIC | Age: 85
End: 2020-07-15
Payer: MEDICARE

## 2020-07-15 DIAGNOSIS — S22.088D OTHER CLOSED FRACTURE OF TWELFTH THORACIC VERTEBRA WITH ROUTINE HEALING, SUBSEQUENT ENCOUNTER: Primary | ICD-10-CM

## 2020-07-15 DIAGNOSIS — R26.2 AMBULATORY DYSFUNCTION: ICD-10-CM

## 2020-07-15 DIAGNOSIS — R29.6 FREQUENT FALLS: ICD-10-CM

## 2020-07-15 PROCEDURE — 97112 NEUROMUSCULAR REEDUCATION: CPT

## 2020-07-15 PROCEDURE — 97110 THERAPEUTIC EXERCISES: CPT

## 2020-07-15 NOTE — PROGRESS NOTES
Daily Note     Today's date: 7/15/2020  Patient name: Marty Frankel  : 1935  MRN: [id number]  Referring provider: Antonietta Hughes MD  Dx:   Encounter Diagnosis     ICD-10-CM    1  Other closed fracture of twelfth thoracic vertebra with routine healing, subsequent encounter S22 088D    2  Frequent falls R29 6    3  Ambulatory dysfunction R26 2        Start Time: 1130  Stop Time: 1213  Total time in clinic (min): 43 minutes    Subjective: Patient states he tripped over his wife's shoe and fell into the sofa the other day  He states his back has been bothering him, but this is not unusual        Objective: See treatment diary below      Assessment: Continued with outlined program  Patient was able to resume increase weight with minimal cues to maintain proper technique  Decrease lateral sway on dynamic surfaces with balance and proprioception  He would continue to benefit from skilled PT to maximize functional independence and improve confidence with balance and daily activities  Plan: Progress treatment as tolerated         Diagnosis: T12 compression fx, fall risk, poor balance   Precautions: Fall risk   Primary Goals: Core and hip strength, balance, gait   *asterisks by exercise = given for HEP   Manuals 7/8 7/10  7/15  7/2    Re-eval                                            There Ex        Thoracic rot in chair*        SB in chair        TA with marches X 20 20x  20x     Dead bug X 10  20x      FF in chair        TA in chair*        Kegel in chair*        Glute set in chair*        Standing marches 5# 3x10 4# 3 x 10  5# 3x12  4# 3x10   Standing HS curls 5# 3x10 4# 3 x 10  5# 3x12  4# 3x10   Supine SLR flex 2 5# with TA focus 2 5# with TA focus x 20 ea  2# with TA 2x10  Standing 4# 3x10   Hip abd/ext 5# 3x10 4# x 30  5# 3x12  4# 3x10   Heel raises  5# x30 4# x 30  5# 30x  30x   LTR        Neuro Re-Ed        Biodex LOSx2 27%, 38% LOS x 3 33%, 32%,  37%  LOS x3 27, 31, 33%   LOS x2-   19% 41%   SLS Ambulation over obstacles     x2 fwd/2 lateral    Tandem stance   30 sec x2   30 sec x2   Clamshells        FT balance   FT EO foam 30 sec x3  FT EO foam 30 sexc2    Multifidus press BTB x 30 ea BTB x 30 ea BTB 30x ea  BTB x20 ea                                                  Ther Act        Sit to stand  10x  2x15   2x15    Gait training with SPC         Modalities          Heat

## 2020-07-17 ENCOUNTER — OFFICE VISIT (OUTPATIENT)
Dept: PHYSICAL THERAPY | Facility: CLINIC | Age: 85
End: 2020-07-17
Payer: MEDICARE

## 2020-07-17 DIAGNOSIS — S22.088D OTHER CLOSED FRACTURE OF TWELFTH THORACIC VERTEBRA WITH ROUTINE HEALING, SUBSEQUENT ENCOUNTER: Primary | ICD-10-CM

## 2020-07-17 DIAGNOSIS — R29.6 FREQUENT FALLS: ICD-10-CM

## 2020-07-17 DIAGNOSIS — R26.2 AMBULATORY DYSFUNCTION: ICD-10-CM

## 2020-07-17 PROCEDURE — 97110 THERAPEUTIC EXERCISES: CPT | Performed by: PHYSICAL THERAPIST

## 2020-07-17 PROCEDURE — 97112 NEUROMUSCULAR REEDUCATION: CPT | Performed by: PHYSICAL THERAPIST

## 2020-07-17 NOTE — PROGRESS NOTES
Daily Note     Today's date: 2020  Patient name: Tj Eric  : 1935  MRN: [id number]  Referring provider: Danette Parker MD  Dx:   Encounter Diagnosis     ICD-10-CM    1  Other closed fracture of twelfth thoracic vertebra with routine healing, subsequent encounter S22 088D    2  Frequent falls R29 6    3  Ambulatory dysfunction R26 2        Start Time: 930  Stop Time: 1013  Total time in clinic (min): 43 minutes    Subjective: Patient reports he has slightly increased back soreness since falling onto the couch earlier this week  He reports next week he will get flexion view x-rays at his MD visit  Objective: See treatment diary below    Assessment: Tolerated treatment with some fatigue noted with increase in weight last visit  Corrections made today for standing hip ext to minimize pain, with TA and glute set focus rather than kicking far back with improved symptoms  Patient exhibited good technique with therapeutic exercises  Plan: Continue per plan of care        Diagnosis: T12 compression fx, fall risk, poor balance   Precautions: Fall risk   Primary Goals: Core and hip strength, balance, gait   *asterisks by exercise = given for HEP   Manuals 7/8 7/10  7/15 7/17 7    Re-eval                                            There Ex        Bike    5 min    Thoracic rot in chair*        SB in chair        TA with marches X 20 20x  20x     Dead bug X 10  20x      FF in chair        TA in chair*        Kegel in chair*        Glute set in chair*        Standing marches 5# 3x10 4# 3 x 10  5# 3x12 5# 3x12 4# 3x10   Standing HS curls 5# 3x10 4# 3 x 10  5# 3x12 5# 3x12 4# 3x10   Supine SLR flex 2 5# with TA focus 2 5# with TA focus x 20 ea  2# with TA 2x10  Standing 4# 3x10   Hip abd/ext/flx 5# 3x10 4# x 30  5# 3x12 5# 3x12 4# 3x10   Heel raises  5# x30 4# x 30  5# 30x 5# x 36 30x   LTR        Neuro Re-Ed        Biodex LOSx2 27%, 38% LOS x 3 33%, 32%,  37%  LOS x3 27, 31, 33%  LOS x 3 35, 42, 40 LOS x2- 19% 41%   SLS    2x30 sec ea    Ambulation over obstacles     x2 fwd/2 lateral    Tandem stance   30 sec x2  x30 sec on foam 30 sec x2   Clamshells        FT balance   FT EO foam 30 sec x3 FT EO foam 30 sec x2 FT EO foam 30 sexc2    Multifidus press BTB x 30 ea BTB x 30 ea BTB 30x ea BTB 30x ea BTB x20 ea     MB overhead lift supine    YMB x 20                                         Ther Act        Sit to stand  10x  2x15  2x15 2x15    Gait training with SPC         Modalities          Heat

## 2020-07-22 ENCOUNTER — OFFICE VISIT (OUTPATIENT)
Dept: PHYSICAL THERAPY | Facility: CLINIC | Age: 85
End: 2020-07-22
Payer: MEDICARE

## 2020-07-22 ENCOUNTER — APPOINTMENT (OUTPATIENT)
Dept: RADIOLOGY | Facility: CLINIC | Age: 85
End: 2020-07-22
Payer: MEDICARE

## 2020-07-22 DIAGNOSIS — S22.080D COMPRESSION FRACTURE OF T12 VERTEBRA WITH ROUTINE HEALING, SUBSEQUENT ENCOUNTER: ICD-10-CM

## 2020-07-22 DIAGNOSIS — S22.088D OTHER CLOSED FRACTURE OF TWELFTH THORACIC VERTEBRA WITH ROUTINE HEALING, SUBSEQUENT ENCOUNTER: Primary | ICD-10-CM

## 2020-07-22 DIAGNOSIS — R26.2 AMBULATORY DYSFUNCTION: ICD-10-CM

## 2020-07-22 DIAGNOSIS — R29.6 FREQUENT FALLS: ICD-10-CM

## 2020-07-22 PROCEDURE — 72114 X-RAY EXAM L-S SPINE BENDING: CPT

## 2020-07-22 PROCEDURE — 97110 THERAPEUTIC EXERCISES: CPT

## 2020-07-22 PROCEDURE — 97112 NEUROMUSCULAR REEDUCATION: CPT

## 2020-07-22 NOTE — PROGRESS NOTES
Daily Note     Today's date: 2020  Patient name: Efra Milan  : 1935  MRN: [id number]  Referring provider: Rj Garnett MD  Dx:   Encounter Diagnosis     ICD-10-CM    1  Other closed fracture of twelfth thoracic vertebra with routine healing, subsequent encounter S22 088D    2  Frequent falls R29 6    3  Ambulatory dysfunction R26 2        Start Time: 1130  Stop Time: 1213  Total time in clinic (min): 43 minutes    Subjective: Patient states he went to get his X-rays this morning and has a f/u tomorrow for results  He states his soreness from falling into the couch is better, but he still feels unstable on his feet and has a huge fear of falling  Objective: See treatment diary below      Assessment: Continued with outlined program  Patient able to perform standing strengthening exercises with       Plan: Progress treatment as tolerated         Diagnosis: T12 compression fx, fall risk, poor balance   Precautions: Fall risk   Primary Goals: Core and hip strength, balance, gait   *asterisks by exercise = given for HEP   Manuals 7/8 7/10  7/15 7/17 7/22    Re-eval                                            There Ex        Bike    5 min 5 min    Thoracic rot in chair*        SB in chair        TA with marches X 20 20x  20x     Dead bug X 10  20x      FF in chair        TA in chair*        Kegel in chair*        Glute set in chair*        Standing marches 5# 3x10 4# 3 x 10  5# 3x12 5# 3x12 5# 3x12   Standing HS curls 5# 3x10 4# 3 x 10  5# 3x12 5# 3x12 5# 3x12   Supine SLR flex 2 5# with TA focus 2 5# with TA focus x 20 ea  2# with TA 2x10  2# with TA 3x10   Hip abd/ext/flx 5# 3x10 4# x 30  5# 3x12 5# 3x12 5# 3x10   Heel raises  5# x30 4# x 30  5# 30x 5# x 36 5# 3x12   LTR        Neuro Re-Ed        Biodex LOSx2 27%, 38% LOS x 3 33%, 32%,  37%  LOS x3 27, 31, 33%  LOS x 3 35, 42, 40 LOS x3-   30%, 26%, 33   SLS    2x30 sec ea 2x30 sec    Ambulation over obstacles        Tandem stance   30 sec x2  x30 sec on foam 30 sec x2   Clamshells        FT balance   FT EO foam 30 sec x3 FT EO foam 30 sec x2 FT EO foam 30 sexc2    Multifidus press BTB x 30 ea BTB x 30 ea BTB 30x ea BTB 30x ea BTB x20 ea     MB overhead lift supine    YMB x 20                                         Ther Act        Sit to stand  10x  2x15  2x15 2x15    Gait training with SPC         Modalities          Heat

## 2020-07-23 ENCOUNTER — OFFICE VISIT (OUTPATIENT)
Dept: NEUROSURGERY | Facility: CLINIC | Age: 85
End: 2020-07-23
Payer: MEDICARE

## 2020-07-23 VITALS
HEART RATE: 77 BPM | BODY MASS INDEX: 29.12 KG/M2 | SYSTOLIC BLOOD PRESSURE: 102 MMHG | RESPIRATION RATE: 16 BRPM | HEIGHT: 72 IN | TEMPERATURE: 97.1 F | WEIGHT: 215 LBS | DIASTOLIC BLOOD PRESSURE: 73 MMHG

## 2020-07-23 DIAGNOSIS — G89.29 CHRONIC MIDLINE LOW BACK PAIN WITHOUT SCIATICA: Primary | ICD-10-CM

## 2020-07-23 DIAGNOSIS — S22.080D COMPRESSION FRACTURE OF T12 VERTEBRA WITH ROUTINE HEALING, SUBSEQUENT ENCOUNTER: ICD-10-CM

## 2020-07-23 DIAGNOSIS — M54.50 CHRONIC MIDLINE LOW BACK PAIN WITHOUT SCIATICA: Primary | ICD-10-CM

## 2020-07-23 PROCEDURE — 3074F SYST BP LT 130 MM HG: CPT | Performed by: PHYSICIAN ASSISTANT

## 2020-07-23 PROCEDURE — 1036F TOBACCO NON-USER: CPT | Performed by: PHYSICIAN ASSISTANT

## 2020-07-23 PROCEDURE — 3066F NEPHROPATHY DOC TX: CPT | Performed by: PHYSICIAN ASSISTANT

## 2020-07-23 PROCEDURE — 4040F PNEUMOC VAC/ADMIN/RCVD: CPT | Performed by: PHYSICIAN ASSISTANT

## 2020-07-23 PROCEDURE — 99213 OFFICE O/P EST LOW 20 MIN: CPT | Performed by: PHYSICIAN ASSISTANT

## 2020-07-23 PROCEDURE — 3008F BODY MASS INDEX DOCD: CPT | Performed by: PHYSICIAN ASSISTANT

## 2020-07-23 PROCEDURE — 3051F HG A1C>EQUAL 7.0%<8.0%: CPT | Performed by: PHYSICIAN ASSISTANT

## 2020-07-23 PROCEDURE — 3078F DIAST BP <80 MM HG: CPT | Performed by: PHYSICIAN ASSISTANT

## 2020-07-23 PROCEDURE — 1160F RVW MEDS BY RX/DR IN RCRD: CPT | Performed by: PHYSICIAN ASSISTANT

## 2020-07-23 NOTE — PROGRESS NOTES
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 , AFib and ambulatory dysfunction here today to go over his flexion-extension follow-up lumbar spine x-rays  Had T12 compression fracture, 13th week  The x-ray appears stable but there is progressive decrease in the ht of  T12 VB  Patient weaned off TLSO brace  Reports 4-5/10 achy back pain  He has history of chronic lower back pain with 2 DAVID injections in the past   The head pain is associated with occasional spasmodic back muscle pain  Denies if pain progressed after the fall accident  Denies any radiculopathy pain in the legs, numbness, weakness or paresthesia  Uses cane for walking  He takes Tylenol the morning  Neuro exam-patient moves all extremities, strength is 5/5 bilaterally, sensation to light touch intact throughout  DTR 2+ without clonus  There is tenderness along the lumbar spine on palpation  Positive Gait instability  Hx, PE, and image reviewed with the patient and his daughter  Management plan and prognosis discussed  Given the progression of decrease in deformity of T12, I discussed with the patient and his daughter consideration of kyphoplasty, but I am unsure how much he will benefit from kyphoplasty in the setting of his underlying chronic back pain  So I referred to pain management for some injection  Advised to call office if there is progressive pain and concerns to reimage and discuss further management plan  Fall precaution  Advised to avoid strenuous activities, excessive bending or twisting  Avoid lifting heavy objects  Questions and concerns were answered  Both patient and his daughter expressed his understanding is and agreed with the plan  Plan:  1  Ambulatory referral to pain management for possible DAVID  2   Continue being active, walking exercise  3  Fall precaution  4  Avoid strenuous activities, lifting heavy objects or excessive bending or twisting of lower back  5  Call 4 question or concern  6  Follow-up on p r n  Basis  Subjective/Objective     Chief Complaint: " I have 5/10 back pain"/ 13 weeks FU for T12 #        HPI  The patient is [de-identified]years old gentleman with history of coronary artery disease , hypertension , 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 , AFib and ambulatory dysfunction here today to go over his flexion-extension follow-up lumbar spine x-rays  Had T12 compression fracture, 13th week  The x-ray demonstrate stable alignment but there is progression of compression with decreased ht of T12     Patient reports 4-5/10 lower back pain  He has underlying chronic lower back pain, and had multiple DAVID in the past   He also reports occasional spasmodic back muscle pain  Feels some improvement with Tylenol  Denies any radiculopathy pain, numbness, paresthesia, weakness in the extremities  Denies bowel/bladder dysfunction or saddle anesthesia  Has some gait issues uses cane for walking  He denies history of fever, chills, rigors, cough or chest pain  He weaned of off TLSO brace  ROS  Review of system personally reviewed and updated  Review of Systems   Constitutional: Negative  HENT: Negative  Eyes: Negative  Respiratory: Negative  Cardiovascular: Negative  Gastrointestinal: Negative  Endocrine: Negative  Genitourinary: Negative  Musculoskeletal: Positive for back pain (non radiating) and gait problem (uses walker for assistace)  Patient states PT is helping him   Skin: Negative  Allergic/Immunologic: Negative  Neurological: Negative for weakness and numbness  Hematological: Bruises/bleeds easily (On Aspirin)  Psychiatric/Behavioral: Negative  All other systems reviewed and are negative        Family History    Family History   Problem Relation Age of Onset    No Known Problems Mother     No Known Problems Father        Social History    Social History     Socioeconomic History    Marital status: /Civil Union     Spouse name: Not on file    Number of children: Not on file    Years of education: Not on file    Highest education level: Not on file   Occupational History    Occupation: retired   Social Needs    Financial resource strain: Not on file    Food insecurity:     Worry: Not on file     Inability: Not on file   [employer] needs:     Medical: Not on file     Non-medical: Not on file   Tobacco Use    Smoking status: Never Smoker    Smokeless tobacco: Never Used   Substance and Sexual Activity    Alcohol use: Yes     Comment: rare    Drug use: No    Sexual activity: Not on file   Lifestyle    Physical activity:     Days per week: Not on file     Minutes per session: Not on file    Stress: Not on file   Relationships    Social connections:     Talks on phone: Not on file     Gets together: Not on file     Attends Jewish service: Not on file     Active member of club or organization: Not on file     Attends meetings of clubs or organizations: Not on file     Relationship status: Not on file    Intimate partner violence:     Fear of current or ex partner: Not on file     Emotionally abused: Not on file     Physically abused: Not on file     Forced sexual activity: Not on file   Other Topics Concern    Not on file   Social History Narrative    [place]:    Most recent tobacco use screenin2020      Do you currently or have you served in the [employer]:   No      Were you activated, into active duty, as a member of the [employer] or as a Reservist:   No      Occupation:   Retired      Marital status:         Sexual orientation:   Heterosexual      Exercise level:   Occasional      Diet:   Regular      General stress level:   Medium      Alcohol intake:   Occasional      Caffeine intake:    Moderate      Chewing tobacco: none      Illicit drugs:   Denied      Guns present in home:   No      Seat belts used routinely:   Yes      Sunscreen used routinely:   Yes      Smoke alarm in home:   Yes      Advance directive:   Yes      Salt Intake:   Normal     Would the patient like to schedule a Mammogram:   No      Is the patient interested in a colorectal cancer screening:   No     Last modified by zulma   01-, 15:03        Past Medical History    Past Medical History:   Diagnosis Date    [facility] [address] [facility]     Atrial fibrillation ([facility]     Cardiac disease     Diabetes mellitus ([facility] )     Heart murmur     Hyperlipidemia     Hypertension        Surgical History    Past Surgical History:   Procedure Laterality Date    CARDIAC CATHETERIZATION      s/p MI    HERNIA REPAIR Left     X5    JOINT REPLACEMENT      TOTAL HIP ARTHROPLASTY         Medications      Current Outpatient Medications:     acetaminophen (TYLENOL) 325 mg tablet, Take 2 tablets (650 mg total) by mouth every 6 (six) hours, Disp: 30 tablet, Rfl: 0    aspirin 81 mg chewable tablet, Chew 81 mg daily, Disp: , Rfl:     Cholecalciferol (VITAMIN D3) 5000 units CAPS, Take by mouth, Disp: , Rfl:     cyanocobalamin (VITAMIN B-12) 500 mcg tablet, Take 1,000 mcg by mouth daily, Disp: , Rfl:     docusate sodium (COLACE) 100 mg capsule, Take 1 capsule (100 mg total) by mouth 2 (two) times a day, Disp: 10 capsule, Rfl: 0    Ferrous Sulfate (IRON) 325 (65 Fe) MG TABS, Take by mouth, Disp: , Rfl:     fluticasone (FLONASE ALLERGY RELIEF) 50 mcg/act nasal spray, 2 sprays into each nostril daily, Disp: , Rfl:     metFORMIN (GLUCOPHAGE) 1000 MG tablet, Take 1,000 mg by mouth 2 (two) times a day with meals, Disp: , Rfl:     metFORMIN (GLUCOPHAGE-XR) 500 mg 24 hr tablet, Take 1,000 mg by mouth 2 (two) times a day, Disp: , Rfl:     methylPREDNISolone 4 MG tablet therapy pack, Use as directed on package, Disp: 21 each, Rfl: 0    metoprolol tartrate (LOPRESSOR) 25 mg tablet, Take 25 mg by mouth every 12 (twelve) hours, Disp: , Rfl:     oxyCODONE (ROXICODONE) 5 mg immediate release tablet, 2 5 mg to 5 mg p o  Every 6 hours as needed for moderate to severe pain, Disp: 20 tablet, Rfl: 0    pantoprazole (PROTONIX) 40 mg tablet, TAKE 1 TABLET BY MOUTH EVERY DAY, Disp: 90 tablet, Rfl: 1    simvastatin (ZOCOR) 10 mg tablet, Take 40 mg by mouth daily at bedtime  , Disp: , Rfl:     simvastatin (ZOCOR) 40 mg tablet, TAKE 1 TABLET BY MOUTH EVERY DAY, Disp: 90 tablet, Rfl: 1    sitaGLIPtin (JANUVIA) 50 mg tablet, Take 1 tablet (50 mg total) by mouth daily, Disp: 90 tablet, Rfl: 3    tiZANidine (ZANAFLEX) 2 mg tablet, Take 1 tablet (2 mg total) by mouth every 8 (eight) hours as needed for muscle spasms, Disp: 30 tablet, Rfl: 0    Allergies    No Known Allergies    The following portions of the patient's history were reviewed and updated as appropriate: allergies, current medications, past family history, past medical history, past social history, past surgical history and problem list     Investigations    I personally reviewed the XRAY results with the patient:    Findings as described in the assessment section above    Physical Exam    There were no vitals filed for this visit  Physical Exam   Constitutional: He appears well-developed and well-nourished  HENT:   Head: Normocephalic and atraumatic  Eyes: EOM are normal    Neck: Normal range of motion  Cardiovascular: Normal rate  Pulmonary/Chest: Effort normal    Musculoskeletal: He exhibits tenderness  Tenderness along the lumbar spines   Neurological: He is alert  He has normal strength and normal reflexes  No cranial nerve deficit or sensory deficit  He has a normal Finger-Nose-Finger Test  GCS eye subscore is 4  GCS verbal subscore is 5  GCS motor subscore is 6  Reflex Scores:       Tricep reflexes are 2+ on the right side and 2+ on the left side         Bicep reflexes are 2+ on the right side and 2+ on the left side        Brachioradialis reflexes are 2+ on the right side and 2+ on the left side  Patellar reflexes are 2+ on the right side and 2+ on the left side  Achilles reflexes are 2+ on the right side and 2+ on the left side  Skin: Skin is warm  Psychiatric: His speech is normal      Neurologic Exam     Mental Status   Speech: speech is normal   Level of consciousness: alert    Cranial Nerves     CN III, IV, VI   Extraocular motions are normal    Nystagmus: none     CN XI   CN XI normal      Motor Exam   Muscle bulk: normal  Overall muscle tone: normal  Right arm tone: normal  Left arm tone: normal  Right arm pronator drift: absent  Left arm pronator drift: absent  Right leg tone: normal  Left leg tone: normal    Strength   Strength 5/5 throughout       Sensory Exam   Light touch normal      Gait, Coordination, and Reflexes     Coordination   Finger to nose coordination: normal    Reflexes   Right brachioradialis: 2+  Left brachioradialis: 2+  Right biceps: 2+  Left biceps: 2+  Right triceps: 2+  Left triceps: 2+  Right patellar: 2+  Left patellar: 2+  Right achilles: 2+  Left achilles: 2+  Right : 2+  Left : 2+  Right Sifuentes: absent  Left Sifuentes: absent  Right ankle clonus: absent  Left ankle clonus: absent

## 2020-07-24 ENCOUNTER — OFFICE VISIT (OUTPATIENT)
Dept: PHYSICAL THERAPY | Facility: CLINIC | Age: 85
End: 2020-07-24
Payer: MEDICARE

## 2020-07-24 DIAGNOSIS — R29.6 FREQUENT FALLS: ICD-10-CM

## 2020-07-24 DIAGNOSIS — S22.088D OTHER CLOSED FRACTURE OF TWELFTH THORACIC VERTEBRA WITH ROUTINE HEALING, SUBSEQUENT ENCOUNTER: Primary | ICD-10-CM

## 2020-07-24 DIAGNOSIS — R26.2 AMBULATORY DYSFUNCTION: ICD-10-CM

## 2020-07-24 PROCEDURE — 97110 THERAPEUTIC EXERCISES: CPT | Performed by: PHYSICAL THERAPIST

## 2020-07-24 PROCEDURE — 97112 NEUROMUSCULAR REEDUCATION: CPT | Performed by: PHYSICAL THERAPIST

## 2020-07-24 PROCEDURE — 97530 THERAPEUTIC ACTIVITIES: CPT | Performed by: PHYSICAL THERAPIST

## 2020-07-24 NOTE — PROGRESS NOTES
Daily Note     Today's date: 2020  Patient name: Luis Felipe Chen  : 1935  MRN: [id number]  Referring provider: Marsha Pacheco MD  Dx:   Encounter Diagnosis     ICD-10-CM    1  Other closed fracture of twelfth thoracic vertebra with routine healing, subsequent encounter S22 088D    2  Frequent falls R29 6    3  Ambulatory dysfunction R26 2        Start Time: 09  Stop Time: 1010  Total time in clinic (min): 40 minutes    Subjective: Patient reports that his MD notes his compression fx at T12 is still progressing, and referred to spine and pain  He also discussed surgery  Patient feels little improvement with PT for this pain  Objective: See treatment diary below    Assessment: Patient is independent with his HEP at this time for BLE strengthening and balance and will discharge next visit  Unfortunately due to the progressive nature of his compression fracture, he has not made significant progress towards his goals of pain-relief  He will follow up with spine and pain for further management  Plan: Progress note during next visit  Potential discharge next visit       Diagnosis: T12 compression fx, fall risk, poor balance   Precautions: Fall risk   Primary Goals: Core and hip strength, balance, gait   *asterisks by exercise = given for HEP   Manuals 7/24 7/10  7/15 7/17 7/22    Re-eval                                            There Ex        Bike 5 min   5 min 5 min    Thoracic rot in chair*        SB in chair        TA with marches  20x  20x     Dead bug   20x      FF in chair        TA in chair*        Kegel in chair*        Glute set in chair*        Standing marches 5# 3x12 4# 3 x 10  5# 3x12 5# 3x12 5# 3x12   Standing HS curls 5# 3x12 4# 3 x 10  5# 3x12 5# 3x12 5# 3x12   Supine SLR flex  2 5# with TA focus x 20 ea  2# with TA 2x10  2# with TA 3x10   Hip abd/ext/flx 5# 3x10 4# x 30  5# 3x12 5# 3x12 5# 3x10   Heel raises  5# 3x12 4# x 30  5# 30x 5# x 36 5# 3x12   LTR        Neuro Re-Ed Biodex LOS x3-   23, 30, 33% LOS x 3 33%, 32%,  37%  LOS x3 27, 31, 33%  LOS x 3 35, 42, 40 LOS x3-   30%, 26%, 33   SLS 2x30 sec   2x30 sec ea 2x30 sec    Ambulation over obstacles        Tandem stance x30 sec on foam  30 sec x2  x30 sec on foam 30 sec x2   Clamshells        FT balance FT EO foam 30 sexc2   FT EO foam 30 sec x3 FT EO foam 30 sec x2 FT EO foam 30 sexc2    Multifidus press BTB x20 ea BTB x 30 ea BTB 30x ea BTB 30x ea BTB x20 ea     MB overhead lift supine    YMB x 20     Tandem walking F/B at barre x3 laps                                   Ther Act        Sit to stand 2x15 10x  2x15  2x15 2x15    Gait training with SPC         Modalities          Heat

## 2020-07-29 ENCOUNTER — EVALUATION (OUTPATIENT)
Dept: PHYSICAL THERAPY | Facility: CLINIC | Age: 85
End: 2020-07-29
Payer: MEDICARE

## 2020-07-29 DIAGNOSIS — S22.088D OTHER CLOSED FRACTURE OF TWELFTH THORACIC VERTEBRA WITH ROUTINE HEALING, SUBSEQUENT ENCOUNTER: Primary | ICD-10-CM

## 2020-07-29 DIAGNOSIS — R29.6 FREQUENT FALLS: ICD-10-CM

## 2020-07-29 DIAGNOSIS — R26.2 AMBULATORY DYSFUNCTION: ICD-10-CM

## 2020-07-29 PROCEDURE — 97110 THERAPEUTIC EXERCISES: CPT | Performed by: PHYSICAL THERAPIST

## 2020-07-29 PROCEDURE — 97112 NEUROMUSCULAR REEDUCATION: CPT | Performed by: PHYSICAL THERAPIST

## 2020-07-29 NOTE — PROGRESS NOTES
PT Discharge    Today's date: 2020  Patient name: Rosa Lopez  : 1935  MRN: [id number]  Referring provider: Zulema Fortune MD  Dx:   Encounter Diagnosis     ICD-10-CM    1  Other closed fracture of twelfth thoracic vertebra with routine healing, subsequent encounter S22 088D    2  Frequent falls R29 6    3  Ambulatory dysfunction R26 2        Start Time: 5360  Stop Time: 1111  Total time in clinic (min): 26 minutes    Assessment  Assessment details: Rosa Lopez has been compliant with attending physical therapy and completing home exercise program since initial evaluation  Alyssa Solano  has made improvements in objective data since initial evalulation and has achieved all goals  Patient reports having returned to their prior level of function  Patient provided with updated Home Exercise Program, all questions answered, and verbalized understanding, agreeing to plan of care   Thus it was mutually decided to discontinue this episode of care and transition to Home Exercise Program        Goals  Impairment Goals 4-6 weeks  - Decrease pain to <3/10 - partially met  - Improve lumbar AROM to >50% throughout - partially met  - Increase hip strength to 4+/5 throughout - met  - Increase core strength to be able to sit straight up without deviation - met  - Patient will improve TUG to < 13 seconds to demonstrate low risk for falls - met  - Patient will improve 30 sec STS to WNL for his age group - met  - Pateint will improve mCTSIB by  5 to demonstrate improved balance - NT    Functional Goals 6-12 weeks  - Increase Functional Status Measure (FOTO) to: > 51 - met  - Patient will be independent with HEP - met  - Patient will be able to sit without increased pain/compensation/difficulty - met  - Patient will be able to perform sit to stand without increased pain/compensation/difficulty - met  - Patient will be able to walk without increased pain/compensation/difficulty - met  - Patient will be able to ambulate with cane with confidence - met    Plan  Treatment plan discussed with: patient        Subjective Evaluation    History of Present Illness  Mechanism of injury: Patient states he feels that he is 60% improved since beginning PT treatment  Patient is currently ambulating without a cane indoors and outdoors with a single point cane  Patient states his balance is improved, but he stills feels somewhat unsteady  His thoracic spine feels about the same, he still has pain when he moves   No pain remains at rest   Pain  Current pain ratin  At best pain ratin  At worst pain ratin  Quality: dull ache  Progression: improved    Patient Goals  Patient goals for therapy: decreased pain, increased motion, increased strength, independence with ADLs/IADLs and improved balance          Objective     General Comments:      Lumbar Comments  Active Range of Motion     Additional Active Range of Motion Details  LS AROM:   Flexion: 75%  Extension: 10%  SideBending right: 50%  SideBending left: 50%  Rotation right: 50%  Rotation left: 50%  End ranges of all motion painful in lower thoracic spine       Strength/Myotome Testing      Left Hip   Planes of Motion   Flexion: 4+  Abduction: 4  External rotation: 4+  Internal rotation: 5     Right Hip   Planes of Motion   Flexion: 4+  Abduction: 4  External rotation: 4+  Internal rotation: 5     Left Knee   Flexion: 5  Extension: 5     Right Knee   Flexion: 5  Extension: 5     Left Ankle/Foot   Dorsiflexion: 5     Right Ankle/Foot   Dorsiflexion: 5     General Comments:        Lumbar Comments  Ambulation: straight cane, minimal decrease in gait speed with ambulation, steady gait, wide DESIREE  Sit to stand: No hands  TU sec without assistive device  30 sec STS: 10 reps              Diagnosis: T12 compression fx, fall risk, poor balance   Precautions: Fall risk   Primary Goals: Core and hip strength, balance, gait   *asterisks by exercise = given for [facility]   Manuals 7/24 7/29 7/15 7/17 7/22  Re-eval  IM, PT                                          There Ex        Bike 5 min 5 min  5 min 5 min    Thoracic rot in chair*        SB in chair        TA with marches   20x     Dead bug   20x      FF in chair        TA in chair*        Kegel in chair*        Glute set in chair*        Standing marches 5# 3x12  5# 3x12 5# 3x12 5# 3x12   Standing HS curls 5# 3x12  5# 3x12 5# 3x12 5# 3x12   Supine SLR flex   2# with TA 2x10  2# with TA 3x10   Hip abd/ext/flx 5# 3x10  5# 3x12 5# 3x12 5# 3x10   Heel raises  5# 3x12  5# 30x 5# x 36 5# 3x12   LTR        Neuro Re-Ed        Biodex LOS x3-   23, 30, 33%  LOS x3 27, 31, 33%  LOS x 3 35, 42, 40 LOS x3-   30%, 26%, 33   SLS 2x30 sec   2x30 sec ea 2x30 sec    Ambulation over obstacles        Tandem stance x30 sec on foam  30 sec x2  x30 sec on foam 30 sec x2   Clamshells        FT balance FT EO foam 30 sexc2   FT EO foam 30 sec x3 FT EO foam 30 sec x2 FT EO foam 30 sexc2    Multifidus press BTB x20 ea  BTB 30x ea BTB 30x ea BTB x20 ea     MB overhead lift supine    YMB x 20     Tandem walking F/B at barre x3 laps                                   Ther Act        Sit to stand 2x15  2x15  2x15 2x15    Gait training with SPC         Modalities         Heat

## 2020-07-31 ENCOUNTER — APPOINTMENT (OUTPATIENT)
Dept: PHYSICAL THERAPY | Facility: CLINIC | Age: 85
End: 2020-07-31
Payer: MEDICARE

## 2020-08-31 ENCOUNTER — TRANSCRIBE ORDERS (OUTPATIENT)
Dept: PAIN MEDICINE | Facility: CLINIC | Age: 85
End: 2020-08-31

## 2020-08-31 ENCOUNTER — CONSULT (OUTPATIENT)
Dept: PAIN MEDICINE | Facility: CLINIC | Age: 85
End: 2020-08-31
Payer: MEDICARE

## 2020-08-31 VITALS
HEART RATE: 69 BPM | DIASTOLIC BLOOD PRESSURE: 82 MMHG | SYSTOLIC BLOOD PRESSURE: 130 MMHG | BODY MASS INDEX: 29.29 KG/M2 | WEIGHT: 216 LBS | TEMPERATURE: 98.2 F

## 2020-08-31 DIAGNOSIS — M51.16 INTERVERTEBRAL DISC DISORDER WITH RADICULOPATHY OF LUMBAR REGION: Primary | ICD-10-CM

## 2020-08-31 PROCEDURE — 3051F HG A1C>EQUAL 7.0%<8.0%: CPT | Performed by: ANESTHESIOLOGY

## 2020-08-31 PROCEDURE — 3079F DIAST BP 80-89 MM HG: CPT | Performed by: ANESTHESIOLOGY

## 2020-08-31 PROCEDURE — 3075F SYST BP GE 130 - 139MM HG: CPT | Performed by: ANESTHESIOLOGY

## 2020-08-31 PROCEDURE — 4040F PNEUMOC VAC/ADMIN/RCVD: CPT | Performed by: ANESTHESIOLOGY

## 2020-08-31 PROCEDURE — 1160F RVW MEDS BY RX/DR IN RCRD: CPT | Performed by: ANESTHESIOLOGY

## 2020-08-31 PROCEDURE — 1036F TOBACCO NON-USER: CPT | Performed by: ANESTHESIOLOGY

## 2020-08-31 PROCEDURE — 3066F NEPHROPATHY DOC TX: CPT | Performed by: ANESTHESIOLOGY

## 2020-08-31 PROCEDURE — 99204 OFFICE O/P NEW MOD 45 MIN: CPT | Performed by: ANESTHESIOLOGY

## 2020-08-31 NOTE — PROGRESS NOTES
Assessment  1  Intervertebral disc disorder with radiculopathy of lumbar region        Plan  The patient's symptoms, history/physical are consistent with pain that is multifactorial in origin but predominantly result of his underlying lumbar degenerative disc disease and stenosis which is likely leading to the radicular symptoms  At this time, I will order an updated MRI of the lumbar spine to evaluate  I advised him I will call with the results and discuss treatment moving forward which will likely include performing epidural steroid injection which in the past has provided significant relief  My impressions and treatment recommendations were discussed in detail with the patient who verbalized understanding and had no further questions  Discharge instructions were provided  I personally saw and examined the patient and I agree with the above discussed plan of care  Orders Placed This Encounter   Procedures    MRI lumbar spine without contrast     Standing Status:   Future     Standing Expiration Date:   8/31/2024     Scheduling Instructions: There is no preparation for this test  Please leave your jewelry and valuables at home, wedding rings are the exception  Magnetic nail polish must be removed prior to arrival for your test  Please bring your insurance cards, a form of photo ID and a list of your medications with you  Arrive 15 minutes prior to your appointment time in order to register  Please bring any prior CT or MRI studies of this area that were not performed at a [facility] facility  To schedule this appointment, please contact Central Scheduling at [phone]  Prior to your appointment, please make sure you complete the MRI Screening Form when you e-Check in for your appointment  This will be available starting 7 days before your appointment in [address]  You may receive an e-mail with an activation code if you do not have a [email or web address] account   If you do not have access to a device, we will complete your screening at your appointment  Order Specific Question:   What is the patient's sedation requirement? Answer:   No Sedation     Order Specific Question:   Does the patient have metallic implants? Answer:   No     No orders of the defined types were placed in this encounter  History of Present Illness    Luis Felipe Chen is a 82 y o  male who presents for consultation in regards to lower back pain  Symptoms have been present for over 20 years without any precipitating injury or trauma but recently with worsened after he fell and developed a T12 compression fracture  Pain is moderate to severe rated 8/10 on numeric rating scale and felt constantly  Symptoms are dull/aching in the lower back with weakness felt the legs  He does use and cane for ambulation  Symptoms are aggravated with bending and physical activity  There is no change with coughing, sneezing or bowel movements  Treatment history has included prior epidural steroid injections and 5 years ago which provided moderate relief  Physical therapy and exercises provided moderate relief  Heat/ice also provides moderate relief  He had a recent T12 compression fracture which was managed conservatively and has been referred by Dino Luque PA-C for the neurosurgery team for further treatment  I have personally reviewed and/or updated the patient's past medical history, past surgical history, family history, social history, current medications, allergies, and vital signs today  Review of Systems   Constitutional: Negative for fever and unexpected weight change  HENT: Negative for trouble swallowing  Eyes: Negative for visual disturbance  Respiratory: Negative for shortness of breath and wheezing  Cardiovascular: Negative for chest pain and palpitations  Gastrointestinal: Negative for constipation, diarrhea, nausea and vomiting     Endocrine: Negative for cold intolerance, heat intolerance and polydipsia  Genitourinary: Negative for difficulty urinating and frequency  Musculoskeletal: Positive for back pain and joint swelling  Negative for arthralgias, gait problem and myalgias  Skin: Negative for rash  Neurological: Negative for dizziness, seizures, syncope, weakness and headaches  Hematological: Does not bruise/bleed easily  Psychiatric/Behavioral: Negative for dysphoric mood  All other systems reviewed and are negative        Patient Active Problem List   Diagnosis    Dehydration    New onset a-fib ([place] [address] )    T2DM (type 2 diabetes mellitus) ([place] [address] )    Essential hypertension    HLD (hyperlipidemia)    GERD (gastroesophageal reflux disease)    CAD (coronary artery disease)    [facility] skull fracture [facility]    Ambulatory dysfunction    Frequent falls    Closed T12 fracture ([facility])    Chronic midline low back pain without sciatica       Past Medical History:   Diagnosis Date    Acute MI ([place] [address] )     Atrial fibrillation ([place] [address] )     Cardiac disease     Diabetes mellitus ([place] [address] )     Heart murmur     Hyperlipidemia     Hypertension        Past Surgical History:   Procedure Laterality Date    CARDIAC CATHETERIZATION      s/p MI    HERNIA REPAIR Left     X5    JOINT REPLACEMENT      TOTAL HIP ARTHROPLASTY         Family History   Problem Relation Age of Onset    No Known Problems Mother     No Known Problems Father        Social History     Occupational History    Occupation: retired   Tobacco Use    Smoking status: Never Smoker    Smokeless tobacco: Never Used   Substance and Sexual Activity    Alcohol use: Yes     Comment: rare    Drug use: No    Sexual activity: Not on file       Current Outpatient Medications on File Prior to Visit   Medication Sig    acetaminophen (TYLENOL) 325 mg tablet Take 2 tablets (650 mg total) by mouth every 6 (six) hours    aspirin 81 mg chewable tablet Chew 81 mg daily    Cholecalciferol (VITAMIN D3) 5000 units CAPS Take by mouth    cyanocobalamin (VITAMIN B-12) 500 mcg tablet Take 1,000 mcg by mouth daily    docusate sodium (COLACE) 100 mg capsule Take 1 capsule (100 mg total) by mouth 2 (two) times a day    Ferrous Sulfate (IRON) 325 (65 Fe) MG TABS Take by mouth    fluticasone (FLONASE ALLERGY RELIEF) 50 mcg/act nasal spray 2 sprays into each nostril daily    metFORMIN (GLUCOPHAGE) 1000 MG tablet Take 1,000 mg by mouth 2 (two) times a day with meals    metoprolol tartrate (LOPRESSOR) 25 mg tablet Take 25 mg by mouth every 12 (twelve) hours    pantoprazole (PROTONIX) 40 mg tablet TAKE 1 TABLET BY MOUTH EVERY DAY    simvastatin (ZOCOR) 40 mg tablet TAKE 1 TABLET BY MOUTH EVERY DAY    sitaGLIPtin (JANUVIA) 50 mg tablet Take 1 tablet (50 mg total) by mouth daily    [DISCONTINUED] tiZANidine (ZANAFLEX) 2 mg tablet Take 1 tablet (2 mg total) by mouth every 8 (eight) hours as needed for muscle spasms    [DISCONTINUED] metFORMIN (GLUCOPHAGE-XR) 500 mg 24 hr tablet Take 1,000 mg by mouth 2 (two) times a day    [DISCONTINUED] methylPREDNISolone 4 MG tablet therapy pack Use as directed on package (Patient not taking: Reported on 7/23/2020)    [DISCONTINUED] oxyCODONE (ROXICODONE) 5 mg immediate release tablet 2 5 mg to 5 mg p o  Every 6 hours as needed for moderate to severe pain (Patient not taking: Reported on 7/23/2020)    [DISCONTINUED] simvastatin (ZOCOR) 10 mg tablet Take 40 mg by mouth daily at bedtime       No current facility-administered medications on file prior to visit  No Known Allergies    Physical Exam    /82   Pulse 69   Temp 98 2 °F (36 8 °C) (Temporal)   Wt 98 kg (216 lb)   BMI 29 29 kg/m²     Constitutional: normal, well developed, well nourished, alert, in no distress and non-toxic and no overt pain behavior    Eyes: anicteric  HEENT: grossly intact  Neck: supple, symmetric, trachea midline and no masses   Pulmonary:even and unlabored  Cardiovascular:No edema or pitting edema present  Skin:Normal without rashes or lesions and well hydrated  Psychiatric:Mood and affect appropriate  Neurologic:Cranial Nerves II-XII grossly intact  Musculoskeletal:normal     Lumbar Spine Exam  Appearance:  Normal lordosis  Palpation/Tenderness:  left lumbar paraspinal tenderness  right lumbar paraspinal tenderness  Range of Motion:  Flexion: Moderately limited  with pain  Extension:  Moderately limited  with pain  Lateral Flexion - Left:  Moderately limited  with pain  Lateral Flexion - Right:  Moderately limited  with pain  Rotation - Left:  Moderately limited  with pain  Rotation - Right:  Moderately limited  with pain  Motor Strength:  Left hip flexion:  5/5  Left hip extension:  5/5  Right hip flexion:  5/5  Right hip extension:  5/5  Left knee flexion:  5/5  Left knee extension:  5/5  Right knee flexion:  5/5  Right knee extension:  5/5  Left foot dorsiflexion:  5/5  Left foot plantar flexion:  5/5  Right foot dorsiflexion:  5/5  Right foot plantar flexion:  5/5  Reflexes:  Left Patellar:  2+   Right Patellar:  2+   Left Achilles:  2+   Right Achilles:  2+   Special Tests:  Left Straight Leg Test:  positive  Right Straight Leg Test:  positive    Imaging    XR LUMBAR SPINE (7/22/2020)     INDICATION:   S22 080D: Wedge compression fracture of t11-T12 vertebra, subsequent encounter for fracture with routine healing      COMPARISON:  Thoracolumbar spine radiograph 7/8/2020     VIEWS:  XR SPINE LUMBAR COMPLETE W BENDING MINIMUM 6 VIEWS        FINDINGS:     There are 5 non rib bearing lumbar vertebral bodies       Severe compression deformity of T12 with greater than 50% vertebral body height loss is redemonstrated  Chronic compression deformities of L3 and L4 redemonstrated      There is dextroconvex curvature of the lumbar spine       There are moderate endplate and facet joint degenerative changes throughout the lumbar spine      The pedicles appear intact   There is no evidence of instability on flexion-extension views      There are atherosclerotic calcifications  Soft tissues are otherwise unremarkable      IMPRESSION:  1  Redemonstration of a compression fracture of T12 with greater than 50% vertebral body height loss    2   Stable appearance of the chronic compression deformities of L3 and L4

## 2020-08-31 NOTE — PATIENT INSTRUCTIONS

## 2020-09-05 ENCOUNTER — HOSPITAL ENCOUNTER (OUTPATIENT)
Dept: MRI IMAGING | Facility: HOSPITAL | Age: 85
Discharge: HOME | End: 2020-09-05
Attending: ANESTHESIOLOGY
Payer: MEDICARE

## 2020-09-05 DIAGNOSIS — M51.16 INTERVERTEBRAL DISC DISORDER WITH RADICULOPATHY OF LUMBAR REGION: ICD-10-CM

## 2020-09-05 PROCEDURE — G1004 CDSM NDSC: HCPCS

## 2020-09-05 PROCEDURE — 72148 MRI LUMBAR SPINE W/O DYE: CPT

## 2020-09-14 DIAGNOSIS — E11.69 TYPE 2 DIABETES MELLITUS WITH OTHER SPECIFIED COMPLICATION, WITHOUT LONG-TERM CURRENT USE OF INSULIN (HCC): Primary | ICD-10-CM

## 2020-09-14 DIAGNOSIS — I10 ESSENTIAL HYPERTENSION: ICD-10-CM

## 2020-09-15 ENCOUNTER — TELEPHONE (OUTPATIENT)
Dept: PAIN MEDICINE | Facility: CLINIC | Age: 85
End: 2020-09-15

## 2020-09-15 NOTE — TELEPHONE ENCOUNTER
Discussed MRI L-spine results with patient which shows T12 compression fracture that was previously known  He reports pain in his lower back primarily  He has had epidurals in the past with significant relief but prior to scheduling that would like him evaluated in the office  Please schedule patient for follow-up visit in office this week or next week   Ok to schedule with an NP

## 2020-09-16 ENCOUNTER — OFFICE VISIT (OUTPATIENT)
Dept: PAIN MEDICINE | Facility: CLINIC | Age: 85
End: 2020-09-16
Payer: MEDICARE

## 2020-09-16 ENCOUNTER — TRANSCRIBE ORDERS (OUTPATIENT)
Dept: PAIN MEDICINE | Facility: CLINIC | Age: 85
End: 2020-09-16

## 2020-09-16 VITALS
TEMPERATURE: 97.5 F | HEIGHT: 72 IN | BODY MASS INDEX: 29.12 KG/M2 | HEART RATE: 91 BPM | WEIGHT: 215 LBS | DIASTOLIC BLOOD PRESSURE: 84 MMHG | RESPIRATION RATE: 16 BRPM | SYSTOLIC BLOOD PRESSURE: 141 MMHG

## 2020-09-16 DIAGNOSIS — G89.4 CHRONIC PAIN SYNDROME: Primary | ICD-10-CM

## 2020-09-16 DIAGNOSIS — M51.16 INTERVERTEBRAL DISC DISORDER WITH RADICULOPATHY OF LUMBAR REGION: ICD-10-CM

## 2020-09-16 PROCEDURE — 99214 OFFICE O/P EST MOD 30 MIN: CPT | Performed by: NURSE PRACTITIONER

## 2020-09-16 NOTE — PATIENT INSTRUCTIONS
Epidural Steroid Injection   AMBULATORY CARE:   What you need to know about an epidural steroid injection (DAVID):  An DAVID is a procedure to inject steroid medicine into the epidural space  The epidural space is between your spinal cord and vertebrae  Steroids reduce inflammation and fluid buildup in your spine that may be causing pain  You may be given pain medicine along with the steroids  How to prepare for an DAVID:  Your healthcare provider will talk to you about how to prepare for your procedure  He will tell you what medicines to take or not take on the day of your procedure  You may need to stop taking blood thinners or other medicines several days before your procedure  You may need to adjust any diabetes medicine you take on the day of your procedure  Steroid medicine can increase your blood sugar level  What will happen during an DAVID:   · You will be given medicine to numb the procedure area  You will be awake for the procedure, but you will not feel pain  You may also be given medicine to help you relax during the procedure  Contrast liquid will be used to help your healthcare provider see the area better  Tell the healthcare provider if you have ever had an allergic reaction to contrast liquid  · Your healthcare provider may place the needle into your neck area, middle of your back, or tailbone area  He may inject the medicine next to the nerves that are causing your pain  He may instead inject the medicine into a larger area of the epidural space  This helps the medicine spread to more nerves  Your healthcare provider will use a fluoroscope to help guide the needle to the right place  A fluoroscope is a type of x-ray  After the procedure, a bandage will be placed over the injection site to prevent infection  Risks of an DAVID:  You may have temporary or permanent nerve damage or paralysis  You may have bleeding or develop a serious infection, such as meningitis (swelling of the brain coverings)  An abscess may also develop  You may need surgery to fix the abscess  You may have a seizure, anxiety, or trouble sleeping  If you are a man, you may have temporary erectile dysfunction (not able to have an erection)  Care for your wound as directed: You may remove the bandage before you go to bed the day of your procedure  You may take a shower, but do not take a bath for at least 24 hours  Self-care:   · Do not drive,  use machines, or do strenuous activity for 24 hours after your procedure or as directed  · Continue other treatments  as directed  Steroid injections alone will not control your pain  The injections are meant to be used with other treatments, such as physical therapy  Seek care immediately if:   · Blood soaks through your bandage  · Your wound is red, swollen, or draining pus  · You have a fever or chills, severe back pain, and the procedure area is sensitive to the touch  · You have a seizure  · You have trouble moving your legs  · You have weakness or numbness in your legs  · You cannot control when you urinate or have a bowel movement  Contact your healthcare provider if:   · You have nausea or are vomiting  · Your face or neck is red for a few days and you feel warm  · You have more pain than you had before the procedure  · You have swelling in your hands or feet  · You have questions or concerns about your condition or care  Follow up with your healthcare provider as directed:  Write down your questions so you remember to ask them during your visits  © 2017 [address] [employer] Information is for End User's use only and may not be sold, redistributed or otherwise used for commercial purposes  All illustrations and images included in CareNotes® are the copyrighted property of [employer] , [employer]  or [employer]  The above information is an  only  It is not intended as medical advice for individual conditions or treatments  Talk to your doctor, nurse or pharmacist before following any medical regimen to see if it is safe and effective for you

## 2020-09-16 NOTE — PROGRESS NOTES
Assessment:  1  Chronic pain syndrome    2  Intervertebral disc disorder with radiculopathy of lumbar region        Plan:  Lilo Sandoval is a 82 y o  male who was last seen 8/31/2020 presents for a follow up office visit in regards to chronic pain syndrome secondary to lumbar intervertebral disc disorder with radiculopathy  I discussed with the patient that he may benefit from epidural steroid injection  The most common risk of the procedure were reviewed with the patient who would like to proceed  An order was placed for lumbar epidural steroid injection  Complete risks and benefits including bleeding, infection, tissue reaction, nerve injury and allergic reaction were discussed  The approach was demonstrated using models and literature was provided  Verbal and written consent was obtained  The patient will follow-up after lumbar epidural steroid injection or sooner if symptoms worsen in the interim  The patient was agreeable and verbalized understanding  My impressions and treatment recommendations were discussed in detail with the patient who verbalized understanding and had no further questions  Discharge instructions were provided  I personally saw and examined the patient and I agree with the above discussed plan of care  Orders Placed This Encounter   Procedures    FL spine and pain procedure     Standing Status:   Future     Standing Expiration Date:   9/16/2024     Order Specific Question:   Reason for Exam:     Answer:   LESI @ L5     Order Specific Question:   Anticoagulant hold needed? Answer:   no     No orders of the defined types were placed in this encounter  History of Present Illness:  Lilo Sandoval is a 82 y o  male who was last seen 8/31/2020 presents for a follow up office visit in regards to chronic pain syndrome secondary to lumbar intervertebral disc disorder with radiculopathy  The patients current symptoms include  Back Pain    The patient currently reports ongoing low back pain that he describes as constant, throbbing pain  The patient reports the pain is increased with prolonged standing and walking  When standing for prolonged periods time, the patient reports the pain radiates into the anterior aspect of his bilateral thighs  The patient denies muscle weakness or bowel or bladder dysfunction  The patient currently rates his pain as 7/10 on numeric rating scale  The patient currently takes no prescribed medications for his pain symptoms  I have personally reviewed and/or updated the patient's past medical history, past surgical history, family history, social history, current medications, allergies, and vital signs today       Review of Systems    Patient Active Problem List   Diagnosis    Dehydration    New onset a-fib ([facility] )    T2DM (type 2 diabetes mellitus) ([place] [address] )    Essential hypertension    HLD (hyperlipidemia)    GERD (gastroesophageal reflux disease)    CAD (coronary artery disease)    Frontal skull fracture (HCC)    Ambulatory dysfunction    Frequent falls    Closed T12 fracture ([facility])    Chronic midline low back pain without sciatica       Past Medical History:   Diagnosis Date    Acute MI ([place] [address] )     Atrial fibrillation ([place] [address] )     Cardiac disease     Diabetes mellitus ([place] [address] )     Heart murmur     Hyperlipidemia     Hypertension     Low back pain        Past Surgical History:   Procedure Laterality Date    CARDIAC CATHETERIZATION      s/p MI    HERNIA REPAIR Left     X5    JOINT REPLACEMENT      TOTAL HIP ARTHROPLASTY         Family History   Problem Relation Age of Onset    No Known Problems Mother     No Known Problems Father        Social History     Occupational History    Occupation: retired   Tobacco Use    Smoking status: Never Smoker    Smokeless tobacco: Never Used   Substance and Sexual Activity    Alcohol use: Yes     Comment: rare    Drug use: No    Sexual activity: Not on file       Current Outpatient Medications on File Prior to Visit   Medication Sig    acetaminophen (TYLENOL) 325 mg tablet Take 2 tablets (650 mg total) by mouth every 6 (six) hours    aspirin 81 mg chewable tablet Chew 81 mg daily    Cholecalciferol (VITAMIN D3) 5000 units CAPS Take by mouth    cyanocobalamin (VITAMIN B-12) 500 mcg tablet Take 1,000 mcg by mouth daily    docusate sodium (COLACE) 100 mg capsule Take 1 capsule (100 mg total) by mouth 2 (two) times a day    Ferrous Sulfate (IRON) 325 (65 Fe) MG TABS Take by mouth    fluticasone (FLONASE ALLERGY RELIEF) 50 mcg/act nasal spray 2 sprays into each nostril daily    metFORMIN (GLUCOPHAGE) 500 mg tablet 2 tablets in the am 2 tablets in the pm    metoprolol tartrate (LOPRESSOR) 25 mg tablet Take 1 tablet (25 mg total) by mouth every 12 (twelve) hours    pantoprazole (PROTONIX) 40 mg tablet TAKE 1 TABLET BY MOUTH EVERY DAY    simvastatin (ZOCOR) 40 mg tablet TAKE 1 TABLET BY MOUTH EVERY DAY    sitaGLIPtin (JANUVIA) 50 mg tablet Take 1 tablet (50 mg total) by mouth daily     No current facility-administered medications on file prior to visit  No Known Allergies    Physical Exam:    /84   Pulse 91   Temp 97 5 °F (36 4 °C) (Oral)   Resp 16   Ht 6' (1 829 m)   Wt 97 5 kg (215 lb)   BMI 29 16 kg/m²     Constitutional:normal, well developed, well nourished, alert, in no distress and non-toxic and no overt pain behavior   and overweight  Eyes:anicteric  HEENT:grossly intact  Neck:supple, symmetric, trachea midline and no masses   Pulmonary:even and unlabored  Cardiovascular:No edema or pitting edema present  Skin:Normal without rashes or lesions and well hydrated  Psychiatric:Mood and affect appropriate  Neurologic:Cranial Nerves II-XII grossly intact  Musculoskeletal:ambulates with cane     Lumbar Spine Exam    Appearance:  Normal lordosis  Palpation/Tenderness:  left lumbar paraspinal tenderness  right lumbar paraspinal tenderness  Range of Motion:  Flexion: Moderately limited  with pain  Extension:  Moderately limited  with pain  Lateral Flexion - Left:  Moderately limited  with pain  Lateral Flexion - Right:  Moderately limited  with pain  Rotation - Left:  Moderately limited  with pain  Rotation - Right:  Moderately limited  with pain  Motor Strength:  Left hip flexion:  5/5  Left hip extension:  5/5  Right hip flexion:  5/5  Right hip extension:  5/5  Left knee flexion:  5/5  Left knee extension:  5/5  Right knee flexion:  5/5  Right knee extension:  5/5  Left foot dorsiflexion:  5/5  Left foot plantar flexion:  5/5  Right foot dorsiflexion:  5/5  Right foot plantar flexion:  5/5  Special Tests:  Left Straight Leg Test:  positive  Right Straight Leg Test:  positive    Imaging    MRI LUMBAR SPINE WITHOUT CONTRAST     INDICATION: [id number]: Intervertebral disc disorders with radiculopathy, lumbar region  Chronic low back pain     COMPARISON:  7/22/2020; 5/21/2020     TECHNIQUE:  Sagittal T1, sagittal T2, sagittal inversion recovery, axial T1 and axial T2, coronal T2     IMAGE QUALITY:  Diagnostic     FINDINGS:     VERTEBRAL BODIES:  There are 5 lumbar type vertebral bodies  Slight kyphotic angulation at the thoracolumbar junction secondary to subacute compression fracture of T12 with bone marrow edema  There is approximately 50-60% loss of height of the T12   compression abnormality which was previously fractured on prior May radiographs on prior CT scan  Scattered spondylotic changes elsewhere  Hemangioma in the T12 vertebra noted  No bony retropulsion or epidural hematoma  Mild dextroscoliosis   thoracolumbar junction  Slight levoscoliosis lumbosacral junction      SACRUM:  Normal signal within the sacrum  No evidence of insufficiency or stress fracture      DISTAL CORD AND CONUS:  Normal size and signal within the distal cord and conus    Conus medullaris terminates at the L1 level      PARASPINAL SOFT TISSUES:  Bilateral rounded T2 hyperintense lesions in the kidneys compatible with cysts      LOWER THORACIC DISC SPACES:  T10-T11: There is no focal disk herniation, central canal or neural foraminal narrowing      T11-T12: There is a diffuse disk bulge  No significant central canal or neural foraminal narrowing      T12-L1: There is a diffuse disk bulge  No significant central canal or neural foraminal narrowing         LUMBAR DISC SPACES:     L1-L2:  Normal      L2-L3:  Normal      L3-L4:  Normal      L4-L5:  There is a diffuse disk bulge  No significant central canal or neural foraminal narrowing  Bilateral facet hypertrophy noted      L5-S1:  There is a diffuse disk bulge  No significant central canal or neural foraminal narrowing  Bilateral facet hypertrophy noted      IMPRESSION:        1  Subacute compression abnormality of T12 with persistent marrow edema, present on prior 5/11/2020 radiograph  The persistence of marrow edema suggests either incomplete healing or perhaps acute on subacute to chronic injury  No bony retropulsion or   epidural hematoma    2   Mild spondylotic changes elsewhere noted

## 2020-10-02 ENCOUNTER — HOSPITAL ENCOUNTER (OUTPATIENT)
Dept: RADIOLOGY | Facility: CLINIC | Age: 85
Discharge: HOME | End: 2020-10-02
Attending: ANESTHESIOLOGY
Payer: MEDICARE

## 2020-10-02 VITALS
HEART RATE: 75 BPM | OXYGEN SATURATION: 95 % | TEMPERATURE: 98.5 F | DIASTOLIC BLOOD PRESSURE: 80 MMHG | SYSTOLIC BLOOD PRESSURE: 124 MMHG | RESPIRATION RATE: 18 BRPM

## 2020-10-02 DIAGNOSIS — M51.16 INTERVERTEBRAL DISC DISORDER WITH RADICULOPATHY OF LUMBAR REGION: ICD-10-CM

## 2020-10-02 PROCEDURE — 62323 NJX INTERLAMINAR LMBR/SAC: CPT | Performed by: ANESTHESIOLOGY

## 2020-10-02 RX ORDER — METHYLPREDNISOLONE ACETATE 80 MG/ML
80 INJECTION, SUSPENSION INTRA-ARTICULAR; INTRALESIONAL; INTRAMUSCULAR; PARENTERAL; SOFT TISSUE ONCE
Status: COMPLETED | OUTPATIENT
Start: 2020-10-02 | End: 2020-10-02

## 2020-10-02 RX ORDER — BUPIVACAINE HCL/PF 2.5 MG/ML
10 VIAL (ML) INJECTION ONCE
Status: COMPLETED | OUTPATIENT
Start: 2020-10-02 | End: 2020-10-02

## 2020-10-02 RX ORDER — LIDOCAINE HYDROCHLORIDE 10 MG/ML
5 INJECTION, SOLUTION EPIDURAL; INFILTRATION; INTRACAUDAL; PERINEURAL ONCE
Status: COMPLETED | OUTPATIENT
Start: 2020-10-02 | End: 2020-10-02

## 2020-10-02 RX ADMIN — LIDOCAINE HYDROCHLORIDE 4 ML: 10 INJECTION, SOLUTION EPIDURAL; INFILTRATION; INTRACAUDAL; PERINEURAL at 10:25

## 2020-10-02 RX ADMIN — BUPIVACAINE HYDROCHLORIDE 2 ML: 2.5 INJECTION, SOLUTION EPIDURAL; INFILTRATION; INTRACAUDAL at 10:27

## 2020-10-02 RX ADMIN — IOHEXOL 1 ML: 300 INJECTION, SOLUTION INTRAVENOUS at 10:27

## 2020-10-02 RX ADMIN — METHYLPREDNISOLONE ACETATE 80 MG: 80 INJECTION, SUSPENSION INTRA-ARTICULAR; INTRALESIONAL; INTRAMUSCULAR; PARENTERAL; SOFT TISSUE at 10:27

## 2020-10-09 ENCOUNTER — TELEPHONE (OUTPATIENT)
Dept: PAIN MEDICINE | Facility: CLINIC | Age: 85
End: 2020-10-09

## 2020-10-16 ENCOUNTER — TELEPHONE (OUTPATIENT)
Dept: RADIOLOGY | Facility: CLINIC | Age: 85
End: 2020-10-16

## 2020-10-16 DIAGNOSIS — M51.16 INTERVERTEBRAL DISC DISORDER WITH RADICULOPATHY OF LUMBAR REGION: Primary | ICD-10-CM

## 2020-10-21 ENCOUNTER — TELEPHONE (OUTPATIENT)
Dept: PAIN MEDICINE | Facility: CLINIC | Age: 85
End: 2020-10-21

## 2020-11-03 ENCOUNTER — HOSPITAL ENCOUNTER (OUTPATIENT)
Dept: RADIOLOGY | Facility: CLINIC | Age: 85
Discharge: HOME | End: 2020-11-03
Attending: ANESTHESIOLOGY | Admitting: ANESTHESIOLOGY
Payer: MEDICARE

## 2020-11-03 VITALS
OXYGEN SATURATION: 97 % | DIASTOLIC BLOOD PRESSURE: 83 MMHG | RESPIRATION RATE: 20 BRPM | HEART RATE: 68 BPM | SYSTOLIC BLOOD PRESSURE: 130 MMHG | TEMPERATURE: 98.3 F

## 2020-11-03 DIAGNOSIS — M51.16 INTERVERTEBRAL DISC DISORDER WITH RADICULOPATHY OF LUMBAR REGION: ICD-10-CM

## 2020-11-03 PROCEDURE — 62323 NJX INTERLAMINAR LMBR/SAC: CPT | Performed by: ANESTHESIOLOGY

## 2020-11-03 RX ORDER — METHYLPREDNISOLONE ACETATE 80 MG/ML
80 INJECTION, SUSPENSION INTRA-ARTICULAR; INTRALESIONAL; INTRAMUSCULAR; PARENTERAL; SOFT TISSUE ONCE
Status: COMPLETED | OUTPATIENT
Start: 2020-11-03 | End: 2020-11-03

## 2020-11-03 RX ORDER — BUPIVACAINE HCL/PF 2.5 MG/ML
10 VIAL (ML) INJECTION ONCE
Status: COMPLETED | OUTPATIENT
Start: 2020-11-03 | End: 2020-11-03

## 2020-11-03 RX ORDER — LIDOCAINE HYDROCHLORIDE 10 MG/ML
5 INJECTION, SOLUTION EPIDURAL; INFILTRATION; INTRACAUDAL; PERINEURAL ONCE
Status: COMPLETED | OUTPATIENT
Start: 2020-11-03 | End: 2020-11-03

## 2020-11-03 RX ADMIN — BUPIVACAINE HYDROCHLORIDE 2 ML: 2.5 INJECTION, SOLUTION EPIDURAL; INFILTRATION; INTRACAUDAL at 11:08

## 2020-11-03 RX ADMIN — LIDOCAINE HYDROCHLORIDE 4 ML: 10 INJECTION, SOLUTION EPIDURAL; INFILTRATION; INTRACAUDAL; PERINEURAL at 11:07

## 2020-11-03 RX ADMIN — METHYLPREDNISOLONE ACETATE 80 MG: 80 INJECTION, SUSPENSION INTRA-ARTICULAR; INTRALESIONAL; INTRAMUSCULAR; PARENTERAL; SOFT TISSUE at 11:08

## 2020-11-03 RX ADMIN — IOHEXOL 1 ML: 300 INJECTION, SOLUTION INTRAVENOUS at 11:08

## 2020-11-10 ENCOUNTER — TELEPHONE (OUTPATIENT)
Dept: PAIN MEDICINE | Facility: CLINIC | Age: 85
End: 2020-11-10

## 2020-11-23 ENCOUNTER — OFFICE VISIT (OUTPATIENT)
Dept: PAIN MEDICINE | Facility: CLINIC | Age: 85
End: 2020-11-23
Payer: MEDICARE

## 2020-11-23 VITALS
WEIGHT: 215 LBS | TEMPERATURE: 98.2 F | DIASTOLIC BLOOD PRESSURE: 88 MMHG | SYSTOLIC BLOOD PRESSURE: 133 MMHG | BODY MASS INDEX: 29.16 KG/M2 | HEART RATE: 70 BPM

## 2020-11-23 DIAGNOSIS — M51.16 INTERVERTEBRAL DISC DISORDER WITH RADICULOPATHY OF LUMBAR REGION: Primary | ICD-10-CM

## 2020-11-23 PROCEDURE — 99214 OFFICE O/P EST MOD 30 MIN: CPT | Performed by: ANESTHESIOLOGY

## 2020-12-09 DIAGNOSIS — I10 ESSENTIAL HYPERTENSION: ICD-10-CM

## 2020-12-11 DIAGNOSIS — E11.69 TYPE 2 DIABETES MELLITUS WITH OTHER SPECIFIED COMPLICATION, WITHOUT LONG-TERM CURRENT USE OF INSULIN (HCC): ICD-10-CM

## 2021-01-11 DIAGNOSIS — E78.5 HYPERLIPIDEMIA, UNSPECIFIED HYPERLIPIDEMIA TYPE: ICD-10-CM

## 2021-01-11 DIAGNOSIS — K21.9 GASTROESOPHAGEAL REFLUX DISEASE WITHOUT ESOPHAGITIS: ICD-10-CM

## 2021-01-11 RX ORDER — PANTOPRAZOLE SODIUM 40 MG/1
TABLET, DELAYED RELEASE ORAL
Qty: 90 TABLET | Refills: 1 | Status: SHIPPED | OUTPATIENT
Start: 2021-01-11 | End: 2021-04-05

## 2021-01-11 RX ORDER — SIMVASTATIN 40 MG
TABLET ORAL
Qty: 90 TABLET | Refills: 1 | Status: SHIPPED | OUTPATIENT
Start: 2021-01-11 | End: 2021-06-07

## 2021-01-12 ENCOUNTER — OFFICE VISIT (OUTPATIENT)
Dept: FAMILY MEDICINE CLINIC | Facility: CLINIC | Age: 86
End: 2021-01-12
Payer: MEDICARE

## 2021-01-12 VITALS
DIASTOLIC BLOOD PRESSURE: 78 MMHG | HEIGHT: 72 IN | SYSTOLIC BLOOD PRESSURE: 122 MMHG | RESPIRATION RATE: 16 BRPM | WEIGHT: 212 LBS | HEART RATE: 74 BPM | BODY MASS INDEX: 28.71 KG/M2 | OXYGEN SATURATION: 97 %

## 2021-01-12 DIAGNOSIS — I48.91 ATRIAL FIBRILLATION, UNSPECIFIED TYPE (HCC): ICD-10-CM

## 2021-01-12 DIAGNOSIS — E55.9 VITAMIN D DEFICIENCY: ICD-10-CM

## 2021-01-12 DIAGNOSIS — E11.22 TYPE 2 DIABETES MELLITUS WITH STAGE 3A CHRONIC KIDNEY DISEASE, WITHOUT LONG-TERM CURRENT USE OF INSULIN (HCC): ICD-10-CM

## 2021-01-12 DIAGNOSIS — Z00.00 WELL ADULT EXAM: Primary | ICD-10-CM

## 2021-01-12 DIAGNOSIS — I10 ESSENTIAL HYPERTENSION: ICD-10-CM

## 2021-01-12 DIAGNOSIS — E78.5 HYPERLIPIDEMIA, UNSPECIFIED HYPERLIPIDEMIA TYPE: ICD-10-CM

## 2021-01-12 DIAGNOSIS — D69.1 QUALITATIVE PLATELET DEFECTS (HCC): ICD-10-CM

## 2021-01-12 DIAGNOSIS — N18.31 TYPE 2 DIABETES MELLITUS WITH STAGE 3A CHRONIC KIDNEY DISEASE, WITHOUT LONG-TERM CURRENT USE OF INSULIN (HCC): ICD-10-CM

## 2021-01-12 PROCEDURE — G0439 PPPS, SUBSEQ VISIT: HCPCS | Performed by: FAMILY MEDICINE

## 2021-01-12 PROCEDURE — 1123F ACP DISCUSS/DSCN MKR DOCD: CPT | Performed by: FAMILY MEDICINE

## 2021-01-12 PROCEDURE — 99214 OFFICE O/P EST MOD 30 MIN: CPT | Performed by: FAMILY MEDICINE

## 2021-01-12 NOTE — PATIENT INSTRUCTIONS
Medicare Preventive Visit Patient Instructions  Thank you for completing your Welcome to Medicare Visit or Medicare Annual Wellness Visit today  Your next wellness visit will be due in one year (1/12/2022)  The screening/preventive services that you may require over the next 5-10 years are detailed below  Some tests may not apply to you based off risk factors and/or age  Screening tests ordered at today's visit but not completed yet may show as past due  Also, please note that scanned in results may not display below  Preventive Screenings:  Service Recommendations Previous Testing/Comments   Colorectal Cancer Screening  · Colonoscopy    · Fecal Occult Blood Test (FOBT)/Fecal Immunochemical Test (FIT)  · Fecal DNA/Cologuard Test  · Flexible Sigmoidoscopy Age: 54-65 years old   Colonoscopy: every 10 years (May be performed more frequently if at higher risk)  OR  FOBT/FIT: every 1 year  OR  Cologuard: every 3 years  OR  Sigmoidoscopy: every 5 years  Screening may be recommended earlier than age 48 if at higher risk for colorectal cancer  Also, an individualized decision between you and your healthcare provider will decide whether screening between the ages of 74-80 would be appropriate   Colonoscopy: Not on file  FOBT/FIT: Not on file  Cologuard: Not on file  Sigmoidoscopy: Not on file         Prostate Cancer Screening Individualized decision between patient and health care provider in men between ages of 53-78   Medicare will cover every 12 months beginning on the day after your 50th birthday PSA: No results in last 5 years     Screening Not Indicated     Hepatitis C Screening Once for adults born between [facility]  More frequently in patients at high risk for Hepatitis C Hep C Antibody: Not on file       Diabetes Screening 1-2 times per year if you're at risk for diabetes or have pre-diabetes Fasting glucose: 116 mg/dL   A1C: 7 0 %    Screening Not Indicated  History Diabetes   Cholesterol Screening Once every 5 years if you don't have a lipid disorder  May order more often based on risk factors  Lipid panel: 06/02/2020    Screening Not Indicated  History Lipid Disorder      Other Preventive Screenings Covered by Medicare:  1  Abdominal Aortic Aneurysm (AAA) Screening: covered once if your at risk  You're considered to be at risk if you have a family history of AAA or a male between the age of 73-68 who smoking at least 100 cigarettes in your lifetime  2  Lung Cancer Screening: covers low dose CT scan once per year if you meet all of the following conditions: (1) Age 50-69; (2) No signs or symptoms of lung cancer; (3) Current smoker or have quit smoking within the last 15 years; (4) You have a tobacco smoking history of at least 30 pack years (packs per day x number of years you smoked); (5) You get a written order from a healthcare provider  3  Glaucoma Screening: covered annually if you're considered high risk: (1) You have diabetes OR (2) Family history of glaucoma OR (3)  aged 48 and older OR (3)  American aged 72 and older  3  Osteoporosis Screening: covered every 2 years if you meet one of the following conditions: (1) Have a vertebral abnormality; (2) On glucocorticoid therapy for more than 3 months; (3) Have primary hyperparathyroidism; (4) On osteoporosis medications and need to assess response to drug therapy  5  HIV Screening: covered annually if you're between the age of 12-76  Also covered annually if you are younger than 13 and older than 72 with risk factors for HIV infection  For pregnant patients, it is covered up to 3 times per pregnancy      Immunizations:  Immunization Recommendations   Influenza Vaccine Annual influenza vaccination during flu season is recommended for all persons aged >= 6 months who do not have contraindications   Pneumococcal Vaccine (Prevnar and Pneumovax)  * Prevnar = PCV13  * Pneumovax = PPSV23 Adults 25-60 years old: 1-3 doses may be recommended based on certain risk factors  Adults 72 years old: Prevnar (PCV13) vaccine recommended followed by Pneumovax (PPSV23) vaccine  If already received PPSV23 since turning 65, then PCV13 recommended at least one year after PPSV23 dose  Hepatitis B Vaccine 3 dose series if at intermediate or high risk (ex: diabetes, end stage renal disease, liver disease)   Tetanus (Td) Vaccine - COST NOT COVERED BY MEDICARE PART B Following completion of primary series, a booster dose should be given every 10 years to maintain immunity against tetanus  Td may also be given as tetanus wound prophylaxis  Tdap Vaccine - COST NOT COVERED BY MEDICARE PART B Recommended at least once for all adults  For pregnant patients, recommended with each pregnancy  Shingles Vaccine (Shingrix) - COST NOT COVERED BY MEDICARE PART B  2 shot series recommended in those aged 48 and above     Health Maintenance Due:  There are no preventive care reminders to display for this patient  Immunizations Due:      Topic Date Due    DTaP,Tdap,and Td Vaccines (1 - Tdap) 12/11/1956    Influenza Vaccine (1) 09/01/2020     Advance Directives   What are advance directives? Advance directives are legal documents that state your wishes and plans for medical care  These plans are made ahead of time in case you lose your ability to make decisions for yourself  Advance directives can apply to any medical decision, such as the treatments you want, and if you want to donate organs  What are the types of advance directives? There are many types of advance directives, and each state has rules about how to use them  You may choose a combination of any of the following:  · Living will: This is a written record of the treatment you want  You can also choose which treatments you do not want, which to limit, and which to stop at a certain time  This includes surgery, medicine, IV fluid, and tube feedings  · Durable power of  for healthcare [facility]):   This is a written record that states who you want to make healthcare choices for you when you are unable to make them for yourself  This person, called a proxy, is usually a family member or a friend  You may choose more than 1 proxy  · Do not resuscitate (DNR) order:  A DNR order is used in case your heart stops beating or you stop breathing  It is a request not to have certain forms of treatment, such as CPR  A DNR order may be included in other types of advance directives  · Medical directive: This covers the care that you want if you are in a coma, near death, or unable to make decisions for yourself  You can list the treatments you want for each condition  Treatment may include pain medicine, surgery, blood transfusions, dialysis, IV or tube feedings, and a ventilator (breathing machine)  · Values history: This document has questions about your views, beliefs, and how you feel and think about life  This information can help others choose the care that you would choose  Why are advance directives important? An advance directive helps you control your care  Although spoken wishes may be used, it is better to have your wishes written down  Spoken wishes can be misunderstood, or not followed  Treatments may be given even if you do not want them  An advance directive may make it easier for your family to make difficult choices about your care  Fall Prevention    Fall prevention  includes ways to make your home and other areas safer  It also includes ways you can move more carefully to prevent a fall  Health conditions that cause changes in your blood pressure, vision, or muscle strength and coordination may increase your risk for falls  Medicines may also increase your risk for falls if they make you dizzy, weak, or sleepy  Fall prevention tips:   · Stand or sit up slowly  · Use assistive devices as directed  · Wear shoes that fit well and have soles that   · Wear a personal alarm  · Stay active  · Manage your medical conditions  Home Safety Tips:  · Add items to prevent falls in the bathroom  · Keep paths clear  · Install bright lights in your home  · Keep items you use often on shelves within reach  · Paint or place reflective tape on the edges of your stairs  Weight Management   Why it is important to manage your weight:  Being overweight increases your risk of health conditions such as heart disease, high blood pressure, type 2 diabetes, and certain types of cancer  It can also increase your risk for osteoarthritis, sleep apnea, and other respiratory problems  Aim for a slow, steady weight loss  Even a small amount of weight loss can lower your risk of health problems  How to lose weight safely:  A safe and healthy way to lose weight is to eat fewer calories and get regular exercise  You can lose up about 1 pound a week by decreasing the number of calories you eat by 500 calories each day  Healthy meal plan for weight management:  A healthy meal plan includes a variety of foods, contains fewer calories, and helps you stay healthy  A healthy meal plan includes the following:  · Eat whole-grain foods more often  A healthy meal plan should contain fiber  Fiber is the part of grains, fruits, and vegetables that is not broken down by your body  Whole-grain foods are healthy and provide extra fiber in your diet  Some examples of whole-grain foods are whole-wheat breads and pastas, oatmeal, brown rice, and bulgur  · Eat a variety of vegetables every day  Include dark, leafy greens such as spinach, kale, qasim greens, and mustard greens  Eat yellow and orange vegetables such as carrots, sweet potatoes, and winter squash  · Eat a variety of fruits every day  Choose fresh or canned fruit (canned in its own juice or light syrup) instead of juice  Fruit juice has very little or no fiber  · Eat low-fat dairy foods  Drink fat-free (skim) milk or 1% milk   Eat fat-free yogurt and low-fat cottage cheese  Try low-fat cheeses such as mozzarella and other reduced-fat cheeses  · Choose meat and other protein foods that are low in fat  Choose beans or other legumes such as split peas or lentils  Choose fish, skinless poultry (chicken or turkey), or lean cuts of red meat (beef or pork)  Before you cook meat or poultry, cut off any visible fat  · Use less fat and oil  Try baking foods instead of frying them  Add less fat, such as margarine, sour cream, regular salad dressing and mayonnaise to foods  Eat fewer high-fat foods  Some examples of high-fat foods include french fries, doughnuts, ice cream, and cakes  · Eat fewer sweets  Limit foods and drinks that are high in sugar  This includes candy, cookies, regular soda, and sweetened drinks  Exercise:  Exercise at least 30 minutes per day on most days of the week  Some examples of exercise include walking, biking, dancing, and swimming  You can also fit in more physical activity by taking the stairs instead of the elevator or parking farther away from stores  Ask your healthcare provider about the best exercise plan for you  © Copyright [employer] 2018 Information is for End User's use only and may not be sold, redistributed or otherwise used for commercial purposes   All illustrations and images included in CareNotes® are the copyrighted property of [employer]  or [address] [place] [address]

## 2021-01-12 NOTE — PROGRESS NOTES
Assessment/Plan:    1  Well adult exam    2  BMI 28 0-28 9,adult    3  Type 2 diabetes mellitus with stage 3a chronic kidney disease, without long-term current use of insulin (HCC)  -     Lipid panel; Future  -     HEMOGLOBIN A1C W/ EAG ESTIMATION; Future  -     Basic metabolic panel; Future  -     CBC and differential; Future  -     Microalbumin / creatinine urine ratio    4  Atrial fibrillation, unspecified type ([address] )    5  Essential hypertension    6  Hyperlipidemia, unspecified hyperlipidemia type    7  Vitamin D deficiency  -     Vitamin D 25 hydroxy; Future    8  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HCC)         Subjective:      Patient ID: Fern Sullivan is a 82 y o  male  HPI   DM: controlled on metformin 500ER 2 bid    obesity: gaining since wife had neck surgery    HLD: controlled on simva 40mg    CAD MI 91': rohotgi ECA: ST nl 15' met 25 bid ASA 81mg    HTN: controlled    GERD: controlled on protonix 40mg does have phlem in throat seeing raisa    vit D def: gave 50K U taking 2000u daily    Vit B 12 def: shot here then otc    unsteadiness: right LE mostly but no instability seen    anemia: ferritin low hb 11 7 to 12 33    LBP with sciatica - will need aspen again and PT again     Echocardiogram showed normal LV ejection fraction  PA pressure was 45    myocardial infarction in 1991  At that time he had PTCA and rotational atherectomy of LAD  Cardiac catheterization done in 2001 had shown 50% LAD narrowing  Fractured T12 - healing now seeing [facility]  June 11th appt for folow up   Walks with walker bc he feels       The following portions of the patient's history were reviewed and updated as appropriate: allergies, current medications, past family history, past medical history, past social history, past surgical history and problem list     Review of Systems   Constitutional: Negative for activity change, appetite change and fever  HENT: Negative for congestion, nosebleeds and trouble swallowing  Eyes: Negative for itching  Respiratory: Negative for cough and chest tightness  Cardiovascular: Negative for chest pain and palpitations  Gastrointestinal: Negative for abdominal pain, constipation, diarrhea and nausea  Endocrine: Negative for cold intolerance  Genitourinary: Negative for frequency  Musculoskeletal: Positive for arthralgias and back pain  Negative for gait problem and joint swelling  Skin: Negative for rash  Allergic/Immunologic: Negative for immunocompromised state  Neurological: Negative for dizziness, tremors, seizures, syncope and headaches  Psychiatric/Behavioral: Negative for hallucinations and suicidal ideas  Objective:      /78 (BP Location: Left arm, Patient Position: Sitting, Cuff Size: Large)   Pulse 74   Resp 16   Ht 6' (1 829 m)   Wt 96 2 kg (212 lb)   SpO2 97%   BMI 28 75 kg/m²     No visits with results within 2 Week(s) from this visit     Latest known visit with results is:   Appointment on 06/02/2020   Component Date Value    Cholesterol 06/02/2020 154     Triglycerides 06/02/2020 246*    HDL, Direct 06/02/2020 35*    LDL Calculated 06/02/2020 70     Non-HDL-Chol (CHOL-HDL) 06/02/2020 119     Sodium 06/02/2020 135*    Potassium 06/02/2020 5 0     Chloride 06/02/2020 104     CO2 06/02/2020 24     ANION GAP 06/02/2020 7     BUN 06/02/2020 13     Creatinine 06/02/2020 1 22     Glucose, Fasting 06/02/2020 116*    Calcium 06/02/2020 9 6     AST 06/02/2020 26     ALT 06/02/2020 38     Alkaline Phosphatase 06/02/2020 90     Total Protein 06/02/2020 8 0     Albumin 06/02/2020 4 0     Total Bilirubin 06/02/2020 0 47     eGFR 06/02/2020 55     WBC 06/02/2020 10 61*    RBC 06/02/2020 4 71     Hemoglobin 06/02/2020 12 9     Hematocrit 06/02/2020 41 8     MCV 06/02/2020 89     MCH 06/02/2020 27 4     MCHC 06/02/2020 30 9*    RDW 06/02/2020 16 2*    Platelets 98/28/6493 295     MPV 06/02/2020 10 3     Hemoglobin A1C 06/02/2020 7 0*    EAG 06/02/2020 154     Vit D, 25-Hydroxy 06/02/2020 28 9*          Physical Exam  Vitals signs and nursing note reviewed  Constitutional:       Appearance: He is well-developed  HENT:      Head: Normocephalic and atraumatic  Neck:      Musculoskeletal: Normal range of motion and neck supple  Cardiovascular:      Rate and Rhythm: Normal rate and regular rhythm  Pulses: no weak pulses          Dorsalis pedis pulses are 2+ on the right side and 2+ on the left side  Heart sounds: Normal heart sounds  Pulmonary:      Effort: Pulmonary effort is normal       Breath sounds: Normal breath sounds  Abdominal:      General: Bowel sounds are normal       Palpations: Abdomen is soft  Musculoskeletal: Normal range of motion  Comments: Using walker to ambulate - very limited    Feet:      Right foot:      Skin integrity: Callus and dry skin present  No ulcer, skin breakdown, erythema or warmth  Left foot:      Skin integrity: Ulcer, callus and dry skin present  No skin breakdown, erythema or warmth  Skin:     General: Skin is warm and dry  Capillary Refill: Capillary refill takes less than 2 seconds  Neurological:      Mental Status: He is alert and oriented to person, place, and time  Psychiatric:         Behavior: Behavior normal          Thought Content: Thought content normal          Judgment: Judgment normal            Patient's shoes and socks removed  Right Foot/Ankle   Right Foot Inspection  Skin Exam: skin normal, skin intact, dry skin, callus, pre-ulcer and callus no warmth, no erythema, no maceration, no abnormal color and no ulcer                          Toe Exam: ROM and strength within normal limits  Sensory       Monofilament testing: absent  Vascular  Capillary refills: < 3 seconds  The right DP pulse is 2+       Left Foot/Ankle  Left Foot Inspection  Skin Exam: skin normal, skin intact, dry skin, ulcer and callusno warmth, no erythema, no maceration, normal color and no pre-ulcer                         Toe Exam: ROM and strength within normal limits                   Sensory       Monofilament: absent  Vascular  Capillary refills: < 3 seconds  The left DP pulse is 2+  Assign Risk Category:  No deformity present; Loss of protective sensation;  No weak pulses       Risk: 3        Natalie Young MD  [place] [id number]

## 2021-01-12 NOTE — PROGRESS NOTES
Assessment and Plan:     Problem List Items Addressed This Visit     None      Visit Diagnoses     Well adult exam    -  Primary    BMI 28 0-28 9,adult            BMI Counseling: Body mass index is 28 75 kg/m²  The BMI is above normal  Nutrition recommendations include decreasing portion sizes, encouraging healthy choices of fruits and vegetables and limiting drinks that contain sugar  Exercise recommendations include moderate physical activity 150 minutes/week  No pharmacotherapy was ordered  Falls Plan of Care: balance, strength, and gait training instructions were provided  Medications that increase falls were reviewed  Preventive health issues were discussed with patient, and age appropriate screening tests were ordered as noted in patient's After Visit Summary  Personalized health advice and appropriate referrals for health education or preventive services given if needed, as noted in patient's After Visit Summary       History of Present Illness:     Patient presents for Medicare Annual Wellness visit    Patient Care Team:  Xavier Watt MD as PCP - General     Problem List:     Patient Active Problem List   Diagnosis    Dehydration    [facility]    T2DM (type 2 diabetes mellitus) ([facility]    Essential hypertension    HLD (hyperlipidemia)    GERD (gastroesophageal reflux disease)    CAD (coronary artery disease)    [facility]ontal [facility]    Ambulatory dysfunction    Frequent falls    [facility]    Chronic midline low back pain without sciatica    Intervertebral disc disorder with radiculopathy of lumbar region      Past Medical and Surgical History:     Past Medical History:   Diagnosis Date    Acute MI ([facility]     Atrial fibrillation ([facility]     Cardiac disease     Diabetes mellitus ([facility]     Heart murmur     Hyperlipidemia     Hypertension     Low back pain      Past Surgical History:   Procedure Laterality Date    CARDIAC CATHETERIZATION      s/p MI    HERNIA REPAIR Left     X5    JOINT REPLACEMENT      TOTAL HIP ARTHROPLASTY        Family History:     Family History   Problem Relation Age of Onset    No Known Problems Mother     No Known Problems Father       Social History:     E-Cigarette/Vaping    E-Cigarette Use Never User      E-Cigarette/Vaping Substances    Nicotine No     THC No     CBD No     Flavoring No     Other No     Unknown No      Social History     Socioeconomic History    Marital status: /Civil Union     Spouse name: None    Number of children: None    Years of education: None    Highest education level: None   Occupational History    Occupation: retired   Social Needs    Financial resource strain: None    Food insecurity     Worry: None     Inability: None    Transportation needs     Medical: None     Non-medical: None   Tobacco Use    Smoking status: Never Smoker    Smokeless tobacco: Never Used   Substance and Sexual Activity    Alcohol use: Yes     Comment: rare    Drug use: No    Sexual activity: None   Lifestyle    Physical activity     Days per week: None     Minutes per session: None    Stress: None   Relationships    Social connections     Talks on phone: None     Gets together: None     Attends Yazidi service: None     Active member of club or organization: None     Attends meetings of clubs or organizations: None     Relationship status: None    Intimate partner violence     Fear of current or ex partner: None     Emotionally abused: None     Physically abused: None     Forced sexual activity: None   Other Topics Concern    None   Social History Narrative    [place]:    Most recent tobacco use screenin2020      Do you currently or have you served in the [employer]:   No      Were you activated, into active duty, as a member of the [employer] or as a Reservist:   No      Occupation:   Retired      Marital status:         Sexual orientation:   Heterosexual Exercise level:   Occasional      Diet:   Regular      General stress level:   Medium      Alcohol intake:   Occasional      Caffeine intake: Moderate      Chewing tobacco:   none      Illicit drugs:   Denied      Guns present in home:   No      Seat belts used routinely:   Yes      Sunscreen used routinely:   Yes      Smoke alarm in home:   Yes      Advance directive:   Yes      Salt Intake:   Normal     Would the patient like to schedule a Mammogram:   No      Is the patient interested in a colorectal cancer screening:   No     Last modified by zulma   01-, 15:03       Medications and Allergies:     Current Outpatient Medications   Medication Sig Dispense Refill    acetaminophen (TYLENOL) 325 mg tablet Take 2 tablets (650 mg total) by mouth every 6 (six) hours 30 tablet 0    aspirin 81 mg chewable tablet Chew 81 mg daily      Cholecalciferol (VITAMIN D3) 5000 units CAPS Take by mouth      cyanocobalamin (VITAMIN B-12) 500 mcg tablet Take 1,000 mcg by mouth daily      docusate sodium (COLACE) 100 mg capsule Take 1 capsule (100 mg total) by mouth 2 (two) times a day 10 capsule 0    Ferrous Sulfate (IRON) 325 (65 Fe) MG TABS Take by mouth      metFORMIN (GLUCOPHAGE) 500 mg tablet TAKE 2 TABLETS EVERY MORNING TAKE 2 TABLETS EVERY EVENING 360 tablet 1    metoprolol tartrate (LOPRESSOR) 25 mg tablet TAKE 1 TABLET (25 MG TOTAL) BY MOUTH EVERY 12 (TWELVE) HOURS 180 tablet 0    pantoprazole (PROTONIX) 40 mg tablet TAKE 1 TABLET BY MOUTH EVERY DAY 90 tablet 1    simvastatin (ZOCOR) 40 mg tablet TAKE 1 TABLET BY MOUTH EVERY DAY 90 tablet 1     No current facility-administered medications for this visit        No Known Allergies   Immunizations:     Immunization History   Administered Date(s) Administered    H1N1, All Formulations 12/19/2009    INFLUENZA 09/30/2010, 11/06/2013, 10/24/2014, 09/25/2015, 03/12/2018, 12/01/2018, 11/01/2019    Influenza Split High Dose Preservative Free IM 11/04/2019    Influenza, recombinant, quadrivalent,injectable, preservative free 11/01/2019    Pneumococcal Conjugate 13-Valent 03/12/2018    Pneumococcal Polysaccharide PPV23 01/17/2020      Health Maintenance: There are no preventive care reminders to display for this patient  Topic Date Due    DTaP,Tdap,and Td Vaccines (1 - Tdap) 12/11/1956    Influenza Vaccine (1) 09/01/2020      Medicare Health Risk Assessment:     /78 (BP Location: Left arm, Patient Position: Sitting, Cuff Size: Large)   Pulse 74   Resp 16   Ht 6' (1 829 m)   Wt 96 2 kg (212 lb)   SpO2 97%   BMI 28 75 kg/m²      Prince Roth is here for his Subsequent Wellness visit  Last Medicare Wellness visit information reviewed, patient interviewed and updates made to the record today  Health Risk Assessment:   Patient rates overall health as fair  Patient feels that their physical health rating is slightly worse  Eyesight was rated as same  Hearing was rated as same  Patient feels that their emotional and mental health rating is same  Pain experienced in the last 7 days has been some  Patient's pain rating has been 5/10  Patient states that he has experienced no weight loss or gain in last 6 months  Depression Screening:   PHQ-2 Score: 0      Fall Risk Screening: In the past year, patient has experienced: history of falling in past year    Number of falls: 2 or more  Injured during fall?: Yes    Feels unsteady when standing or walking?: Yes    Worried about falling?: Yes      Home Safety:  Patient has trouble with stairs inside or outside of their home  Patient has working smoke alarms and has working carbon monoxide detector  Home safety hazards include: none  Nutrition:   Current diet is Regular  Medications:   Patient is currently taking over-the-counter supplements  OTC medications include: see medication list  Patient is able to manage medications       Activities of Daily Living (ADLs)/Instrumental Activities of Daily Living (IADLs):   Walk and transfer into and out of bed and chair?: Yes  Dress and groom yourself?: Yes    Bathe or shower yourself?: Yes    Feed yourself? Yes  Do your laundry/housekeeping?: Yes  Manage your money, pay your bills and track your expenses?: Yes  Make your own meals?: Yes    Do your own shopping?: Yes    Previous Hospitalizations:   Any hospitalizations or ED visits within the last 12 months?: Yes    How many hospitalizations have you had in the last year?: 1-2    Advance Care Planning:   Living will: Yes    Durable POA for healthcare:  Yes    Advanced directive: Yes    Five wishes given: No      Cognitive Screening:   Provider or family/friend/caregiver concerned regarding cognition?: No    PREVENTIVE SCREENINGS      Cardiovascular Screening:    General: Screening Not Indicated and History Lipid Disorder      Diabetes Screening:     General: Screening Not Indicated and History Diabetes      Colorectal Cancer Screening:     General: Screening Not Indicated      Prostate Cancer Screening:    General: Screening Not Indicated      Osteoporosis Screening:    General: Screening Not Indicated      Abdominal Aortic Aneurysm (AAA) Screening:        General: Screening Not Indicated      Lung Cancer Screening:     General: Screening Not Indicated      Hepatitis C Screening:    General: Screening Not Indicated      Regina Medrano MD

## 2021-01-19 ENCOUNTER — IMMUNIZATIONS (OUTPATIENT)
Dept: FAMILY MEDICINE CLINIC | Facility: HOSPITAL | Age: 86
End: 2021-01-19

## 2021-01-19 DIAGNOSIS — Z23 ENCOUNTER FOR IMMUNIZATION: Primary | ICD-10-CM

## 2021-01-19 PROCEDURE — 0001A SARS-COV-2 / COVID-19 MRNA VACCINE (PFIZER-BIONTECH) 30 MCG: CPT

## 2021-01-19 PROCEDURE — 91300 SARS-COV-2 / COVID-19 MRNA VACCINE (PFIZER-BIONTECH) 30 MCG: CPT

## 2021-02-08 ENCOUNTER — IMMUNIZATIONS (OUTPATIENT)
Dept: FAMILY MEDICINE CLINIC | Facility: HOSPITAL | Age: 86
End: 2021-02-08

## 2021-02-08 DIAGNOSIS — Z23 ENCOUNTER FOR IMMUNIZATION: Primary | ICD-10-CM

## 2021-02-08 PROCEDURE — 91300 SARS-COV-2 / COVID-19 MRNA VACCINE (PFIZER-BIONTECH) 30 MCG: CPT

## 2021-02-08 PROCEDURE — 0002A SARS-COV-2 / COVID-19 MRNA VACCINE (PFIZER-BIONTECH) 30 MCG: CPT

## 2021-02-12 ENCOUNTER — TRANSCRIBE ORDERS (OUTPATIENT)
Dept: LAB | Facility: CLINIC | Age: 86
End: 2021-02-12

## 2021-02-12 ENCOUNTER — LAB (OUTPATIENT)
Dept: LAB | Facility: CLINIC | Age: 86
End: 2021-02-12
Payer: MEDICARE

## 2021-02-12 DIAGNOSIS — E55.9 VITAMIN D DEFICIENCY: ICD-10-CM

## 2021-02-12 DIAGNOSIS — N18.31 TYPE 2 DIABETES MELLITUS WITH STAGE 3A CHRONIC KIDNEY DISEASE, WITHOUT LONG-TERM CURRENT USE OF INSULIN (HCC): ICD-10-CM

## 2021-02-12 DIAGNOSIS — E11.22 TYPE 2 DIABETES MELLITUS WITH STAGE 3A CHRONIC KIDNEY DISEASE, WITHOUT LONG-TERM CURRENT USE OF INSULIN (HCC): ICD-10-CM

## 2021-02-12 LAB
25(OH)D3 SERPL-MCNC: 31.4 NG/ML (ref 30–100)
ANION GAP SERPL CALCULATED.3IONS-SCNC: 5 MMOL/L (ref 4–13)
BASOPHILS # BLD AUTO: 0.05 THOUSANDS/ΜL (ref 0–0.1)
BASOPHILS NFR BLD AUTO: 1 % (ref 0–1)
BUN SERPL-MCNC: 14 MG/DL (ref 5–25)
CALCIUM SERPL-MCNC: 9.5 MG/DL (ref 8.3–10.1)
CHLORIDE SERPL-SCNC: 104 MMOL/L (ref 100–108)
CHOLEST SERPL-MCNC: 125 MG/DL (ref 50–200)
CO2 SERPL-SCNC: 28 MMOL/L (ref 21–32)
CREAT SERPL-MCNC: 1.36 MG/DL (ref 0.6–1.3)
CREAT UR-MCNC: 199 MG/DL
EOSINOPHIL # BLD AUTO: 0.42 THOUSAND/ΜL (ref 0–0.61)
EOSINOPHIL NFR BLD AUTO: 6 % (ref 0–6)
ERYTHROCYTE [DISTWIDTH] IN BLOOD BY AUTOMATED COUNT: 15.1 % (ref 11.6–15.1)
EST. AVERAGE GLUCOSE BLD GHB EST-MCNC: 151 MG/DL
GFR SERPL CREATININE-BSD FRML MDRD: 48 ML/MIN/1.73SQ M
GLUCOSE P FAST SERPL-MCNC: 134 MG/DL (ref 65–99)
HBA1C MFR BLD: 6.9 %
HCT VFR BLD AUTO: 42 % (ref 36.5–49.3)
HDLC SERPL-MCNC: 31 MG/DL
HGB BLD-MCNC: 13 G/DL (ref 12–17)
IMM GRANULOCYTES # BLD AUTO: 0.04 THOUSAND/UL (ref 0–0.2)
IMM GRANULOCYTES NFR BLD AUTO: 1 % (ref 0–2)
LDLC SERPL CALC-MCNC: 54 MG/DL (ref 0–100)
LYMPHOCYTES # BLD AUTO: 2.84 THOUSANDS/ΜL (ref 0.6–4.47)
LYMPHOCYTES NFR BLD AUTO: 38 % (ref 14–44)
MCH RBC QN AUTO: 27.5 PG (ref 26.8–34.3)
MCHC RBC AUTO-ENTMCNC: 31 G/DL (ref 31.4–37.4)
MCV RBC AUTO: 89 FL (ref 82–98)
MICROALBUMIN UR-MCNC: 30.7 MG/L (ref 0–20)
MICROALBUMIN/CREAT 24H UR: 15 MG/G CREATININE (ref 0–30)
MONOCYTES # BLD AUTO: 0.67 THOUSAND/ΜL (ref 0.17–1.22)
MONOCYTES NFR BLD AUTO: 9 % (ref 4–12)
NEUTROPHILS # BLD AUTO: 3.51 THOUSANDS/ΜL (ref 1.85–7.62)
NEUTS SEG NFR BLD AUTO: 45 % (ref 43–75)
NONHDLC SERPL-MCNC: 94 MG/DL
NRBC BLD AUTO-RTO: 0 /100 WBCS
PLATELET # BLD AUTO: 238 THOUSANDS/UL (ref 149–390)
PMV BLD AUTO: 10.6 FL (ref 8.9–12.7)
POTASSIUM SERPL-SCNC: 4.7 MMOL/L (ref 3.5–5.3)
RBC # BLD AUTO: 4.72 MILLION/UL (ref 3.88–5.62)
SODIUM SERPL-SCNC: 137 MMOL/L (ref 136–145)
TRIGL SERPL-MCNC: 200 MG/DL
WBC # BLD AUTO: 7.53 THOUSAND/UL (ref 4.31–10.16)

## 2021-02-12 PROCEDURE — 82043 UR ALBUMIN QUANTITATIVE: CPT | Performed by: FAMILY MEDICINE

## 2021-02-12 PROCEDURE — 80048 BASIC METABOLIC PNL TOTAL CA: CPT

## 2021-02-12 PROCEDURE — 82570 ASSAY OF URINE CREATININE: CPT | Performed by: FAMILY MEDICINE

## 2021-02-12 PROCEDURE — 82306 VITAMIN D 25 HYDROXY: CPT

## 2021-02-12 PROCEDURE — 36415 COLL VENOUS BLD VENIPUNCTURE: CPT

## 2021-02-12 PROCEDURE — 83036 HEMOGLOBIN GLYCOSYLATED A1C: CPT

## 2021-02-12 PROCEDURE — 85025 COMPLETE CBC W/AUTO DIFF WBC: CPT

## 2021-02-12 PROCEDURE — 80061 LIPID PANEL: CPT

## 2021-02-16 DIAGNOSIS — R79.89 SERUM CREATININE RAISED: Primary | ICD-10-CM

## 2021-03-01 DIAGNOSIS — I10 ESSENTIAL HYPERTENSION: ICD-10-CM

## 2021-03-23 ENCOUNTER — APPOINTMENT (OUTPATIENT)
Dept: LAB | Facility: CLINIC | Age: 86
End: 2021-03-23
Payer: MEDICARE

## 2021-03-23 DIAGNOSIS — R79.89 SERUM CREATININE RAISED: ICD-10-CM

## 2021-03-23 LAB
ANION GAP SERPL CALCULATED.3IONS-SCNC: 7 MMOL/L (ref 4–13)
BUN SERPL-MCNC: 23 MG/DL (ref 5–25)
CALCIUM SERPL-MCNC: 9.6 MG/DL (ref 8.3–10.1)
CHLORIDE SERPL-SCNC: 105 MMOL/L (ref 100–108)
CO2 SERPL-SCNC: 26 MMOL/L (ref 21–32)
CREAT SERPL-MCNC: 1.43 MG/DL (ref 0.6–1.3)
GFR SERPL CREATININE-BSD FRML MDRD: 45 ML/MIN/1.73SQ M
GLUCOSE SERPL-MCNC: 137 MG/DL (ref 65–140)
POTASSIUM SERPL-SCNC: 4.7 MMOL/L (ref 3.5–5.3)
SODIUM SERPL-SCNC: 138 MMOL/L (ref 136–145)

## 2021-03-23 PROCEDURE — 36415 COLL VENOUS BLD VENIPUNCTURE: CPT

## 2021-03-23 PROCEDURE — 80048 BASIC METABOLIC PNL TOTAL CA: CPT

## 2021-03-24 ENCOUNTER — TELEPHONE (OUTPATIENT)
Dept: FAMILY MEDICINE CLINIC | Facility: CLINIC | Age: 86
End: 2021-03-24

## 2021-03-24 NOTE — TELEPHONE ENCOUNTER
----- Message from Michaelle Ken MD sent at 3/24/2021  8:22 AM EDT -----  Kidney function is up and down but stable enough   No changes

## 2021-04-05 ENCOUNTER — TELEPHONE (OUTPATIENT)
Dept: FAMILY MEDICINE CLINIC | Facility: CLINIC | Age: 86
End: 2021-04-05

## 2021-04-05 DIAGNOSIS — K21.9 GASTROESOPHAGEAL REFLUX DISEASE WITHOUT ESOPHAGITIS: Primary | ICD-10-CM

## 2021-04-05 RX ORDER — OMEPRAZOLE 40 MG/1
40 CAPSULE, DELAYED RELEASE ORAL
Qty: 90 CAPSULE | Refills: 3 | Status: SHIPPED | OUTPATIENT
Start: 2021-04-05 | End: 2022-04-04

## 2021-04-05 NOTE — TELEPHONE ENCOUNTER
Patient was informed that omeprazole is $ 0 and his Pantoprazole is $ 18  If you are okay with the change the patient is okay with the change

## 2021-05-30 DIAGNOSIS — E11.69 TYPE 2 DIABETES MELLITUS WITH OTHER SPECIFIED COMPLICATION, WITHOUT LONG-TERM CURRENT USE OF INSULIN (HCC): ICD-10-CM

## 2021-06-07 DIAGNOSIS — E78.5 HYPERLIPIDEMIA, UNSPECIFIED HYPERLIPIDEMIA TYPE: ICD-10-CM

## 2021-06-07 RX ORDER — SIMVASTATIN 40 MG
TABLET ORAL
Qty: 90 TABLET | Refills: 1 | Status: SHIPPED | OUTPATIENT
Start: 2021-06-07 | End: 2021-10-11

## 2021-07-22 ENCOUNTER — OFFICE VISIT (OUTPATIENT)
Dept: FAMILY MEDICINE CLINIC | Facility: CLINIC | Age: 86
End: 2021-07-22
Payer: MEDICARE

## 2021-07-22 VITALS
HEIGHT: 72 IN | SYSTOLIC BLOOD PRESSURE: 124 MMHG | RESPIRATION RATE: 16 BRPM | DIASTOLIC BLOOD PRESSURE: 72 MMHG | OXYGEN SATURATION: 98 % | HEART RATE: 74 BPM | BODY MASS INDEX: 28.12 KG/M2 | WEIGHT: 207.6 LBS

## 2021-07-22 DIAGNOSIS — F41.9 ANXIETY: Primary | ICD-10-CM

## 2021-07-22 DIAGNOSIS — R26.2 AMBULATORY DYSFUNCTION: ICD-10-CM

## 2021-07-22 DIAGNOSIS — K21.9 GASTROESOPHAGEAL REFLUX DISEASE WITHOUT ESOPHAGITIS: ICD-10-CM

## 2021-07-22 DIAGNOSIS — G89.29 CHRONIC MIDLINE LOW BACK PAIN WITHOUT SCIATICA: ICD-10-CM

## 2021-07-22 DIAGNOSIS — M54.50 CHRONIC MIDLINE LOW BACK PAIN WITHOUT SCIATICA: ICD-10-CM

## 2021-07-22 DIAGNOSIS — I25.10 CORONARY ARTERY DISEASE INVOLVING NATIVE CORONARY ARTERY OF NATIVE HEART WITHOUT ANGINA PECTORIS: ICD-10-CM

## 2021-07-22 DIAGNOSIS — E11.69 TYPE 2 DIABETES MELLITUS WITH OTHER SPECIFIED COMPLICATION, WITHOUT LONG-TERM CURRENT USE OF INSULIN (HCC): ICD-10-CM

## 2021-07-22 DIAGNOSIS — S22.088A OTHER FRACTURE OF T11-T12 VERTEBRA, INITIAL ENCOUNTER FOR CLOSED FRACTURE (HCC): ICD-10-CM

## 2021-07-22 DIAGNOSIS — M51.16 INTERVERTEBRAL DISC DISORDER WITH RADICULOPATHY OF LUMBAR REGION: ICD-10-CM

## 2021-07-22 PROBLEM — S02.0XXA FRONTAL SKULL FRACTURE (HCC): Status: RESOLVED | Noted: 2018-12-26 | Resolved: 2021-07-22

## 2021-07-22 PROBLEM — E86.0 DEHYDRATION: Status: RESOLVED | Noted: 2018-02-09 | Resolved: 2021-07-22

## 2021-07-22 PROCEDURE — 99214 OFFICE O/P EST MOD 30 MIN: CPT | Performed by: FAMILY MEDICINE

## 2021-07-22 RX ORDER — ESCITALOPRAM OXALATE 5 MG/1
5 TABLET ORAL DAILY
Qty: 30 TABLET | Refills: 5 | Status: SHIPPED | OUTPATIENT
Start: 2021-07-22 | End: 2022-01-03

## 2021-07-22 NOTE — PROGRESS NOTES
Assessment/Plan:    1  Anxiety  -     escitalopram (LEXAPRO) 5 mg tablet; Take 1 tablet (5 mg total) by mouth daily    2  Type 2 diabetes mellitus with other specified complication, without long-term current use of insulin [facility])  Assessment & Plan:    Lab Results   Component Value Date    HGBA1C 6 9 (H) 02/12/2021   -stable/controlled, continue same medication  Will evaluate again next visit       Orders:  -     Lipid Panel with Direct LDL reflex; Future  -     HEMOGLOBIN A1C W/ EAG ESTIMATION; Future  -     Basic metabolic panel; Future    3  Other fracture of t11-T12 vertebra, initial encounter for closed fracture ([facility] )   -     DXA bone density spine hip and pelvis; Future; Expected date: 07/22/2021    4  Chronic midline low back pain without sciatica    5  Gastroesophageal reflux disease without esophagitis  Assessment & Plan:  -stable/controlled, continue same medication  Will evaluate again next visit         6  Coronary artery disease involving native coronary artery of native heart without angina pectoris  Assessment & Plan:  -stable/controlled, continue same medication  Will evaluate again next visit   Seeing ECA      7  Intervertebral disc disorder with radiculopathy of lumbar region  Assessment & Plan:  Seeing [facility]         8  Ambulatory dysfunction  Assessment & Plan:  Andre Reyes for now         9  BMI 28 0-28 9,adult       Subjective:      Patient ID: Elenita Arreola is a 82 y o  male      HPI   Fear of falling - wont let people touch him   Has fallen in the past   Uses cane - he just caries the walker     DM: controlled on metformin 500ER 2 bid    obesity: gaining since wife had neck surgery    HLD: controlled on simva 40mg    CAD MI 91': rohotgi ECA: ST nl 15' met 25 bid ASA 81mg    HTN: controlled    GERD: controlled on protonix 40mg does have phlem in throat seeing raisa    vit D def: gave 50K U taking 2000u daily    Vit B 12 def: shot here then otc    unsteadiness: right LE mostly but no instability seen    anemia: ferritin low hb 11 7 to 12 33    LBP with sciatica - will need aspen again and PT again     Echocardiogram showed normal LV ejection fraction  PA pressure was 45    myocardial infarction in 1991  At that time he had PTCA and rotational atherectomy of LAD  Cardiac catheterization done in 2001 had shown 50% LAD narrowing  Fractured T12 - healing now seeing [facility]  June 11th appt for folow up   [facility] with walker bc he feels       Chest discomfort - took omeprazole - and vital fine taken by daugther that day   She feels its was anxiety  Lasted 10 mins - did drink water   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  100% waits on her            The following portions of the patient's history were reviewed and updated as appropriate: allergies, current medications, past family history, past medical history, past social history, past surgical history and problem list     Review of Systems   Constitutional: Negative for activity change, appetite change and fever  HENT: Negative for congestion, nosebleeds and trouble swallowing  Eyes: Negative for itching  Respiratory: Negative for cough and chest tightness  Cardiovascular: Positive for chest pain  Negative for palpitations  Gastrointestinal: Negative for abdominal pain, constipation, diarrhea and nausea  Endocrine: Negative for cold intolerance  Genitourinary: Negative for frequency  Musculoskeletal: Positive for arthralgias, back pain and gait problem  Negative for joint swelling  Skin: Negative for rash  Allergic/Immunologic: Negative for immunocompromised state  Neurological: Negative for dizziness, tremors, seizures, syncope and headaches  Psychiatric/Behavioral: Negative for hallucinations and suicidal ideas  The patient is nervous/anxious            Objective:      /72   Pulse 74   Resp 16   Ht 6' (1 829 m)   Wt 94 2 kg (207 lb 9 6 oz)   SpO2 98%   BMI 28 16 kg/m²     No visits with results within 2 Week(s) from this visit  Latest known visit with results is:   Appointment on 03/23/2021   Component Date Value    Sodium 03/23/2021 138     Potassium 03/23/2021 4 7     Chloride 03/23/2021 105     CO2 03/23/2021 26     ANION GAP 03/23/2021 7     BUN 03/23/2021 23     Creatinine 03/23/2021 1 43*    Glucose 03/23/2021 137     Calcium 03/23/2021 9 6     eGFR 03/23/2021 45           Physical Exam  Vitals and nursing note reviewed  Constitutional:       General: He is not in acute distress  Appearance: He is well-developed  HENT:      Head: Normocephalic and atraumatic  Cardiovascular:      Rate and Rhythm: Normal rate and regular rhythm  Heart sounds: Normal heart sounds  No murmur heard  Pulmonary:      Effort: Pulmonary effort is normal       Breath sounds: Normal breath sounds  No wheezing or rales  Abdominal:      General: Bowel sounds are normal  There is no distension  Palpations: Abdomen is soft  Tenderness: There is no abdominal tenderness  There is no guarding or rebound  Musculoskeletal:         General: No tenderness  Normal range of motion  Cervical back: Normal range of motion and neck supple  Lymphadenopathy:      Cervical: No cervical adenopathy  Skin:     General: Skin is warm and dry  Capillary Refill: Capillary refill takes less than 2 seconds  Findings: No rash  Neurological:      Mental Status: He is alert and oriented to person, place, and time  Cranial Nerves: No cranial nerve deficit  Sensory: No sensory deficit  Motor: No abnormal muscle tone  Psychiatric:         Behavior: Behavior normal          Thought Content: Thought content normal          Judgment: Judgment normal            BMI Counseling: Body mass index is 28 16 kg/m²  The BMI is above normal  Nutrition recommendations include decreasing portion sizes, encouraging healthy choices of fruits and vegetables and limiting drinks that contain sugar  Exercise recommendations include moderate physical activity 150 minutes/week  No pharmacotherapy was ordered  Falls Plan of Care: balance, strength, and gait training instructions were provided  Medications that increase falls were reviewed         Boy Maurer MD  [place] [id number]

## 2021-07-22 NOTE — ASSESSMENT & PLAN NOTE
Lab Results   Component Value Date    HGBA1C 6 9 (H) 02/12/2021   -stable/controlled, continue same medication   Will evaluate again next visit

## 2021-08-02 ENCOUNTER — HOSPITAL ENCOUNTER (OUTPATIENT)
Dept: MRI IMAGING | Facility: HOSPITAL | Age: 86
Discharge: HOME | End: 2021-08-02
Payer: MEDICARE

## 2021-08-02 DIAGNOSIS — M54.16 LUMBAR RADICULOPATHY: ICD-10-CM

## 2021-08-02 PROCEDURE — 72148 MRI LUMBAR SPINE W/O DYE: CPT

## 2021-08-03 ENCOUNTER — APPOINTMENT (OUTPATIENT)
Dept: LAB | Facility: CLINIC | Age: 86
End: 2021-08-03
Payer: MEDICARE

## 2021-08-03 DIAGNOSIS — E11.69 TYPE 2 DIABETES MELLITUS WITH OTHER SPECIFIED COMPLICATION, WITHOUT LONG-TERM CURRENT USE OF INSULIN (HCC): ICD-10-CM

## 2021-08-03 LAB
ANION GAP SERPL CALCULATED.3IONS-SCNC: 3 MMOL/L (ref 4–13)
BUN SERPL-MCNC: 14 MG/DL (ref 5–25)
CALCIUM SERPL-MCNC: 8.9 MG/DL (ref 8.3–10.1)
CHLORIDE SERPL-SCNC: 106 MMOL/L (ref 100–108)
CHOLEST SERPL-MCNC: 136 MG/DL (ref 50–200)
CO2 SERPL-SCNC: 26 MMOL/L (ref 21–32)
CREAT SERPL-MCNC: 1.18 MG/DL (ref 0.6–1.3)
EST. AVERAGE GLUCOSE BLD GHB EST-MCNC: 166 MG/DL
GFR SERPL CREATININE-BSD FRML MDRD: 57 ML/MIN/1.73SQ M
GLUCOSE P FAST SERPL-MCNC: 117 MG/DL (ref 65–99)
HBA1C MFR BLD: 7.4 %
HDLC SERPL-MCNC: 35 MG/DL
LDLC SERPL CALC-MCNC: 66 MG/DL (ref 0–100)
POTASSIUM SERPL-SCNC: 5 MMOL/L (ref 3.5–5.3)
SODIUM SERPL-SCNC: 135 MMOL/L (ref 136–145)
TRIGL SERPL-MCNC: 177 MG/DL

## 2021-08-03 PROCEDURE — 80061 LIPID PANEL: CPT

## 2021-08-03 PROCEDURE — 83036 HEMOGLOBIN GLYCOSYLATED A1C: CPT

## 2021-08-03 PROCEDURE — 36415 COLL VENOUS BLD VENIPUNCTURE: CPT

## 2021-08-03 PROCEDURE — 80048 BASIC METABOLIC PNL TOTAL CA: CPT

## 2021-08-05 ENCOUNTER — TELEPHONE (OUTPATIENT)
Dept: FAMILY MEDICINE CLINIC | Facility: CLINIC | Age: 86
End: 2021-08-05

## 2021-08-05 NOTE — TELEPHONE ENCOUNTER
----- Message from Stacey Larios MD sent at 8/4/2021 11:07 PM EDT -----  Well ill add back int the glipizide   I think we need better control   Rest is fine

## 2021-09-07 DIAGNOSIS — I10 ESSENTIAL HYPERTENSION: ICD-10-CM

## 2021-10-11 DIAGNOSIS — E78.5 HYPERLIPIDEMIA, UNSPECIFIED HYPERLIPIDEMIA TYPE: ICD-10-CM

## 2021-10-11 DIAGNOSIS — E11.69 TYPE 2 DIABETES MELLITUS WITH OTHER SPECIFIED COMPLICATION, WITHOUT LONG-TERM CURRENT USE OF INSULIN (HCC): ICD-10-CM

## 2021-10-11 RX ORDER — SIMVASTATIN 40 MG
TABLET ORAL
Qty: 90 TABLET | Refills: 1 | Status: SHIPPED | OUTPATIENT
Start: 2021-10-11 | End: 2022-04-04

## 2021-11-18 ENCOUNTER — TELEPHONE (OUTPATIENT)
Dept: CT IMAGING | Facility: HOSPITAL | Age: 86
End: 2021-11-18

## 2021-11-18 ENCOUNTER — HOSPITAL ENCOUNTER (INPATIENT)
Facility: HOSPITAL | Age: 86
LOS: 4 days | Discharge: HOME | DRG: 445 | End: 2021-11-22
Attending: EMERGENCY MEDICINE | Admitting: INTERNAL MEDICINE
Payer: MEDICARE

## 2021-11-18 ENCOUNTER — APPOINTMENT (EMERGENCY)
Dept: CT IMAGING | Facility: HOSPITAL | Age: 86
DRG: 445 | End: 2021-11-18
Payer: MEDICARE

## 2021-11-18 DIAGNOSIS — K81.9 CHOLECYSTITIS: Primary | ICD-10-CM

## 2021-11-18 DIAGNOSIS — R26.2 AMBULATORY DYSFUNCTION: ICD-10-CM

## 2021-11-18 DIAGNOSIS — I48.91 ATRIAL FIBRILLATION (HCC): ICD-10-CM

## 2021-11-18 DIAGNOSIS — R59.0 HILAR LYMPHADENOPATHY: ICD-10-CM

## 2021-11-18 DIAGNOSIS — R29.6 FREQUENT FALLS: ICD-10-CM

## 2021-11-18 DIAGNOSIS — R77.8 ELEVATED TROPONIN: ICD-10-CM

## 2021-11-18 PROBLEM — A41.9 SEPSIS (HCC): Status: ACTIVE | Noted: 2021-11-18

## 2021-11-18 PROBLEM — K81.0 ACUTE CHOLECYSTITIS: Status: ACTIVE | Noted: 2021-11-18
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
## 2021-11-18 PROBLEM — R79.89 TROPONIN LEVEL ELEVATED: Status: ACTIVE | Noted: 2021-11-18

## 2021-11-18 PROBLEM — N18.9 CKD (CHRONIC KIDNEY DISEASE): Status: ACTIVE | Noted: 2021-11-18

## 2021-11-18 LAB
2HR DELTA HS TROPONIN: -16 NG/L
4HR DELTA HS TROPONIN: -14 NG/L
ALBUMIN SERPL BCP-MCNC: 2.5 G/DL (ref 3.5–5)
ALP SERPL-CCNC: 69 U/L (ref 46–116)
ALT SERPL W P-5'-P-CCNC: 23 U/L (ref 12–78)
ANION GAP SERPL CALCULATED.3IONS-SCNC: 14 MMOL/L (ref 4–13)
APTT PPP: 36 SECONDS (ref 23–37)
AST SERPL W P-5'-P-CCNC: 25 U/L (ref 5–45)
BASOPHILS # BLD AUTO: 0.03 THOUSANDS/ΜL (ref 0–0.1)
BASOPHILS NFR BLD AUTO: 0 % (ref 0–1)
BILIRUB SERPL-MCNC: 0.72 MG/DL (ref 0.2–1)
BILIRUB UR QL STRIP: NEGATIVE
BUN SERPL-MCNC: 34 MG/DL (ref 5–25)
CALCIUM ALBUM COR SERPL-MCNC: 9.2 MG/DL (ref 8.3–10.1)
CALCIUM SERPL-MCNC: 8 MG/DL (ref 8.3–10.1)
CARDIAC TROPONIN I PNL SERPL HS: 70 NG/L
CARDIAC TROPONIN I PNL SERPL HS: 72 NG/L
CARDIAC TROPONIN I PNL SERPL HS: 86 NG/L
CHLORIDE SERPL-SCNC: 101 MMOL/L (ref 100–108)
CLARITY UR: CLEAR
CO2 SERPL-SCNC: 21 MMOL/L (ref 21–32)
COLOR UR: YELLOW
CREAT SERPL-MCNC: 1.63 MG/DL (ref 0.6–1.3)
EOSINOPHIL # BLD AUTO: 0.09 THOUSAND/ΜL (ref 0–0.61)
EOSINOPHIL NFR BLD AUTO: 1 % (ref 0–6)
ERYTHROCYTE [DISTWIDTH] IN BLOOD BY AUTOMATED COUNT: 15.9 % (ref 11.6–15.1)
FLUAV RNA RESP QL NAA+PROBE: NEGATIVE
FLUBV RNA RESP QL NAA+PROBE: NEGATIVE
GFR SERPL CREATININE-BSD FRML MDRD: 38 ML/MIN/1.73SQ M
GLUCOSE SERPL-MCNC: 99 MG/DL (ref 65–140)
GLUCOSE UR STRIP-MCNC: NEGATIVE MG/DL
HCT VFR BLD AUTO: 37.1 % (ref 36.5–49.3)
HGB BLD-MCNC: 11.8 G/DL (ref 12–17)
HGB UR QL STRIP.AUTO: NEGATIVE
IMM GRANULOCYTES # BLD AUTO: 0.09 THOUSAND/UL (ref 0–0.2)
IMM GRANULOCYTES NFR BLD AUTO: 1 % (ref 0–2)
INR PPP: 1.13 (ref 0.84–1.19)
KETONES UR STRIP-MCNC: NEGATIVE MG/DL
LACTATE SERPL-SCNC: 2 MMOL/L (ref 0.5–2)
LACTATE SERPL-SCNC: 2.7 MMOL/L (ref 0.5–2)
LEUKOCYTE ESTERASE UR QL STRIP: NEGATIVE
LYMPHOCYTES # BLD AUTO: 1.19 THOUSANDS/ΜL (ref 0.6–4.47)
LYMPHOCYTES NFR BLD AUTO: 11 % (ref 14–44)
MCH RBC QN AUTO: 27.6 PG (ref 26.8–34.3)
MCHC RBC AUTO-ENTMCNC: 31.8 G/DL (ref 31.4–37.4)
MCV RBC AUTO: 87 FL (ref 82–98)
MONOCYTES # BLD AUTO: 0.67 THOUSAND/ΜL (ref 0.17–1.22)
MONOCYTES NFR BLD AUTO: 6 % (ref 4–12)
NEUTROPHILS # BLD AUTO: 9.22 THOUSANDS/ΜL (ref 1.85–7.62)
NEUTS SEG NFR BLD AUTO: 81 % (ref 43–75)
NITRITE UR QL STRIP: NEGATIVE
NRBC BLD AUTO-RTO: 0 /100 WBCS
NT-PROBNP SERPL-MCNC: 3177 PG/ML
PH UR STRIP.AUTO: 5.5 [PH] (ref 4.5–8)
PLATELET # BLD AUTO: 207 THOUSANDS/UL (ref 149–390)
PMV BLD AUTO: 13.2 FL (ref 8.9–12.7)
POTASSIUM SERPL-SCNC: 3.5 MMOL/L (ref 3.5–5.3)
PROT SERPL-MCNC: 6.2 G/DL (ref 6.4–8.2)
PROT UR STRIP-MCNC: ABNORMAL MG/DL
PROTHROMBIN TIME: 14.5 SECONDS (ref 11.6–14.5)
RBC # BLD AUTO: 4.28 MILLION/UL (ref 3.88–5.62)
RSV RNA RESP QL NAA+PROBE: NEGATIVE
SARS-COV-2 RNA RESP QL NAA+PROBE: NEGATIVE
SODIUM SERPL-SCNC: 136 MMOL/L (ref 136–145)
SP GR UR STRIP.AUTO: 1.01 (ref 1–1.03)
TSH SERPL DL<=0.05 MIU/L-ACNC: 2.05 UIU/ML (ref 0.36–3.74)
UROBILINOGEN UR QL STRIP.AUTO: 4 E.U./DL
WBC # BLD AUTO: 11.29 THOUSAND/UL (ref 4.31–10.16)

## 2021-11-18 PROCEDURE — 84484 ASSAY OF TROPONIN QUANT: CPT | Performed by: PHYSICIAN ASSISTANT

## 2021-11-18 PROCEDURE — 70450 CT HEAD/BRAIN W/O DYE: CPT

## 2021-11-18 PROCEDURE — 85610 PROTHROMBIN TIME: CPT | Performed by: PHYSICIAN ASSISTANT

## 2021-11-18 PROCEDURE — 87040 BLOOD CULTURE FOR BACTERIA: CPT | Performed by: PHYSICIAN ASSISTANT

## 2021-11-18 PROCEDURE — 80053 COMPREHEN METABOLIC PANEL: CPT | Performed by: PHYSICIAN ASSISTANT

## 2021-11-18 PROCEDURE — 72125 CT NECK SPINE W/O DYE: CPT

## 2021-11-18 PROCEDURE — 96374 THER/PROPH/DIAG INJ IV PUSH: CPT

## 2021-11-18 PROCEDURE — 0241U HB NFCT DS VIR RESP RNA 4 TRGT: CPT | Performed by: PHYSICIAN ASSISTANT

## 2021-11-18 PROCEDURE — 96361 HYDRATE IV INFUSION ADD-ON: CPT

## 2021-11-18 PROCEDURE — 74177 CT ABD & PELVIS W/CONTRAST: CPT

## 2021-11-18 PROCEDURE — 71275 CT ANGIOGRAPHY CHEST: CPT

## 2021-11-18 PROCEDURE — 83880 ASSAY OF NATRIURETIC PEPTIDE: CPT | Performed by: PHYSICIAN ASSISTANT

## 2021-11-18 PROCEDURE — 84443 ASSAY THYROID STIM HORMONE: CPT | Performed by: PHYSICIAN ASSISTANT

## 2021-11-18 PROCEDURE — 83605 ASSAY OF LACTIC ACID: CPT | Performed by: PHYSICIAN ASSISTANT

## 2021-11-18 PROCEDURE — G1004 CDSM NDSC: HCPCS

## 2021-11-18 PROCEDURE — 99222 1ST HOSP IP/OBS MODERATE 55: CPT | Performed by: STUDENT IN AN ORGANIZED HEALTH CARE EDUCATION/TRAINING PROGRAM

## 2021-11-18 PROCEDURE — 85025 COMPLETE CBC W/AUTO DIFF WBC: CPT | Performed by: PHYSICIAN ASSISTANT

## 2021-11-18 PROCEDURE — 85730 THROMBOPLASTIN TIME PARTIAL: CPT | Performed by: PHYSICIAN ASSISTANT

## 2021-11-18 PROCEDURE — 99285 EMERGENCY DEPT VISIT HI MDM: CPT | Performed by: PHYSICIAN ASSISTANT

## 2021-11-18 PROCEDURE — 93005 ELECTROCARDIOGRAM TRACING: CPT

## 2021-11-18 PROCEDURE — 99223 1ST HOSP IP/OBS HIGH 75: CPT | Performed by: INTERNAL MEDICINE

## 2021-11-18 PROCEDURE — 99285 EMERGENCY DEPT VISIT HI MDM: CPT

## 2021-11-18 PROCEDURE — 36415 COLL VENOUS BLD VENIPUNCTURE: CPT | Performed by: PHYSICIAN ASSISTANT

## 2021-11-18 RX ORDER — HEPARIN SODIUM 5000 [USP'U]/ML
5000 INJECTION, SOLUTION INTRAVENOUS; SUBCUTANEOUS EVERY 8 HOURS SCHEDULED
Status: DISCONTINUED | OUTPATIENT
Start: 2021-11-18 | End: 2021-11-22 | Stop reason: HOSPADM

## 2021-11-18 RX ORDER — PRAVASTATIN SODIUM 80 MG/1
80 TABLET ORAL
Status: DISCONTINUED | OUTPATIENT
Start: 2021-11-19 | End: 2021-11-22 | Stop reason: HOSPADM

## 2021-11-18 RX ORDER — ESCITALOPRAM OXALATE 10 MG/1
5 TABLET ORAL DAILY
Status: DISCONTINUED | OUTPATIENT
Start: 2021-11-19 | End: 2021-11-22 | Stop reason: HOSPADM

## 2021-11-18 RX ORDER — SODIUM CHLORIDE 9 MG/ML
100 INJECTION, SOLUTION INTRAVENOUS CONTINUOUS
Status: DISPENSED | OUTPATIENT
Start: 2021-11-18 | End: 2021-11-19

## 2021-11-18 RX ORDER — METRONIDAZOLE 500 MG/1
500 TABLET ORAL EVERY 8 HOURS SCHEDULED
Status: DISCONTINUED | OUTPATIENT
Start: 2021-11-18 | End: 2021-11-22 | Stop reason: HOSPADM

## 2021-11-18 RX ORDER — ACETAMINOPHEN 325 MG/1
650 TABLET ORAL EVERY 6 HOURS PRN
Status: DISCONTINUED | OUTPATIENT
Start: 2021-11-18 | End: 2021-11-19

## 2021-11-18 RX ORDER — ASPIRIN 81 MG/1
81 TABLET, CHEWABLE ORAL DAILY
Status: DISCONTINUED | OUTPATIENT
Start: 2021-11-19 | End: 2021-11-19

## 2021-11-18 RX ORDER — SIMETHICONE 80 MG
80 TABLET,CHEWABLE ORAL 4 TIMES DAILY PRN
Status: DISCONTINUED | OUTPATIENT
Start: 2021-11-18 | End: 2021-11-22 | Stop reason: HOSPADM

## 2021-11-18 RX ORDER — PANTOPRAZOLE SODIUM 40 MG/1
40 TABLET, DELAYED RELEASE ORAL
Status: DISCONTINUED | OUTPATIENT
Start: 2021-11-19 | End: 2021-11-22 | Stop reason: HOSPADM

## 2021-11-18 RX ORDER — ONDANSETRON 2 MG/ML
4 INJECTION INTRAMUSCULAR; INTRAVENOUS EVERY 6 HOURS PRN
Status: DISCONTINUED | OUTPATIENT
Start: 2021-11-18 | End: 2021-11-22 | Stop reason: HOSPADM

## 2021-11-18 RX ORDER — ASPIRIN 81 MG/1
324 TABLET, CHEWABLE ORAL ONCE
Status: COMPLETED | OUTPATIENT
Start: 2021-11-18 | End: 2021-11-18

## 2021-11-18 RX ORDER — METOPROLOL TARTRATE 5 MG/5ML
5 INJECTION INTRAVENOUS EVERY 6 HOURS PRN
Status: DISCONTINUED | OUTPATIENT
Start: 2021-11-18 | End: 2021-11-19

## 2021-11-18 RX ORDER — DOCUSATE SODIUM 100 MG/1
100 CAPSULE, LIQUID FILLED ORAL 2 TIMES DAILY
Status: DISCONTINUED | OUTPATIENT
Start: 2021-11-18 | End: 2021-11-22 | Stop reason: HOSPADM

## 2021-11-18 RX ADMIN — HEPARIN SODIUM 5000 UNITS: 5000 INJECTION INTRAVENOUS; SUBCUTANEOUS at 23:06

## 2021-11-18 RX ADMIN — CEFEPIME HYDROCHLORIDE 2000 MG: 2 INJECTION, POWDER, FOR SOLUTION INTRAVENOUS at 23:06

## 2021-11-18 RX ADMIN — SODIUM CHLORIDE 500 ML: 0.9 INJECTION, SOLUTION INTRAVENOUS at 15:44

## 2021-11-18 RX ADMIN — DOCUSATE SODIUM 100 MG: 100 CAPSULE ORAL at 23:05

## 2021-11-18 RX ADMIN — METOPROLOL TARTRATE 25 MG: 25 TABLET, FILM COATED ORAL at 23:05

## 2021-11-18 RX ADMIN — SODIUM CHLORIDE 500 ML: 0.9 INJECTION, SOLUTION INTRAVENOUS at 20:37

## 2021-11-18 RX ADMIN — IOHEXOL 100 ML: 350 INJECTION, SOLUTION INTRAVENOUS at 18:50

## 2021-11-18 RX ADMIN — METRONIDAZOLE 500 MG: 500 TABLET ORAL at 23:05

## 2021-11-18 RX ADMIN — ASPIRIN 324 MG: 81 TABLET, CHEWABLE ORAL at 20:37

## 2021-11-18 RX ADMIN — SODIUM CHLORIDE 100 ML/HR: 0.9 INJECTION, SOLUTION INTRAVENOUS at 23:11

## 2021-11-18 RX ADMIN — CEFTRIAXONE SODIUM 1000 MG: 10 INJECTION, POWDER, FOR SOLUTION INTRAVENOUS at 21:14

## 2021-11-19 PROBLEM — E87.2 LACTIC ACIDOSIS: Status: ACTIVE | Noted: 2021-11-18

## 2021-11-19 PROBLEM — E87.20 LACTIC ACIDOSIS: Status: ACTIVE | Noted: 2021-11-18

## 2021-11-19 LAB
ALBUMIN SERPL BCP-MCNC: 1.9 G/DL (ref 3.5–5)
ALP SERPL-CCNC: 60 U/L (ref 46–116)
ALT SERPL W P-5'-P-CCNC: 17 U/L (ref 12–78)
ANION GAP SERPL CALCULATED.3IONS-SCNC: 11 MMOL/L (ref 4–13)
AST SERPL W P-5'-P-CCNC: 26 U/L (ref 5–45)
ATRIAL RATE: 241 BPM
ATRIAL RATE: 267 BPM
BACTERIA UR QL AUTO: NORMAL /HPF
BASOPHILS # BLD AUTO: 0.02 THOUSANDS/ΜL (ref 0–0.1)
BASOPHILS NFR BLD AUTO: 0 % (ref 0–1)
BILIRUB SERPL-MCNC: 0.69 MG/DL (ref 0.2–1)
BUN SERPL-MCNC: 26 MG/DL (ref 5–25)
CALCIUM ALBUM COR SERPL-MCNC: 8.7 MG/DL (ref 8.3–10.1)
CALCIUM SERPL-MCNC: 7 MG/DL (ref 8.3–10.1)
CHLORIDE SERPL-SCNC: 107 MMOL/L (ref 100–108)
CO2 SERPL-SCNC: 19 MMOL/L (ref 21–32)
CREAT SERPL-MCNC: 1.17 MG/DL (ref 0.6–1.3)
EOSINOPHIL # BLD AUTO: 0.05 THOUSAND/ΜL (ref 0–0.61)
EOSINOPHIL NFR BLD AUTO: 1 % (ref 0–6)
ERYTHROCYTE [DISTWIDTH] IN BLOOD BY AUTOMATED COUNT: 15.4 % (ref 11.6–15.1)
GFR SERPL CREATININE-BSD FRML MDRD: 57 ML/MIN/1.73SQ M
GLUCOSE SERPL-MCNC: 136 MG/DL (ref 65–140)
GLUCOSE SERPL-MCNC: 60 MG/DL (ref 65–140)
HCT VFR BLD AUTO: 32.4 % (ref 36.5–49.3)
HGB BLD-MCNC: 10.4 G/DL (ref 12–17)
IMM GRANULOCYTES # BLD AUTO: 0.07 THOUSAND/UL (ref 0–0.2)
IMM GRANULOCYTES NFR BLD AUTO: 1 % (ref 0–2)
INR PPP: 1.17 (ref 0.84–1.19)
LYMPHOCYTES # BLD AUTO: 0.76 THOUSANDS/ΜL (ref 0.6–4.47)
LYMPHOCYTES NFR BLD AUTO: 10 % (ref 14–44)
MAGNESIUM SERPL-MCNC: 1.1 MG/DL (ref 1.6–2.6)
MCH RBC QN AUTO: 27.8 PG (ref 26.8–34.3)
MCHC RBC AUTO-ENTMCNC: 32.1 G/DL (ref 31.4–37.4)
MCV RBC AUTO: 87 FL (ref 82–98)
MONOCYTES # BLD AUTO: 0.64 THOUSAND/ΜL (ref 0.17–1.22)
MONOCYTES NFR BLD AUTO: 8 % (ref 4–12)
NEUTROPHILS # BLD AUTO: 6.5 THOUSANDS/ΜL (ref 1.85–7.62)
NEUTS SEG NFR BLD AUTO: 80 % (ref 43–75)
NON-SQ EPI CELLS URNS QL MICRO: NORMAL /HPF
NRBC BLD AUTO-RTO: 0 /100 WBCS
P AXIS: 256 DEGREES
P AXIS: 257 DEGREES
PHOSPHATE SERPL-MCNC: 2.1 MG/DL (ref 2.3–4.1)
PLATELET # BLD AUTO: 233 THOUSANDS/UL (ref 149–390)
PMV BLD AUTO: 10.6 FL (ref 8.9–12.7)
POTASSIUM SERPL-SCNC: 3.3 MMOL/L (ref 3.5–5.3)
PROT SERPL-MCNC: 5.1 G/DL (ref 6.4–8.2)
PROTHROMBIN TIME: 14.9 SECONDS (ref 11.6–14.5)
QRS AXIS: -60 DEGREES
QRS AXIS: -61 DEGREES
QRSD INTERVAL: 104 MS
QRSD INTERVAL: 110 MS
QT INTERVAL: 306 MS
QT INTERVAL: 310 MS
QTC INTERVAL: 409 MS
QTC INTERVAL: 416 MS
RBC # BLD AUTO: 3.74 MILLION/UL (ref 3.88–5.62)
RBC #/AREA URNS AUTO: NORMAL /HPF
SODIUM SERPL-SCNC: 137 MMOL/L (ref 136–145)
T WAVE AXIS: 76 DEGREES
T WAVE AXIS: 78 DEGREES
VENTRICULAR RATE: 105 BPM
VENTRICULAR RATE: 111 BPM
WBC # BLD AUTO: 8.04 THOUSAND/UL (ref 4.31–10.16)
WBC #/AREA URNS AUTO: NORMAL /HPF

## 2021-11-19 PROCEDURE — 82948 REAGENT STRIP/BLOOD GLUCOSE: CPT

## 2021-11-19 PROCEDURE — 84100 ASSAY OF PHOSPHORUS: CPT | Performed by: INTERNAL MEDICINE

## 2021-11-19 PROCEDURE — 83735 ASSAY OF MAGNESIUM: CPT | Performed by: INTERNAL MEDICINE

## 2021-11-19 PROCEDURE — 97163 PT EVAL HIGH COMPLEX 45 MIN: CPT

## 2021-11-19 PROCEDURE — 99232 SBSQ HOSP IP/OBS MODERATE 35: CPT | Performed by: SURGERY

## 2021-11-19 PROCEDURE — 85025 COMPLETE CBC W/AUTO DIFF WBC: CPT | Performed by: INTERNAL MEDICINE

## 2021-11-19 PROCEDURE — 93010 ELECTROCARDIOGRAM REPORT: CPT | Performed by: INTERNAL MEDICINE

## 2021-11-19 PROCEDURE — 80053 COMPREHEN METABOLIC PANEL: CPT | Performed by: INTERNAL MEDICINE

## 2021-11-19 PROCEDURE — 81001 URINALYSIS AUTO W/SCOPE: CPT

## 2021-11-19 PROCEDURE — 85610 PROTHROMBIN TIME: CPT | Performed by: STUDENT IN AN ORGANIZED HEALTH CARE EDUCATION/TRAINING PROGRAM

## 2021-11-19 PROCEDURE — 99222 1ST HOSP IP/OBS MODERATE 55: CPT | Performed by: RADIOLOGY

## 2021-11-19 PROCEDURE — 36415 COLL VENOUS BLD VENIPUNCTURE: CPT | Performed by: STUDENT IN AN ORGANIZED HEALTH CARE EDUCATION/TRAINING PROGRAM

## 2021-11-19 PROCEDURE — 99222 1ST HOSP IP/OBS MODERATE 55: CPT | Performed by: INTERNAL MEDICINE

## 2021-11-19 RX ORDER — MAGNESIUM SULFATE HEPTAHYDRATE 40 MG/ML
2 INJECTION, SOLUTION INTRAVENOUS ONCE
Status: COMPLETED | OUTPATIENT
Start: 2021-11-19 | End: 2021-11-19

## 2021-11-19 RX ORDER — ACETAMINOPHEN 325 MG/1
650 TABLET ORAL EVERY 6 HOURS PRN
Status: DISCONTINUED | OUTPATIENT
Start: 2021-11-19 | End: 2021-11-19

## 2021-11-19 RX ORDER — ACETAMINOPHEN 325 MG/1
975 TABLET ORAL EVERY 8 HOURS PRN
Status: DISCONTINUED | OUTPATIENT
Start: 2021-11-19 | End: 2021-11-22 | Stop reason: HOSPADM

## 2021-11-19 RX ORDER — MAGNESIUM SULFATE HEPTAHYDRATE 40 MG/ML
4 INJECTION, SOLUTION INTRAVENOUS ONCE
Status: COMPLETED | OUTPATIENT
Start: 2021-11-19 | End: 2021-11-19

## 2021-11-19 RX ADMIN — METRONIDAZOLE 500 MG: 500 TABLET ORAL at 20:34

## 2021-11-19 RX ADMIN — PANTOPRAZOLE SODIUM 40 MG: 40 TABLET, DELAYED RELEASE ORAL at 06:13

## 2021-11-19 RX ADMIN — ESCITALOPRAM 5 MG: 5 TABLET, FILM COATED ORAL at 08:25

## 2021-11-19 RX ADMIN — CEFEPIME HYDROCHLORIDE 2000 MG: 2 INJECTION, POWDER, FOR SOLUTION INTRAVENOUS at 10:58

## 2021-11-19 RX ADMIN — ASPIRIN 81 MG: 81 TABLET, CHEWABLE ORAL at 08:25

## 2021-11-19 RX ADMIN — HEPARIN SODIUM 5000 UNITS: 5000 INJECTION INTRAVENOUS; SUBCUTANEOUS at 20:34

## 2021-11-19 RX ADMIN — MAGNESIUM SULFATE HEPTAHYDRATE 4 G: 40 INJECTION, SOLUTION INTRAVENOUS at 10:58

## 2021-11-19 RX ADMIN — METRONIDAZOLE 500 MG: 500 TABLET ORAL at 06:13

## 2021-11-19 RX ADMIN — HEPARIN SODIUM 5000 UNITS: 5000 INJECTION INTRAVENOUS; SUBCUTANEOUS at 06:15

## 2021-11-19 RX ADMIN — ACETAMINOPHEN 975 MG: 325 TABLET ORAL at 20:33

## 2021-11-19 RX ADMIN — Medication 2 TABLET: at 10:58

## 2021-11-19 RX ADMIN — PRAVASTATIN SODIUM 80 MG: 80 TABLET ORAL at 20:35

## 2021-11-19 RX ADMIN — MAGNESIUM SULFATE HEPTAHYDRATE 2 G: 40 INJECTION, SOLUTION INTRAVENOUS at 07:56

## 2021-11-19 RX ADMIN — Medication 2 TABLET: at 20:34

## 2021-11-19 RX ADMIN — CYANOCOBALAMIN TAB 500 MCG 1000 MCG: 500 TAB at 08:25

## 2021-11-19 RX ADMIN — METOPROLOL TARTRATE 25 MG: 25 TABLET, FILM COATED ORAL at 20:35

## 2021-11-19 RX ADMIN — METRONIDAZOLE 500 MG: 500 TABLET ORAL at 15:09

## 2021-11-20 PROBLEM — E87.20 LACTIC ACIDOSIS: Status: RESOLVED | Noted: 2021-11-18 | Resolved: 2021-11-20

## 2021-11-20 PROBLEM — E87.2 LACTIC ACIDOSIS: Status: RESOLVED | Noted: 2021-11-18 | Resolved: 2021-11-20

## 2021-11-20 LAB
ALBUMIN SERPL BCP-MCNC: 2.2 G/DL (ref 3.5–5)
ALP SERPL-CCNC: 96 U/L (ref 46–116)
ALT SERPL W P-5'-P-CCNC: 27 U/L (ref 12–78)
ANION GAP SERPL CALCULATED.3IONS-SCNC: 11 MMOL/L (ref 4–13)
AST SERPL W P-5'-P-CCNC: 33 U/L (ref 5–45)
BASOPHILS # BLD AUTO: 0.04 THOUSANDS/ΜL (ref 0–0.1)
BASOPHILS NFR BLD AUTO: 0 % (ref 0–1)
BILIRUB SERPL-MCNC: 1.17 MG/DL (ref 0.2–1)
BUN SERPL-MCNC: 19 MG/DL (ref 5–25)
CALCIUM ALBUM COR SERPL-MCNC: 8.9 MG/DL (ref 8.3–10.1)
CALCIUM SERPL-MCNC: 7.5 MG/DL (ref 8.3–10.1)
CHLORIDE SERPL-SCNC: 103 MMOL/L (ref 100–108)
CO2 SERPL-SCNC: 23 MMOL/L (ref 21–32)
CREAT SERPL-MCNC: 1.3 MG/DL (ref 0.6–1.3)
EOSINOPHIL # BLD AUTO: 0.08 THOUSAND/ΜL (ref 0–0.61)
EOSINOPHIL NFR BLD AUTO: 1 % (ref 0–6)
ERYTHROCYTE [DISTWIDTH] IN BLOOD BY AUTOMATED COUNT: 15.1 % (ref 11.6–15.1)
GFR SERPL CREATININE-BSD FRML MDRD: 50 ML/MIN/1.73SQ M
GLUCOSE SERPL-MCNC: 101 MG/DL (ref 65–140)
HCT VFR BLD AUTO: 32.2 % (ref 36.5–49.3)
HGB BLD-MCNC: 10.6 G/DL (ref 12–17)
IMM GRANULOCYTES # BLD AUTO: 0.11 THOUSAND/UL (ref 0–0.2)
IMM GRANULOCYTES NFR BLD AUTO: 1 % (ref 0–2)
LYMPHOCYTES # BLD AUTO: 1.04 THOUSANDS/ΜL (ref 0.6–4.47)
LYMPHOCYTES NFR BLD AUTO: 9 % (ref 14–44)
MCH RBC QN AUTO: 28 PG (ref 26.8–34.3)
MCHC RBC AUTO-ENTMCNC: 32.9 G/DL (ref 31.4–37.4)
MCV RBC AUTO: 85 FL (ref 82–98)
MONOCYTES # BLD AUTO: 0.62 THOUSAND/ΜL (ref 0.17–1.22)
MONOCYTES NFR BLD AUTO: 6 % (ref 4–12)
NEUTROPHILS # BLD AUTO: 9.3 THOUSANDS/ΜL (ref 1.85–7.62)
NEUTS SEG NFR BLD AUTO: 83 % (ref 43–75)
NRBC BLD AUTO-RTO: 0 /100 WBCS
PLATELET # BLD AUTO: 180 THOUSANDS/UL (ref 149–390)
PMV BLD AUTO: 9.8 FL (ref 8.9–12.7)
POTASSIUM SERPL-SCNC: 3.4 MMOL/L (ref 3.5–5.3)
PROT SERPL-MCNC: 5.9 G/DL (ref 6.4–8.2)
RBC # BLD AUTO: 3.79 MILLION/UL (ref 3.88–5.62)
SODIUM SERPL-SCNC: 137 MMOL/L (ref 136–145)
WBC # BLD AUTO: 11.19 THOUSAND/UL (ref 4.31–10.16)

## 2021-11-20 PROCEDURE — 80053 COMPREHEN METABOLIC PANEL: CPT | Performed by: INTERNAL MEDICINE

## 2021-11-20 PROCEDURE — 99232 SBSQ HOSP IP/OBS MODERATE 35: CPT | Performed by: STUDENT IN AN ORGANIZED HEALTH CARE EDUCATION/TRAINING PROGRAM

## 2021-11-20 PROCEDURE — 99232 SBSQ HOSP IP/OBS MODERATE 35: CPT | Performed by: INTERNAL MEDICINE

## 2021-11-20 PROCEDURE — 85025 COMPLETE CBC W/AUTO DIFF WBC: CPT | Performed by: INTERNAL MEDICINE

## 2021-11-20 RX ORDER — POTASSIUM CHLORIDE 20 MEQ/1
40 TABLET, EXTENDED RELEASE ORAL ONCE
Status: COMPLETED | OUTPATIENT
Start: 2021-11-20 | End: 2021-11-20

## 2021-11-20 RX ORDER — SODIUM CHLORIDE 9 MG/ML
75 INJECTION, SOLUTION INTRAVENOUS CONTINUOUS
Status: DISCONTINUED | OUTPATIENT
Start: 2021-11-20 | End: 2021-11-22 | Stop reason: HOSPADM

## 2021-11-20 RX ADMIN — HEPARIN SODIUM 5000 UNITS: 5000 INJECTION INTRAVENOUS; SUBCUTANEOUS at 14:56

## 2021-11-20 RX ADMIN — CYANOCOBALAMIN TAB 500 MCG 1000 MCG: 500 TAB at 09:50

## 2021-11-20 RX ADMIN — SODIUM CHLORIDE 250 ML: 0.9 INJECTION, SOLUTION INTRAVENOUS at 16:00

## 2021-11-20 RX ADMIN — CEFEPIME HYDROCHLORIDE 2000 MG: 2 INJECTION, POWDER, FOR SOLUTION INTRAVENOUS at 23:52

## 2021-11-20 RX ADMIN — HEPARIN SODIUM 5000 UNITS: 5000 INJECTION INTRAVENOUS; SUBCUTANEOUS at 05:17

## 2021-11-20 RX ADMIN — METRONIDAZOLE 500 MG: 500 TABLET ORAL at 14:56

## 2021-11-20 RX ADMIN — SODIUM CHLORIDE 75 ML/HR: 0.9 INJECTION, SOLUTION INTRAVENOUS at 18:32

## 2021-11-20 RX ADMIN — METRONIDAZOLE 500 MG: 500 TABLET ORAL at 21:42

## 2021-11-20 RX ADMIN — POTASSIUM CHLORIDE 40 MEQ: 1500 TABLET, EXTENDED RELEASE ORAL at 09:53

## 2021-11-20 RX ADMIN — PANTOPRAZOLE SODIUM 40 MG: 40 TABLET, DELAYED RELEASE ORAL at 05:17

## 2021-11-20 RX ADMIN — CEFEPIME HYDROCHLORIDE 2000 MG: 2 INJECTION, POWDER, FOR SOLUTION INTRAVENOUS at 11:01

## 2021-11-20 RX ADMIN — METRONIDAZOLE 500 MG: 500 TABLET ORAL at 05:17

## 2021-11-20 RX ADMIN — PRAVASTATIN SODIUM 80 MG: 80 TABLET ORAL at 16:00

## 2021-11-20 RX ADMIN — HEPARIN SODIUM 5000 UNITS: 5000 INJECTION INTRAVENOUS; SUBCUTANEOUS at 21:42

## 2021-11-20 RX ADMIN — CEFEPIME HYDROCHLORIDE 2000 MG: 2 INJECTION, POWDER, FOR SOLUTION INTRAVENOUS at 00:23

## 2021-11-20 RX ADMIN — DOCUSATE SODIUM 100 MG: 100 CAPSULE ORAL at 09:53

## 2021-11-20 RX ADMIN — ESCITALOPRAM 5 MG: 5 TABLET, FILM COATED ORAL at 09:50

## 2021-11-21 LAB
ALBUMIN SERPL BCP-MCNC: 2.1 G/DL (ref 3.5–5)
ALP SERPL-CCNC: 88 U/L (ref 46–116)
ALT SERPL W P-5'-P-CCNC: 25 U/L (ref 12–78)
ANION GAP SERPL CALCULATED.3IONS-SCNC: 8 MMOL/L (ref 4–13)
AST SERPL W P-5'-P-CCNC: 25 U/L (ref 5–45)
BILIRUB SERPL-MCNC: 0.64 MG/DL (ref 0.2–1)
BUN SERPL-MCNC: 15 MG/DL (ref 5–25)
CALCIUM ALBUM COR SERPL-MCNC: 8.8 MG/DL (ref 8.3–10.1)
CALCIUM SERPL-MCNC: 7.3 MG/DL (ref 8.3–10.1)
CHLORIDE SERPL-SCNC: 105 MMOL/L (ref 100–108)
CO2 SERPL-SCNC: 25 MMOL/L (ref 21–32)
CREAT SERPL-MCNC: 1.23 MG/DL (ref 0.6–1.3)
ERYTHROCYTE [DISTWIDTH] IN BLOOD BY AUTOMATED COUNT: 15.3 % (ref 11.6–15.1)
GFR SERPL CREATININE-BSD FRML MDRD: 54 ML/MIN/1.73SQ M
GLUCOSE SERPL-MCNC: 108 MG/DL (ref 65–140)
HCT VFR BLD AUTO: 31.8 % (ref 36.5–49.3)
HGB BLD-MCNC: 10.5 G/DL (ref 12–17)
MCH RBC QN AUTO: 28.4 PG (ref 26.8–34.3)
MCHC RBC AUTO-ENTMCNC: 33 G/DL (ref 31.4–37.4)
MCV RBC AUTO: 86 FL (ref 82–98)
PLATELET # BLD AUTO: 212 THOUSANDS/UL (ref 149–390)
PMV BLD AUTO: 10 FL (ref 8.9–12.7)
POTASSIUM SERPL-SCNC: 3.9 MMOL/L (ref 3.5–5.3)
PROT SERPL-MCNC: 5.7 G/DL (ref 6.4–8.2)
RBC # BLD AUTO: 3.7 MILLION/UL (ref 3.88–5.62)
SODIUM SERPL-SCNC: 138 MMOL/L (ref 136–145)
WBC # BLD AUTO: 8.15 THOUSAND/UL (ref 4.31–10.16)

## 2021-11-21 PROCEDURE — 99232 SBSQ HOSP IP/OBS MODERATE 35: CPT | Performed by: INTERNAL MEDICINE

## 2021-11-21 PROCEDURE — 80053 COMPREHEN METABOLIC PANEL: CPT | Performed by: INTERNAL MEDICINE

## 2021-11-21 PROCEDURE — 85027 COMPLETE CBC AUTOMATED: CPT | Performed by: INTERNAL MEDICINE

## 2021-11-21 PROCEDURE — 99232 SBSQ HOSP IP/OBS MODERATE 35: CPT | Performed by: SURGERY

## 2021-11-21 RX ADMIN — METRONIDAZOLE 500 MG: 500 TABLET ORAL at 22:21

## 2021-11-21 RX ADMIN — METRONIDAZOLE 500 MG: 500 TABLET ORAL at 15:46

## 2021-11-21 RX ADMIN — ESCITALOPRAM 5 MG: 5 TABLET, FILM COATED ORAL at 09:50

## 2021-11-21 RX ADMIN — SODIUM CHLORIDE 75 ML/HR: 0.9 INJECTION, SOLUTION INTRAVENOUS at 18:00

## 2021-11-21 RX ADMIN — PRAVASTATIN SODIUM 80 MG: 80 TABLET ORAL at 15:46

## 2021-11-21 RX ADMIN — HEPARIN SODIUM 5000 UNITS: 5000 INJECTION INTRAVENOUS; SUBCUTANEOUS at 22:22

## 2021-11-21 RX ADMIN — HEPARIN SODIUM 5000 UNITS: 5000 INJECTION INTRAVENOUS; SUBCUTANEOUS at 05:46

## 2021-11-21 RX ADMIN — PANTOPRAZOLE SODIUM 40 MG: 40 TABLET, DELAYED RELEASE ORAL at 05:46

## 2021-11-21 RX ADMIN — CYANOCOBALAMIN TAB 500 MCG 1000 MCG: 500 TAB at 09:51

## 2021-11-21 RX ADMIN — CEFEPIME HYDROCHLORIDE 2000 MG: 2 INJECTION, POWDER, FOR SOLUTION INTRAVENOUS at 22:22

## 2021-11-21 RX ADMIN — HEPARIN SODIUM 5000 UNITS: 5000 INJECTION INTRAVENOUS; SUBCUTANEOUS at 15:46

## 2021-11-21 RX ADMIN — METRONIDAZOLE 500 MG: 500 TABLET ORAL at 05:46

## 2021-11-21 RX ADMIN — CEFEPIME HYDROCHLORIDE 2000 MG: 2 INJECTION, POWDER, FOR SOLUTION INTRAVENOUS at 10:57

## 2021-11-21 RX ADMIN — METOPROLOL TARTRATE 25 MG: 25 TABLET, FILM COATED ORAL at 09:51

## 2021-11-22 VITALS
TEMPERATURE: 97.7 F | SYSTOLIC BLOOD PRESSURE: 112 MMHG | HEART RATE: 70 BPM | WEIGHT: 203.8 LBS | DIASTOLIC BLOOD PRESSURE: 68 MMHG | HEIGHT: 72 IN | BODY MASS INDEX: 27.6 KG/M2 | OXYGEN SATURATION: 94 % | RESPIRATION RATE: 18 BRPM

## 2021-11-22 LAB
ALBUMIN SERPL BCP-MCNC: 2 G/DL (ref 3.5–5)
ALP SERPL-CCNC: 83 U/L (ref 46–116)
ALT SERPL W P-5'-P-CCNC: 24 U/L (ref 12–78)
ANION GAP SERPL CALCULATED.3IONS-SCNC: 10 MMOL/L (ref 4–13)
AST SERPL W P-5'-P-CCNC: 25 U/L (ref 5–45)
BILIRUB SERPL-MCNC: 0.4 MG/DL (ref 0.2–1)
BUN SERPL-MCNC: 14 MG/DL (ref 5–25)
CALCIUM ALBUM COR SERPL-MCNC: 8.6 MG/DL (ref 8.3–10.1)
CALCIUM SERPL-MCNC: 7 MG/DL (ref 8.3–10.1)
CHLORIDE SERPL-SCNC: 105 MMOL/L (ref 100–108)
CO2 SERPL-SCNC: 22 MMOL/L (ref 21–32)
CREAT SERPL-MCNC: 1.04 MG/DL (ref 0.6–1.3)
ERYTHROCYTE [DISTWIDTH] IN BLOOD BY AUTOMATED COUNT: 15.5 % (ref 11.6–15.1)
GFR SERPL CREATININE-BSD FRML MDRD: 66 ML/MIN/1.73SQ M
GLUCOSE SERPL-MCNC: 105 MG/DL (ref 65–140)
HCT VFR BLD AUTO: 31.4 % (ref 36.5–49.3)
HGB BLD-MCNC: 10.2 G/DL (ref 12–17)
MCH RBC QN AUTO: 27.8 PG (ref 26.8–34.3)
MCHC RBC AUTO-ENTMCNC: 32.5 G/DL (ref 31.4–37.4)
MCV RBC AUTO: 86 FL (ref 82–98)
PLATELET # BLD AUTO: 215 THOUSANDS/UL (ref 149–390)
PMV BLD AUTO: 10.3 FL (ref 8.9–12.7)
POTASSIUM SERPL-SCNC: 4 MMOL/L (ref 3.5–5.3)
PROT SERPL-MCNC: 4.9 G/DL (ref 6.4–8.2)
RBC # BLD AUTO: 3.67 MILLION/UL (ref 3.88–5.62)
SODIUM SERPL-SCNC: 137 MMOL/L (ref 136–145)
WBC # BLD AUTO: 6.48 THOUSAND/UL (ref 4.31–10.16)

## 2021-11-22 PROCEDURE — 80053 COMPREHEN METABOLIC PANEL: CPT | Performed by: INTERNAL MEDICINE

## 2021-11-22 PROCEDURE — 97166 OT EVAL MOD COMPLEX 45 MIN: CPT

## 2021-11-22 PROCEDURE — 99232 SBSQ HOSP IP/OBS MODERATE 35: CPT | Performed by: SURGERY

## 2021-11-22 PROCEDURE — 85027 COMPLETE CBC AUTOMATED: CPT | Performed by: INTERNAL MEDICINE

## 2021-11-22 PROCEDURE — 99239 HOSP IP/OBS DSCHRG MGMT >30: CPT | Performed by: INTERNAL MEDICINE

## 2021-11-22 RX ORDER — AMOXICILLIN AND CLAVULANATE POTASSIUM 875; 125 MG/1; MG/1
1 TABLET, FILM COATED ORAL EVERY 12 HOURS SCHEDULED
Qty: 10 TABLET | Refills: 0 | Status: SHIPPED | OUTPATIENT
Start: 2021-11-22 | End: 2021-11-27

## 2021-11-22 RX ORDER — ASPIRIN 81 MG/1
81 TABLET, CHEWABLE ORAL DAILY
Status: DISCONTINUED | OUTPATIENT
Start: 2021-11-22 | End: 2021-11-22

## 2021-11-22 RX ADMIN — CYANOCOBALAMIN TAB 500 MCG 1000 MCG: 500 TAB at 08:13

## 2021-11-22 RX ADMIN — CEFEPIME HYDROCHLORIDE 2000 MG: 2 INJECTION, POWDER, FOR SOLUTION INTRAVENOUS at 10:07

## 2021-11-22 RX ADMIN — ESCITALOPRAM 5 MG: 5 TABLET, FILM COATED ORAL at 08:13

## 2021-11-22 RX ADMIN — SODIUM CHLORIDE 75 ML/HR: 0.9 INJECTION, SOLUTION INTRAVENOUS at 08:13

## 2021-11-22 RX ADMIN — PANTOPRAZOLE SODIUM 40 MG: 40 TABLET, DELAYED RELEASE ORAL at 06:17

## 2021-11-22 RX ADMIN — METRONIDAZOLE 500 MG: 500 TABLET ORAL at 06:17

## 2021-11-22 RX ADMIN — METOPROLOL TARTRATE 25 MG: 25 TABLET, FILM COATED ORAL at 08:13

## 2021-11-22 RX ADMIN — HEPARIN SODIUM 5000 UNITS: 5000 INJECTION INTRAVENOUS; SUBCUTANEOUS at 06:17

## 2021-11-23 ENCOUNTER — TRANSITIONAL CARE MANAGEMENT (OUTPATIENT)
Dept: FAMILY MEDICINE CLINIC | Facility: CLINIC | Age: 86
End: 2021-11-23

## 2021-11-23 ENCOUNTER — OFFICE VISIT (OUTPATIENT)
Dept: FAMILY MEDICINE CLINIC | Facility: CLINIC | Age: 86
End: 2021-11-23
Payer: MEDICARE

## 2021-11-23 VITALS
OXYGEN SATURATION: 96 % | BODY MASS INDEX: 27.5 KG/M2 | WEIGHT: 203 LBS | SYSTOLIC BLOOD PRESSURE: 120 MMHG | DIASTOLIC BLOOD PRESSURE: 74 MMHG | HEIGHT: 72 IN | HEART RATE: 77 BPM | RESPIRATION RATE: 16 BRPM

## 2021-11-23 DIAGNOSIS — I48.0 PAROXYSMAL ATRIAL FIBRILLATION (HCC): ICD-10-CM

## 2021-11-23 DIAGNOSIS — K81.0 ACUTE CHOLECYSTITIS: Primary | ICD-10-CM

## 2021-11-23 DIAGNOSIS — R29.6 FREQUENT FALLS: ICD-10-CM

## 2021-11-23 LAB
BACTERIA BLD CULT: NORMAL
BACTERIA BLD CULT: NORMAL

## 2021-11-23 PROCEDURE — 99496 TRANSJ CARE MGMT HIGH F2F 7D: CPT | Performed by: FAMILY MEDICINE

## 2021-11-23 PROCEDURE — 1111F DSCHRG MED/CURRENT MED MERGE: CPT | Performed by: FAMILY MEDICINE

## 2021-11-26 ENCOUNTER — CONSULT (OUTPATIENT)
Dept: SURGERY | Facility: CLINIC | Age: 86
End: 2021-11-26
Payer: MEDICARE

## 2021-11-26 VITALS
DIASTOLIC BLOOD PRESSURE: 72 MMHG | WEIGHT: 209 LBS | HEART RATE: 63 BPM | RESPIRATION RATE: 18 BRPM | TEMPERATURE: 97.5 F | BODY MASS INDEX: 28.31 KG/M2 | HEIGHT: 72 IN | SYSTOLIC BLOOD PRESSURE: 120 MMHG

## 2021-11-26 DIAGNOSIS — K42.0 INCARCERATED UMBILICAL HERNIA: ICD-10-CM

## 2021-11-26 DIAGNOSIS — K81.0 ACUTE CHOLECYSTITIS: Primary | ICD-10-CM

## 2021-11-26 PROCEDURE — 99214 OFFICE O/P EST MOD 30 MIN: CPT | Performed by: SURGERY

## 2021-12-01 ENCOUNTER — TELEPHONE (OUTPATIENT)
Dept: SURGERY | Facility: CLINIC | Age: 86
End: 2021-12-01

## 2021-12-08 ENCOUNTER — OFFICE VISIT (OUTPATIENT)
Dept: CARDIOLOGY CLINIC | Facility: CLINIC | Age: 86
End: 2021-12-08
Payer: MEDICARE

## 2021-12-08 VITALS
SYSTOLIC BLOOD PRESSURE: 119 MMHG | WEIGHT: 197 LBS | HEIGHT: 72 IN | BODY MASS INDEX: 26.68 KG/M2 | HEART RATE: 73 BPM | DIASTOLIC BLOOD PRESSURE: 75 MMHG | OXYGEN SATURATION: 99 %

## 2021-12-08 DIAGNOSIS — I10 ESSENTIAL HYPERTENSION: ICD-10-CM

## 2021-12-08 DIAGNOSIS — Z01.818 PRE-OPERATIVE EXAMINATION: Primary | ICD-10-CM

## 2021-12-08 DIAGNOSIS — I48.0 PAROXYSMAL ATRIAL FIBRILLATION (HCC): ICD-10-CM

## 2021-12-08 DIAGNOSIS — I48.91 ATRIAL FIBRILLATION, UNSPECIFIED TYPE (HCC): ICD-10-CM

## 2021-12-08 DIAGNOSIS — I25.10 CORONARY ARTERY DISEASE INVOLVING NATIVE CORONARY ARTERY OF NATIVE HEART WITHOUT ANGINA PECTORIS: ICD-10-CM

## 2021-12-08 PROCEDURE — 99214 OFFICE O/P EST MOD 30 MIN: CPT | Performed by: INTERNAL MEDICINE

## 2021-12-08 PROCEDURE — 93000 ELECTROCARDIOGRAM COMPLETE: CPT | Performed by: INTERNAL MEDICINE

## 2021-12-28 ENCOUNTER — APPOINTMENT (OUTPATIENT)
Dept: LAB | Facility: CLINIC | Age: 86
End: 2021-12-28
Payer: MEDICARE

## 2021-12-28 DIAGNOSIS — K81.0 ACUTE CHOLECYSTITIS: ICD-10-CM

## 2021-12-28 LAB
ALBUMIN SERPL BCP-MCNC: 4 G/DL (ref 3.5–5)
ALP SERPL-CCNC: 88 U/L (ref 46–116)
ALT SERPL W P-5'-P-CCNC: 34 U/L (ref 12–78)
AST SERPL W P-5'-P-CCNC: 25 U/L (ref 5–45)
BILIRUB DIRECT SERPL-MCNC: 0.13 MG/DL (ref 0–0.2)
BILIRUB SERPL-MCNC: 0.64 MG/DL (ref 0.2–1)
PROT SERPL-MCNC: 7.9 G/DL (ref 6.4–8.2)

## 2021-12-28 PROCEDURE — 80076 HEPATIC FUNCTION PANEL: CPT

## 2021-12-28 PROCEDURE — 36415 COLL VENOUS BLD VENIPUNCTURE: CPT

## 2022-01-03 DIAGNOSIS — F41.9 ANXIETY: ICD-10-CM

## 2022-01-03 DIAGNOSIS — I10 ESSENTIAL HYPERTENSION: ICD-10-CM

## 2022-01-03 DIAGNOSIS — E11.69 TYPE 2 DIABETES MELLITUS WITH OTHER SPECIFIED COMPLICATION, WITHOUT LONG-TERM CURRENT USE OF INSULIN (HCC): ICD-10-CM

## 2022-01-03 RX ORDER — GLIPIZIDE 5 MG/1
TABLET, FILM COATED, EXTENDED RELEASE ORAL
Qty: 90 TABLET | Refills: 1 | Status: SHIPPED | OUTPATIENT
Start: 2022-01-03 | End: 2022-06-28

## 2022-01-03 RX ORDER — ESCITALOPRAM OXALATE 5 MG/1
TABLET ORAL
Qty: 90 TABLET | Refills: 1 | Status: SHIPPED | OUTPATIENT
Start: 2022-01-03 | End: 2022-06-28

## 2022-01-03 NOTE — PRE-PROCEDURE INSTRUCTIONS
Pre-Surgery Instructions:   Medication Instructions    acetaminophen (TYLENOL) 325 mg tablet Instructed patient per Anesthesia Guidelines   aspirin 81 mg chewable tablet Patient was instructed by Physician and understands   cyanocobalamin (VITAMIN B-12) 500 mcg tablet Instructed patient per Anesthesia Guidelines   docusate sodium (COLACE) 100 mg capsule Instructed patient per Anesthesia Guidelines   escitalopram (LEXAPRO) 5 mg tablet Instructed patient per Anesthesia Guidelines   glipiZIDE (GLUCOTROL XL) 5 mg 24 hr tablet Instructed patient per Anesthesia Guidelines   metFORMIN (GLUCOPHAGE) 500 mg tablet Instructed patient per Anesthesia Guidelines   metoprolol tartrate (LOPRESSOR) 25 mg tablet Instructed patient per Anesthesia Guidelines   omeprazole (PriLOSEC) 40 MG capsule Instructed patient per Anesthesia Guidelines   simvastatin (ZOCOR) 40 mg tablet Instructed patient per Anesthesia Guidelines   [DISCONTINUED] escitalopram (LEXAPRO) 5 mg tablet Instructed patient per Anesthesia Guidelines   [DISCONTINUED] glipiZIDE (GLUCOTROL XL) 5 mg 24 hr tablet Instructed patient per Anesthesia Guidelines  Instructed to take escitalopram/ omeprazole and metoprolol am of surgery with sip of water per anesthesia guidelines

## 2022-01-04 ENCOUNTER — ANESTHESIA EVENT (OUTPATIENT)
Dept: PERIOP | Facility: HOSPITAL | Age: 87
End: 2022-01-04
Payer: MEDICARE

## 2022-01-06 RX ORDER — CEFAZOLIN SODIUM 2 G/50ML
2000 SOLUTION INTRAVENOUS ONCE
Status: CANCELLED | OUTPATIENT
Start: 2022-01-07

## 2022-01-07 ENCOUNTER — ANESTHESIA (OUTPATIENT)
Dept: PERIOP | Facility: HOSPITAL | Age: 87
End: 2022-01-07
Payer: MEDICARE

## 2022-01-07 ENCOUNTER — HOSPITAL ENCOUNTER (OUTPATIENT)
Facility: HOSPITAL | Age: 87
Setting detail: OUTPATIENT SURGERY
Discharge: HOME | End: 2022-01-08
Attending: SURGERY | Admitting: SURGERY
Payer: MEDICARE

## 2022-01-07 DIAGNOSIS — K42.9 UMBILICAL HERNIA: ICD-10-CM

## 2022-01-07 DIAGNOSIS — K81.0 ACUTE CHOLECYSTITIS: ICD-10-CM

## 2022-01-07 LAB
GLUCOSE SERPL-MCNC: 111 MG/DL (ref 65–140)
GLUCOSE SERPL-MCNC: 115 MG/DL (ref 65–140)
GLUCOSE SERPL-MCNC: 117 MG/DL (ref 65–140)
GLUCOSE SERPL-MCNC: 128 MG/DL (ref 65–140)
GLUCOSE SERPL-MCNC: 190 MG/DL (ref 65–140)

## 2022-01-07 PROCEDURE — G9197 ORDER FOR CEPH: HCPCS | Performed by: SURGERY

## 2022-01-07 PROCEDURE — 82948 REAGENT STRIP/BLOOD GLUCOSE: CPT

## 2022-01-07 PROCEDURE — NC001 PR NO CHARGE: Performed by: SURGERY

## 2022-01-07 PROCEDURE — S2900 ROBOTIC SURGICAL SYSTEM: HCPCS | Performed by: SURGERY

## 2022-01-07 PROCEDURE — NC001 PR NO CHARGE: Performed by: PHYSICIAN ASSISTANT

## 2022-01-07 PROCEDURE — 47562 LAPAROSCOPIC CHOLECYSTECTOMY: CPT | Performed by: SURGERY

## 2022-01-07 PROCEDURE — 88304 TISSUE EXAM BY PATHOLOGIST: CPT | Performed by: PATHOLOGY

## 2022-01-07 PROCEDURE — 49652 PR LAP, VENTRAL HERNIA REPAIR,REDUCIBLE: CPT | Performed by: SURGERY

## 2022-01-07 RX ORDER — DOCUSATE SODIUM 100 MG/1
100 CAPSULE, LIQUID FILLED ORAL 2 TIMES DAILY
Status: DISCONTINUED | OUTPATIENT
Start: 2022-01-07 | End: 2022-01-08 | Stop reason: HOSPADM

## 2022-01-07 RX ORDER — PROPOFOL 10 MG/ML
INJECTION, EMULSION INTRAVENOUS AS NEEDED
Status: DISCONTINUED | OUTPATIENT
Start: 2022-01-07 | End: 2022-01-07

## 2022-01-07 RX ORDER — ONDANSETRON 2 MG/ML
INJECTION INTRAMUSCULAR; INTRAVENOUS AS NEEDED
Status: DISCONTINUED | OUTPATIENT
Start: 2022-01-07 | End: 2022-01-07

## 2022-01-07 RX ORDER — FENTANYL CITRATE/PF 50 MCG/ML
50 SYRINGE (ML) INJECTION
Status: DISCONTINUED | OUTPATIENT
Start: 2022-01-07 | End: 2022-01-07 | Stop reason: HOSPADM

## 2022-01-07 RX ORDER — OXYCODONE HYDROCHLORIDE 10 MG/1
10 TABLET ORAL EVERY 4 HOURS PRN
Status: DISCONTINUED | OUTPATIENT
Start: 2022-01-07 | End: 2022-01-08 | Stop reason: HOSPADM

## 2022-01-07 RX ORDER — GLYCOPYRROLATE 0.2 MG/ML
INJECTION INTRAMUSCULAR; INTRAVENOUS AS NEEDED
Status: DISCONTINUED | OUTPATIENT
Start: 2022-01-07 | End: 2022-01-07

## 2022-01-07 RX ORDER — LIDOCAINE HYDROCHLORIDE 10 MG/ML
INJECTION, SOLUTION EPIDURAL; INFILTRATION; INTRACAUDAL; PERINEURAL AS NEEDED
Status: DISCONTINUED | OUTPATIENT
Start: 2022-01-07 | End: 2022-01-07

## 2022-01-07 RX ORDER — BUPIVACAINE HYDROCHLORIDE 5 MG/ML
INJECTION, SOLUTION EPIDURAL; INTRACAUDAL AS NEEDED
Status: DISCONTINUED | OUTPATIENT
Start: 2022-01-07 | End: 2022-01-07 | Stop reason: HOSPADM

## 2022-01-07 RX ORDER — ESCITALOPRAM OXALATE 10 MG/1
5 TABLET ORAL DAILY
Status: DISCONTINUED | OUTPATIENT
Start: 2022-01-08 | End: 2022-01-08 | Stop reason: HOSPADM

## 2022-01-07 RX ORDER — SODIUM CHLORIDE 9 MG/ML
125 INJECTION, SOLUTION INTRAVENOUS CONTINUOUS
Status: DISCONTINUED | OUTPATIENT
Start: 2022-01-07 | End: 2022-01-07

## 2022-01-07 RX ORDER — OXYCODONE HYDROCHLORIDE 5 MG/1
5 TABLET ORAL EVERY 4 HOURS PRN
Status: DISCONTINUED | OUTPATIENT
Start: 2022-01-07 | End: 2022-01-08 | Stop reason: HOSPADM

## 2022-01-07 RX ORDER — FENTANYL CITRATE 50 UG/ML
INJECTION, SOLUTION INTRAMUSCULAR; INTRAVENOUS AS NEEDED
Status: DISCONTINUED | OUTPATIENT
Start: 2022-01-07 | End: 2022-01-07

## 2022-01-07 RX ORDER — ONDANSETRON 2 MG/ML
4 INJECTION INTRAMUSCULAR; INTRAVENOUS ONCE AS NEEDED
Status: COMPLETED | OUTPATIENT
Start: 2022-01-07 | End: 2022-01-07

## 2022-01-07 RX ORDER — SODIUM CHLORIDE, SODIUM LACTATE, POTASSIUM CHLORIDE, CALCIUM CHLORIDE 600; 310; 30; 20 MG/100ML; MG/100ML; MG/100ML; MG/100ML
125 INJECTION, SOLUTION INTRAVENOUS CONTINUOUS
Status: DISCONTINUED | OUTPATIENT
Start: 2022-01-07 | End: 2022-01-07

## 2022-01-07 RX ORDER — PANTOPRAZOLE SODIUM 40 MG/1
40 TABLET, DELAYED RELEASE ORAL
Status: DISCONTINUED | OUTPATIENT
Start: 2022-01-08 | End: 2022-01-08 | Stop reason: HOSPADM

## 2022-01-07 RX ORDER — CEFAZOLIN SODIUM 2 G/50ML
SOLUTION INTRAVENOUS AS NEEDED
Status: DISCONTINUED | OUTPATIENT
Start: 2022-01-07 | End: 2022-01-07

## 2022-01-07 RX ORDER — INDOCYANINE GREEN AND WATER 25 MG
2.5 KIT INJECTION ONCE
Status: COMPLETED | OUTPATIENT
Start: 2022-01-07 | End: 2022-01-07

## 2022-01-07 RX ORDER — VECURONIUM BROMIDE 1 MG/ML
INJECTION, POWDER, LYOPHILIZED, FOR SOLUTION INTRAVENOUS AS NEEDED
Status: DISCONTINUED | OUTPATIENT
Start: 2022-01-07 | End: 2022-01-07

## 2022-01-07 RX ORDER — ONDANSETRON 2 MG/ML
4 INJECTION INTRAMUSCULAR; INTRAVENOUS EVERY 6 HOURS PRN
Status: DISCONTINUED | OUTPATIENT
Start: 2022-01-07 | End: 2022-01-08 | Stop reason: HOSPADM

## 2022-01-07 RX ORDER — NEOSTIGMINE METHYLSULFATE 1 MG/ML
INJECTION INTRAVENOUS AS NEEDED
Status: DISCONTINUED | OUTPATIENT
Start: 2022-01-07 | End: 2022-01-07

## 2022-01-07 RX ORDER — ACETAMINOPHEN 325 MG/1
650 TABLET ORAL EVERY 6 HOURS PRN
Status: DISCONTINUED | OUTPATIENT
Start: 2022-01-07 | End: 2022-01-08 | Stop reason: HOSPADM

## 2022-01-07 RX ORDER — MAGNESIUM HYDROXIDE 1200 MG/15ML
LIQUID ORAL AS NEEDED
Status: DISCONTINUED | OUTPATIENT
Start: 2022-01-07 | End: 2022-01-07 | Stop reason: HOSPADM

## 2022-01-07 RX ORDER — PRAVASTATIN SODIUM 40 MG
40 TABLET ORAL
Status: DISCONTINUED | OUTPATIENT
Start: 2022-01-07 | End: 2022-01-08 | Stop reason: HOSPADM

## 2022-01-07 RX ORDER — HYDROMORPHONE HCL/PF 1 MG/ML
0.5 SYRINGE (ML) INJECTION
Status: DISCONTINUED | OUTPATIENT
Start: 2022-01-07 | End: 2022-01-08 | Stop reason: HOSPADM

## 2022-01-07 RX ORDER — GLIPIZIDE 5 MG/1
5 TABLET, FILM COATED, EXTENDED RELEASE ORAL DAILY
Status: DISCONTINUED | OUTPATIENT
Start: 2022-01-07 | End: 2022-01-08 | Stop reason: HOSPADM

## 2022-01-07 RX ADMIN — FENTANYL CITRATE 50 MCG: 50 INJECTION INTRAMUSCULAR; INTRAVENOUS at 07:36

## 2022-01-07 RX ADMIN — FENTANYL CITRATE 25 MCG: 50 INJECTION, SOLUTION INTRAMUSCULAR; INTRAVENOUS at 10:43

## 2022-01-07 RX ADMIN — SODIUM CHLORIDE: 0.9 INJECTION, SOLUTION INTRAVENOUS at 08:21

## 2022-01-07 RX ADMIN — LIDOCAINE HYDROCHLORIDE 60 MG: 10 INJECTION, SOLUTION EPIDURAL; INFILTRATION; INTRACAUDAL; PERINEURAL at 07:36

## 2022-01-07 RX ADMIN — CEFAZOLIN SODIUM 2000 MG: 2 SOLUTION INTRAVENOUS at 07:22

## 2022-01-07 RX ADMIN — VECURONIUM BROMIDE 7 MG: 1 INJECTION, POWDER, LYOPHILIZED, FOR SOLUTION INTRAVENOUS at 07:36

## 2022-01-07 RX ADMIN — FENTANYL CITRATE 50 MCG: 50 INJECTION INTRAMUSCULAR; INTRAVENOUS at 08:04

## 2022-01-07 RX ADMIN — OXYCODONE HYDROCHLORIDE 10 MG: 10 TABLET ORAL at 16:53

## 2022-01-07 RX ADMIN — VECURONIUM BROMIDE 3 MG: 1 INJECTION, POWDER, LYOPHILIZED, FOR SOLUTION INTRAVENOUS at 08:20

## 2022-01-07 RX ADMIN — METFORMIN HYDROCHLORIDE 1000 MG: 500 TABLET ORAL at 16:54

## 2022-01-07 RX ADMIN — SODIUM CHLORIDE, SODIUM LACTATE, POTASSIUM CHLORIDE, AND CALCIUM CHLORIDE 125 ML/HR: .6; .31; .03; .02 INJECTION, SOLUTION INTRAVENOUS at 14:16

## 2022-01-07 RX ADMIN — FENTANYL CITRATE 25 MCG: 50 INJECTION, SOLUTION INTRAMUSCULAR; INTRAVENOUS at 10:25

## 2022-01-07 RX ADMIN — INDOCYANINE GREEN AND WATER 2.5 MG: KIT at 06:17

## 2022-01-07 RX ADMIN — PROPOFOL 50 MG: 10 INJECTION, EMULSION INTRAVENOUS at 09:13

## 2022-01-07 RX ADMIN — GLIPIZIDE 5 MG: 5 TABLET, EXTENDED RELEASE ORAL at 14:32

## 2022-01-07 RX ADMIN — ONDANSETRON 4 MG: 2 INJECTION INTRAMUSCULAR; INTRAVENOUS at 09:36

## 2022-01-07 RX ADMIN — ONDANSETRON 4 MG: 2 INJECTION INTRAMUSCULAR; INTRAVENOUS at 10:07

## 2022-01-07 RX ADMIN — NEOSTIGMINE METHYLSULFATE 3 MG: 1 INJECTION INTRAVENOUS at 09:36

## 2022-01-07 RX ADMIN — FENTANYL CITRATE 50 MCG: 50 INJECTION INTRAMUSCULAR; INTRAVENOUS at 08:19

## 2022-01-07 RX ADMIN — METOPROLOL TARTRATE 25 MG: 25 TABLET, FILM COATED ORAL at 21:04

## 2022-01-07 RX ADMIN — PRAVASTATIN SODIUM 40 MG: 40 TABLET ORAL at 16:54

## 2022-01-07 RX ADMIN — PROPOFOL 150 MG: 10 INJECTION, EMULSION INTRAVENOUS at 07:36

## 2022-01-07 RX ADMIN — GLYCOPYRROLATE 0.4 MG: 0.2 INJECTION, SOLUTION INTRAMUSCULAR; INTRAVENOUS at 09:36

## 2022-01-07 RX ADMIN — DOCUSATE SODIUM 100 MG: 100 CAPSULE ORAL at 16:54

## 2022-01-07 NOTE — OP NOTE
PERATIVE REPORT  PATIENT NAME: Fausto Benitez    :  1935  MRN: [id number]  Pt Location: [facility] OR ROOM 08    SURGERY DATE: 2022    Surgeon(s) and Role: * Padilla Neves MD - Primary     * Veronica Hermosillo PA-C - Assisting    Preop Diagnosis:  Acute cholecystitis [P08 3]  Umbilical hernia [N44 2]    Post-Op Diagnosis Codes:     * Acute cholecystitis [G38 6]     * Umbilical hernia [T42 8]    Procedure(s) (LRB):  ROBOTIC ASSISTED LAPAROSCOPIC CHOLECYSTECTOMY (N/A)  Open umbilical hernia repair (N/A)    Specimen(s):  ID Type Source Tests Collected by Time Destination   1 : gallbladder Tissue Gallbladder TISSUE EXAM Padilla Neves MD 2022 0930        Estimated Blood Loss:   20 mL    Drains:  Closed/Suction Drain Right RUQ Bulb 15 Fr  (Active)   Number of days: 0       Anesthesia Type:   General    Operative Indications:  Acute cholecystitis [W96 5]  Umbilical hernia [H64 2]      Operative Findings:  Severely inflamed gallbladder  The anterior surface of the liver was densely adhered to the abdominal wall  Intrahepatic gallbladder  Omentum and transverse colon hepatic flexure was densely adhered to the gallbladder  Acute inflammation present at the Calot's triangle  Complications:   None    Procedure and Technique:  The patient was brought to the operating room and identified by myself in the operating room staff  General anesthesia was given by the anesthesia team   OG tube was inserted  Parts were prepped and draped in the standard fashion  A time-out was performed  Patient had received ICG in the AP view  Patient also received IV antibiotics prior to induction  A small supraumbilical hernia was present  An incision was made at the site of the hernia and was deepened through the subcutaneous tissue  The hernia sac was opened  A 5 mm port was inserted through the sac in insufflation was performed    Robotic ports were then inserted in the right anterior axillary line, right midclavicular line, left epigastric region respectively  They were all subcostal   The initial 5 mm port was exchanged for robotic 12 mm port  The robot was brought to the field and docked after positioning the patient in head-up in left side down  Findings were confirmed  The dissection was started by taking down all the adhesions between the liver and the anterior abdominal wall  We also started doing sharp and blunt dissection using hook electrocautery to take off adhesions between the omentum and the fundus of the gallbladder  We were able to visualize the gallbladder  The gallbladder was then grabbed and was retracted cephalad  We continue to take down adhesions from the gallbladder using hook electrocautery and bipolar [place]  The Calot's triangle not came into visualization  He had inserted a 12 mm port to assist with retraction and suction  Using bipolar energy and [place] forceps careful dissection was carried out at the Calot's triangle  Cystic duct was dissected as well as the cystic artery  ICG was used to confirm biliary anatomy  A critical view of safety was obtained  Three robotic clips were applied and the cystic duct towards the patient and and 1 towards the specimen  The cystic duct was divided between the distal-most clip  Robotic clip was applied on the cystic artery  The distal end of the cystic artery was cauterized using bipolar energy and was divided using hook electrocautery  The gallbladder was then dissected off the gallbladder fossa using hook electrocautery  Hemostasis was obtained using hook electrocautery  We also used a piece of Surgicel which was left in position  Copious irrigation was performed after the specimen was placed in the Endo-Catch bag  A 15 [place]ali drain was inserted in the subhepatic space through the right-sided port and was sutured in position using 3-0 nylon  The robot was then undocked  The specimen bag along with the specimen was removed    All the ports were removed under full visual guidance  The supraumbilical port site which was also the site of the umbilical hernia was closed using interrupted 0 Vicryl at the fascia  This also led to repair of the umbilical hernia  Local anesthetic was given at all the port site  4-0 Monocryl was then used in subcuticular fashion to close skin at all the port site  Surgical glue was applied  Patient was reversed from anesthesia and taken to the recovery under stable condition     I was present for the entire procedure, A qualified resident physician was not available and A physician assistant was required during the procedure for retraction tissue handling,dissection and suturing    Patient Disposition:  PACU       SIGNATURE: Niurka Moss MD  DATE: January 7, 2022  TIME: 9:52 AM

## 2022-01-07 NOTE — PLAN OF CARE
Problem: Prexisting or High Potential for Compromised Skin Integrity  Goal: Skin integrity is maintained or improved  Description: INTERVENTIONS:  - Identify patients at risk for skin breakdown  - Assess and monitor skin integrity  - Assess and monitor nutrition and hydration status  - Monitor labs   - Assess for incontinence   - Turn and reposition patient  - Assist with mobility/ambulation  - Relieve pressure over bony prominences  - Avoid friction and shearing  - Provide appropriate hygiene as needed including keeping skin clean and dry  - Evaluate need for skin moisturizer/barrier cream  - Collaborate with interdisciplinary team   - Patient/family teaching  - Consider wound care consult   Outcome: Progressing     Problem: MOBILITY - ADULT  Goal: Maintain or return to baseline ADL function  Description: INTERVENTIONS:  -  Assess patient's ability to carry out ADLs; assess patient's baseline for ADL function and identify physical deficits which impact ability to perform ADLs (bathing, care of mouth/teeth, toileting, grooming, dressing, etc )  - Assess/evaluate cause of self-care deficits   - Assess range of motion  - Assess patient's mobility; develop plan if impaired  - Assess patient's need for assistive devices and provide as appropriate  - Encourage maximum independence but intervene and supervise when necessary  - Involve family in performance of ADLs  - Assess for home care needs following discharge   - Consider OT consult to assist with ADL evaluation and planning for discharge  - Provide patient education as appropriate  Outcome: Progressing  Goal: Maintains/Returns to pre admission functional level  Description: INTERVENTIONS:  - Perform BMAT or MOVE assessment daily    - Set and communicate daily mobility goal to care team and patient/family/caregiver  - Collaborate with rehabilitation services on mobility goals if consulted  - Perform Range of Motion  times a day    - Reposition patient every hours   - Dangle patient  times a day  - Stand patient  times a day  - Ambulate patient  times a day  - Out of bed to chair  times a day   - Out of bed for meals  times a day  - Out of bed for toileting  - Record patient progress and toleration of activity level   Outcome: Progressing     Problem: Potential for Falls  Goal: Patient will remain free of falls  Description: INTERVENTIONS:  - Educate patient/family on patient safety including physical limitations  - Instruct patient to call for assistance with activity   - Consult OT/PT to assist with strengthening/mobility   - Keep Call bell within reach  - Keep bed low and locked with side rails adjusted as appropriate  - Keep care items and personal belongings within reach  - Initiate and maintain comfort rounds  - Make Fall Risk Sign visible to staff  - Offer Toileting every  Hours, in advance of need  - Initiate/Maintain alarm  - Obtain necessary fall risk management equipment:  - Apply yellow socks and bracelet for high fall risk patients  - Consider moving patient to room near nurses station  Outcome: Progressing

## 2022-01-07 NOTE — H&P
Assessment/Plan: I reviewed patient's a CT scan  Images  He definitely had acute call close cholecystitis  He also had a small incarcerated umbilical hernia  He has improved on antibiotics however he needs definitive interval cholecystectomy  I explained the procedure to him  I told him that we would do this robotically at [facility] on January 7, 2022  In the meantime he is going to see the cardiologist for cardiology risk evaluation  I also told him to stop taking aspirin at least a week prior to his surgery  He verbalized understanding  He will also need a repeat LFT prior to his surgery  I reviewed labs from his prior admission  No problem-specific Assessment & Plan notes found for this encounter          Diagnoses and all orders for this visit:     Acute cholecystitis  -     Ambulatory referral to General Surgery     Incarcerated umbilical hernia            Subjective:       Patient ID: Jermaine Philip is a 82 y o  male        53-year-old male patient who has multiple comorbidities was admitted to [facility] with complaints of acute cholecystitis  He did not undergo surgery at that time because he had developed atrial fibrillation and was considered high risk for surgery  Patient improved on IV antibiotics  He was discharged on oral antibiotics at that time  Currently patient says that he does not have any pain  He is able to tolerate diet  Regular bowel movements  No fever  Patient has history of cardiac catheterization done multiple times  He takes aspirin  He tells me that he does not have any cardiac stents  Suffers from diabetes which is under control    Patient is also high risk for fall for which consideration he was not started on oral anticoagulation         The following portions of the patient's history were reviewed and updated as appropriate:   He  has a past medical history of Acute MI 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    Intervertebral disc disorder with radiculopathy of lumbar region      Chronic midline low back pain without sciatica 04/27/2020    Frequent falls 03/21/2020    Closed T12 fracture ([facility] ) 03/21/2020    Ambulatory dysfunction 12/26/2018    Atrial fibrillation ([place] [id number] ) 02/09/2018    T2DM (type 2 diabetes mellitus) ([place] [address] ) 02/09/2018    Essential hypertension 02/09/2018    HLD (hyperlipidemia) 02/09/2018    GERD (gastroesophageal reflux disease) 02/09/2018    CAD (coronary artery disease) 02/09/2018      He  has a past surgical history that includes Joint replacement; Cardiac catheterization; Hernia repair (Left); and Total hip arthroplasty  His family history includes No Known Problems in his father and mother  He  reports that he has never smoked  He has never used smokeless tobacco  He reports current alcohol use  He reports that he does not use drugs           Current Outpatient Medications   Medication Sig Dispense Refill    acetaminophen (TYLENOL) 325 mg tablet Take 2 tablets (650 mg total) by mouth every 6 (six) hours 30 tablet 0    amoxicillin-clavulanate (AUGMENTIN) 875-125 mg per tablet Take 1 tablet by mouth every 12 (twelve) hours for 5 days 10 tablet 0    aspirin 81 mg chewable tablet Chew 81 mg daily        cyanocobalamin (VITAMIN B-12) 500 mcg tablet Take 1,000 mcg by mouth daily        docusate sodium (COLACE) 100 mg capsule Take 1 capsule (100 mg total) by mouth 2 (two) times a day 10 capsule 0    escitalopram (LEXAPRO) 5 mg tablet Take 1 tablet (5 mg total) by mouth daily 30 tablet 5    glipiZIDE (GLUCOTROL XL) 5 mg 24 hr tablet Take 1 tablet (5 mg total) by mouth daily 90 tablet 1    metFORMIN (GLUCOPHAGE) 500 mg tablet TAKE 2 TABLETS EVERY MORNING TAKE 2 TABLETS EVERY EVENING 360 tablet 1    metoprolol tartrate (LOPRESSOR) 25 mg tablet TAKE 1 TABLET (25 MG TOTAL) BY MOUTH EVERY 12 (TWELVE) HOURS 180 tablet 1    omeprazole (PriLOSEC) 40 MG capsule Take 1 capsule (40 mg total) by mouth daily before breakfast 90 capsule 3    simvastatin (ZOCOR) 40 mg tablet TAKE 1 TABLET BY MOUTH EVERY DAY 90 tablet 1      No current facility-administered medications for this visit            Current Outpatient Medications on File Prior to Visit   Medication Sig    acetaminophen (TYLENOL) 325 mg tablet Take 2 tablets (650 mg total) by mouth every 6 (six) hours    amoxicillin-clavulanate (AUGMENTIN) 875-125 mg per tablet Take 1 tablet by mouth every 12 (twelve) hours for 5 days    aspirin 81 mg chewable tablet Chew 81 mg daily    cyanocobalamin (VITAMIN B-12) 500 mcg tablet Take 1,000 mcg by mouth daily    docusate sodium (COLACE) 100 mg capsule Take 1 capsule (100 mg total) by mouth 2 (two) times a day    escitalopram (LEXAPRO) 5 mg tablet Take 1 tablet (5 mg total) by mouth daily    glipiZIDE (GLUCOTROL XL) 5 mg 24 hr tablet Take 1 tablet (5 mg total) by mouth daily    metFORMIN (GLUCOPHAGE) 500 mg tablet TAKE 2 TABLETS EVERY MORNING TAKE 2 TABLETS EVERY EVENING    metoprolol tartrate (LOPRESSOR) 25 mg tablet TAKE 1 TABLET (25 MG TOTAL) BY MOUTH EVERY 12 (TWELVE) HOURS    omeprazole (PriLOSEC) 40 MG capsule Take 1 capsule (40 mg total) by mouth daily before breakfast    simvastatin (ZOCOR) 40 mg tablet TAKE 1 TABLET BY MOUTH EVERY DAY      No current facility-administered medications on file prior to visit       He has No Known Allergies        Review of Systems   Constitutional: Negative  HENT: Negative  Eyes: Negative  Respiratory: Negative  Cardiovascular: Negative  Gastrointestinal: Positive for abdominal pain     Endocrine: Negative  Genitourinary: Negative  Musculoskeletal: Negative  Skin: Negative  Allergic/Immunologic: Negative  Neurological: Negative  Hematological: Negative  Psychiatric/Behavioral: Negative            Objective:        /72 (BP Location: Left arm, Patient Position: Sitting)   Pulse 63   Temp 97 5 °F (36 4 °C)   Resp 18   Ht 6' (1 829 m)   Wt 94 8 kg (209 lb)   BMI 28 35 kg/m²             Physical Exam  Vitals reviewed  Constitutional:       Appearance: He is well-developed  HENT:      Head: Normocephalic and atraumatic  Cardiovascular:      Rate and Rhythm: Normal rate and regular rhythm  Heart sounds: Normal heart sounds  Pulmonary:      Effort: Pulmonary effort is normal       Breath sounds: Normal breath sounds  Abdominal:      General: Abdomen is flat  Bowel sounds are normal  There is no distension  Palpations: Abdomen is soft  Tenderness: There is no abdominal tenderness  Hernia: A hernia is present  Hernia is present in the umbilical area  Skin:     General: Skin is warm  Capillary Refill: Capillary refill takes less than 2 seconds  Neurological:      General: No focal deficit present  Mental Status: He is alert     Psychiatric:         Mood and Affect: Mood normal          Behavior: Behavior normal

## 2022-01-07 NOTE — QUICK NOTE
General Surgery Post-Op Check  Brandon Lomeli 82 y o  male MRN: [id number]  Unit/Bed#: E5 -01 Encounter: [id number]     S: Tolerating diet    O:   Vitals:    01/07/22 1651   BP: 145/86   Pulse: 91   Resp:    Temp:    SpO2: 95%     I/O       01/05 0701  01/06 0700 01/06 0701  01/07 0700 01/07 0701  01/08 0700    I V  (mL/kg)   1200 (13 4)    Total Intake(mL/kg)   1200 (13 4)    Urine (mL/kg/hr)   200 (0 2)    Drains   25    Blood   20    Total Output   245    Net   +955               PE:  Gen:  NAD  HENT: MMM  CV:  warm, well-perfused  Lung:  normal effort  Abd:  soft, NT/ND  Ext:  no CCE  Neuro:  A&Ox3, M/S grossly intact     Lab Results   Component Value Date    WBC 6 48 11/22/2021    HGB 10 2 (L) 11/22/2021    HCT 31 4 (L) 11/22/2021    MCV 86 11/22/2021     11/22/2021     Lab Results   Component Value Date    CALCIUM 7 0 (L) 11/22/2021    K 4 0 11/22/2021    CO2 22 11/22/2021     11/22/2021    BUN 14 11/22/2021    CREATININE 1 04 11/22/2021         A/P: 82 y o  male Day of Surgery s/p Procedure(s) (LRB):  ROBOTIC ASSISTED LAPAROSCOPIC CHOLECYSTECTOMY (N/A)  Open umbilical hernia repair (N/A)   Doing well   DC IVF   Diet   Ambulation/IS   Wean oxygen      Donna Benavidez MD  PGY3, General Surgery

## 2022-01-08 VITALS
SYSTOLIC BLOOD PRESSURE: 107 MMHG | HEIGHT: 72 IN | RESPIRATION RATE: 19 BRPM | OXYGEN SATURATION: 91 % | DIASTOLIC BLOOD PRESSURE: 60 MMHG | BODY MASS INDEX: 26.78 KG/M2 | HEART RATE: 79 BPM | WEIGHT: 197.75 LBS | TEMPERATURE: 97.4 F

## 2022-01-08 LAB
ALBUMIN SERPL BCP-MCNC: 2.9 G/DL (ref 3.5–5)
ALP SERPL-CCNC: 62 U/L (ref 46–116)
ALT SERPL W P-5'-P-CCNC: 33 U/L (ref 12–78)
ANION GAP SERPL CALCULATED.3IONS-SCNC: 7 MMOL/L (ref 4–13)
AST SERPL W P-5'-P-CCNC: 37 U/L (ref 5–45)
BASOPHILS # BLD AUTO: 0.02 THOUSANDS/ΜL (ref 0–0.1)
BASOPHILS NFR BLD AUTO: 0 % (ref 0–1)
BILIRUB SERPL-MCNC: 0.87 MG/DL (ref 0.2–1)
BUN SERPL-MCNC: 17 MG/DL (ref 5–25)
CALCIUM ALBUM COR SERPL-MCNC: 8.9 MG/DL (ref 8.3–10.1)
CALCIUM SERPL-MCNC: 8 MG/DL (ref 8.3–10.1)
CHLORIDE SERPL-SCNC: 105 MMOL/L (ref 100–108)
CO2 SERPL-SCNC: 27 MMOL/L (ref 21–32)
CREAT SERPL-MCNC: 1.14 MG/DL (ref 0.6–1.3)
EOSINOPHIL # BLD AUTO: 0.08 THOUSAND/ΜL (ref 0–0.61)
EOSINOPHIL NFR BLD AUTO: 1 % (ref 0–6)
ERYTHROCYTE [DISTWIDTH] IN BLOOD BY AUTOMATED COUNT: 15 % (ref 11.6–15.1)
GFR SERPL CREATININE-BSD FRML MDRD: 58 ML/MIN/1.73SQ M
GLUCOSE SERPL-MCNC: 108 MG/DL (ref 65–140)
HCT VFR BLD AUTO: 33.9 % (ref 36.5–49.3)
HGB BLD-MCNC: 10.9 G/DL (ref 12–17)
IMM GRANULOCYTES # BLD AUTO: 0.04 THOUSAND/UL (ref 0–0.2)
IMM GRANULOCYTES NFR BLD AUTO: 1 % (ref 0–2)
LYMPHOCYTES # BLD AUTO: 1.74 THOUSANDS/ΜL (ref 0.6–4.47)
LYMPHOCYTES NFR BLD AUTO: 23 % (ref 14–44)
MCH RBC QN AUTO: 28.8 PG (ref 26.8–34.3)
MCHC RBC AUTO-ENTMCNC: 32.2 G/DL (ref 31.4–37.4)
MCV RBC AUTO: 90 FL (ref 82–98)
MONOCYTES # BLD AUTO: 0.68 THOUSAND/ΜL (ref 0.17–1.22)
MONOCYTES NFR BLD AUTO: 9 % (ref 4–12)
NEUTROPHILS # BLD AUTO: 4.9 THOUSANDS/ΜL (ref 1.85–7.62)
NEUTS SEG NFR BLD AUTO: 66 % (ref 43–75)
NRBC BLD AUTO-RTO: 0 /100 WBCS
PLATELET # BLD AUTO: 154 THOUSANDS/UL (ref 149–390)
PMV BLD AUTO: 10.3 FL (ref 8.9–12.7)
POTASSIUM SERPL-SCNC: 4.8 MMOL/L (ref 3.5–5.3)
PROT SERPL-MCNC: 6 G/DL (ref 6.4–8.2)
RBC # BLD AUTO: 3.78 MILLION/UL (ref 3.88–5.62)
SODIUM SERPL-SCNC: 139 MMOL/L (ref 136–145)
WBC # BLD AUTO: 7.46 THOUSAND/UL (ref 4.31–10.16)

## 2022-01-08 PROCEDURE — 80053 COMPREHEN METABOLIC PANEL: CPT | Performed by: PHYSICIAN ASSISTANT

## 2022-01-08 PROCEDURE — NC001 PR NO CHARGE: Performed by: SURGERY

## 2022-01-08 PROCEDURE — 99024 POSTOP FOLLOW-UP VISIT: CPT | Performed by: SURGERY

## 2022-01-08 PROCEDURE — 85025 COMPLETE CBC W/AUTO DIFF WBC: CPT | Performed by: PHYSICIAN ASSISTANT

## 2022-01-08 RX ORDER — OXYCODONE HYDROCHLORIDE AND ACETAMINOPHEN 5; 325 MG/1; MG/1
1 TABLET ORAL EVERY 6 HOURS PRN
Qty: 6 TABLET | Refills: 0 | Status: SHIPPED | OUTPATIENT
Start: 2022-01-08 | End: 2022-01-18

## 2022-01-08 RX ADMIN — GLIPIZIDE 5 MG: 5 TABLET, EXTENDED RELEASE ORAL at 11:04

## 2022-01-08 RX ADMIN — DOCUSATE SODIUM 100 MG: 100 CAPSULE ORAL at 11:03

## 2022-01-08 RX ADMIN — METFORMIN HYDROCHLORIDE 1000 MG: 500 TABLET ORAL at 11:04

## 2022-01-08 RX ADMIN — PANTOPRAZOLE SODIUM 40 MG: 40 TABLET, DELAYED RELEASE ORAL at 06:04

## 2022-01-08 RX ADMIN — DOCUSATE SODIUM 100 MG: 100 CAPSULE ORAL at 11:04

## 2022-01-08 RX ADMIN — OXYCODONE HYDROCHLORIDE 10 MG: 10 TABLET ORAL at 06:04

## 2022-01-08 RX ADMIN — ESCITALOPRAM OXALATE 5 MG: 10 TABLET, FILM COATED ORAL at 11:04

## 2022-01-08 RX ADMIN — ONDANSETRON 4 MG: 2 INJECTION INTRAMUSCULAR; INTRAVENOUS at 10:43

## 2022-01-08 NOTE — DISCHARGE SUMMARY
Discharge Summary - Ruth Durand 82 y o  male MRN: [id number]    Unit/Bed#: E5 -01 Encounter: [id number]    Admission Date:     Admitting Diagnosis: Acute cholecystitis [V31 4]  Umbilical hernia [N85 6]    HPI: as per Iraj Flores MD, "    Procedures Performed: No orders of the defined types were placed in this encounter  Summary of Hospital Course: The patient underwent a successful robotically assisted cholecystectomy with umbilical hernia repair  Post-operatively his vital signs were normal and stable and his abdominal examination was reassuring  He tolerated a diet for dinner  He has a Jilda Andrade grant drain which put out a nominal amount of serosanguineous effluent in the first 24 hours of admission  On hospital day 1, all AM labs were well within normal limits, his pain and blood glucose were well controled, and his examination was again reassuring  He was discharged to home in good condition  Prior to departure from the hospital, provisions for surgical follow-up, medication reconciliation, and referral to [facility] for drain care were all arranged  Significant Findings, Care, Treatment and Services Provided:   Robotically assisted laparoscopic cholecystectomy, umbilical herniorrhaphy     Complications: none    Discharge Diagnosis: acute on chronic cholecystitis, calculous, and umbilical hernia     Medical Problems             Resolved Problems  Date Reviewed: 12/8/2021    None                Condition at Discharge: good         Discharge instructions/Information to patient and family:   See after visit summary for information provided to patient and family  Provisions for Follow-Up Care:  See after visit summary for information related to follow-up care and any pertinent home health orders  PCP: Marilia Colby MD    Disposition: Home    Planned Readmission: No      Discharge Statement   I spent 28 minutes discharging the patient  This time was spent on the day of discharge   I had direct contact with the patient on the day of discharge  Additional documentation is required if more than 30 minutes were spent on discharge  Discharge Medications:  See after visit summary for reconciled discharge medications provided to patient and family

## 2022-01-08 NOTE — DISCHARGE INSTRUCTIONS
Nilson-Peterson Drain Care   WHAT YOU NEED TO KNOW:   A Nilson-Peterson (STIVEN) drain is used to remove fluids that build up in an area of your body after surgery  The STIVEN drain is a bulb shaped device connected to a tube  One end of the tube is placed inside you during surgery  The other end comes out through a small cut in your skin  The bulb is connected to this end  You may have a stitch to hold the tube in place  DISCHARGE INSTRUCTIONS:   Return to the emergency department if:   · Your STIVEN drain breaks or comes out  · You have cloudy yellow or brown drainage from your STIVEN drain site, or the drainage smells bad  Contact your healthcare provider if:   · You drain less than 30 milliliters (2 tablespoons) in 24 hours  This may mean your drain can be removed  · You suddenly stop draining fluid or think your STIVEN drain is blocked  · You have a fever higher than 101 5°F (38 6°C)  · You have increased pain, redness, or swelling around the drain site  · You have questions about your STIVEN drain care  How a Nilson-Peterson drain works: The STIVEN drain removes fluids by creating suction in the tube  The bulb is squeezed flat and connected to the tube that sticks out of your body  The bulb expands as it fills with fluid  How to change the bandage around your Nilson-Peterson drain:  If you have a bandage, change it once a day  You may need to change your bandage more than once a day if it gets completely wet  · Wash your hands with soap and water  · Loosen the tape and gently remove the old bandage  Throw the old bandage into a plastic trash bag  · Use soap and water or saline solution to clean your STIVEN drain site  Dip a cotton swab or gauze pad in the solution and gently clean your skin  · Pat the area dry  · Place a new bandage on your STIVEN drain site and secure it to your skin with surgical tape  · Wash your hands      How to empty the Nilson-Peterson drain:  Empty the bulb when it is half full or every 8 to 12 hours  · Wash your hands with soap and water  · Remove the plug from the bulb  · Pour the fluid into a measuring cup  · Clean the plug with an alcohol swab or a cotton ball dipped in rubbing alcohol  · Squeeze the bulb flat and put the plug back in  The bulb should stay flat until it starts to fill with fluid again  · Measure the amount of fluid you pour out  Write down how much fluid you empty from the STIVEN drain and the date and time you collected it  · Flush the fluid down the toilet  Wash your hands  Clear clogged tubing:  Use the following steps to clear your Nilson-Peterson tubing:  · Hold the tubing between your thumb and first finger at the place closest to your skin  This hand will prevent the tube from being pulled out of your skin  · Use your other thumb and first finger to slide the clog down the tubing toward the bulb  You may have to repeat the sliding until the tubing is unclogged  Nilson-Peterson drain removal:  The amount of fluid that you drain will decrease as your wound heals  The STIVEN drain usually is removed when less than 30 milliliters (2 tablespoons) is collected in 24 hours  Ask your healthcare provider when and how your STIVEN drain will be removed  Follow up with your doctor as directed:  Write down your questions so you remember to ask them during your visits  © Copyright [employer] 2021 Information is for End User's use only and may not be sold, redistributed or otherwise used for commercial purposes  All illustrations and images included in CareNotes® are the copyrighted property of A D A M , Inc  or [address] [place], [place] [address]shawn   The above information is an  only  It is not intended as medical advice for individual conditions or treatments  Talk to your doctor, nurse or pharmacist before following any medical regimen to see if it is safe and effective for you        Umbilical Hernia Repair   WHAT YOU NEED TO KNOW:   An umbilical hernia repair is surgery to fix your umbilical (belly button) hernia  Your hernia may be fixed with an open or laparoscopic surgery  You may have pain, bloating, or nausea after your surgery  If you had a laparoscopic repair, you may have pain in your shoulder or near your ribs  This is from the gas used during surgery  DISCHARGE INSTRUCTIONS:   Call 911 for any of the following:   · You feel lightheaded, short of breath, and have chest pain  · You cough up blood  Seek care immediately if:   · Your arm or leg feels warm, tender, and painful  It may look swollen and red  · Blood soaks through your bandage  · Your abdomen feels hard and looks bigger than usual      · Your bowel movements are black, bloody, or look like tar  · You have severe abdominal pain  Contact your healthcare provider if:   · You have a fever  · Your pain does not get better after you take pain medicine  · You develop a skin rash, hives, or itching  · Your incision is swollen, red, or draining pus or fluid  · You have nausea or are vomiting  · You do not have a bowel movement for 3 days or more  · You have questions or concerns about your condition or care  Medicines:   · Prescription pain medicine  may be given  Ask your healthcare provider how to take this medicine safely  Some prescription pain medicines contain acetaminophen  Do not take other medicines that contain acetaminophen without talking to your healthcare provider  Too much acetaminophen may cause liver damage  Prescription pain medicine may cause constipation  Ask your healthcare provider how to prevent or treat constipation  · Take your medicine as directed  Contact your healthcare provider if you think your medicine is not helping or if you have side effects  Tell him of her if you are allergic to any medicine  Keep a list of the medicines, vitamins, and herbs you take  Include the amounts, and when and why you take them   Bring the list or the pill bottles to follow-up visits  Carry your medicine list with you in case of an emergency  Bathing: You can shower 48 hours after your surgery or as directed  Do not take a bath or go in hot tubs  This can cause an infection  Wash around your incision  Let soap and water run over your incision  Gently pat the area dry or let it air dry  Care for your incision as directed:  Keep your incision clean and dry  Change your bandages when they get wet or dirty  If you have Steri-strips over your incision, allow them to fall off on their own  If they do not fall off after 2 weeks, gently peel them off  Do not put powders or lotions on your incision  Check your incision every day for signs of infection, such as redness, swelling, or pus  Self-care:   · Eat high-fiber foods  Fiber may prevent constipation and straining during a bowel movement  This can prevent your hernia from returning  Foods that contain fiber include fruits, vegetables, legumes, and whole grains  You may need to take over-the-counter fiber supplements if you do not get enough fiber in your diet  Ask your healthcare provider if supplements are right for you  · Drink plenty of liquids  Liquids may prevent constipation and straining during a bowel movement  This will help prevent pressure on your incision  It may also prevent another hernia  Ask how much liquid you should drink each day and which liquids are best for you  · Apply ice  on your incision for 15 to 20 minutes every hour or as directed  Use an ice pack, or put crushed ice in a plastic bag  Cover it with a towel before you apply it to your skin  Ice helps prevent tissue damage and decreases swelling and pain  Activity:  Take short walks around the house every hour  This will help prevent blood clots  Slowly return to your normal activities  Do not play sports or lift anything heavier than 10 pounds for 4 to 6 weeks or as directed   Ask your healthcare provider when you can return to work and your usual activities  Follow up with your healthcare provider as directed:  Write down your questions so you remember to ask them during your visits  © Copyright [employer] 2021 Information is for End User's use only and may not be sold, redistributed or otherwise used for commercial purposes  All illustrations and images included in CareNotes® are the copyrighted property of A D A M , Inc  or Norma Smith   The above information is an  only  It is not intended as medical advice for individual conditions or treatments  Talk to your doctor, nurse or pharmacist before following any medical regimen to see if it is safe and effective for you  Laparoscopic Cholecystectomy   AMBULATORY CARE:   What you need to know about a laparoscopic cholecystectomy:  Laparoscopic cholecystectomy is surgery to remove gallstones and your gallbladder  How to prepare for surgery:   · Your surgeon will tell you how to prepare  You may be told not to eat or drink anything after midnight on the day of surgery  Arrange to have someone drive you home after surgery and stay with you for 24 hours  · Tell your surgeon all the medicines you currently take  He or she will tell you if you need to stop any medicine for surgery, and when to stop  He or she will tell you which medicines to take or not take on the day of surgery  · You may need blood or urine tests  You may also need x-rays, an ultrasound, or a CT scan  Tell your surgeon if you had an allergic reaction to contrast liquid  Tell your surgeon about any allergies you have, including medicines and anesthesia  What will happen during surgery:   · Your surgeon will make between 1 and 4 small incisions in your abdomen or belly button  He or she will insert small tools into the incisions  Your abdomen will be filled with carbon dioxide gas to make it swell   This helps your surgeon see your organs better and gives more room to move the tools around  · Your surgeon will look for and remove gallstones in and around your gallbladder  X-rays or an ultrasound may be used  Your surgeon will remove your gallbladder through one of the incisions  The carbon dioxide will be released from your abdomen  The incisions will be closed with stitches, medical glue, or adhesive strips, then covered with bandages  What to expect after surgery: You will be taken to a recovery room until you are fully awake  Healthcare providers will monitor you closely for any problems  Providers will help you walk around to prevent blood clots  You may be able to go home later the same day, or you may stay in the hospital overnight  · Pain, a sore throat, nausea, and vomiting are common after this surgery  These should get better within a few days  You may also have diarrhea that lasts up to a few months  · Medicines may be given to prevent or treat pain, nausea, and vomiting  Medicines may also be given to prevent a bacterial infection  Blood thinners may be given to prevent blood clots  You may be bleed or bruise more easily while you are taking blood thinners  · Your surgeon will tell you when to remove the bandages covering the surgery area  He or she will tell you when it is okay to start bathing  You may need to take showers instead of baths for a few days  · Your surgeon will tell you when you can drive, return to work, and do your regular daily activities  · You will be shown how to care for the surgery area and check for signs of infection  You will also be told which foods to eat in the days and weeks after surgery  Risks of a laparoscopic cholecystectomy:   · You could bleed more than expected or get an infection  Any carbon dioxide gas still in your body can cause neck and shoulder pain  Your gallbladder may leak bile into your abdomen during or after surgery  This can cause a severe infection or an abscess      · You may still have gallstones after surgery  You may need a different procedure to remove them  Your surgeon may need to make a larger incision than expected during surgery  Your bile duct, bowel, or other organs could be damaged during surgery  This can be life-threatening  Call your local emergency number (911 in the [address]) if:   · You feel lightheaded, short of breath, and have chest pain  · You cough up blood  Seek care immediately if:   · Your arm or leg feels warm, tender, and painful  It may look swollen and red  · You cannot stop vomiting  · Your bowel movements are black or bloody  · You have pain in your abdomen and it is swollen or hard  · You have a fever over 101°F (38°C) or chills  Call your doctor or surgeon if:   · You have pain or nausea that is not relieved by medicine  · You have redness and swelling around your incision sites  · You have blood or pus leaking from your incision sites  · You are constipated, have diarrhea, or your bowel movements are pale  · Your skin or eyes are yellow  · You have questions or concerns about your surgery, condition, or care  Medicines: You may need any of the following:  · Prescription pain medicine  may be given  Ask your healthcare provider how to take this medicine safely  Some prescription pain medicines contain acetaminophen  Do not take other medicines that contain acetaminophen without talking to your healthcare provider  Too much acetaminophen may cause liver damage  Prescription pain medicine may cause constipation  Ask your healthcare provider how to prevent or treat constipation  · NSAIDs  help decrease swelling and pain or fever  This medicine is available with or without a doctor's order  NSAIDs can cause stomach bleeding or kidney problems in certain people  If you take blood thinner medicine, always ask your healthcare provider if NSAIDs are safe for you  Always read the medicine label and follow directions      · Take your medicine as directed  Contact your healthcare provider if you think your medicine is not helping or if you have side effects  Tell him or her if you are allergic to any medicine  Keep a list of the medicines, vitamins, and herbs you take  Include the amounts, and when and why you take them  Bring the list or the pill bottles to follow-up visits  Carry your medicine list with you in case of an emergency  Take deep breaths and cough 10 times each hour: This will decrease your risk for a lung infection  Take a deep breath and hold it for as long as you can  Then let the air out and cough strongly  You may be given an incentive spirometer to help you take deep breaths  Put the plastic piece in your mouth and take a slow, deep breath  Then let the air out and cough  Repeat these steps 10 times every hour  Care for the surgery area:   · Remove the bandages as directed  Your surgeon may tell you to remove the bandages the day after surgery  · Keep the area clean and dry  You may take a shower the day after your surgery  Do not take baths, swim, or soak in a hot tub until your surgeon says it is okay  · Check for signs of infection each day  Check the area for swelling, red streaks, or pus  Tell your surgeon right away if you see any of these  · Hug a pillow against the surgery area before you sneeze or cough  This will help prevent pain and protect the surgery area  What to eat after surgery:   · Eat low-fat foods for 4 to 6 weeks  while your body learns to digest fat without a gallbladder  Slowly increase the amount of fat that you eat  · Drink more liquids  Ask how much liquid to drink and which liquids are best for you  When to return to work and other activities:   · Rest often  and slowly increase your activity level each day  If an activity causes pain, wait several days before you do that activity again  · Do not drive  for the first 24 hours after surgery   Your surgeon will tell you when it is okay to drive after the first 24 hours  This is usually after you have stopped taking narcotic pain medicine for a few days  · Do not lift anything heavier than 10 pounds  for 4 to 6 weeks, or as directed  · You may return to work or other activities  as soon as your pain is controlled and you feel comfortable  This is usually 5 to 7 days after surgery  Follow up with your doctor or surgeon as directed:  Write down your questions so you remember to ask them during your visits  © Copyright [employer] 2021 Information is for End User's use only and may not be sold, redistributed or otherwise used for commercial purposes  All illustrations and images included in CareNotes® are the copyrighted property of [employer]  or [facility] Mavis Smith   The above information is an  only  It is not intended as medical advice for individual conditions or treatments  Talk to your doctor, nurse or pharmacist before following any medical regimen to see if it is safe and effective for you

## 2022-01-08 NOTE — PROGRESS NOTES
Progress Note - General Surgery   Velna Grout 82 y o  male MRN: [id number]  Unit/Bed#: E5 -01 Encounter: [id number]    Assessment:  POD1 s/p robotic cholecystectomy and umbilical hernia repair  Doing well    Plan:   Continue diet   Ambulation/IS   Pain management    Maintain STIVEN   Possible dc today    Subjective/Objective     Subjective:   No complaints, tolerating diet    Objective:    Blood pressure 109/69, pulse 79, temperature 98 1 °F (36 7 °C), temperature source Temporal, resp  rate 18, height 6' (1 829 m), weight 89 7 kg (197 lb 12 oz), SpO2 94 %  ,Body mass index is 26 82 kg/m²        Intake/Output Summary (Last 24 hours) at 1/8/2022 0729  Last data filed at 1/8/2022 6955  Gross per 24 hour   Intake 1200 ml   Output 1095 ml   Net 105 ml       Invasive Devices  Report    Peripheral Intravenous Line            Peripheral IV 01/07/22 Left Hand 1 day          Drain            Closed/Suction Drain Right RUQ Bulb 15 Fr  <1 day                Physical Exam:   Gen:  NAD  CV:  warm, well-perfused  Lungs: nl effort  Abd:  soft, NT/ND, incision cdi, STIVEN in place  Ext:  no CCE  Neuro: A&Ox3     Results from last 7 days   Lab Units 01/08/22  0448   WBC Thousand/uL 7 46   HEMOGLOBIN g/dL 10 9*   HEMATOCRIT % 33 9*   PLATELETS Thousands/uL 154     Results from last 7 days   Lab Units 01/08/22  0448   POTASSIUM mmol/L 4 8   CHLORIDE mmol/L 105   CO2 mmol/L 27   BUN mg/dL 17   CREATININE mg/dL 1 14   CALCIUM mg/dL 8 0*

## 2022-01-08 NOTE — PLAN OF CARE
Problem: Prexisting or High Potential for Compromised Skin Integrity  Goal: Skin integrity is maintained or improved  Description: INTERVENTIONS:  - Identify patients at risk for skin breakdown  - Assess and monitor skin integrity  - Assess and monitor nutrition and hydration status  - Monitor labs   - Assess for incontinence   - Turn and reposition patient  - Assist with mobility/ambulation  - Relieve pressure over bony prominences  - Avoid friction and shearing  - Provide appropriate hygiene as needed including keeping skin clean and dry  - Evaluate need for skin moisturizer/barrier cream  - Collaborate with interdisciplinary team   - Patient/family teaching  - Consider wound care consult   Outcome: Adequate for Discharge     Problem: MOBILITY - ADULT  Goal: Maintain or return to baseline ADL function  Description: INTERVENTIONS:  -  Assess patient's ability to carry out ADLs; assess patient's baseline for ADL function and identify physical deficits which impact ability to perform ADLs (bathing, care of mouth/teeth, toileting, grooming, dressing, etc )  - Assess/evaluate cause of self-care deficits   - Assess range of motion  - Assess patient's mobility; develop plan if impaired  - Assess patient's need for assistive devices and provide as appropriate  - Encourage maximum independence but intervene and supervise when necessary  - Involve family in performance of ADLs  - Assess for home care needs following discharge   - Consider OT consult to assist with ADL evaluation and planning for discharge  - Provide patient education as appropriate  Outcome: Adequate for Discharge  Goal: Maintains/Returns to pre admission functional level  Description: INTERVENTIONS:  - Perform BMAT or MOVE assessment daily    - Set and communicate daily mobility goal to care team and patient/family/caregiver  - Collaborate with rehabilitation services on mobility goals if consulted  - Perform Range of Motion  times a day    - Reposition patient every  hours    - Dangle patient  times a day  - Stand patient  times a day  - Ambulate patient  times a day  - Out of bed to chair  times a day   - Out of bed for meals  times a day  - Out of bed for toileting  - Record patient progress and toleration of activity level   Outcome: Adequate for Discharge     Problem: Potential for Falls  Goal: Patient will remain free of falls  Description: INTERVENTIONS:  - Educate patient/family on patient safety including physical limitations  - Instruct patient to call for assistance with activity   - Consult OT/PT to assist with strengthening/mobility   - Keep Call bell within reach  - Keep bed low and locked with side rails adjusted as appropriate  - Keep care items and personal belongings within reach  - Initiate and maintain comfort rounds  - Make Fall Risk Sign visible to staff  - Offer Toileting every  Hours, in advance of need  - Initiate/Maintain alarm  - Obtain necessary fall risk management equipment:   - Apply yellow socks and bracelet for high fall risk patients  - Consider moving patient to room near nurses station  Outcome: Adequate for Discharge

## 2022-01-08 NOTE — CASE MANAGEMENT
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    Intervertebral disc disorder with radiculopathy of lumbar region     Chronic midline low back pain without sciatica 2020    Frequent falls 2020    Closed T12 fracture ([facility] ) 2020    Ambulatory dysfunction 2018    Atrial fibrillation ([facility] ) 2018    T2DM (type 2 diabetes mellitus) ([facility] ) 2018    Essential hypertension 2018    HLD (hyperlipidemia) 2018    GERD (gastroesophageal reflux disease) 2018    CAD (coronary artery disease) 2018      LOS (days): 0  Geometric Mean LOS (GMLOS) (days):   Days to GMLOS:     OBJECTIVE:              Current admission status: Outpatient Surgery       Preferred Pharmacy:   [facility]/pharmacy #[id number] [id number]  Phone: [phone] Fax: [phone]    Primary Care Provider: Ivan Tristan MD    Primary Insurance: MEDICARE  Secondary Insurance: AARP    ASSESSMENT:  [address] [place] [address], [address] [place] [address] Rd - Daughter   Primary Phone: [phone] (Mobile)                              Patient Information  Admitted from[de-identified] Home  Mental Status: Alert  During Assessment patient was accompanied by: Not accompanied during assessment  Assessment information provided by[de-identified] Patient  Primary Caregiver: Self  Support Systems: [address] [place] [address] of Residence: [address] [place] [address] do you live in?: [facility] entry access options  Select all that apply : No steps to enter home  Type of Current Residence: [place]  In the last 12 months, was there a time when you were not able to pay the mortgage or rent on time?: No  In the last 12 months, how many places have you lived?: 1  Living Arrangements: Lives w/ Spouse/significant other    Activities of Daily Living Prior to Admission  Completes ADLs independently?: Yes  Ambulates independently?: No  Level of ambulatory dependence: Assistance  Does patient use assisted devices?: Yes  Assisted Devices (DME) used:  Shower [employer]  Does patient currently own DME?: Yes  What DME does the patient currently own?: Straight Cane,Shower Chair,Walker         Patient Information Continued  Income Source: Pension/residential  Does patient have prescription coverage?: Yes  Within the past 12 months, you worried that your food would run out before you got the money to buy more : Never true  Within the past 12 months, the food you bought just didnt last and you didnt have money to get more : Never true                  DISCHARGE DETAILS:    Discharge planning discussed with[de-identified] patient  Freedom of Choice: Yes        Were Treatment Team discharge recommendations reviewed with patient/caregiver?: Yes  Did patient/caregiver verbalize understanding of patient care needs?: Yes  Were patient/caregiver advised of the risks associated with not following Treatment Team discharge recommendations?: Yes         [address]         Is the patient interested in [facility] at discharge?: Yes  Via Skylar Butcher requested[de-identified] [address] Name[de-identified] [address] [place] [address] Provider[de-identified] Referring Provider  Home Health Services Needed[de-identified] Post-Op Care and Assessment  Homebound Criteria Met[de-identified] Uses an Assist Device (i e  cane, walker, etc)  Supporting Clincal Findings[de-identified] Limited Endurance         Other Referral/Resources/Interventions Provided:  Interventions: Lian 78         Treatment Team Recommendation: Home with Home Health Care  Discharge Destination Plan[de-identified] Home with [address] [facility]

## 2022-01-08 NOTE — PLAN OF CARE
Problem: Prexisting or High Potential for Compromised Skin Integrity  Goal: Skin integrity is maintained or improved  Description: INTERVENTIONS:  - Identify patients at risk for skin breakdown  - Assess and monitor skin integrity  - Assess and monitor nutrition and hydration status  - Monitor labs   - Assess for incontinence   - Turn and reposition patient  - Assist with mobility/ambulation  - Relieve pressure over bony prominences  - Avoid friction and shearing  - Provide appropriate hygiene as needed including keeping skin clean and dry  - Evaluate need for skin moisturizer/barrier cream  - Collaborate with interdisciplinary team   - Patient/family teaching  - Consider wound care consult   Outcome: Progressing     Problem: MOBILITY - ADULT  Goal: Maintain or return to baseline ADL function  Description: INTERVENTIONS:  -  Assess patient's ability to carry out ADLs; assess patient's baseline for ADL function and identify physical deficits which impact ability to perform ADLs (bathing, care of mouth/teeth, toileting, grooming, dressing, etc )  - Assess/evaluate cause of self-care deficits   - Assess range of motion  - Assess patient's mobility; develop plan if impaired  - Assess patient's need for assistive devices and provide as appropriate  - Encourage maximum independence but intervene and supervise when necessary  - Involve family in performance of ADLs  - Assess for home care needs following discharge   - Consider OT consult to assist with ADL evaluation and planning for discharge  - Provide patient education as appropriate  Outcome: Progressing  Goal: Maintains/Returns to pre admission functional level  Description: INTERVENTIONS:  - Perform BMAT or MOVE assessment daily    - Set and communicate daily mobility goal to care team and patient/family/caregiver  - Collaborate with rehabilitation services on mobility goals if consulted  - Perform Range of Motion 3 times a day    - Reposition patient every 2 hours   - Dangle patient 3 times a day  - Stand patient 3 times a day  - Ambulate patient 3 times a day  - Out of bed to chair 2 times a day   - Out of bed for meals 2 times a day  - Out of bed for toileting  - Record patient progress and toleration of activity level   Outcome: Progressing     Problem: Potential for Falls  Goal: Patient will remain free of falls  Description: INTERVENTIONS:  - Educate patient/family on patient safety including physical limitations  - Instruct patient to call for assistance with activity   - Consult OT/PT to assist with strengthening/mobility   - Keep Call bell within reach  - Keep bed low and locked with side rails adjusted as appropriate  - Keep care items and personal belongings within reach  - Initiate and maintain comfort rounds  - Make Fall Risk Sign visible to staff  - Offer Toileting every 2 Hours, in advance of need  - Initiate/Maintain bed alarm  - Obtain necessary fall risk management equipment: yellow sock  - Apply yellow socks and bracelet for high fall risk patients  - Consider moving patient to room near nurses station  Outcome: Progressing

## 2022-01-10 ENCOUNTER — OFFICE VISIT (OUTPATIENT)
Dept: SURGERY | Facility: CLINIC | Age: 87
End: 2022-01-10

## 2022-01-10 VITALS
WEIGHT: 197 LBS | HEIGHT: 72 IN | SYSTOLIC BLOOD PRESSURE: 124 MMHG | DIASTOLIC BLOOD PRESSURE: 72 MMHG | HEART RATE: 75 BPM | TEMPERATURE: 97.7 F | RESPIRATION RATE: 20 BRPM | BODY MASS INDEX: 26.68 KG/M2

## 2022-01-10 DIAGNOSIS — K81.0 ACUTE CHOLECYSTITIS: Primary | ICD-10-CM

## 2022-01-10 PROCEDURE — 99024 POSTOP FOLLOW-UP VISIT: CPT | Performed by: SURGERY

## 2022-01-10 NOTE — PROGRESS NOTES
Assessment/Plan:  He is doing well  Clinically there is no icterus  I told him to continue with regular diet  Follow-up with me in 2 weeks  No problem-specific Assessment & Plan notes found for this encounter  Diagnoses and all orders for this visit:    Acute cholecystitis          Subjective:      Patient ID: Sergio Morin is a 82 y o  male  82-year-old male patient who is 3 days status post robotic assisted cholecystectomy with umbilical hernia repair  He is doing well  He says his drain came out yesterday on its own  Before coming about the drainage was clear and was minimal   He is tolerating diet  No nausea or vomiting  He had a bowel movement yesterday  He is passing flatus  No fevers  The following portions of the patient's history were reviewed and updated as appropriate: allergies, current medications, past family history, past medical history, past social history, past surgical history and problem list     Review of Systems   All other systems reviewed and are negative  Objective:      /72 (BP Location: Left arm, Patient Position: Sitting, Cuff Size: Adult)   Pulse 75   Temp 97 7 °F (36 5 °C)   Resp 20   Ht 6' (1 829 m)   Wt 89 4 kg (197 lb)   BMI 26 72 kg/m²          Physical Exam  Vitals reviewed  Constitutional:       Appearance: Normal appearance  HENT:      Head: Normocephalic and atraumatic  Nose: Nose normal       Mouth/Throat:      Mouth: Mucous membranes are moist    Eyes:      General: Scleral icterus present  Pupils: Pupils are equal, round, and reactive to light  Cardiovascular:      Rate and Rhythm: Normal rate  Abdominal:      General: Abdomen is flat  Palpations: Abdomen is soft  Comments: Incisions are healing well  There is a Monocryl suture in the right flank incision  Musculoskeletal:      Cervical back: Normal range of motion  Neurological:      Mental Status: He is alert

## 2022-01-11 ENCOUNTER — TELEPHONE (OUTPATIENT)
Dept: SURGERY | Facility: CLINIC | Age: 87
End: 2022-01-11

## 2022-01-11 NOTE — TELEPHONE ENCOUNTER
Post surgical call from 1/7 with Dr Chemo Suarez with patients daughter and she said that he was doing just fine  An appointment was made for him to come in on 1/10 because he had a drain that needed to be pulled  However, it fell out during the night  Patient came into see Dr Edgar Jolly and is doing great  Will be back in two weeks for a 2nd post op appointment

## 2022-01-21 ENCOUNTER — OFFICE VISIT (OUTPATIENT)
Dept: FAMILY MEDICINE CLINIC | Facility: CLINIC | Age: 87
End: 2022-01-21
Payer: MEDICARE

## 2022-01-21 VITALS
RESPIRATION RATE: 16 BRPM | DIASTOLIC BLOOD PRESSURE: 78 MMHG | OXYGEN SATURATION: 96 % | HEART RATE: 84 BPM | SYSTOLIC BLOOD PRESSURE: 122 MMHG | WEIGHT: 195 LBS | BODY MASS INDEX: 26.41 KG/M2 | HEIGHT: 72 IN

## 2022-01-21 DIAGNOSIS — E11.69 TYPE 2 DIABETES MELLITUS WITH OTHER SPECIFIED COMPLICATION, WITHOUT LONG-TERM CURRENT USE OF INSULIN (HCC): ICD-10-CM

## 2022-01-21 DIAGNOSIS — Z00.00 WELL ADULT EXAM: Primary | ICD-10-CM

## 2022-01-21 DIAGNOSIS — N18.31 TYPE 2 DIABETES MELLITUS WITH STAGE 3A CHRONIC KIDNEY DISEASE, WITHOUT LONG-TERM CURRENT USE OF INSULIN (HCC): ICD-10-CM

## 2022-01-21 DIAGNOSIS — E11.22 TYPE 2 DIABETES MELLITUS WITH STAGE 3A CHRONIC KIDNEY DISEASE, WITHOUT LONG-TERM CURRENT USE OF INSULIN (HCC): ICD-10-CM

## 2022-01-21 DIAGNOSIS — I48.0 PAROXYSMAL ATRIAL FIBRILLATION (HCC): ICD-10-CM

## 2022-01-21 PROCEDURE — G0439 PPPS, SUBSEQ VISIT: HCPCS | Performed by: FAMILY MEDICINE

## 2022-01-21 NOTE — PROGRESS NOTES
Assessment and Plan:     Problem List Items Addressed This Visit        Endocrine    Type 2 diabetes mellitus with stage 3a chronic kidney disease, without long-term current use of insulin ([facility] )       Cardiovascular and Mediastinum    Atrial fibrillation ([facility] )      Other Visit Diagnoses     Well adult exam    -  Primary        BMI Counseling: Body mass index is 26 45 kg/m²  The BMI is above normal  Nutrition recommendations include decreasing portion sizes, encouraging healthy choices of fruits and vegetables and limiting drinks that contain sugar  Exercise recommendations include moderate physical activity 150 minutes/week  No pharmacotherapy was ordered  Rationale for BMI follow-up plan is due to patient being overweight or obese  Depression Screening and Follow-up Plan: Patient was screened for depression during today's encounter  They screened negative with a PHQ-2 score of 0  Falls Plan of Care: balance, strength, and gait training instructions were provided  Medications that increase falls were reviewed  Preventive health issues were discussed with patient, and age appropriate screening tests were ordered as noted in patient's After Visit Summary  Personalized health advice and appropriate referrals for health education or preventive services given if needed, as noted in patient's After Visit Summary       History of Present Illness:     Patient presents for Medicare Annual Wellness visit    Patient Care Team:  Marilia Colby MD as PCP - General     Problem List:     Patient Active Problem List   Diagnosis    Atrial fibrillation ([facility] )    Type 2 diabetes mellitus with stage 3a chronic kidney disease, without long-term current use of insulin ([facility] )    Essential hypertension    HLD (hyperlipidemia)    GERD (gastroesophageal reflux disease)    CAD (coronary artery disease)    Ambulatory dysfunction    Frequent falls    Closed T12 fracture (HCC)    Chronic midline low back pain without sciatica    Intervertebral disc disorder with radiculopathy of lumbar region    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[facility] )    Anxiety    Acute cholecystitis    Hilar adenopathy    Troponin level elevated    CKD (chronic kidney disease)    Falls      Past Medical and Surgical History:     Past Medical History:   Diagnosis Date    Acute MI ([facility] )     12    Ambulates with cane     Anxiety     Arthritis     hands    At risk for falls     Atrial fibrillation (HCC)     Cholecystitis     CKD (chronic kidney disease)     Coronary artery disease     Diabetes mellitus (HCC)     GERD (gastroesophageal reflux disease)     Heart murmur     Hyperlipidemia     Hypertension     Low back pain     Lumbar disc disease     Umbilical hernia     Wears dentures     full set    Wears glasses      Past Surgical History:   Procedure Laterality Date    CARDIAC CATHETERIZATION      s/p MI    COLONOSCOPY      HERNIA REPAIR Left     X5    [place] LAP,CHOLECYSTECTOMY N/A 1/7/2022    Procedure: ROBOTIC ASSISTED LAPAROSCOPIC CHOLECYSTECTOMY;  Surgeon: Marcial Melgar MD;  [facility];  Service: General    TOTAL HIP ARTHROPLASTY Left     UMBILICAL HERNIA REPAIR LAPAROSCOPIC N/A 1/7/2022    Procedure: Open umbilical hernia repair;  Surgeon: Marcial Melgar MD;  [facility];  Service: General      Family History:     Family History   Problem Relation Age of Onset    No Known Problems Mother     No Known Problems Father       Social History:     Social History     Socioeconomic History    Marital status: /Civil Union     Spouse name: None    Number of children: None    Years of education: None    Highest education level: None   Occupational History    Occupation: retired   Tobacco Use    Smoking status: Never Smoker    Smokeless tobacco: Never Used   Vaping Use    Vaping Use: Never used   Substance and Sexual Activity    Alcohol use: Yes     Comment: rare    Drug use: No    Sexual activity: Not Currently   Other Topics Concern    None   Social History Narrative    Prabha:    Most recent tobacco use screenin2020      Do you currently or have you served in the [employer]:   No      Were you activated, into active duty, as a member of the [employer] or as a Reservist:   No      Occupation:   Retired      Marital status:         Sexual orientation:   Heterosexual      Exercise level:   Occasional      Diet:   Regular      General stress level:   Medium      Alcohol intake:   Occasional      Caffeine intake:    Moderate      Chewing tobacco:   none      Illicit drugs:   Denied      Guns present in home:   No      Seat belts used routinely:   Yes      Sunscreen used routinely:   Yes      Smoke alarm in home:   Yes      Advance directive:   Yes      Salt Intake:   Normal     Would the patient like to schedule a Mammogram:   No      Is the patient interested in a colorectal cancer screening:   No     Last modified by zulma   2020, 15:03      Social Determinants of Health     Financial Resource Strain: Not on file   Food Insecurity: No Food Insecurity    Worried About Running Out of Food in the Last Year: Never true    Ran Out of Food in the Last Year: Never true   Transportation Needs: Not on file   Physical Activity: Not on file   Stress: Not on file   Social Connections: Not on file   Intimate Partner Violence: Not on file   Housing Stability: Unknown    Unable to Pay for Housing in the Last Year: No    Number of [place] in the Last Year: 1    Unstable Housing in the Last Year: Not on file      Medications and Allergies:     Current Outpatient Medications   Medication Sig Dispense Refill    acetaminophen (TYLENOL) 325 mg tablet Take 2 tablets (650 mg total) by mouth every 6 (six) hours (Patient taking differently: Take 650 mg by mouth every 6 (six) hours as needed  ) 30 tablet 0    aspirin 81 mg chewable tablet Chew 81 mg daily Pt stopped       cyanocobalamin (VITAMIN B-12) 500 mcg tablet Take 1,000 mcg by mouth daily      docusate sodium (COLACE) 100 mg capsule Take 1 capsule (100 mg total) by mouth 2 (two) times a day (Patient taking differently: Take 100 mg by mouth every evening  ) 10 capsule 0    escitalopram (LEXAPRO) 5 mg tablet TAKE 1 TABLET BY MOUTH EVERY DAY 90 tablet 1    glipiZIDE (GLUCOTROL XL) 5 mg 24 hr tablet TAKE 1 TABLET BY MOUTH EVERY DAY 90 tablet 1    metFORMIN (GLUCOPHAGE) 500 mg tablet TAKE 2 TABLETS EVERY MORNING TAKE 2 TABLETS EVERY EVENING 360 tablet 1    metoprolol tartrate (LOPRESSOR) 25 mg tablet TAKE 1 TABLET (25 MG TOTAL) BY MOUTH EVERY 12 (TWELVE) HOURS 180 tablet 1    omeprazole (PriLOSEC) 40 MG capsule Take 1 capsule (40 mg total) by mouth daily before breakfast 90 capsule 3    simvastatin (ZOCOR) 40 mg tablet TAKE 1 TABLET BY MOUTH EVERY DAY (Patient taking differently: Take 40 mg by mouth daily at bedtime  ) 90 tablet 1     No current facility-administered medications for this visit  No Known Allergies   Immunizations:     Immunization History   Administered Date(s) Administered    COVID-19 PFIZER VACCINE 0 3 ML IM 01/19/2021, 02/08/2021    H1N1, All Formulations 12/19/2009    INFLUENZA 09/30/2010, 11/06/2013, 10/24/2014, 09/25/2015, 03/12/2018, 12/01/2018, 11/01/2019    Influenza Split High Dose Preservative Free IM 11/04/2019    Influenza, Seasonal Vaccine, Quadrivalent, Adjuvanted,   5e 10/26/2021    Influenza, recombinant, quadrivalent,injectable, preservative free 11/01/2019    Pneumococcal Conjugate 13-Valent 03/12/2018    Pneumococcal Polysaccharide PPV23 01/17/2020      Health Maintenance: There are no preventive care reminders to display for this patient        Topic Date Due    COVID-19 Vaccine (3 - Booster for Pfizer series) 08/08/2021    Influenza Vaccine (1) 09/01/2021      Medicare Health Risk Assessment:     /78 (BP Location: Left arm, Patient Position: Sitting, Cuff Size: Large)   Pulse 84 Resp 16   Ht 6' (1 829 m)   Wt 88 5 kg (195 lb)   SpO2 96%   BMI 26 45 kg/m²      Brad Iyer is here for his Subsequent Wellness visit  Last Medicare Wellness visit information reviewed, patient interviewed and updates made to the record today  Health Risk Assessment:   Patient rates overall health as good  Patient feels that their physical health rating is same  Patient is very satisfied with their life  Eyesight was rated as same  Hearing was rated as same  Patient feels that their emotional and mental health rating is same  Patients states they are never, rarely angry  Patient states they are never, rarely unusually tired/fatigued  Pain experienced in the last 7 days has been none  Patient states that he has experienced no weight loss or gain in last 6 months  Depression Screening:   PHQ-2 Score: 0      Fall Risk Screening: In the past year, patient has experienced: no history of falling in past year      Home Safety:  Patient has trouble with stairs inside or outside of their home  Patient has working smoke alarms and has working carbon monoxide detector  Home safety hazards include: none  Nutrition:   Current diet is Regular  Medications:   Patient is currently taking over-the-counter supplements  OTC medications include: see medication list  Patient is able to manage medications  Activities of Daily Living (ADLs)/Instrumental Activities of Daily Living (IADLs):   Walk and transfer into and out of bed and chair?: Yes  Dress and groom yourself?: Yes    Bathe or shower yourself?: Yes    Feed yourself?  Yes  Do your laundry/housekeeping?: Yes  Manage your money, pay your bills and track your expenses?: Yes  Make your own meals?: Yes    Do your own shopping?: Yes    Previous Hospitalizations:   Any hospitalizations or ED visits within the last 12 months?: Yes    How many hospitalizations have you had in the last year?: 1-2    Advance Care Planning:   Living will: Yes    Durable POA for healthcare: Yes    Advanced directive: Yes    Five wishes given: No      Cognitive Screening:   Provider or family/friend/caregiver concerned regarding cognition?: No    PREVENTIVE SCREENINGS      Cardiovascular Screening:    General: Screening Not Indicated and History Lipid Disorder    Due for: Lipid Panel      Diabetes Screening:     General: Screening Not Indicated and History Diabetes    Due for: Blood Glucose      Colorectal Cancer Screening:     General: Screening Not Indicated      Prostate Cancer Screening:    General: Screening Not Indicated      Osteoporosis Screening:    General: Screening Not Indicated      Abdominal Aortic Aneurysm (AAA) Screening:        General: Screening Not Indicated      Lung Cancer Screening:     General: Screening Not Indicated      Hepatitis C Screening:      Hep C Screening Accepted: No     Screening, Brief Intervention, and Referral to Treatment (SBIRT)    Screening  Typical number of drinks in a day: 0  Typical number of drinks in a week: 0  Interpretation: Low risk drinking behavior  Single Item Drug Screening:  How often have you used an illegal drug (including marijuana) or a prescription medication for non-medical reasons in the past year? never    Single Item Drug Screen Score: 0  Interpretation: Negative screen for possible drug use disorder    Brief Intervention  Alcohol & drug use screenings were reviewed  No concerns regarding substance use disorder identified         Noé Morgan MD

## 2022-01-21 NOTE — PATIENT INSTRUCTIONS
Medicare Preventive Visit Patient Instructions  Thank you for completing your Welcome to Medicare Visit or Medicare Annual Wellness Visit today  Your next wellness visit will be due in one year (1/22/2023)  The screening/preventive services that you may require over the next 5-10 years are detailed below  Some tests may not apply to you based off risk factors and/or age  Screening tests ordered at today's visit but not completed yet may show as past due  Also, please note that scanned in results may not display below  Preventive Screenings:  Service Recommendations Previous Testing/Comments   Colorectal Cancer Screening  · Colonoscopy    · Fecal Occult Blood Test (FOBT)/Fecal Immunochemical Test (FIT)  · Fecal DNA/Cologuard Test  · Flexible Sigmoidoscopy Age: 54-65 years old   Colonoscopy: every 10 years (May be performed more frequently if at higher risk)  OR  FOBT/FIT: every 1 year  OR  Cologuard: every 3 years  OR  Sigmoidoscopy: every 5 years  Screening may be recommended earlier than age 48 if at higher risk for colorectal cancer  Also, an individualized decision between you and your healthcare provider will decide whether screening between the ages of 74-80 would be appropriate   Colonoscopy: Not on file  FOBT/FIT: Not on file  Cologuard: Not on file  Sigmoidoscopy: Not on file          Prostate Cancer Screening Individualized decision between patient and health care provider in men between ages of 53-78   Medicare will cover every 12 months beginning on the day after your 50th birthday PSA: No results in last 5 years     Screening Not Indicated     Hepatitis C Screening Once for adults born between [facility]  More frequently in patients at high risk for Hepatitis C Hep C Antibody: Not on file        Diabetes Screening 1-2 times per year if you're at risk for diabetes or have pre-diabetes Fasting glucose: 117 mg/dL   A1C: 7 4 %    Screening Not Indicated  History Diabetes   Cholesterol Screening Once every 5 years if you don't have a lipid disorder  May order more often based on risk factors  Lipid panel: 08/03/2021    Screening Not Indicated  History Lipid Disorder      Other Preventive Screenings Covered by Medicare:  1  Abdominal Aortic Aneurysm (AAA) Screening: covered once if your at risk  You're considered to be at risk if you have a family history of AAA or a male between the age of 73-68 who smoking at least 100 cigarettes in your lifetime  2  Lung Cancer Screening: covers low dose CT scan once per year if you meet all of the following conditions: (1) Age 50-69; (2) No signs or symptoms of lung cancer; (3) Current smoker or have quit smoking within the last 15 years; (4) You have a tobacco smoking history of at least 30 pack years (packs per day x number of years you smoked); (5) You get a written order from a healthcare provider  3  Glaucoma Screening: covered annually if you're considered high risk: (1) You have diabetes OR (2) Family history of glaucoma OR (3)  aged 48 and older OR (3)  American aged 72 and older  3  Osteoporosis Screening: covered every 2 years if you meet one of the following conditions: (1) Have a vertebral abnormality; (2) On glucocorticoid therapy for more than 3 months; (3) Have primary hyperparathyroidism; (4) On osteoporosis medications and need to assess response to drug therapy  5  HIV Screening: covered annually if you're between the age of 12-76  Also covered annually if you are younger than 13 and older than 72 with risk factors for HIV infection  For pregnant patients, it is covered up to 3 times per pregnancy      Immunizations:  Immunization Recommendations   Influenza Vaccine Annual influenza vaccination during flu season is recommended for all persons aged >= 6 months who do not have contraindications   Pneumococcal Vaccine (Prevnar and Pneumovax)  * Prevnar = PCV13  * Pneumovax = PPSV23 Adults 25-60 years old: 1-3 doses may be recommended based on certain risk factors  Adults 72 years old: Prevnar (PCV13) vaccine recommended followed by Pneumovax (PPSV23) vaccine  If already received PPSV23 since turning 65, then PCV13 recommended at least one year after PPSV23 dose  Hepatitis B Vaccine 3 dose series if at intermediate or high risk (ex: diabetes, end stage renal disease, liver disease)   Tetanus (Td) Vaccine - COST NOT COVERED BY MEDICARE PART B Following completion of primary series, a booster dose should be given every 10 years to maintain immunity against tetanus  Td may also be given as tetanus wound prophylaxis  Tdap Vaccine - COST NOT COVERED BY MEDICARE PART B Recommended at least once for all adults  For pregnant patients, recommended with each pregnancy  Shingles Vaccine (Shingrix) - COST NOT COVERED BY MEDICARE PART B  2 shot series recommended in those aged 48 and above     Health Maintenance Due:  There are no preventive care reminders to display for this patient  Immunizations Due:      Topic Date Due    COVID-19 Vaccine (3 - Booster for Pfizer series) 08/08/2021    Influenza Vaccine (1) 09/01/2021     Advance Directives   What are advance directives? Advance directives are legal documents that state your wishes and plans for medical care  These plans are made ahead of time in case you lose your ability to make decisions for yourself  Advance directives can apply to any medical decision, such as the treatments you want, and if you want to donate organs  What are the types of advance directives? There are many types of advance directives, and each state has rules about how to use them  You may choose a combination of any of the following:  · Living will: This is a written record of the treatment you want  You can also choose which treatments you do not want, which to limit, and which to stop at a certain time  This includes surgery, medicine, IV fluid, and tube feedings     · Durable power of  for healthcare [facility]): This is a written record that states who you want to make healthcare choices for you when you are unable to make them for yourself  This person, called a proxy, is usually a family member or a friend  You may choose more than 1 proxy  · Do not resuscitate (DNR) order:  A DNR order is used in case your heart stops beating or you stop breathing  It is a request not to have certain forms of treatment, such as CPR  A DNR order may be included in other types of advance directives  · Medical directive: This covers the care that you want if you are in a coma, near death, or unable to make decisions for yourself  You can list the treatments you want for each condition  Treatment may include pain medicine, surgery, blood transfusions, dialysis, IV or tube feedings, and a ventilator (breathing machine)  · Values history: This document has questions about your views, beliefs, and how you feel and think about life  This information can help others choose the care that you would choose  Why are advance directives important? An advance directive helps you control your care  Although spoken wishes may be used, it is better to have your wishes written down  Spoken wishes can be misunderstood, or not followed  Treatments may be given even if you do not want them  An advance directive may make it easier for your family to make difficult choices about your care  Weight Management   Why it is important to manage your weight:  Being overweight increases your risk of health conditions such as heart disease, high blood pressure, type 2 diabetes, and certain types of cancer  It can also increase your risk for osteoarthritis, sleep apnea, and other respiratory problems  Aim for a slow, steady weight loss  Even a small amount of weight loss can lower your risk of health problems  How to lose weight safely:  A safe and healthy way to lose weight is to eat fewer calories and get regular exercise   You can lose up about 1 pound a week by decreasing the number of calories you eat by 500 calories each day  Healthy meal plan for weight management:  A healthy meal plan includes a variety of foods, contains fewer calories, and helps you stay healthy  A healthy meal plan includes the following:  · Eat whole-grain foods more often  A healthy meal plan should contain fiber  Fiber is the part of grains, fruits, and vegetables that is not broken down by your body  Whole-grain foods are healthy and provide extra fiber in your diet  Some examples of whole-grain foods are whole-wheat breads and pastas, oatmeal, brown rice, and bulgur  · Eat a variety of vegetables every day  Include dark, leafy greens such as spinach, kale, qasim greens, and mustard greens  Eat yellow and orange vegetables such as carrots, sweet potatoes, and winter squash  · Eat a variety of fruits every day  Choose fresh or canned fruit (canned in its own juice or light syrup) instead of juice  Fruit juice has very little or no fiber  · Eat low-fat dairy foods  Drink fat-free (skim) milk or 1% milk  Eat fat-free yogurt and low-fat cottage cheese  Try low-fat cheeses such as mozzarella and other reduced-fat cheeses  · Choose meat and other protein foods that are low in fat  Choose beans or other legumes such as split peas or lentils  Choose fish, skinless poultry (chicken or turkey), or lean cuts of red meat (beef or pork)  Before you cook meat or poultry, cut off any visible fat  · Use less fat and oil  Try baking foods instead of frying them  Add less fat, such as margarine, sour cream, regular salad dressing and mayonnaise to foods  Eat fewer high-fat foods  Some examples of high-fat foods include french fries, doughnuts, ice cream, and cakes  · Eat fewer sweets  Limit foods and drinks that are high in sugar  This includes candy, cookies, regular soda, and sweetened drinks  Exercise:  Exercise at least 30 minutes per day on most days of the week   Some examples of exercise include walking, biking, dancing, and swimming  You can also fit in more physical activity by taking the stairs instead of the elevator or parking farther away from stores  Ask your healthcare provider about the best exercise plan for you  © Copyright [employer] 2018 Information is for End User's use only and may not be sold, redistributed or otherwise used for commercial purposes  All illustrations and images included in CareNotes® are the copyrighted property of [employer]  or [address]  10% - bad control"> 10% - bad control,Hemoglobin A1c (HbA1c) greater than 10% indicating poor diabetic control,Haemoglobin A1c greater than 10% indicating poor diabetic control">   Diabetes Mellitus Type 2 in Adults, Ambulatory Care   GENERAL INFORMATION:   Diabetes mellitus type 2  is a disease that affects how your body uses glucose (sugar)  Insulin helps move sugar out of the blood so it can be used for energy  Normally, when the blood sugar level increases, the pancreas makes more insulin  Type 2 diabetes develops because either the body cannot make enough insulin, or it cannot use the insulin correctly  After many years, your pancreas may stop making insulin  Common symptoms include the following:   · More hunger or thirst than usual     · Frequent urination     · Weight loss without trying     · Blurred vision  Seek immediate care for the following symptoms:   · Severe abdominal pain, or pain that spreads to your back  You may also be vomiting  · Trouble staying awake or focusing    · Shaking or sweating    · Blurred or double vision    · Breath has a fruity, sweet smell    · Breathing is deep and labored, or rapid and shallow    · Heartbeat is fast and weak  Treatment for diabetes mellitus type 2  includes keeping your blood sugar at a normal level  You must eat the right foods, and exercise regularly   You may also need medicine if you cannot control your blood sugar level with nutrition and exercise  Manage diabetes mellitus type 2:   · Check your blood sugar level  You will be taught how to check a small drop of blood in a glucose monitor  Ask your healthcare provider when and how often to check during the day  Ask your healthcare provider what your blood sugar levels should be when you check them  · Keep track of carbohydrates (sugar and starchy foods)  Your blood sugar level can get too high if you eat too many carbohydrates  Your dietitian will help you plan meals and snacks that have the right amount of carbohydrates  · Eat low-fat foods  Some examples are skinless chicken and low-fat milk  · Eat less sodium (salt)  Some examples of high-sodium foods to limit are soy sauce, potato chips, and soup  Do not add salt to food you cook  Limit your use of table salt  · Eat high-fiber foods  Foods that are a good source of fiber include vegetables, whole grain bread, and beans  · Limit alcohol  Alcohol affects your blood sugar level and can make it harder to manage your diabetes  Women should limit alcohol to 1 drink a day  Men should limit alcohol to 2 drinks a day  A drink of alcohol is 12 ounces of beer, 5 ounces of wine, or 1½ ounces of liquor  · Get regular exercise  Exercise can help keep your blood sugar level steady, decrease your risk of heart disease, and help you lose weight  Exercise for at least 30 minutes, 5 days a week  Include muscle strengthening activities 2 days each week  Work with your healthcare provider to create an exercise plan  · Check your feet each day  for injuries or open sores  Ask your healthcare provider for activities you can do if you have an open sore  · Quit smoking  If you smoke, it is never too late to quit  Smoking can worsen the problems that may occur with diabetes  Ask your healthcare provider for information about how to stop smoking if you are having trouble quitting       · Ask about your weight:  Ask healthcare providers if you need to lose weight, and how much to lose  Ask them to help you with a weight loss program  Even a 10 to 15 pound weight loss can help you manage your blood sugar level  · Carry medical alert identification  Wear medical alert jewelry or carry a card that says you have diabetes  Ask your healthcare provider where to get these items  · Ask about vaccines  Diabetes puts you at risk of serious illness if you get the flu, pneumonia, or hepatitis  Ask your healthcare provider if you should get a flu, pneumonia, or hepatitis B vaccine, and when to get the vaccine  Follow up with your healthcare provider as directed:  Write down your questions so you remember to ask them during your visits  CARE AGREEMENT:   You have the right to help plan your care  Learn about your health condition and how it may be treated  Discuss treatment options with your caregivers to decide what care you want to receive  You always have the right to refuse treatment  The above information is an  only  It is not intended as medical advice for individual conditions or treatments  Talk to your doctor, nurse or pharmacist before following any medical regimen to see if it is safe and effective for you  © 2014 [address] is for End User's use only and may not be sold, redistributed or otherwise used for commercial purposes  All illustrations and images included in CareNotes® are the copyrighted property of A D A M [employer]  or Jin Han  Foot Care for People with Diabetes   AMBULATORY CARE:   What you need to know about foot care:   · Foot care helps protect your feet and prevent foot ulcers or sores  Long-term high blood sugar levels can damage the blood vessels and nerves in your legs and feet  This damage makes it hard to feel pressure, pain, temperature, and touch  You may not be able to feel a cut or sore, or shoes that are too tight   Foot care is needed to prevent serious problems, such as an infection or amputation  · Diabetes may cause your toes to become crooked or curved under  These changes may affect the way you walk and can lead to increased pressure on your foot  The pressure can decrease blood flow to your feet  Lack of blood flow increases your risk for a foot ulcer  Do not ignore small problems, such as dry skin or small wounds  These can become life-threatening over time without proper care  Call your care team provider if:   · Your feet become numb, weak, or hard to move  · You have pus draining from a sore on your foot  · You have a wound on your foot that gets bigger, deeper, or does not heal      · You see blisters, cuts, scratches, calluses, or sores on your foot  · You have a fever, and your feet become red, warm, and swollen  · Your toenails become thick, curled, or yellow  · You find it hard to check your feet because your vision is poor  · You have questions or concerns about your condition or care  How to care for your feet:   · Check your feet each day  Look at your whole foot, including the bottom, and between and under your toes  Check for wounds, corns, and calluses  Use a mirror to see the bottom of your feet  The skin on your feet may be shiny, tight, or darker than normal  Your feet may also be cold and pale  Feel your feet by running your hands along the tops, bottoms, sides, and between your toes  Redness, swelling, and warmth are signs of blood flow problems that can lead to a foot ulcer  Do not try to remove corns or calluses yourself  · Wash your feet each day with soap and warm water  Do not use hot water, because this can injure your foot  Dry your feet gently with a towel after you wash them  Dry between and under your toes  · Apply lotion or a moisturizer on your dry feet  Ask your care team provider what lotions are best to use  Do not put lotion or moisturizer between your toes   Moisture between your toes could lead to skin breakdown  · Cut your toenails correctly  File or cut your toenails straight across  Use a soft brush to clean around your toenails  If your toenails are very thick, you may need to have a care team provider or specialist cut them  · Protect your feet  Do not walk barefoot or wear your shoes without socks  Check your shoes for rocks or other objects that can hurt your feet  Wear cotton socks to help keep your feet dry  Wear socks without toe seams, or wear them with the seams inside out  Change your socks each day  Do not wear socks that are dirty or damp  · Wear shoes that fit well  Wear shoes that do not rub against any area of your feet  Your shoes should be ½ to ¾ inch (1 to 2 centimeters) longer than your feet  Your shoes should also have extra space around the widest part of your feet  Walking or athletic shoes with laces or straps that adjust are best  Ask your care team provider for help to choose shoes that fit you best  Ask him or her if you need to wear an insert, orthotic, or bandage on your feet  · Go to your follow-up visits  Your care team provider will do a foot exam at least once a year  You may need a foot exam more often if you have nerve damage, foot deformities, or ulcers  He will check for nerve damage and how well you can feel your feet  He will check your shoes to see if they fit well  · Do not smoke  Smoking can damage your blood vessels and put you at increased risk for foot ulcers  Ask your care team provider for information if you currently smoke and need help to quit  E-cigarettes or smokeless tobacco still contain nicotine  Talk to your care team provider before you use these products  Follow up with your diabetes care team provider or foot specialist as directed: You will need to have your feet checked at least once a year  You may need a foot exam more often if you have nerve damage, foot deformities, or ulcers   Write down your questions so you remember to ask them during your visits  © Copyright [employer] 2021 Information is for End User's use only and may not be sold, redistributed or otherwise used for commercial purposes  All illustrations and images included in CareNotes® are the copyrighted property of A D A M [employer] Inc  or Norma Lemus  The above information is an  only  It is not intended as medical advice for individual conditions or treatments  Talk to your doctor, nurse or pharmacist before following any medical regimen to see if it is safe and effective for you

## 2022-01-24 ENCOUNTER — OFFICE VISIT (OUTPATIENT)
Dept: SURGERY | Facility: CLINIC | Age: 87
End: 2022-01-24

## 2022-01-24 VITALS
DIASTOLIC BLOOD PRESSURE: 84 MMHG | BODY MASS INDEX: 27.09 KG/M2 | TEMPERATURE: 97.8 F | RESPIRATION RATE: 20 BRPM | SYSTOLIC BLOOD PRESSURE: 132 MMHG | WEIGHT: 200 LBS | HEIGHT: 72 IN | HEART RATE: 70 BPM

## 2022-01-24 DIAGNOSIS — K81.0 ACUTE CHOLECYSTITIS: Primary | ICD-10-CM

## 2022-01-24 PROCEDURE — 99024 POSTOP FOLLOW-UP VISIT: CPT | Performed by: SURGERY

## 2022-01-24 NOTE — PROGRESS NOTES
Assessment/Plan:  Patient is doing very well  No heavy lifting for another 2 weeks  Follow-up with bell whipple  No problem-specific Assessment & Plan notes found for this encounter  Diagnoses and all orders for this visit:    Acute cholecystitis          Subjective:      Patient ID: Saad Banda is a 82 y o  male  28-year-old male who is 2 weeks status post laparoscopic robotic assisted cholecystectomy  He is doing very well  No nausea vomiting  No pain  Tolerating diet  The following portions of the patient's history were reviewed and updated as appropriate: allergies, current medications, past family history, past medical history, past social history, past surgical history and problem list     Review of Systems   All other systems reviewed and are negative  Objective:      /84 (BP Location: Left arm, Patient Position: Sitting, Cuff Size: Adult)   Pulse 70   Temp 97 8 °F (36 6 °C)   Resp 20   Ht 6' (1 829 m)   Wt 90 7 kg (200 lb)   BMI 27 12 kg/m²          Physical Exam  Vitals reviewed  HENT:      Head: Normocephalic  Eyes:      General: No scleral icterus  Abdominal:      General: Abdomen is flat  Bowel sounds are normal       Palpations: Abdomen is soft  Comments: Incisions healing well   Musculoskeletal:         General: Normal range of motion  Skin:     General: Skin is warm  Neurological:      Mental Status: He is alert

## 2022-02-08 ENCOUNTER — APPOINTMENT (OUTPATIENT)
Dept: LAB | Facility: CLINIC | Age: 87
End: 2022-02-08
Payer: MEDICARE

## 2022-02-08 DIAGNOSIS — E11.69 TYPE 2 DIABETES MELLITUS WITH OTHER SPECIFIED COMPLICATION, WITHOUT LONG-TERM CURRENT USE OF INSULIN (HCC): ICD-10-CM

## 2022-02-08 LAB
CHOLEST SERPL-MCNC: 128 MG/DL
HDLC SERPL-MCNC: 34 MG/DL
LDLC SERPL CALC-MCNC: 69 MG/DL (ref 0–100)
TRIGL SERPL-MCNC: 124 MG/DL

## 2022-02-08 PROCEDURE — 83036 HEMOGLOBIN GLYCOSYLATED A1C: CPT

## 2022-02-08 PROCEDURE — 80061 LIPID PANEL: CPT

## 2022-02-08 PROCEDURE — 36415 COLL VENOUS BLD VENIPUNCTURE: CPT

## 2022-02-09 LAB
EST. AVERAGE GLUCOSE BLD GHB EST-MCNC: 120 MG/DL
HBA1C MFR BLD: 5.8 %

## 2022-04-04 DIAGNOSIS — E11.69 TYPE 2 DIABETES MELLITUS WITH OTHER SPECIFIED COMPLICATION, WITHOUT LONG-TERM CURRENT USE OF INSULIN (HCC): ICD-10-CM

## 2022-04-04 DIAGNOSIS — E78.5 HYPERLIPIDEMIA, UNSPECIFIED HYPERLIPIDEMIA TYPE: ICD-10-CM

## 2022-04-04 DIAGNOSIS — K21.9 GASTROESOPHAGEAL REFLUX DISEASE WITHOUT ESOPHAGITIS: ICD-10-CM

## 2022-04-04 RX ORDER — SIMVASTATIN 40 MG
TABLET ORAL
Qty: 90 TABLET | Refills: 1 | Status: SHIPPED | OUTPATIENT
Start: 2022-04-04

## 2022-04-04 RX ORDER — OMEPRAZOLE 40 MG/1
CAPSULE, DELAYED RELEASE ORAL
Qty: 90 CAPSULE | Refills: 3 | Status: SHIPPED | OUTPATIENT
Start: 2022-04-04

## 2022-06-06 NOTE — ANESTHESIA PREPROCEDURE EVALUATION
Procedure:  ROBOTIC ASSISTED LAPAROSCOPIC CHOLECYSTECTOMY (N/A Abdomen)  ROBOTIC UMBILICAL HERNIA REPAIR (N/A Abdomen)    Relevant Problems   CARDIO   (+) Atrial fibrillation (HCC)   (+) CAD (coronary artery disease)   (+) Essential hypertension   (+) HLD (hyperlipidemia)      ENDO   (+) T2DM (type 2 diabetes mellitus) (HCC)      GI/HEPATIC   (+) GERD (gastroesophageal reflux disease)      /RENAL   (+) CKD (chronic kidney disease)      MUSCULOSKELETAL   (+) Chronic midline low back pain without sciatica      NEURO/PSYCH   (+) Anxiety      Other   (+) Hilar adenopathy        Physical Exam    Airway    Mallampati score: III  TM Distance: >3 FB  Neck ROM: full     Dental   upper dentures and lower dentures,     Cardiovascular  Rhythm: regular, Rate: normal, Cardiovascular exam normal    Pulmonary  Pulmonary exam normal Breath sounds clear to auscultation,     Other Findings        Anesthesia Plan  ASA Score- 3     Anesthesia Type- general with ASA Monitors  Additional Monitors:   Airway Plan: ETT  Plan Factors-    Chart reviewed  EKG reviewed  Imaging results reviewed  Existing labs reviewed  Patient summary reviewed  Induction- intravenous  Postoperative Plan- Plan for postoperative opioid use  Informed Consent- Anesthetic plan and risks discussed with patient  I personally reviewed this patient with the CRNA  Discussed and agreed on the Anesthesia Plan with the CRNA  Dedrick Vogel Odomzo Counseling- I discussed with the patient the risks of Odomzo including but not limited to nausea, vomiting, diarrhea, constipation, weight loss, changes in the sense of taste, decreased appetite, muscle spasms, and hair loss.  The patient verbalized understanding of the proper use and possible adverse effects of Odomzo.  All of the patient's questions and concerns were addressed.

## 2022-06-10 ENCOUNTER — APPOINTMENT (EMERGENCY)
Dept: CT IMAGING | Facility: HOSPITAL | Age: 87
End: 2022-06-10
Payer: MEDICARE

## 2022-06-10 ENCOUNTER — HOSPITAL ENCOUNTER (EMERGENCY)
Facility: HOSPITAL | Age: 87
Discharge: HOME | End: 2022-06-10
Attending: EMERGENCY MEDICINE
Payer: MEDICARE

## 2022-06-10 VITALS
OXYGEN SATURATION: 99 % | TEMPERATURE: 97.8 F | RESPIRATION RATE: 18 BRPM | SYSTOLIC BLOOD PRESSURE: 146 MMHG | HEART RATE: 64 BPM | BODY MASS INDEX: 28.29 KG/M2 | DIASTOLIC BLOOD PRESSURE: 81 MMHG | WEIGHT: 208.56 LBS

## 2022-06-10 DIAGNOSIS — M54.2 NECK PAIN: ICD-10-CM

## 2022-06-10 DIAGNOSIS — T14.8XXA ABRASION: Primary | ICD-10-CM
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
## 2022-06-10 PROCEDURE — 70450 CT HEAD/BRAIN W/O DYE: CPT

## 2022-06-10 PROCEDURE — 99284 EMERGENCY DEPT VISIT MOD MDM: CPT

## 2022-06-10 PROCEDURE — 72125 CT NECK SPINE W/O DYE: CPT

## 2022-06-10 PROCEDURE — G1004 CDSM NDSC: HCPCS

## 2022-06-10 PROCEDURE — 99282 EMERGENCY DEPT VISIT SF MDM: CPT | Performed by: EMERGENCY MEDICINE

## 2022-06-10 RX ORDER — GINSENG 100 MG
1 CAPSULE ORAL ONCE
Status: COMPLETED | OUTPATIENT
Start: 2022-06-10 | End: 2022-06-10

## 2022-06-10 RX ADMIN — BACITRACIN ZINC 1 SMALL APPLICATION: 500 OINTMENT TOPICAL at 15:26

## 2022-06-10 NOTE — ED PROVIDER NOTES
Emergency Department Trauma Note  Delmi Altamirano 82 y o  male MRN: [id number]  Unit/Bed#: ED 16/ED 16 Encounter: [id number]      Trauma Alert: Trauma Acuity: C  Model of Arrival: Mode of Arrival: BLS via    Trauma Team: Current Providers  Attending Provider: Brian Fortune MD  Registered Nurse: Mikki Christine RN  ED Technician: Ave Day  ED Technician: Yolanda Mack  Registered Nurse: Moses Mustafa RN  Consultants:     None      History of Present Illness     Chief Complaint:   Chief Complaint   Patient presents with    Fall     fall this afternoon, + asa, also fell on Saturday, abrasion to R forehead and b/l scabs on knees from fall on Saturday, GCS 15     HPI:  Delmi Altamirano is a 82 y o  male who presents for evaluation after a fall  He has an abrasion to his right forehead and pain in his neck  Mechanism:Details of Incident: fall this afternoon, + asa, also fell on Saturday, abrasion to R forehead and b/l scabs on knees from fall on Saturday, GCS 15 Injury Date: 06/10/22 Injury Time: 200      Thai Seats is an 82 y o  male who presents with the chief complaint of a head injury (forehead abrasion, swelling) and neck pain following a fall about 1 hour ago  He reports that he has bad balance and actually falls somewhat frequently (falling 6 days ago as well onto his knees)  He states he started to tip forward today and he was unable to keep himself from falling  He struck his forehead  He did not lose consciousness  He has midline neck pain (but reports he had this pain when he woke up- before the fall) and an abrasion to his right forehead  He takes aspirin  No other blood thinners  No nausea, vomiting, palpitations, fevers, chills, shortness of breath preceding or following the fall  No limb or joint pain  He has healing scabbed wounds to his bilateral knees         History provided by:  Patient   used: No    Fall  Mechanism of injury: fall    Injury location: Head/neck  Head/neck injury location:  Head  Incident location:  Home  Time since incident:  1 hour  Arrived directly from scene: yes    Fall:     Fall occurred:  Standing    Impact surface: driveway  Point of impact:  Head    Entrapped after fall: no    Suspicion of alcohol use: no    Suspicion of drug use: no    Prior to arrival data:     Patient ambulatory at scene: yes      Blood loss:  Minimal    Responsiveness at scene:  Alert    Orientation at scene:  Person, situation, place and time    Loss of consciousness: no      Amnesic to event: no      Immobilization:  C-collar  Associated symptoms: headaches and neck pain    Associated symptoms: no abdominal pain, no back pain, no blindness, no chest pain, no difficulty breathing, no hearing loss, no loss of consciousness, no nausea, no seizures and no vomiting    Risk factors: no anticoagulation therapy      Review of Systems   Constitutional: Negative for chills, diaphoresis and fever  HENT: Negative for hearing loss  Eyes: Negative for blindness  Respiratory: Negative for shortness of breath  Cardiovascular: Negative for chest pain and palpitations  Gastrointestinal: Negative for abdominal pain, diarrhea, nausea and vomiting  Genitourinary: Negative for dysuria and frequency  Musculoskeletal: Positive for neck pain  Negative for back pain  Skin: Positive for wound (abrasion to right forehead)  Negative for rash  Neurological: Positive for headaches  Negative for seizures and loss of consciousness  All other systems reviewed and are negative        Historical Information     Immunizations:   Immunization History   Administered Date(s) Administered    COVID-19 PFIZER VACCINE 0 3 ML IM 01/19/2021, 02/08/2021    COVID-19 Pfizer vac (Bentley-sucrose, gray cap) 12 yr+ IM 04/05/2022    H1N1, All Formulations 12/19/2009    INFLUENZA 09/30/2010, 11/06/2013, 10/24/2014, 09/25/2015, 03/12/2018, 12/01/2018, 11/01/2019    Influenza Split High Dose Preservative Free IM 11/04/2019    Influenza, Seasonal Vaccine, Quadrivalent, Adjuvanted,   5e 10/26/2021    Influenza, recombinant, quadrivalent,injectable, preservative free 11/01/2019    Pneumococcal Conjugate 13-Valent 03/12/2018    Pneumococcal Polysaccharide PPV23 01/17/2020       Past Medical History:   Diagnosis Date    Acute MI ([facility] [address] )     1991    Ambulates with cane     Anxiety     Arthritis     hands    At risk for falls     Atrial fibrillation (HCC)     Cholecystitis     CKD (chronic kidney disease)     Coronary artery disease     Diabetes mellitus (HCC)     GERD (gastroesophageal reflux disease)     Heart murmur     Hyperlipidemia     Hypertension     Low back pain     Lumbar disc disease     Umbilical hernia     Wears dentures     full set    Wears glasses        Family History   Problem Relation Age of Onset    No Known Problems Mother     No Known Problems Father      Past Surgical History:   Procedure Laterality Date    CARDIAC CATHETERIZATION      s/p MI    COLONOSCOPY      HERNIA REPAIR Left     X5    MO LAP,CHOLECYSTECTOMY N/A 1/7/2022    Procedure: ROBOTIC ASSISTED LAPAROSCOPIC CHOLECYSTECTOMY;  Surgeon: Lauryn Baez MD;  Location: [facility];  Service: General    TOTAL HIP ARTHROPLASTY Left     UMBILICAL HERNIA REPAIR LAPAROSCOPIC N/A 1/7/2022    Procedure: Open umbilical hernia repair;  Surgeon: Lauryn Baez MD;  Location: [facility];  Service: General     Social History     Tobacco Use    Smoking status: Never Smoker    Smokeless tobacco: Never Used   Vaping Use    Vaping Use: Never used   Substance Use Topics    Alcohol use: Yes     Comment: rare    Drug use: No     E-Cigarette/Vaping    E-Cigarette Use Never User      E-Cigarette/Vaping Substances    Nicotine No     THC No     CBD No     Flavoring No     Other No     Unknown No        Family History: non-contributory    Meds/Allergies   Prior to Admission Medications   Prescriptions Last Dose Informant Patient Reported? Taking?   acetaminophen (TYLENOL) 325 mg tablet  Self No No   Sig: Take 2 tablets (650 mg total) by mouth every 6 (six) hours   Patient taking differently: Take 650 mg by mouth every 6 (six) hours as needed     aspirin 81 mg chewable tablet  Self Yes No   Sig: Chew 81 mg daily Pt stopped    cyanocobalamin (VITAMIN B-12) 500 mcg tablet  Self Yes No   Sig: Take 1,000 mcg by mouth daily   docusate sodium (COLACE) 100 mg capsule  Self No No   Sig: Take 1 capsule (100 mg total) by mouth 2 (two) times a day   Patient taking differently: Take 100 mg by mouth every evening     escitalopram (LEXAPRO) 5 mg tablet  Self No No   Sig: TAKE 1 TABLET BY MOUTH EVERY DAY   glipiZIDE (GLUCOTROL XL) 5 mg 24 hr tablet  Self No No   Sig: TAKE 1 TABLET BY MOUTH EVERY DAY   metFORMIN (GLUCOPHAGE) 500 mg tablet   No No   Sig: TAKE 2 TABLETS EVERY MORNING TAKE 2 TABLETS EVERY EVENING   metoprolol tartrate (LOPRESSOR) 25 mg tablet  Self No No   Sig: TAKE 1 TABLET (25 MG TOTAL) BY MOUTH EVERY 12 (TWELVE) HOURS   omeprazole (PriLOSEC) 40 MG capsule   No No   Sig: TAKE 1 CAPSULE BY MOUTH EVERY DAY BEFORE BREAKFAST   simvastatin (ZOCOR) 40 mg tablet   No No   Sig: TAKE 1 TABLET BY MOUTH EVERY DAY      Facility-Administered Medications: None       No Known Allergies    PHYSICAL EXAM    PE limited by: nothing    Objective   Vitals:   First set: Temperature: 97 8 °F (36 6 °C) (06/10/22 1406)  Pulse: 70 (06/10/22 1406)  Respirations: 20 (06/10/22 1406)  Blood Pressure: 145/69 (06/10/22 1406)  SpO2: 98 % (06/10/22 1406)    Primary Survey:   (A) Airway: intact  (B) Breathing: CTAB, non-labored  (C) Circulation: Pulses:   normal  (D) Disabliity:  GCS Total:  15  (E) Expose:  Completed    Secondary Survey: (Click on Physical Exam tab above)  Physical Exam  Vitals and nursing note reviewed  Constitutional:       General: He is in acute distress (mild)  Appearance: He is well-developed     HENT:      Head: Normocephalic and atraumatic  Eyes:      Extraocular Movements: Extraocular movements intact  Pupils: Pupils are equal, round, and reactive to light  Neck:      Vascular: No JVD  Cardiovascular:      Rate and Rhythm: Normal rate and regular rhythm  Heart sounds: Normal heart sounds  No murmur heard  No friction rub  No gallop  Pulmonary:      Effort: Pulmonary effort is normal  No respiratory distress  Breath sounds: Normal breath sounds  No wheezing or rales  Chest:      Chest wall: No tenderness  Musculoskeletal:         General: No tenderness, deformity or signs of injury  Normal range of motion  Cervical back: Normal range of motion  Skin:     General: Skin is warm and dry  Comments: Abrasion to right forehead, scabbed wounds to bilateral knees     Neurological:      General: No focal deficit present  Mental Status: He is alert and oriented to person, place, and time  Psychiatric:         Mood and Affect: Mood normal          Behavior: Behavior normal          Thought Content: Thought content normal          Judgment: Judgment normal          Cervical spine cleared by clinical criteria? No (imaging required)      No Drains - biliary drain mentioned came out only a few days after his surgery in January 2022  Lab Results:   Results Reviewed     None                 Imaging Studies:   Direct to CT: No  CT head without contrast   Final Result by Eliud Ramirez MD (06/10 5611)      No acute intracranial abnormality  Right frontal soft tissue swelling without underlying fracture  Workstation performed: [id number]         CT cervical spine without contrast   Final Result by Eliud Ramirez MD (06/10 7366)      No cervical spine fracture or traumatic malalignment                     Workstation performed: [id number]               Procedures  Procedures         ED Course           MDM  Number of Diagnoses or Management Options  Abrasion: new and requires workup  Fall, initial encounter: new and requires workup  Neck pain: new and requires workup  Diagnosis management comments: Background: 82 y o  male with headache and neck pain after injury    Differential DX includes but is not limited to: forehead contusion, less likely skull fx, ICH    Plan: imaging, symptom control         Amount and/or Complexity of Data Reviewed  Tests in the radiology section of CPT®: ordered and reviewed    Risk of Complications, Morbidity, and/or Mortality  Diagnostic procedures: high    Patient Progress  Patient progress: stable          Disposition  [facility] Transfer: No  Final diagnoses:   Abrasion - acute right forehead   Neck pain   Fall, initial encounter     Time reflects when diagnosis was documented in both MDM as applicable and the Disposition within this note     Time User Action Codes Description Comment    6/10/2022  3:32 PM Tanvir Marus  8XXA] Abrasion     6/10/2022  3:32 PM Colie Kirann  8XXA] Abrasion acute right forehead    6/10/2022  3:32 PM Veronica Ku Add [M54 2] Neck pain     6/10/2022  3:32 PM Veronica Ku Add [D68  Drema Amarilis, initial encounter       ED Disposition     ED Disposition   Discharge    Condition   Stable    Date/Time   Fri Phani 10, 2022  3:31 PM    Comment   Brian Lomeli discharge to home/self care                 Follow-up Information     Follow up With Specialties Details Why Contact Info    Payal Mora MD Family Medicine Schedule an appointment as soon as possible for a visit in 1 week  [address]  [address] [place] [address]  [phone]          Discharge Medication List as of 6/10/2022  3:32 PM      CONTINUE these medications which have NOT CHANGED    Details   acetaminophen (TYLENOL) 325 mg tablet Take 2 tablets (650 mg total) by mouth every 6 (six) hours, Starting Sun 3/22/2020, Normal      aspirin 81 mg chewable tablet Chew 81 mg daily Pt stopped , Historical Med      cyanocobalamin (VITAMIN B-12) 500 mcg tablet Take 1,000 mcg by mouth daily, Historical Med      docusate sodium (COLACE) 100 mg capsule Take 1 capsule (100 mg total) by mouth 2 (two) times a day, Starting Sun 3/22/2020, Normal      escitalopram (LEXAPRO) 5 mg tablet TAKE 1 TABLET BY MOUTH EVERY DAY, Normal      glipiZIDE (GLUCOTROL XL) 5 mg 24 hr tablet TAKE 1 TABLET BY MOUTH EVERY DAY, Normal      metFORMIN (GLUCOPHAGE) 500 mg tablet TAKE 2 TABLETS EVERY MORNING TAKE 2 TABLETS EVERY EVENING, Normal      metoprolol tartrate (LOPRESSOR) 25 mg tablet TAKE 1 TABLET (25 MG TOTAL) BY MOUTH EVERY 12 (TWELVE) HOURS, Starting Mon 1/3/2022, Normal      omeprazole (PriLOSEC) 40 MG capsule TAKE 1 CAPSULE BY MOUTH EVERY DAY BEFORE BREAKFAST, Normal      simvastatin (ZOCOR) 40 mg tablet TAKE 1 TABLET BY MOUTH EVERY DAY, Normal           No discharge procedures on file      PDMP Review       Value Time User    PDMP Reviewed  Yes 1/8/2022  1:18 PM Sergei Gtz MD          ED Provider  Electronically Signed by         Hank Booker MD  06/10/22 5753

## 2022-06-16 ENCOUNTER — OFFICE VISIT (OUTPATIENT)
Dept: FAMILY MEDICINE CLINIC | Facility: CLINIC | Age: 87
End: 2022-06-16
Payer: MEDICARE

## 2022-06-16 VITALS
SYSTOLIC BLOOD PRESSURE: 118 MMHG | WEIGHT: 212 LBS | BODY MASS INDEX: 28.71 KG/M2 | HEART RATE: 78 BPM | DIASTOLIC BLOOD PRESSURE: 64 MMHG | HEIGHT: 72 IN | OXYGEN SATURATION: 98 %

## 2022-06-16 DIAGNOSIS — G62.9 NEUROPATHY: ICD-10-CM

## 2022-06-16 DIAGNOSIS — E11.69 TYPE 2 DIABETES MELLITUS WITH OTHER SPECIFIED COMPLICATION, WITHOUT LONG-TERM CURRENT USE OF INSULIN (HCC): ICD-10-CM

## 2022-06-16 DIAGNOSIS — I10 ESSENTIAL HYPERTENSION: ICD-10-CM

## 2022-06-16 DIAGNOSIS — R29.6 RECURRENT FALLS: Primary | ICD-10-CM

## 2022-06-16 DIAGNOSIS — M48.061 SPINAL STENOSIS OF LUMBAR REGION, UNSPECIFIED WHETHER NEUROGENIC CLAUDICATION PRESENT: ICD-10-CM

## 2022-06-16 DIAGNOSIS — R27.8 OTHER LACK OF COORDINATION: ICD-10-CM

## 2022-06-16 DIAGNOSIS — E83.39 OTHER DISORDERS OF PHOSPHORUS METABOLISM: ICD-10-CM

## 2022-06-16 PROCEDURE — 99214 OFFICE O/P EST MOD 30 MIN: CPT | Performed by: FAMILY MEDICINE

## 2022-06-16 NOTE — PROGRESS NOTES
Assessment/Plan:    I think he would need pmr eval   As his legs feel strong but then all of a sudden he cant move them and he falls   He has full consciousness     1  Recurrent falls  -     Ambulatory Referral to Physiatry; Future    2  Type 2 diabetes mellitus with other specified complication, without long-term current use of insulin ([facility] [address] )  -     Ambulatory Referral to Physiatry; Future  -     HEMOGLOBIN A1C W/ EAG ESTIMATION; Future  -     Microalbumin / creatinine urine ratio  -     CBC and differential; Future  -     Comprehensive metabolic panel; Future  -     Magnesium; Future  -     TSH, 3rd generation with Free T4 reflex; Future  -     Lipid Panel with Direct LDL reflex; Future    3  Spinal stenosis of lumbar region, unspecified whether neurogenic claudication present  -     Ambulatory Referral to Physiatry; Future    4  Neuropathy  -     Vitamin B12; Future  -     Vitamin B6; Future    5  Essential hypertension  -     Magnesium; Future  -     NT-BNP PRO; Future    6  Other lack of coordination   -     Vitamin B12; Future    7  Other disorders of phosphorus metabolism   -     Vitamin B6; Future       Subjective:      Patient ID: Joseph Truong is a 82 y o  male  HPI     Here for follow up on ER visit for another fall   Using walker now     The following portions of the patient's history were reviewed and updated as appropriate: allergies, current medications, past family history, past medical history, past social history, past surgical history and problem list     Review of Systems   Constitutional: Negative for activity change, appetite change and fever  HENT: Negative for congestion, nosebleeds and trouble swallowing  Eyes: Negative for itching  Respiratory: Negative for cough and chest tightness  Cardiovascular: Negative for chest pain and palpitations  Gastrointestinal: Negative for abdominal pain, constipation, diarrhea and nausea  Endocrine: Negative for cold intolerance  Genitourinary: Negative for frequency  Musculoskeletal: Positive for arthralgias, back pain and gait problem  Negative for joint swelling  Skin: Negative for rash  Allergic/Immunologic: Negative for immunocompromised state  Neurological: Negative for dizziness, tremors, seizures, syncope and headaches  Psychiatric/Behavioral: Negative for hallucinations and suicidal ideas  The patient is nervous/anxious  Objective:      /64   Pulse 78   Ht 6' (1 829 m)   Wt 96 2 kg (212 lb)   SpO2 98%   BMI 28 75 kg/m²     No visits with results within 2 Week(s) from this visit  Latest known visit with results is:   Appointment on 02/08/2022   Component Date Value    Hemoglobin A1C 02/08/2022 5 8 (A)    EAG 02/08/2022 120     Cholesterol 02/08/2022 128     Triglycerides 02/08/2022 124     HDL, Direct 02/08/2022 34 (A)    LDL Calculated 02/08/2022 69           Physical Exam  Vitals and nursing note reviewed  Constitutional:       General: He is not in acute distress  Appearance: He is well-developed  Comments: walker   HENT:      Head: Normocephalic and atraumatic  Cardiovascular:      Rate and Rhythm: Normal rate and regular rhythm  Heart sounds: Normal heart sounds  No murmur heard  Pulmonary:      Effort: Pulmonary effort is normal       Breath sounds: Normal breath sounds  No wheezing or rales  Abdominal:      General: Bowel sounds are normal  There is no distension  Palpations: Abdomen is soft  Tenderness: There is no abdominal tenderness  There is no guarding or rebound  Musculoskeletal:         General: No tenderness  Normal range of motion  Cervical back: Normal range of motion and neck supple  Lymphadenopathy:      Cervical: No cervical adenopathy  Skin:     General: Skin is warm and dry  Capillary Refill: Capillary refill takes less than 2 seconds  Findings: No rash     Neurological:      Mental Status: He is alert and oriented to person, place, and time  Cranial Nerves: No cranial nerve deficit  Sensory: No sensory deficit  Motor: No abnormal muscle tone  Psychiatric:         Behavior: Behavior normal          Thought Content:  Thought content normal          Judgment: Judgment normal              Marcelle Sequeira MD  [place] [id number]

## 2022-06-17 ENCOUNTER — TELEPHONE (OUTPATIENT)
Dept: NEUROLOGY | Facility: CLINIC | Age: 87
End: 2022-06-17

## 2022-06-18 PROBLEM — D69.1 QUALITATIVE PLATELET DEFECTS (HCC): Status: RESOLVED | Noted: 2021-01-12 | Resolved: 2022-06-18

## 2022-06-28 ENCOUNTER — APPOINTMENT (OUTPATIENT)
Dept: LAB | Facility: CLINIC | Age: 87
End: 2022-06-28
Payer: MEDICARE

## 2022-06-28 DIAGNOSIS — I10 ESSENTIAL HYPERTENSION: ICD-10-CM

## 2022-06-28 DIAGNOSIS — R27.8 OTHER LACK OF COORDINATION: ICD-10-CM

## 2022-06-28 DIAGNOSIS — E11.69 TYPE 2 DIABETES MELLITUS WITH OTHER SPECIFIED COMPLICATION, WITHOUT LONG-TERM CURRENT USE OF INSULIN (HCC): ICD-10-CM

## 2022-06-28 DIAGNOSIS — E83.39 OTHER DISORDERS OF PHOSPHORUS METABOLISM: ICD-10-CM

## 2022-06-28 DIAGNOSIS — G62.9 NEUROPATHY: ICD-10-CM

## 2022-06-28 DIAGNOSIS — F41.9 ANXIETY: ICD-10-CM

## 2022-06-28 LAB
ALBUMIN SERPL BCP-MCNC: 3.3 G/DL (ref 3.5–5)
ALP SERPL-CCNC: 88 U/L (ref 46–116)
ALT SERPL W P-5'-P-CCNC: 26 U/L (ref 12–78)
ANION GAP SERPL CALCULATED.3IONS-SCNC: 5 MMOL/L (ref 4–13)
AST SERPL W P-5'-P-CCNC: 21 U/L (ref 5–45)
BASOPHILS # BLD AUTO: 0.04 THOUSANDS/ΜL (ref 0–0.1)
BASOPHILS NFR BLD AUTO: 1 % (ref 0–1)
BILIRUB SERPL-MCNC: 0.9 MG/DL (ref 0.2–1)
BUN SERPL-MCNC: 18 MG/DL (ref 5–25)
CALCIUM ALBUM COR SERPL-MCNC: 9.6 MG/DL (ref 8.3–10.1)
CALCIUM SERPL-MCNC: 9 MG/DL (ref 8.3–10.1)
CHLORIDE SERPL-SCNC: 107 MMOL/L (ref 100–108)
CHOLEST SERPL-MCNC: 122 MG/DL
CO2 SERPL-SCNC: 27 MMOL/L (ref 21–32)
CREAT SERPL-MCNC: 1.32 MG/DL (ref 0.6–1.3)
CREAT UR-MCNC: 93.2 MG/DL
EOSINOPHIL # BLD AUTO: 0.25 THOUSAND/ΜL (ref 0–0.61)
EOSINOPHIL NFR BLD AUTO: 4 % (ref 0–6)
ERYTHROCYTE [DISTWIDTH] IN BLOOD BY AUTOMATED COUNT: 15.4 % (ref 11.6–15.1)
EST. AVERAGE GLUCOSE BLD GHB EST-MCNC: 123 MG/DL
GFR SERPL CREATININE-BSD FRML MDRD: 49 ML/MIN/1.73SQ M
GLUCOSE P FAST SERPL-MCNC: 89 MG/DL (ref 65–99)
HBA1C MFR BLD: 5.9 %
HCT VFR BLD AUTO: 39.1 % (ref 36.5–49.3)
HDLC SERPL-MCNC: 29 MG/DL
HGB BLD-MCNC: 12.4 G/DL (ref 12–17)
IMM GRANULOCYTES # BLD AUTO: 0.03 THOUSAND/UL (ref 0–0.2)
IMM GRANULOCYTES NFR BLD AUTO: 0 % (ref 0–2)
LDLC SERPL CALC-MCNC: 65 MG/DL (ref 0–100)
LYMPHOCYTES # BLD AUTO: 2.1 THOUSANDS/ΜL (ref 0.6–4.47)
LYMPHOCYTES NFR BLD AUTO: 30 % (ref 14–44)
MAGNESIUM SERPL-MCNC: 1.9 MG/DL (ref 1.6–2.6)
MCH RBC QN AUTO: 27.9 PG (ref 26.8–34.3)
MCHC RBC AUTO-ENTMCNC: 31.7 G/DL (ref 31.4–37.4)
MCV RBC AUTO: 88 FL (ref 82–98)
MICROALBUMIN UR-MCNC: 5.7 MG/L (ref 0–20)
MICROALBUMIN/CREAT 24H UR: 6 MG/G CREATININE (ref 0–30)
MONOCYTES # BLD AUTO: 0.6 THOUSAND/ΜL (ref 0.17–1.22)
MONOCYTES NFR BLD AUTO: 9 % (ref 4–12)
NEUTROPHILS # BLD AUTO: 3.88 THOUSANDS/ΜL (ref 1.85–7.62)
NEUTS SEG NFR BLD AUTO: 56 % (ref 43–75)
NRBC BLD AUTO-RTO: 0 /100 WBCS
NT-PROBNP SERPL-MCNC: 751 PG/ML
PLATELET # BLD AUTO: 229 THOUSANDS/UL (ref 149–390)
PMV BLD AUTO: 10.5 FL (ref 8.9–12.7)
POTASSIUM SERPL-SCNC: 5.1 MMOL/L (ref 3.5–5.3)
PROT SERPL-MCNC: 7.4 G/DL (ref 6.4–8.2)
RBC # BLD AUTO: 4.45 MILLION/UL (ref 3.88–5.62)
SODIUM SERPL-SCNC: 139 MMOL/L (ref 136–145)
TRIGL SERPL-MCNC: 140 MG/DL
TSH SERPL DL<=0.05 MIU/L-ACNC: 4.29 UIU/ML (ref 0.45–4.5)
VIT B12 SERPL-MCNC: 627 PG/ML (ref 100–900)
WBC # BLD AUTO: 6.9 THOUSAND/UL (ref 4.31–10.16)

## 2022-06-28 PROCEDURE — 84443 ASSAY THYROID STIM HORMONE: CPT

## 2022-06-28 PROCEDURE — 83735 ASSAY OF MAGNESIUM: CPT

## 2022-06-28 PROCEDURE — 83036 HEMOGLOBIN GLYCOSYLATED A1C: CPT

## 2022-06-28 PROCEDURE — 83880 ASSAY OF NATRIURETIC PEPTIDE: CPT

## 2022-06-28 PROCEDURE — 84207 ASSAY OF VITAMIN B-6: CPT

## 2022-06-28 PROCEDURE — 85025 COMPLETE CBC W/AUTO DIFF WBC: CPT

## 2022-06-28 PROCEDURE — 80053 COMPREHEN METABOLIC PANEL: CPT

## 2022-06-28 PROCEDURE — 36415 COLL VENOUS BLD VENIPUNCTURE: CPT

## 2022-06-28 PROCEDURE — 82607 VITAMIN B-12: CPT

## 2022-06-28 PROCEDURE — 80061 LIPID PANEL: CPT

## 2022-06-28 PROCEDURE — 82570 ASSAY OF URINE CREATININE: CPT | Performed by: FAMILY MEDICINE

## 2022-06-28 PROCEDURE — 82043 UR ALBUMIN QUANTITATIVE: CPT | Performed by: FAMILY MEDICINE

## 2022-06-28 RX ORDER — GLIPIZIDE 5 MG/1
TABLET, FILM COATED, EXTENDED RELEASE ORAL
Qty: 90 TABLET | Refills: 1 | Status: SHIPPED | OUTPATIENT
Start: 2022-06-28 | End: 2022-07-21

## 2022-06-28 RX ORDER — ESCITALOPRAM OXALATE 5 MG/1
TABLET ORAL
Qty: 90 TABLET | Refills: 1 | Status: SHIPPED | OUTPATIENT
Start: 2022-06-28

## 2022-07-01 LAB — VIT B6 SERPL-MCNC: 5.8 UG/L (ref 3.4–65.2)

## 2022-07-06 DIAGNOSIS — I10 ESSENTIAL HYPERTENSION: ICD-10-CM

## 2022-07-15 ENCOUNTER — RA CDI HCC (OUTPATIENT)
Dept: OTHER | Facility: HOSPITAL | Age: 87
End: 2022-07-15

## 2022-07-15 NOTE — PROGRESS NOTES
[facility]  coding opportunities          Chart Reviewed number of suggestions sent to Provider: 1     Patients Insurance     Medicare Insurance: Medicare        [id number]

## 2022-07-21 ENCOUNTER — OFFICE VISIT (OUTPATIENT)
Dept: FAMILY MEDICINE CLINIC | Facility: CLINIC | Age: 87
End: 2022-07-21
Payer: MEDICARE

## 2022-07-21 VITALS
WEIGHT: 202 LBS | HEART RATE: 65 BPM | OXYGEN SATURATION: 96 % | SYSTOLIC BLOOD PRESSURE: 118 MMHG | HEIGHT: 72 IN | RESPIRATION RATE: 16 BRPM | BODY MASS INDEX: 27.36 KG/M2 | DIASTOLIC BLOOD PRESSURE: 72 MMHG

## 2022-07-21 DIAGNOSIS — R26.2 AMBULATORY DYSFUNCTION: ICD-10-CM

## 2022-07-21 DIAGNOSIS — E11.42 TYPE 2 DIABETES MELLITUS WITH DIABETIC POLYNEUROPATHY, WITHOUT LONG-TERM CURRENT USE OF INSULIN (HCC): Primary | ICD-10-CM

## 2022-07-21 DIAGNOSIS — R59.0 HILAR ADENOPATHY: ICD-10-CM

## 2022-07-21 PROBLEM — R29.6 FALLS: Status: RESOLVED | Noted: 2021-11-18 | Resolved: 2022-07-21
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
## 2022-07-21 PROCEDURE — 99214 OFFICE O/P EST MOD 30 MIN: CPT | Performed by: FAMILY MEDICINE

## 2022-07-21 RX ORDER — GLIPIZIDE 2.5 MG/1
2.5 TABLET, EXTENDED RELEASE ORAL DAILY
Qty: 90 TABLET | Refills: 1 | Status: SHIPPED | OUTPATIENT
Start: 2022-07-21 | End: 2022-10-21

## 2022-07-21 NOTE — PROGRESS NOTES
Assessment/Plan:    1  Type 2 diabetes mellitus with diabetic polyneuropathy, without long-term current use of insulin (HCC)  -     glipiZIDE (GLUCOTROL XL) 2 5 mg 24 hr tablet; Take 1 tablet (2 5 mg total) by mouth daily  -     Hemoglobin A1C; Future; Expected date: 10/21/2022  -     CBC and differential; Future; Expected date: 10/21/2022  -     Comprehensive metabolic panel; Future; Expected date: 10/21/2022  -     Ambulatory Referral to Podiatry; Future    2  Hilar adenopathy  -     XR chest pa & lateral; Future; Expected date: 07/21/2022    3  Ambulatory dysfunction    4  BMI 27 0-27 9,adult       Subjective:      Patient ID: Joel Mejia is a 82 y o  male  HPI  Here to go over chronic issues and labs / imaging studies if applicable  The following portions of the patient's history were reviewed and updated as appropriate: allergies, current medications, past family history, past medical history, past social history, past surgical history and problem list     Review of Systems   Constitutional: Negative for activity change, appetite change and fever  HENT: Negative for congestion, nosebleeds and trouble swallowing  Eyes: Negative for itching  Respiratory: Negative for cough and chest tightness  Cardiovascular: Negative for chest pain and palpitations  Gastrointestinal: Negative for abdominal pain, constipation, diarrhea and nausea  Endocrine: Negative for cold intolerance  Genitourinary: Negative for frequency  Musculoskeletal: Positive for arthralgias and back pain  Negative for gait problem and joint swelling  Skin: Negative for rash  Allergic/Immunologic: Negative for immunocompromised state  Neurological: Negative for dizziness, tremors, seizures, syncope and headaches  Psychiatric/Behavioral: Negative for hallucinations and suicidal ideas  The patient is nervous/anxious            Objective:      /72 (BP Location: Left arm, Patient Position: Sitting, Cuff Size: Standard)   Pulse 65   Resp 16   Ht 6' (1 829 m)   Wt 91 6 kg (202 lb)   SpO2 96%   BMI 27 40 kg/m²     No visits with results within 2 Week(s) from this visit     Latest known visit with results is:   Appointment on 06/28/2022   Component Date Value    Hemoglobin A1C 06/28/2022 5 9 (A)    EAG 06/28/2022 123     WBC 06/28/2022 6 90     RBC 06/28/2022 4 45     Hemoglobin 06/28/2022 12 4     Hematocrit 06/28/2022 39 1     MCV 06/28/2022 88     MCH 06/28/2022 27 9     MCHC 06/28/2022 31 7     RDW 06/28/2022 15 4 (A)    MPV 06/28/2022 10 5     Platelets 61/75/2107 229     nRBC 06/28/2022 0     Neutrophils Relative 06/28/2022 56     Immat GRANS % 06/28/2022 0     Lymphocytes Relative 06/28/2022 30     Monocytes Relative 06/28/2022 9     Eosinophils Relative 06/28/2022 4     Basophils Relative 06/28/2022 1     Neutrophils Absolute 06/28/2022 3 88     Immature Grans Absolute 06/28/2022 0 03     Lymphocytes Absolute 06/28/2022 2 10     Monocytes Absolute 06/28/2022 0 60     Eosinophils Absolute 06/28/2022 0 25     Basophils Absolute 06/28/2022 0 04     Sodium 06/28/2022 139     Potassium 06/28/2022 5 1     Chloride 06/28/2022 107     CO2 06/28/2022 27     ANION GAP 06/28/2022 5     BUN 06/28/2022 18     Creatinine 06/28/2022 1 32 (A)    Glucose, Fasting 06/28/2022 89     Calcium 06/28/2022 9 0     Corrected Calcium 06/28/2022 9 6     AST 06/28/2022 21     ALT 06/28/2022 26     Alkaline Phosphatase 06/28/2022 88     Total Protein 06/28/2022 7 4     Albumin 06/28/2022 3 3 (A)    Total Bilirubin 06/28/2022 0 90     eGFR 06/28/2022 49     Magnesium 06/28/2022 1 9     TSH 3RD GENERATON 06/28/2022 4 290     NT-proBNP 06/28/2022 751 (A)    Cholesterol 06/28/2022 122     Triglycerides 06/28/2022 140     HDL, Direct 06/28/2022 29 (A)    LDL Calculated 06/28/2022 65     Vitamin B-12 06/28/2022 627     Vitamin B6 06/28/2022 5 8           Physical Exam  Vitals and nursing note reviewed  Constitutional:       General: He is not in acute distress  Appearance: He is well-developed  Comments: walker   HENT:      Head: Normocephalic and atraumatic  Cardiovascular:      Rate and Rhythm: Normal rate and regular rhythm  Pulses: no weak pulses          Dorsalis pedis pulses are 2+ on the right side and 2+ on the left side  Heart sounds: Normal heart sounds  No murmur heard  Pulmonary:      Effort: Pulmonary effort is normal       Breath sounds: Normal breath sounds  No wheezing or rales  Abdominal:      General: Bowel sounds are normal  There is no distension  Palpations: Abdomen is soft  Tenderness: There is no abdominal tenderness  There is no guarding or rebound  Musculoskeletal:         General: No tenderness  Normal range of motion  Cervical back: Normal range of motion and neck supple  Feet:    Feet:      Right foot:      Skin integrity: Callus and dry skin present  No ulcer, skin breakdown, erythema or warmth  Left foot:      Skin integrity: Callus and dry skin present  No ulcer, skin breakdown, erythema or warmth  Lymphadenopathy:      Cervical: No cervical adenopathy  Skin:     General: Skin is warm and dry  Capillary Refill: Capillary refill takes less than 2 seconds  Findings: No rash  Neurological:      Mental Status: He is alert and oriented to person, place, and time  Cranial Nerves: No cranial nerve deficit  Sensory: No sensory deficit  Motor: No abnormal muscle tone  Psychiatric:         Behavior: Behavior normal          Thought Content: Thought content normal          Judgment: Judgment normal            Patient's shoes and socks removed  Right Foot/Ankle   Right Foot Inspection  Skin Exam: skin normal, skin intact, dry skin, callus and callus  No warmth, no erythema, no maceration, no abnormal color, no pre-ulcer and no ulcer  Toe Exam: ROM and strength within normal limits  Sensory   Monofilament testing: diminished    Vascular  Capillary refills: < 3 seconds  The right DP pulse is 2+  Left Foot/Ankle  Left Foot Inspection  Skin Exam: skin normal, skin intact, dry skin and callus  No warmth, no erythema, no maceration, normal color, no pre-ulcer and no ulcer  Toe Exam: ROM and strength within normal limits  Sensory   Monofilament testing: diminished    Vascular  Capillary refills: < 3 seconds  The left DP pulse is 2+  Assign Risk Category  No deformity present  Loss of protective sensation  No weak pulses  Risk: 1        BMI Counseling: Body mass index is 27 4 kg/m²  The BMI is above normal  Nutrition recommendations include decreasing portion sizes, encouraging healthy choices of fruits and vegetables, decreasing fast food intake, consuming healthier snacks and limiting drinks that contain sugar  Exercise recommendations include exercising 3-5 times per week  No pharmacotherapy was ordered  Rationale for BMI follow-up plan is due to patient being overweight or obese  Depression Screening and Follow-up Plan: Patient was screened for depression during today's encounter  They screened negative with a PHQ-2 score of 0  Falls Plan of Care: balance, strength, and gait training instructions were provided  Medications that increase falls were reviewed           MD Baldev DiazPiedmont [facility]

## 2022-07-27 ENCOUNTER — APPOINTMENT (OUTPATIENT)
Dept: RADIOLOGY | Facility: CLINIC | Age: 87
End: 2022-07-27
Payer: MEDICARE

## 2022-07-27 DIAGNOSIS — R59.0 HILAR ADENOPATHY: ICD-10-CM

## 2022-07-27 PROCEDURE — 71046 X-RAY EXAM CHEST 2 VIEWS: CPT

## 2022-08-05 ENCOUNTER — OFFICE VISIT (OUTPATIENT)
Dept: PODIATRY | Facility: CLINIC | Age: 87
End: 2022-08-05
Payer: MEDICARE

## 2022-08-05 VITALS
WEIGHT: 202 LBS | BODY MASS INDEX: 27.36 KG/M2 | OXYGEN SATURATION: 95 % | SYSTOLIC BLOOD PRESSURE: 134 MMHG | HEART RATE: 62 BPM | DIASTOLIC BLOOD PRESSURE: 72 MMHG | HEIGHT: 72 IN

## 2022-08-05 DIAGNOSIS — E11.42 TYPE 2 DIABETES MELLITUS WITH DIABETIC POLYNEUROPATHY, WITHOUT LONG-TERM CURRENT USE OF INSULIN (HCC): ICD-10-CM

## 2022-08-05 PROCEDURE — 11721 DEBRIDE NAIL 6 OR MORE: CPT | Performed by: PODIATRIST

## 2022-08-05 PROCEDURE — 99203 OFFICE O/P NEW LOW 30 MIN: CPT | Performed by: PODIATRIST

## 2022-08-06 NOTE — PROGRESS NOTES
Assessment/Plan:    - The pedal nails were aseptically debrided with sterile nail clippers x 10  The left foot callus was debrided with a sterile #15 blade  No other acute pedal issues  - Discussed importance of daily foot checks  - He would like to follow up for routine diabetic foot care in 2-3 months  Diagnoses and all orders for this visit:    Type 2 diabetes mellitus with diabetic polyneuropathy, without long-term current use of insulin ([facility] )  -     Ambulatory Referral to Podiatry          Subjective:      Patient ID: Santos Miller is a 82 y o  male  Patient presents for evaluation and treatment of painful elongated toenails  He states he is unable to safely trim them himself  In fact, he states he recently tried and cut his left 2nd toe, causing it to bleed profusely  He notes his feet are numb from neuropathy  But he has no other foot pain or discomfort at this time  He ambulates with a walker        The following portions of the patient's history were reviewed and updated as appropriate: allergies, current medications, past family history, past medical history, past social history, past surgical history and problem list       PAST MEDICAL HISTORY:  Past Medical History:   Diagnosis Date    Acute MI ([address] )     12    Ambulates with cane     Anxiety     Arthritis     hands    At risk for falls     Atrial fibrillation (HCC)     Cholecystitis     CKD (chronic kidney disease)     Coronary artery disease     Diabetes mellitus [facility] [facility]     GERD (gastroesophageal reflux disease)     Heart murmur     Hyperlipidemia     Hypertension     Low back pain     Lumbar disc disease     Umbilical hernia     Wears dentures     full set    Wears glasses        PAST SURGICAL HISTORY:  Past Surgical History:   Procedure Laterality Date    CARDIAC CATHETERIZATION      s/p MI    COLONOSCOPY      HERNIA REPAIR Left     X5    RI LAP,CHOLECYSTECTOMY N/A 1/7/2022    Procedure: ROBOTIC ASSISTED LAPAROSCOPIC CHOLECYSTECTOMY;  Surgeon: Chet Olivares MD;  Location: [facility];  Service: General    TOTAL HIP ARTHROPLASTY Left     UMBILICAL HERNIA REPAIR LAPAROSCOPIC N/A 1/7/2022    Procedure: Open umbilical hernia repair;  Surgeon: Chet Olivares MD;  Location: [facility];  Service: General        ALLERGIES:  Patient has no known allergies  MEDICATIONS:  Current Outpatient Medications   Medication Sig Dispense Refill    acetaminophen (TYLENOL) 325 mg tablet Take 2 tablets (650 mg total) by mouth every 6 (six) hours (Patient taking differently: Take 650 mg by mouth every 6 (six) hours as needed) 30 tablet 0    aspirin 81 mg chewable tablet Chew 81 mg daily Pt stopped       cyanocobalamin (VITAMIN B-12) 500 mcg tablet Take 1,000 mcg by mouth daily      docusate sodium (COLACE) 100 mg capsule Take 1 capsule (100 mg total) by mouth 2 (two) times a day (Patient taking differently: Take 100 mg by mouth every evening) 10 capsule 0    escitalopram (LEXAPRO) 5 mg tablet TAKE 1 TABLET BY MOUTH EVERY DAY 90 tablet 1    glipiZIDE (GLUCOTROL XL) 2 5 mg 24 hr tablet Take 1 tablet (2 5 mg total) by mouth daily 90 tablet 1    metFORMIN (GLUCOPHAGE) 500 mg tablet TAKE 2 TABLETS EVERY MORNING TAKE 2 TABLETS EVERY EVENING 360 tablet 1    metoprolol tartrate (LOPRESSOR) 25 mg tablet TAKE 1 TABLET (25 MG TOTAL) BY MOUTH EVERY 12 (TWELVE) HOURS 180 tablet 1    omeprazole (PriLOSEC) 40 MG capsule TAKE 1 CAPSULE BY MOUTH EVERY DAY BEFORE BREAKFAST 90 capsule 3    simvastatin (ZOCOR) 40 mg tablet TAKE 1 TABLET BY MOUTH EVERY DAY 90 tablet 1     No current facility-administered medications for this visit         SOCIAL HISTORY:  Social History     Socioeconomic History    Marital status: /Civil Union     Spouse name: None    Number of children: None    Years of education: None    Highest education level: None   Occupational History    Occupation: retired   Tobacco Use    Smoking status: Never Smoker  Smokeless tobacco: Never Used   Vaping Use    Vaping Use: Never used   Substance and Sexual Activity    Alcohol use: Yes     Comment: rare    Drug use: No    Sexual activity: Not Currently   Other Topics Concern    None   Social History Narrative    Prabha:    Most recent tobacco use screenin2020      Do you currently or have you served in the [employer]:   No      Were you activated, into active duty, as a member of the [employer] or as a Reservist:   No      Occupation:   Retired      Marital status:         Sexual orientation:   Heterosexual      Exercise level:   Occasional      Diet:   Regular      General stress level:   Medium      Alcohol intake:   Occasional      Caffeine intake: Moderate      Chewing tobacco:   none      Illicit drugs:   Denied      Guns present in home:   No      Seat belts used routinely:   Yes      Sunscreen used routinely:   Yes      Smoke alarm in home:   Yes      Advance directive:   Yes      Salt Intake:   Normal     Would the patient like to schedule a Mammogram:   No      Is the patient interested in a colorectal cancer screening:   No     Last modified by zulma   2020, 15:03      Social Determinants of Health     Financial Resource Strain: Not on file   Food Insecurity: No Food Insecurity    Worried About Running Out of Food in the Last Year: Never true    John of Food in the Last Year: Never true   Transportation Needs: Not on file   Physical Activity: Not on file   Stress: Not on file   Social Connections: Not on file   Intimate Partner Violence: Not on file   Housing Stability: Unknown    Unable to Pay for Housing in the Last Year: No    Number of Places Lived in the Last Year: 1    Unstable Housing in the Last Year: Not on file        Review of Systems   Constitutional: Negative for chills and fever  HENT: Negative for ear pain and sore throat  Eyes: Negative for pain and visual disturbance     Respiratory: Negative for cough and shortness of breath  Cardiovascular: Negative for chest pain and palpitations  Gastrointestinal: Negative for abdominal pain and vomiting  Musculoskeletal: Negative for arthralgias and back pain  Skin: Negative for color change and rash  Neurological: Negative for seizures and syncope  Psychiatric/Behavioral: Negative  All other systems reviewed and are negative  Objective:      /72   Pulse 62   Ht 6' (1 829 m)   Wt 91 6 kg (202 lb)   SpO2 95%   BMI 27 40 kg/m²          Physical Exam  Vitals reviewed  Constitutional:       Appearance: Normal appearance  HENT:      Head: Normocephalic and atraumatic  Nose: Nose normal    Cardiovascular:      Pulses: Pulses are weak  Dorsalis pedis pulses are 1+ on the right side and 1+ on the left side  Posterior tibial pulses are 1+ on the right side and 1+ on the left side  Pulmonary:      Effort: Pulmonary effort is normal    Musculoskeletal:      Right foot: Decreased range of motion  Bunion present  Left foot: Decreased range of motion  Bunion present  Feet:      Right foot:      Protective Sensation: 9 sites tested  2 sites sensed  Skin integrity: Dry skin present  No skin breakdown or erythema  Toenail Condition: Right toenails are abnormally thick and long  Fungal disease present  Left foot:      Protective Sensation: 9 sites tested  1 site sensed  Skin integrity: Callus and dry skin present  No skin breakdown or erythema  Toenail Condition: Left toenails are abnormally thick and long  Fungal disease present  Comments: (+) mycotic pedal nails x 10; nails are elongated and dystrophic    (+) eschar to distal left 2nd toe from patients prior attempt at trimming his own nails    (+) callus sub-MT 1 left foot  Skin:     General: Skin is warm and dry  Capillary Refill: Capillary refill takes less than 2 seconds  Neurological:      General: No focal deficit present  Mental Status: He is alert and oriented to person, place, and time  Psychiatric:         Mood and Affect: Mood normal          Behavior: Behavior normal          Thought Content: Thought content normal          Judgment: Judgment normal            Procedures  Diabetic Foot Exam    Patient's shoes and socks removed  Right Foot/Ankle   Right Foot Inspection  Skin Exam: skin intact and dry skin  No erythema and no maceration  Sensory   Monofilament testing: diminished    Vascular  Capillary refills: < 3 seconds  The right DP pulse is 1+  The right PT pulse is 1+  Right Toe  - Comprehensive Exam  Hallux valgus: yes      Left Foot/Ankle  Left Foot Inspection  Skin Exam: skin intact, dry skin and callus  No erythema and no maceration  Sensory   Monofilament testing: diminished    Vascular  Capillary refills: < 3 seconds  The left DP pulse is 1+  The left PT pulse is 1+       Left Toe  - Comprehensive Exam  Hallux valgus: yes      Assign Risk Category  Deformity present  Loss of protective sensation  Weak pulses  Risk: 2

## 2022-09-01 ENCOUNTER — CONSULT (OUTPATIENT)
Dept: NEUROLOGY | Facility: CLINIC | Age: 87
End: 2022-09-01
Payer: MEDICARE

## 2022-09-01 VITALS
SYSTOLIC BLOOD PRESSURE: 143 MMHG | HEART RATE: 63 BPM | WEIGHT: 204.3 LBS | DIASTOLIC BLOOD PRESSURE: 68 MMHG | TEMPERATURE: 96.1 F | HEIGHT: 72 IN | BODY MASS INDEX: 27.67 KG/M2

## 2022-09-01 DIAGNOSIS — R29.6 RECURRENT FALLS: ICD-10-CM

## 2022-09-01 DIAGNOSIS — S32.010D CLOSED WEDGE COMPRESSION FRACTURE OF L1 VERTEBRA WITH ROUTINE HEALING, SUBSEQUENT ENCOUNTER: Primary | ICD-10-CM

## 2022-09-01 DIAGNOSIS — M48.061 SPINAL STENOSIS OF LUMBAR REGION, UNSPECIFIED WHETHER NEUROGENIC CLAUDICATION PRESENT: ICD-10-CM

## 2022-09-01 DIAGNOSIS — E11.69 TYPE 2 DIABETES MELLITUS WITH OTHER SPECIFIED COMPLICATION, WITHOUT LONG-TERM CURRENT USE OF INSULIN (HCC): ICD-10-CM

## 2022-09-01 PROCEDURE — 99204 OFFICE O/P NEW MOD 45 MIN: CPT | Performed by: STUDENT IN AN ORGANIZED HEALTH CARE EDUCATION/TRAINING PROGRAM

## 2022-09-01 NOTE — PROGRESS NOTES
Physical Medicine & Rehabilitation New patient Evaluation  Destiny Lomeli 82 y o  male      HPI/SUBJECTIVE: Destiny Lomeli 82 y o  male right handed, with  has a past medical history of Acute MI ([facility] [address] ), Ambulates with cane, Anxiety, Arthritis, At risk for falls, Atrial fibrillation ([facility] [address] ), Cholecystitis, CKD (chronic kidney disease), Coronary artery disease, Diabetes mellitus ([facility] [address] ), GERD (gastroesophageal reflux disease), Heart murmur, Hyperlipidemia, Hypertension, Low back pain, Lumbar disc disease, Umbilical hernia, Wears dentures, and Wears glasses  Old records were reviewed personally  Mr Cassidy Garber has been having falls over the last 2-3 years, has not gotten too much worse recently and his falling has been fairly consistent  He has handrails through the house and uses a cane or sometimes no assistive device  He is currently using a rolling walker  He only falls when outside the house on unfamiliar terrain  Long history of lumbar issues  MRI from July 2021 shows an L1 compression fracture and mild dextroscoliosis  He has had physical therapy, Epidural steroid injections  Hip replacement 2013  Last injections approximately 1 year ago  Pain is across the low back, left lateral thigh down to the knee  Pain now is predominately in the low back  4/10-10/10, today is a 4/10  Pain is sharp  Its worse first thing in the morning  Does not necessarily change with prolonged walking vs sitting  He has no radicular pain, no bowel or bladder incontinence and no saddle anesthesia    Sees a podiatrist regularly  Exercises/stretching helps  Takes tylenol rarely  ---------------------------------------------------------------------------------------  MRI LUMBAR SPINE WITHOUT CONTRAST      VERTEBRAL BODIES:  There are 5 lumbar type vertebral bodies  Smooth dextroscoliosis of the thoracolumbar spine centered at the L1 level    No spondylolisthesis or spondylolysis      There is a chronic L1 compression fracture with approximately 75% loss of height of the vertebral body  This was originally identified in September 2020  Hemangiomas are noted within the L2 and L4 vertebral bodies  SACRUM:  Normal signal within the sacrum  No evidence of insufficiency or stress fracture  DISTAL CORD AND CONUS:  Normal size and signal within the distal cord and conus  PARASPINAL SOFT TISSUES:  Small renal cysts are seen bilaterally  Mild atrophy of the posterior paraspinal musculature  LOWER THORACIC DISC SPACES:  Mild lower thoracic degenerative change at T11-12 and T12-L1 including disc and facet changes  No canal stenosis  Minor left foraminal narrowing at T12-L1 without cord or nerve impingement  LUMBAR DISC SPACES:  L1-L2:  Minor degenerative change without disc herniation or canal stenosis  There is slight annular bulging with mild endplate hypertrophic change  Mild right foraminal narrowing without discrete nerve impingement  L2-L3:  Normal disc height and signal   Anterior osteophyte formation is seen to the left of midline  No canal stenosis or foraminal narrowing  L3-L4:  Mild diffuse annular bulging  Small right foraminal disc protrusion  No canal stenosis  No foraminal nerve impingement  L4-L5:  Normal disc height  Mild annular bulging  There is a far lateral right-sided disc protrusion with associated endplate hypertrophic osteophyte formation  Mild facet arthropathy is present  There is mild canal stenosis and mild right foraminal   narrowing  L5-S1:  Normal disc height and signal   No disc herniation  Minor facet degenerative change  No canal stenosis or foraminal narrowing        ASSESSMENT/PLAN:     Nate Jimenez was seen today for fall  Diagnoses and all orders for this visit:    Closed wedge compression fracture of L1 vertebra with routine healing, subsequent encounter  -     Ambulatory Referral to Physical Therapy;  Future    Recurrent falls  -     Ambulatory Referral to Physiatry  -     Ambulatory Referral to Physical Therapy; Future    Type 2 diabetes mellitus with other specified complication, without long-term current use of insulin ([facility] [address] )  -     Ambulatory Referral to Physiatry  -     Ambulatory Referral to Physical Therapy; Future    Spinal stenosis of lumbar region, unspecified whether neurogenic claudication present  -     Ambulatory Referral to Physiatry      On my review of his imaging, He has a sacralized L5, making his compression fracture at the T12 level, regardless of counting methods, this is likely a component of his back pain  He has no significant central canal stenosis or neuroforaminal stenosis  This along with full 5/5 strength in the lower limbs, no UMN signs, indicates that despite this being a pain generator, there is no neurologic compromise at the nerve root level or proximal  Imaging of the neck does not show any cervical myelopathy that could explain the balance and ambulatory dysfunction  I anticipate that this is purely peripheral in nature and likely secondary to his diabetes  His impaired sensation to the right foot goes to the malleolus only,b ut on the left goes up mid calf  I would recommend continued walker use and the followin  Physical therapy course- can change over to aqua therapy if requested  2  Try diclofenac gel to the low back 3-4 times per day as needed  3  Tylenol overnight when you wake up to use the bathroom to help with morning time stiffness  4  Trigger point injections can be considered in the future, but are local injections to the low back, not the same as epidural steroid injections  5  Consideration of a nerve test could be done, but would not recommend at this time as it will not likely     Review of Systems   Constitutional: Negative  Negative for appetite change and fever  HENT: Negative  Negative for hearing loss, tinnitus, trouble swallowing and voice change  Eyes: Negative  Negative for photophobia and pain     Respiratory: Negative  Negative for shortness of breath  Cardiovascular: Negative  Negative for palpitations  Gastrointestinal: Negative  Negative for nausea and vomiting  Endocrine: Negative  Negative for cold intolerance  Genitourinary: Negative  Negative for dysuria, frequency and urgency  Musculoskeletal: Positive for back pain and gait problem  Negative for myalgias and neck pain  Skin: Negative  Negative for rash  Neurological: Negative for dizziness, tremors, seizures, syncope, facial asymmetry, speech difficulty, weakness, light-headedness, numbness and headaches  Hematological: Negative  Does not bruise/bleed easily  Psychiatric/Behavioral: Negative  Negative for confusion, hallucinations and sleep disturbance  All other systems reviewed and are negative  ROS personally reviewed and updated    OBJECTIVE:   /68 (BP Location: Left arm, Patient Position: Sitting)   Pulse 63   Temp (!) 96 1 °F (35 6 °C) (Temporal)   Ht 6' (1 829 m)   Wt 92 7 kg (204 lb 4 8 oz)   BMI 27 71 kg/m²      Physical Exam  Constitutional:       General: He is not in acute distress  Appearance: Normal appearance  HENT:      Head: Normocephalic and atraumatic  Right Ear: External ear normal       Left Ear: External ear normal       Nose: Nose normal  No rhinorrhea  Mouth/Throat:      Mouth: Mucous membranes are moist       Pharynx: Oropharynx is clear  Eyes:      General: No scleral icterus  Right eye: No discharge  Left eye: No discharge  Cardiovascular:      Rate and Rhythm: Normal rate  Pulses: Normal pulses  Pulmonary:      Effort: Pulmonary effort is normal  No respiratory distress  Breath sounds: No wheezing  Abdominal:      General: There is no distension  Palpations: Abdomen is soft  Musculoskeletal:         General: Normal range of motion  Cervical back: Normal range of motion and neck supple  Right lower leg: No edema        Left lower leg: No edema  Skin:     General: Skin is warm and dry  Comments: Onychomycosis, mild   Neurological:      Mental Status: He is alert and oriented to person, place, and time  Cranial Nerves: No cranial nerve deficit  Sensory: Sensory deficit present  Motor: No weakness  Coordination: Coordination normal       Comments: Proprioception ok, sensory def in right foot and right leg up to the mid calf light touch and pin prick  Str 5/5 no UMN signs  Slow gait, noted genu varum  Appropriate heelstrike, but a step to gait pattern     Psychiatric:         Mood and Affect: Mood normal          Behavior: Behavior normal            Imaging: I personally reviewed pertinent imaging    Labs: Personally reviewed  Lab Results   Component Value Date    WBC 6 90 06/28/2022    HGB 12 4 06/28/2022    HCT 39 1 06/28/2022    MCV 88 06/28/2022     06/28/2022     Lab Results   Component Value Date    CALCIUM 9 0 06/28/2022    K 5 1 06/28/2022    CO2 27 06/28/2022     06/28/2022    BUN 18 06/28/2022    CREATININE 1 32 (H) 06/28/2022         The following portions of the patient's history were reviewed and updated as appropriate: allergies, current medications, past family history, past medical history, past social history, past surgical history and problem list     Past Medical History:   Diagnosis Date    [facility] )     12    Ambulates with cane     Anxiety     Arthritis     hands    At risk for falls     [facility]     Cholecystitis     CKD (chronic kidney disease)     Coronary artery disease     Diabetes mellitus ([facility] )     GERD (gastroesophageal reflux disease)     Heart murmur     Hyperlipidemia     Hypertension     Low back pain     Lumbar disc disease     Umbilical hernia     Wears dentures     full set    Wears glasses        Patient Active Problem List    Diagnosis Date Noted    Type 2 diabetes mellitus with diabetic polyneuropathy, without long-term current use of insulin ([facility] ) 07/21/2022    Acute cholecystitis 11/18/2021    Hilar adenopathy 11/18/2021    Troponin level elevated 11/18/2021    CKD (chronic kidney disease) 11/18/2021    Anxiety 07/22/2021    Intervertebral disc disorder with radiculopathy of lumbar region     Chronic midline low back pain without sciatica 04/27/2020    Frequent falls 03/21/2020    Closed T12 fracture ([facility] ) 03/21/2020    Ambulatory dysfunction 12/26/2018    Atrial fibrillation ([place] [address] ) 02/09/2018    Type 2 diabetes mellitus with stage 3a chronic kidney disease, without long-term current use of insulin ([place] [address] ) 02/09/2018    Essential hypertension 02/09/2018    HLD (hyperlipidemia) 02/09/2018    GERD (gastroesophageal reflux disease) 02/09/2018    CAD (coronary artery disease) 02/09/2018       Past Surgical History:   Procedure Laterality Date    CARDIAC CATHETERIZATION      s/p MI    COLONOSCOPY      HERNIA REPAIR Left     X5    IA LAP,CHOLECYSTECTOMY N/A 1/7/2022    Procedure: ROBOTIC ASSISTED LAPAROSCOPIC CHOLECYSTECTOMY;  Surgeon: Pedro Clarke MD;  [facility];  Service: General    TOTAL HIP ARTHROPLASTY Left     UMBILICAL HERNIA REPAIR LAPAROSCOPIC N/A 1/7/2022    Procedure: Open umbilical hernia repair;  Surgeon: Pedro Clarke MD;  [facility];  Service: General       Family History   Problem Relation Age of Onset    No Known Problems Mother     No Known Problems Father        Social History     No Known Allergies      Current Outpatient Medications:     acetaminophen (TYLENOL) 325 mg tablet, Take 2 tablets (650 mg total) by mouth every 6 (six) hours (Patient taking differently: Take 650 mg by mouth every 6 (six) hours as needed), Disp: 30 tablet, Rfl: 0    aspirin 81 mg chewable tablet, Chew 81 mg daily Pt stopped , Disp: , Rfl:     cyanocobalamin (VITAMIN B-12) 500 mcg tablet, Take 1,000 mcg by mouth daily, Disp: , Rfl:     docusate sodium (COLACE) 100 mg capsule, Take 1 capsule (100 mg total) by mouth 2 (two) times a day (Patient taking differently: Take 100 mg by mouth every evening), Disp: 10 capsule, Rfl: 0    escitalopram (LEXAPRO) 5 mg tablet, TAKE 1 TABLET BY MOUTH EVERY DAY, Disp: 90 tablet, Rfl: 1    glipiZIDE (GLUCOTROL XL) 2 5 mg 24 hr tablet, Take 1 tablet (2 5 mg total) by mouth daily, Disp: 90 tablet, Rfl: 1    metFORMIN (GLUCOPHAGE) 500 mg tablet, TAKE 2 TABLETS EVERY MORNING TAKE 2 TABLETS EVERY EVENING, Disp: 360 tablet, Rfl: 1    metoprolol tartrate (LOPRESSOR) 25 mg tablet, TAKE 1 TABLET (25 MG TOTAL) BY MOUTH EVERY 12 (TWELVE) HOURS, Disp: 180 tablet, Rfl: 1    omeprazole (PriLOSEC) 40 MG capsule, TAKE 1 CAPSULE BY MOUTH EVERY DAY BEFORE BREAKFAST, Disp: 90 capsule, Rfl: 3    simvastatin (ZOCOR) 40 mg tablet, TAKE 1 TABLET BY MOUTH EVERY DAY, Disp: 90 tablet, Rfl: 1    Christ Garza DO  Physical Medicine and [place]

## 2022-09-01 NOTE — PATIENT INSTRUCTIONS
Some of your falls are likely related to diabetic peripheral neuropathy affecting the left leg more than the right  Continue suing the walker and try the following:  Physical therapy course- can change over to aqua therapy if requested  Try diclofenac gel to the low back 3-4 times per day as needed  Tylenol overnight when you wake up to use the bathroom to help with morning time stiffness  Trigger point injections can be considered in the future, but are local injections to the low back, not the same as epidural steroid injections  Consideration of a nerve test could be done, but would not recommend at this time as it will not likely   Max dose tylenol is 1000mg every 8 hours as needed   Do not exceed 3grams per day  I recommend either 650mg every 6 hours as needed

## 2022-10-06 DIAGNOSIS — E11.69 TYPE 2 DIABETES MELLITUS WITH OTHER SPECIFIED COMPLICATION, WITHOUT LONG-TERM CURRENT USE OF INSULIN (HCC): ICD-10-CM

## 2022-10-06 DIAGNOSIS — E78.5 HYPERLIPIDEMIA, UNSPECIFIED HYPERLIPIDEMIA TYPE: ICD-10-CM

## 2022-10-06 RX ORDER — SIMVASTATIN 40 MG
TABLET ORAL
Qty: 90 TABLET | Refills: 1 | Status: SHIPPED | OUTPATIENT
Start: 2022-10-06

## 2022-10-07 ENCOUNTER — OFFICE VISIT (OUTPATIENT)
Dept: PODIATRY | Facility: CLINIC | Age: 87
End: 2022-10-07
Payer: MEDICARE

## 2022-10-07 VITALS — BODY MASS INDEX: 27.63 KG/M2 | HEIGHT: 72 IN | HEART RATE: 70 BPM | WEIGHT: 204 LBS | OXYGEN SATURATION: 99 %

## 2022-10-07 DIAGNOSIS — B35.1 ONYCHOMYCOSIS: Primary | ICD-10-CM

## 2022-10-07 PROCEDURE — 99212 OFFICE O/P EST SF 10 MIN: CPT | Performed by: PODIATRIST

## 2022-10-07 PROCEDURE — 11721 DEBRIDE NAIL 6 OR MORE: CPT | Performed by: PODIATRIST

## 2022-10-07 NOTE — PROGRESS NOTES
PATIENT:  Santos An  1935    ASSESSMENT/PLAN:  1  Onychomycosis           No orders of the defined types were placed in this encounter  Disease prevention and related risk factors of diabetes were identified and discussed  The patient was educated in proper foot wear for diabetics  Also educated in daily foot assessment and routine diabetic foot care  Discussed the importance of controlling BS through diet and exercise  The patient will follow up in 9 weeks for further diabetic foot exam and care     PROCEDURE:  All mycotic toenails were reduced and debrided in length, width, and girth using a nail nipper and dremel  All hyperkeratotic skin lesion(s) were sharply pared with a scalpel / forcep with no bleeding or evidence of ulceration  Patient tolerated procedure(s) well without complications       HPI:  Santos An is a 82 y  o year old male seen for diabetic foot exam     BS is under control  The patient complained of thick toenails and calluses to both feet  The patient denied any acute pedal disorder, redness, acute swelling, or recent injury            PAST MEDICAL HISTORY:  Past Medical History:   Diagnosis Date    Acute MI ([address] )     12    Ambulates with cane     Anxiety     Arthritis     hands    At risk for falls     Atrial fibrillation (HCC)     Cholecystitis     CKD (chronic kidney disease)     Coronary artery disease     Diabetes mellitus (HCC)     GERD (gastroesophageal reflux disease)     Heart murmur     Hyperlipidemia     Hypertension     Low back pain     Lumbar disc disease     Umbilical hernia     Wears dentures     full set    Wears glasses        PAST SURGICAL HISTORY:  Past Surgical History:   Procedure Laterality Date    CARDIAC CATHETERIZATION      s/p MI    COLONOSCOPY      HERNIA REPAIR Left     X5    AR LAP,CHOLECYSTECTOMY N/A 1/7/2022    Procedure: ROBOTIC ASSISTED LAPAROSCOPIC CHOLECYSTECTOMY;  Surgeon: Pamela Conrad MD;  [facility];  Service: General    TOTAL HIP ARTHROPLASTY Left     UMBILICAL HERNIA REPAIR LAPAROSCOPIC N/A 1/7/2022    Procedure: Open umbilical hernia repair;  Surgeon: Felecia Pineda MD;  [facility];  Service: General        ALLERGIES:  Patient has no known allergies  MEDICATIONS:  Current Outpatient Medications   Medication Sig Dispense Refill    acetaminophen (TYLENOL) 325 mg tablet Take 2 tablets (650 mg total) by mouth every 6 (six) hours (Patient taking differently: Take 650 mg by mouth every 6 (six) hours as needed) 30 tablet 0    aspirin 81 mg chewable tablet Chew 81 mg daily Pt stopped       cyanocobalamin (VITAMIN B-12) 500 mcg tablet Take 1,000 mcg by mouth daily      docusate sodium (COLACE) 100 mg capsule Take 1 capsule (100 mg total) by mouth 2 (two) times a day (Patient taking differently: Take 100 mg by mouth every evening) 10 capsule 0    escitalopram (LEXAPRO) 5 mg tablet TAKE 1 TABLET BY MOUTH EVERY DAY 90 tablet 1    glipiZIDE (GLUCOTROL XL) 2 5 mg 24 hr tablet Take 1 tablet (2 5 mg total) by mouth daily 90 tablet 1    metFORMIN (GLUCOPHAGE) 500 mg tablet TAKE 2 TABLETS EVERY MORNING TAKE 2 TABLETS EVERY EVENING 360 tablet 1    metoprolol tartrate (LOPRESSOR) 25 mg tablet TAKE 1 TABLET (25 MG TOTAL) BY MOUTH EVERY 12 (TWELVE) HOURS 180 tablet 1    omeprazole (PriLOSEC) 40 MG capsule TAKE 1 CAPSULE BY MOUTH EVERY DAY BEFORE BREAKFAST 90 capsule 3    simvastatin (ZOCOR) 40 mg tablet TAKE 1 TABLET BY MOUTH EVERY DAY 90 tablet 1     No current facility-administered medications for this visit         SOCIAL HISTORY:  Social History     Socioeconomic History    Marital status: /Civil Union     Spouse name: Not on file    Number of children: Not on file    Years of education: Not on file    Highest education level: Not on file   Occupational History    Occupation: retired   Tobacco Use    Smoking status: Never Smoker    Smokeless tobacco: Never Used   Vaping Use    Vaping Use: Never used   Substance and Sexual Activity    Alcohol use: Yes     Comment: rare    Drug use: No    Sexual activity: Not Currently   Other Topics Concern    Not on file   Social History Narrative    Prabha:    Most recent tobacco use screenin2020      Do you currently or have you served in the [employer]:   No      Were you activated, into active duty, as a member of the [employer] or as a Reservist:   No      Occupation:   Retired      Marital status:         Sexual orientation:   Heterosexual      Exercise level:   Occasional      Diet:   Regular      General stress level:   Medium      Alcohol intake:   Occasional      Caffeine intake:    Moderate      Chewing tobacco:   none      Illicit drugs:   Denied      Guns present in home:   No      Seat belts used routinely:   Yes      Sunscreen used routinely:   Yes      Smoke alarm in home:   Yes      Advance directive:   Yes      Salt Intake:   Normal     Would the patient like to schedule a Mammogram:   No      Is the patient interested in a colorectal cancer screening:   No     Last modified by zulma   2020, 15:03      Social Determinants of Health     Financial Resource Strain: Not on file   Food Insecurity: No Food Insecurity    Worried About Running Out of Food in the Last Year: Never true    John of Food in the Last Year: Never true   Transportation Needs: Not on file   Physical Activity: Not on file   Stress: Not on file   Social Connections: Not on file   Intimate Partner Violence: Not on file   Housing Stability: Unknown    Unable to Pay for Housing in the Last Year: No    Number of [place] in the Last Year: 1    Unstable Housing in the Last Year: Not on file        REVIEW OF SYSTEMS:  GENERAL: No fever or chills  HEART: No chest pain, or palpitation  RESPIRATORY:  No acute SOB or cough  GI: No Nausea, vomit or diarrhea  NEUROLOGIC: No syncope or acute weakness    PHYSICAL EXAM:    Pulse 70 Ht 6' (1 829 m)   Wt 92 5 kg (204 lb)   SpO2 99%   BMI 27 67 kg/m²     VASCULAR EXAM  Dorsalis pedis  +1, Posterior tibial artery  absent  The patient has class findings with skin atrophy, lack of digital hair, and nail dystrophy  There is +1 and no lower extremity edema bilaterally  Venous stasis skin changes noted BLE  NEUROLOGIC EXAM  Sensation is intact to light touch  Sensation is intact to 10gm monofilament  No focal neurologic deficit  DERMATOLOGIC EXAM:   No ulcer or cellulitis noted  The patient has hypertrophic toenails with discoloration, onycholysis, and subungal debris  No notable skin lesion  Patient has hyperkeratosis in right distal hallux and sub-Mt 1 left foot  MUSCULOSKELETAL EXAM:   No acute joint pain, edema, or redness  No acute musculoskeletal problem  Patient has deformity including bunions B/L  Risk Category:   2 = Loss of protective sensation with either high pressure (callous/deformity) or poor circulation    A)  Has the patient had a previous amputation of the foot or integral skeletal portion thereof? No  B)  Does the patient have absent dorsalis pedis or posterior tibial pulse? Yes        Does the patient have three of the following? Yes        1  Hair growth (increased or decreased), 2   Nail changes (thickening) and 4  Skin texture (thin, shiny)  C)  Does the patient have two of the following and one above?  No

## 2022-10-12 ENCOUNTER — APPOINTMENT (OUTPATIENT)
Dept: LAB | Facility: CLINIC | Age: 87
End: 2022-10-12
Payer: MEDICARE

## 2022-10-12 DIAGNOSIS — E11.42 TYPE 2 DIABETES MELLITUS WITH DIABETIC POLYNEUROPATHY, WITHOUT LONG-TERM CURRENT USE OF INSULIN (HCC): ICD-10-CM

## 2022-10-12 LAB
ALBUMIN SERPL BCP-MCNC: 3.6 G/DL (ref 3.5–5)
ALP SERPL-CCNC: 86 U/L (ref 46–116)
ALT SERPL W P-5'-P-CCNC: 28 U/L (ref 12–78)
ANION GAP SERPL CALCULATED.3IONS-SCNC: 4 MMOL/L (ref 4–13)
AST SERPL W P-5'-P-CCNC: 18 U/L (ref 5–45)
BASOPHILS # BLD AUTO: 0.04 THOUSANDS/ΜL (ref 0–0.1)
BASOPHILS NFR BLD AUTO: 1 % (ref 0–1)
BILIRUB SERPL-MCNC: 0.51 MG/DL (ref 0.2–1)
BUN SERPL-MCNC: 19 MG/DL (ref 5–25)
CALCIUM SERPL-MCNC: 9.8 MG/DL (ref 8.3–10.1)
CHLORIDE SERPL-SCNC: 108 MMOL/L (ref 96–108)
CO2 SERPL-SCNC: 25 MMOL/L (ref 21–32)
CREAT SERPL-MCNC: 1.44 MG/DL (ref 0.6–1.3)
EOSINOPHIL # BLD AUTO: 0.25 THOUSAND/ΜL (ref 0–0.61)
EOSINOPHIL NFR BLD AUTO: 3 % (ref 0–6)
ERYTHROCYTE [DISTWIDTH] IN BLOOD BY AUTOMATED COUNT: 15.2 % (ref 11.6–15.1)
EST. AVERAGE GLUCOSE BLD GHB EST-MCNC: 131 MG/DL
GFR SERPL CREATININE-BSD FRML MDRD: 44 ML/MIN/1.73SQ M
GLUCOSE P FAST SERPL-MCNC: 98 MG/DL (ref 65–99)
HBA1C MFR BLD: 6.2 %
HCT VFR BLD AUTO: 40.3 % (ref 36.5–49.3)
HGB BLD-MCNC: 12.1 G/DL (ref 12–17)
IMM GRANULOCYTES # BLD AUTO: 0.02 THOUSAND/UL (ref 0–0.2)
IMM GRANULOCYTES NFR BLD AUTO: 0 % (ref 0–2)
LYMPHOCYTES # BLD AUTO: 2.39 THOUSANDS/ΜL (ref 0.6–4.47)
LYMPHOCYTES NFR BLD AUTO: 31 % (ref 14–44)
MCH RBC QN AUTO: 27.6 PG (ref 26.8–34.3)
MCHC RBC AUTO-ENTMCNC: 30 G/DL (ref 31.4–37.4)
MCV RBC AUTO: 92 FL (ref 82–98)
MONOCYTES # BLD AUTO: 0.58 THOUSAND/ΜL (ref 0.17–1.22)
MONOCYTES NFR BLD AUTO: 7 % (ref 4–12)
NEUTROPHILS # BLD AUTO: 4.55 THOUSANDS/ΜL (ref 1.85–7.62)
NEUTS SEG NFR BLD AUTO: 58 % (ref 43–75)
NRBC BLD AUTO-RTO: 0 /100 WBCS
PLATELET # BLD AUTO: 220 THOUSANDS/UL (ref 149–390)
PMV BLD AUTO: 11.1 FL (ref 8.9–12.7)
POTASSIUM SERPL-SCNC: 5.6 MMOL/L (ref 3.5–5.3)
PROT SERPL-MCNC: 7.6 G/DL (ref 6.4–8.4)
RBC # BLD AUTO: 4.38 MILLION/UL (ref 3.88–5.62)
SODIUM SERPL-SCNC: 137 MMOL/L (ref 135–147)
WBC # BLD AUTO: 7.83 THOUSAND/UL (ref 4.31–10.16)

## 2022-10-12 PROCEDURE — 85025 COMPLETE CBC W/AUTO DIFF WBC: CPT

## 2022-10-12 PROCEDURE — 36415 COLL VENOUS BLD VENIPUNCTURE: CPT

## 2022-10-12 PROCEDURE — 80053 COMPREHEN METABOLIC PANEL: CPT

## 2022-10-12 PROCEDURE — 83036 HEMOGLOBIN GLYCOSYLATED A1C: CPT

## 2022-10-21 ENCOUNTER — OFFICE VISIT (OUTPATIENT)
Dept: FAMILY MEDICINE CLINIC | Facility: CLINIC | Age: 87
End: 2022-10-21
Payer: MEDICARE

## 2022-10-21 VITALS
WEIGHT: 206 LBS | SYSTOLIC BLOOD PRESSURE: 116 MMHG | RESPIRATION RATE: 16 BRPM | HEART RATE: 66 BPM | HEIGHT: 72 IN | OXYGEN SATURATION: 98 % | DIASTOLIC BLOOD PRESSURE: 62 MMHG | BODY MASS INDEX: 27.9 KG/M2

## 2022-10-21 DIAGNOSIS — E11.42 TYPE 2 DIABETES MELLITUS WITH DIABETIC POLYNEUROPATHY, WITHOUT LONG-TERM CURRENT USE OF INSULIN (HCC): ICD-10-CM

## 2022-10-21 DIAGNOSIS — I10 ESSENTIAL HYPERTENSION: ICD-10-CM

## 2022-10-21 DIAGNOSIS — I48.0 PAROXYSMAL ATRIAL FIBRILLATION (HCC): ICD-10-CM

## 2022-10-21 DIAGNOSIS — R29.6 FREQUENT FALLS: ICD-10-CM

## 2022-10-21 DIAGNOSIS — Z23 NEED FOR VACCINATION: Primary | ICD-10-CM

## 2022-10-21 DIAGNOSIS — E78.2 MIXED HYPERLIPIDEMIA: ICD-10-CM

## 2022-10-21 DIAGNOSIS — F41.9 ANXIETY: ICD-10-CM

## 2022-10-21 PROBLEM — R79.89 TROPONIN LEVEL ELEVATED: Status: RESOLVED | Noted: 2021-11-18 | Resolved: 2022-10-21

## 2022-10-21 PROBLEM — R77.8 TROPONIN LEVEL ELEVATED: Status: RESOLVED | Noted: 2021-11-18 | Resolved: 2022-10-21

## 2022-10-21 PROBLEM — K81.0 ACUTE CHOLECYSTITIS: Status: RESOLVED | Noted: 2021-11-18 | Resolved: 2022-10-21

## 2022-10-21 PROCEDURE — 99214 OFFICE O/P EST MOD 30 MIN: CPT | Performed by: FAMILY MEDICINE

## 2022-10-21 PROCEDURE — 90662 IIV NO PRSV INCREASED AG IM: CPT | Performed by: FAMILY MEDICINE

## 2022-10-21 PROCEDURE — G0008 ADMIN INFLUENZA VIRUS VAC: HCPCS | Performed by: FAMILY MEDICINE

## 2022-10-21 NOTE — PROGRESS NOTES
Assessment/Plan:  He does every once while get some hypoglycemia therefore over going to stop his glipizide 2 5 mg will continue with his metformin as we are still watching his GFR if he gets below 40s pretty consistently then were going to have to stop the metformin in the meantime were going to continue with the Lexapro 5 his simvastatin 40 the metoprolol tartrate 25 twice and his low-dose aspirin I would also like him to continue with his Colace his bowels are fine continue to use your walker and just be mindful of your diabetic neuropathy especially in the middle the night    1  Need for vaccination  -     influenza vaccine, high-dose, PF 0 7 mL (FLUZONE HIGH-DOSE)    2  Type 2 diabetes mellitus with diabetic polyneuropathy, without long-term current use of insulin (HCC)  -     Microalbumin / creatinine urine ratio; Future; Expected date: 02/06/2023  -     HEMOGLOBIN A1C W/ EAG ESTIMATION; Future; Expected date: 02/06/2023  -     Lipid Panel with Direct LDL reflex; Future; Expected date: 02/06/2023  -     Basic metabolic panel; Future; Expected date: 02/06/2023    3  Mixed hyperlipidemia    4  Frequent falls    5  Essential hypertension    6  Paroxysmal atrial fibrillation (HCC)    7  Anxiety       Subjective:      Patient ID: Xavi Bonilla is a 82 y o  male  HPI Here to go over chronic issues and labs / imaging studies if applicable  No complaints  The following portions of the patient's history were reviewed and updated as appropriate: allergies, current medications, past family history, past medical history, past social history, past surgical history and problem list     Review of Systems   Constitutional: Negative for activity change, appetite change and fever  HENT: Negative for congestion, nosebleeds and trouble swallowing  Eyes: Negative for itching  Respiratory: Negative for cough and chest tightness  Cardiovascular: Negative for chest pain and palpitations     Gastrointestinal: Negative for abdominal pain, constipation, diarrhea and nausea  Endocrine: Negative for cold intolerance  Genitourinary: Negative for frequency  Musculoskeletal: Positive for arthralgias and back pain  Negative for gait problem and joint swelling  Skin: Negative for rash  Allergic/Immunologic: Negative for immunocompromised state  Neurological: Negative for dizziness, tremors, seizures, syncope and headaches  Psychiatric/Behavioral: Negative for hallucinations and suicidal ideas  The patient is nervous/anxious            Objective:      /62 (BP Location: Left arm, Patient Position: Sitting, Cuff Size: Standard)   Pulse 66   Resp 16   Ht 6' (1 829 m)   Wt 93 4 kg (206 lb)   SpO2 98%   BMI 27 94 kg/m²     Appointment on 10/12/2022   Component Date Value   • Hemoglobin A1C 10/12/2022 6 2 (A)   • EAG 10/12/2022 131    • WBC 10/12/2022 7 83    • RBC 10/12/2022 4 38    • Hemoglobin 10/12/2022 12 1    • Hematocrit 10/12/2022 40 3    • MCV 10/12/2022 92    • MCH 10/12/2022 27 6    • MCHC 10/12/2022 30 0 (A)   • RDW 10/12/2022 15 2 (A)   • MPV 10/12/2022 11 1    • Platelets 15/24/3925 220    • nRBC 10/12/2022 0    • Neutrophils Relative 10/12/2022 58    • Immat GRANS % 10/12/2022 0    • Lymphocytes Relative 10/12/2022 31    • Monocytes Relative 10/12/2022 7    • Eosinophils Relative 10/12/2022 3    • Basophils Relative 10/12/2022 1    • Neutrophils Absolute 10/12/2022 4 55    • Immature Grans Absolute 10/12/2022 0 02    • Lymphocytes Absolute 10/12/2022 2 39    • Monocytes Absolute 10/12/2022 0 58    • Eosinophils Absolute 10/12/2022 0 25    • Basophils Absolute 10/12/2022 0 04    • Sodium 10/12/2022 137    • Potassium 10/12/2022 5 6 (A)   • Chloride 10/12/2022 108    • CO2 10/12/2022 25    • ANION GAP 10/12/2022 4    • BUN 10/12/2022 19    • Creatinine 10/12/2022 1 44 (A)   • Glucose, Fasting 10/12/2022 98    • Calcium 10/12/2022 9 8    • AST 10/12/2022 18    • ALT 10/12/2022 28    • Alkaline Phosphatase 10/12/2022 86    • Total Protein 10/12/2022 7 6    • Albumin 10/12/2022 3 6    • Total Bilirubin 10/12/2022 0 51    • eGFR 10/12/2022 44           Physical Exam  Vitals and nursing note reviewed  Constitutional:       General: He is not in acute distress  Appearance: He is well-developed  Comments: Using a cane today   HENT:      Head: Normocephalic and atraumatic  Cardiovascular:      Rate and Rhythm: Normal rate and regular rhythm  Heart sounds: Normal heart sounds  No murmur heard  Pulmonary:      Effort: Pulmonary effort is normal       Breath sounds: Normal breath sounds  No wheezing or rales  Abdominal:      General: Bowel sounds are normal  There is no distension  Palpations: Abdomen is soft  Tenderness: There is no abdominal tenderness  There is no guarding or rebound  Musculoskeletal:         General: No tenderness  Normal range of motion  Cervical back: Normal range of motion and neck supple  Lymphadenopathy:      Cervical: No cervical adenopathy  Skin:     General: Skin is warm and dry  Capillary Refill: Capillary refill takes less than 2 seconds  Findings: No rash  Neurological:      Mental Status: He is alert and oriented to person, place, and time  Cranial Nerves: No cranial nerve deficit  Sensory: No sensory deficit  Motor: No abnormal muscle tone  Psychiatric:         Behavior: Behavior normal          Thought Content:  Thought content normal          Judgment: Judgment normal              [facility]

## 2022-12-08 ENCOUNTER — OFFICE VISIT (OUTPATIENT)
Dept: CARDIOLOGY CLINIC | Facility: CLINIC | Age: 87
End: 2022-12-08

## 2022-12-08 VITALS
TEMPERATURE: 97.1 F | SYSTOLIC BLOOD PRESSURE: 109 MMHG | HEART RATE: 66 BPM | WEIGHT: 205 LBS | HEIGHT: 72 IN | BODY MASS INDEX: 27.77 KG/M2 | OXYGEN SATURATION: 98 % | DIASTOLIC BLOOD PRESSURE: 72 MMHG

## 2022-12-08 DIAGNOSIS — I10 ESSENTIAL HYPERTENSION: ICD-10-CM

## 2022-12-08 DIAGNOSIS — I25.10 CORONARY ARTERY DISEASE INVOLVING NATIVE CORONARY ARTERY OF NATIVE HEART WITHOUT ANGINA PECTORIS: ICD-10-CM

## 2022-12-08 DIAGNOSIS — I48.0 PAROXYSMAL ATRIAL FIBRILLATION (HCC): Primary | ICD-10-CM

## 2022-12-08 DIAGNOSIS — R29.6 FREQUENT FALLS: ICD-10-CM

## 2022-12-08 RX ORDER — GLIPIZIDE 2.5 MG/1
2.5 TABLET, EXTENDED RELEASE ORAL DAILY
COMMUNITY

## 2022-12-08 NOTE — PATIENT INSTRUCTIONS
You were seen today in the Cardiology office for follow up  Please have a two week heart monitor performed to assess for recurrent atrial fibrillation  Thank you for choosing [address]  Please call our office or use [employer] with any questions

## 2022-12-08 NOTE — PROGRESS NOTES
[facility]    CHIEF COMPLAINT:   Chief Complaint   Patient presents with   • Follow-up     Annual no complaints       HPI:  Eykrissy Friday is a 82 y o  male with a past medical history significant for DM, hypertension, coronary artery disease, history of MI (1991) and paroxysmal atrial fibrillation/flutter who presents today for follow up  He was last seen for preoperative risk assessment in December 2021 for laparoscopic cholecystectomy and umbilical hernia repair after a recent admission on 11/18/2021 with sepsis due to acute cholecystitis  His hospital course was complicated by atrial flutter with RVR and a non-MI troponin elevation  He subsequently underwent laparoscopic cholecystectomy and umbilical herniorrhaphy on 1/7/22  Post op course was uncomplicated  Interval history: He is generally not physically active  He has a history of recurrent falls and was just seen in the emergency department in June 2022 for a fall which resulted in head strike  He is maintained on aspirin 81 mg daily  No falls since 6 months ago  Does home physical therapy exercises for his legs and arms  Feels more steady on his feet  Walks with cane and throughout the store with difficulty  No lightheadedness, visual changes, chest pain, palpitations, shortness of breath         The following portions of the patient's history were reviewed and updated as appropriate: allergies, current medications, past family history, past medical history, past social history, past surgical history, and problem list     SINCE LAST OV I REVIEWED WITH THE PATIENT THE INTERIM LABS, TEST RESULTS, CONSULTANT(S) NOTES AND PERFORMED AN INTERIM REVIEW OF HISTORY    Past Medical History:   Diagnosis Date   • Acute MI ([facility] [address] )     1991   • Ambulates with cane    • Anxiety    • Arthritis     hands   • At risk for falls    • Atrial fibrillation ([facility] [address] )    • Cholecystitis    • CKD (chronic kidney disease)    • Coronary artery disease    • Diabetes mellitus ([facility] [address] )    • GERD (gastroesophageal reflux disease)    • Heart murmur    • Hyperlipidemia    • Hypertension    • Low back pain    • Lumbar disc disease    • Umbilical hernia    • Wears dentures     full set   • Wears glasses        Past Surgical History:   Procedure Laterality Date   • CARDIAC CATHETERIZATION      s/p MI   • COLONOSCOPY     • HERNIA REPAIR Left     X5   • DE LAP,CHOLECYSTECTOMY N/A 2022    Procedure: ROBOTIC ASSISTED LAPAROSCOPIC CHOLECYSTECTOMY;  Surgeon: Marino Rivera MD;  [facility]: [facility];  Service: General   • TOTAL HIP ARTHROPLASTY Left    • UMBILICAL HERNIA REPAIR LAPAROSCOPIC N/A 2022    Procedure: Open umbilical hernia repair;  Surgeon: Marino Rivrea MD;  Location: [facility];  Service: General       Social History     Socioeconomic History   • Marital status: /Civil Union     Spouse name: Not on file   • Number of children: Not on file   • Years of education: Not on file   • Highest education level: Not on file   Occupational History   • Occupation: retired   Tobacco Use   • Smoking status: Never   • Smokeless tobacco: Never   Vaping Use   • Vaping Use: Never used   Substance and Sexual Activity   • Alcohol use: Yes     Comment: rare   • Drug use: No   • Sexual activity: Not Currently   Other Topics Concern   • Not on file   Social History Narrative    [place]:    Most recent tobacco use screenin2020      Do you currently or have you served in the [employer]:   No      Were you activated, into active duty, as a member of the [employer] or as a Reservist:   No      Occupation:   Retired      Marital status:         Sexual orientation:   Heterosexual      Exercise level:   Occasional      Diet:   Regular      General stress level:   Medium      Alcohol intake:   Occasional      Caffeine intake:    Moderate      Chewing tobacco:   none      Illicit drugs:   Denied      Guns present in home:   No      Seat belts used routinely:   Yes Sunscreen used routinely:   Yes      Smoke alarm in home:   Yes      Advance directive:   Yes      Salt Intake:   Normal     Would the patient like to schedule a Mammogram:   No      Is the patient interested in a colorectal cancer screening:   No     Last modified by zulma   01-, 15:03      Social Determinants of Health     Financial Resource Strain: Not on file   Food Insecurity: No Food Insecurity   • Worried About Running Out of Food in the Last Year: Never true   • Ran Out of Food in the Last Year: Never true   Transportation Needs: Not on file   Physical Activity: Not on file   Stress: Not on file   Social Connections: Not on file   Intimate Partner Violence: Not on file   Housing Stability: Unknown   • Unable to Pay for Housing in the Last Year: No   • Number of Places Lived in the Last Year: 1   • Unstable Housing in the Last Year: Not on file       Family History   Problem Relation Age of Onset   • No Known Problems Mother    • No Known Problems Father        No Known Allergies    Current Outpatient Medications   Medication Sig Dispense Refill   • acetaminophen (TYLENOL) 325 mg tablet Take 2 tablets (650 mg total) by mouth every 6 (six) hours (Patient taking differently: Take 650 mg by mouth every 6 (six) hours as needed) 30 tablet 0   • aspirin 81 mg chewable tablet Chew 81 mg daily Pt stopped      • cyanocobalamin (VITAMIN B-12) 500 mcg tablet Take 1,000 mcg by mouth daily     • docusate sodium (COLACE) 100 mg capsule Take 1 capsule (100 mg total) by mouth 2 (two) times a day (Patient taking differently: Take 100 mg by mouth every evening) 10 capsule 0   • escitalopram (LEXAPRO) 5 mg tablet TAKE 1 TABLET BY MOUTH EVERY DAY 90 tablet 1   • glipiZIDE (GLUCOTROL XL) 2 5 mg 24 hr tablet Take 2 5 mg by mouth daily     • metFORMIN (GLUCOPHAGE) 500 mg tablet TAKE 2 TABLETS EVERY MORNING TAKE 2 TABLETS EVERY EVENING 360 tablet 1   • metoprolol tartrate (LOPRESSOR) 25 mg tablet TAKE 1 TABLET (25 MG TOTAL) BY MOUTH EVERY 12 (TWELVE) HOURS 180 tablet 1   • omeprazole (PriLOSEC) 40 MG capsule TAKE 1 CAPSULE BY MOUTH EVERY DAY BEFORE BREAKFAST 90 capsule 3   • simvastatin (ZOCOR) 40 mg tablet TAKE 1 TABLET BY MOUTH EVERY DAY 90 tablet 1     No current facility-administered medications for this visit  /72 (BP Location: Left arm, Patient Position: Sitting, Cuff Size: Standard)   Pulse 66   Temp (!) 97 1 °F (36 2 °C)   Ht 6' (1 829 m)   Wt 93 kg (205 lb)   SpO2 98%   BMI 27 80 kg/m²     Review of Systems   All other systems reviewed and are negative  Physical Exam  Vitals reviewed  HENT:      Head: Normocephalic and atraumatic  Eyes:      General: No scleral icterus  Extraocular Movements: Extraocular movements intact  Cardiovascular:      Rate and Rhythm: Normal rate and regular rhythm  Pulses: Normal pulses  Heart sounds: Normal heart sounds  No murmur heard  No gallop  Pulmonary:      Effort: Pulmonary effort is normal  No respiratory distress  Breath sounds: Normal breath sounds  No wheezing or rales  Abdominal:      General: Abdomen is flat  Bowel sounds are normal  There is no distension  Palpations: Abdomen is soft  Tenderness: There is no abdominal tenderness  There is no guarding  Musculoskeletal:      Right lower leg: No edema  Left lower leg: No edema  Skin:     General: Skin is warm and dry  Coloration: Skin is not jaundiced or pale  Neurological:      Mental Status: He is alert     Psychiatric:         Mood and Affect: Mood normal          Behavior: Behavior normal         Lab Results   Component Value Date    K 5 6 (H) 10/12/2022     10/12/2022    CO2 25 10/12/2022    BUN 19 10/12/2022    CREATININE 1 44 (H) 10/12/2022    CALCIUM 9 8 10/12/2022    ALT 28 10/12/2022    AST 18 10/12/2022    INR 1 17 11/19/2021       Lab Results   Component Value Date    HDL 29 (L) 06/28/2022    LDLCALC 65 06/28/2022    TRIG 140 06/28/2022       Lab Results   Component Value Date    WBC 7 83 10/12/2022    HGB 12 1 10/12/2022    HCT 40 3 10/12/2022     10/12/2022       Lab Results   Component Value Date     10/12/2022    HGBA1C 6 2 (H) 10/12/2022     Cardiac studies:   Results for orders placed or performed in visit on 12/08/22   POCT ECG    Impression    Sinus rhythm with sinus arrhythmia   LAFB         ASSESSMENT AND PLAN:  Dahiana Leonardo was seen today for follow-up  Diagnoses and all orders for this visit:    Paroxysmal atrial fibrillation [facility]): He has a history of paroxysmal atrial fibrillation that was diagnosed years ago  He was not placed on anticoagulation due to a history of frequent falls and progressive lower extremity weakness  ECG today reveals normal sinus rhythm  Prior discussions with his daughter and him-both preferred baby aspirin  We addressed potential for anticoagulation given improvement in weakness and less frequent falls but still hesitant  We will first check a 2-week cardiac monitor to assess his overall atrial fibrillation burden which will assist in decision making/med changes  -     POCT ECG  -     AMB extended holter monitor; Future    Essential hypertension: Blood pressure is at goal   Continue with Toprol tartrate 25 Mg twice daily   -     POCT ECG    Frequent falls: He did have a fall in June 2022 which resulted in a forehead abrasion  He otherwise feels he is doing well from the standpoint for the past 6 months  He does feel more stable on his feet and continues to do home physical therapy  Coronary artery disease involving native coronary artery of native heart without angina pectoris: History of remote MI in 1991  He was treated with thrombolytics and rotational atherectomy of the left anterior descending artery  He underwent cardiac catheterization in 2001 which revealed a 50% LAD narrowing  He has no anginal complaints at this time    His lipid panel from 6/28/2022 shows total cholesterol 122, triglycerides 140, HDL 29, LDL 65  Continue simvastatin and aspirin         Omar Landry MD

## 2022-12-12 ENCOUNTER — TELEPHONE (OUTPATIENT)
Dept: CARDIOLOGY CLINIC | Facility: CLINIC | Age: 87
End: 2022-12-12

## 2022-12-12 NOTE — TELEPHONE ENCOUNTER
Registered patient for Johnathan Medrano extended holter monitor   Registration Complete    Ruth Durand  [facility]  [phone]

## 2022-12-31 DIAGNOSIS — F41.9 ANXIETY: ICD-10-CM

## 2023-01-03 RX ORDER — ESCITALOPRAM OXALATE 5 MG/1
TABLET ORAL
Qty: 90 TABLET | Refills: 1 | Status: SHIPPED | OUTPATIENT
Start: 2023-01-03

## 2023-01-05 DIAGNOSIS — I48.0 PAROXYSMAL ATRIAL FIBRILLATION (HCC): ICD-10-CM

## 2023-01-09 ENCOUNTER — OFFICE VISIT (OUTPATIENT)
Dept: PODIATRY | Facility: CLINIC | Age: 88
End: 2023-01-09

## 2023-01-09 VITALS
SYSTOLIC BLOOD PRESSURE: 142 MMHG | WEIGHT: 205 LBS | HEART RATE: 64 BPM | DIASTOLIC BLOOD PRESSURE: 75 MMHG | OXYGEN SATURATION: 99 % | HEIGHT: 72 IN | BODY MASS INDEX: 27.77 KG/M2

## 2023-01-09 DIAGNOSIS — M21.611 BUNION OF RIGHT FOOT: ICD-10-CM

## 2023-01-09 DIAGNOSIS — L84 CORNS: ICD-10-CM

## 2023-01-09 DIAGNOSIS — B35.1 ONYCHOMYCOSIS: ICD-10-CM

## 2023-01-09 DIAGNOSIS — E11.42 DIABETIC POLYNEUROPATHY ASSOCIATED WITH TYPE 2 DIABETES MELLITUS (HCC): Primary | ICD-10-CM

## 2023-01-09 NOTE — PROGRESS NOTES
Assessment/Plan:     The patient's clinical examination today is consistent with painful elongated pedal nail plates complicated by onychomycosis and diabetes with peripheral neuropathy  The pedal nails were sharply debrided x10 with a sterile nail clipper without incident  They were then mechanically reduced in thickness and girth utilizing a rotary bur  A tender callus lesion to the plantar aspect of his right fifth metatarsophalangeal joint was debrided with a sterile 15 blade without complication  The patient noted immediate relief postdebridement  The patient is also requesting a prescription for new diabetic shoes  A prescription was dispensed to the patient and he was referred to med [place] for this purpose  Recommend follow-up in 2 to 3 months  Diagnoses and all orders for this visit:    Diabetic polyneuropathy associated with type 2 diabetes mellitus ([facility] [address] )  -     Diabetic Shoe    Bunion of right foot  -     Diabetic Shoe    Corns    Onychomycosis          Subjective:     Patient ID: Chad Romero is a 82 y o  male  The patient presents today for follow-up diabetic foot care  The patient does note some tender elongated nails to his feet as well as a very tender callus to the plantar aspect of his right fifth metatarsophalangeal joint  He is also requesting a prescription for new diabetic shoes as his current pair is very worn and he is currently ambulating in a regular pair sneakers        PAST MEDICAL HISTORY:  Past Medical History:   Diagnosis Date   • Acute MI ([facility] [address] )     1991   • Ambulates with cane    • Anxiety    • Arthritis     hands   • At risk for falls    • Atrial fibrillation (HCC)    • Cholecystitis    • CKD (chronic kidney disease)    • Coronary artery disease    • Diabetes mellitus (HCC)    • GERD (gastroesophageal reflux disease)    • Heart murmur    • Hyperlipidemia    • Hypertension    • Low back pain    • Lumbar disc disease    • Umbilical hernia    • Wears dentures     full UV Protection set   • Wears glasses        PAST SURGICAL HISTORY:  Past Surgical History:   Procedure Laterality Date   • CARDIAC CATHETERIZATION      s/p MI   • COLONOSCOPY     • HERNIA REPAIR Left     X5   • HI LAPAROSCOPY SURG CHOLECYSTECTOMY N/A 1/7/2022    Procedure: ROBOTIC ASSISTED LAPAROSCOPIC CHOLECYSTECTOMY;  Surgeon: Crystal Mays MD;  [facility];  Service: General   • TOTAL HIP ARTHROPLASTY Left    • UMBILICAL HERNIA REPAIR LAPAROSCOPIC N/A 1/7/2022    Procedure: Open umbilical hernia repair;  Surgeon: Crystal Mays MD;  [facility];  Service: General        ALLERGIES:  Patient has no known allergies  MEDICATIONS:  Current Outpatient Medications   Medication Sig Dispense Refill   • acetaminophen (TYLENOL) 325 mg tablet Take 2 tablets (650 mg total) by mouth every 6 (six) hours (Patient taking differently: Take 650 mg by mouth every 6 (six) hours as needed) 30 tablet 0   • aspirin 81 mg chewable tablet Chew 81 mg daily Pt stopped      • cyanocobalamin (VITAMIN B-12) 500 mcg tablet Take 1,000 mcg by mouth daily     • docusate sodium (COLACE) 100 mg capsule Take 1 capsule (100 mg total) by mouth 2 (two) times a day (Patient taking differently: Take 100 mg by mouth every evening) 10 capsule 0   • escitalopram (LEXAPRO) 5 mg tablet TAKE 1 TABLET BY MOUTH EVERY DAY 90 tablet 1   • metFORMIN (GLUCOPHAGE) 500 mg tablet TAKE 2 TABLETS EVERY MORNING TAKE 2 TABLETS EVERY EVENING 360 tablet 1   • metoprolol tartrate (LOPRESSOR) 25 mg tablet TAKE 1 TABLET (25 MG TOTAL) BY MOUTH EVERY 12 (TWELVE) HOURS 180 tablet 1   • omeprazole (PriLOSEC) 40 MG capsule TAKE 1 CAPSULE BY MOUTH EVERY DAY BEFORE BREAKFAST 90 capsule 3   • simvastatin (ZOCOR) 40 mg tablet TAKE 1 TABLET BY MOUTH EVERY DAY 90 tablet 1   • glipiZIDE (GLUCOTROL XL) 2 5 mg 24 hr tablet Take 2 5 mg by mouth daily (Patient not taking: Reported on 1/9/2023)       No current facility-administered medications for this visit         SOCIAL HISTORY:  Social History     Socioeconomic History   • Marital status: /Civil Union     Spouse name: None   • Number of children: None   • Years of education: None   • Highest education level: None   Occupational History   • Occupation: retired   Tobacco Use   • Smoking status: Never   • Smokeless tobacco: Never   Vaping Use   • Vaping Use: Never used   Substance and Sexual Activity   • Alcohol use: Yes     Comment: rare   • Drug use: No   • Sexual activity: Not Currently   Other Topics Concern   • None   Social History Narrative    [place]:    Most recent tobacco use screenin2020      Do you currently or have you served in [employer]:   No      Were you activated, into active duty, as a member of the [employer] or as a Reservist:   No      Occupation:   Retired      Marital status:         Sexual orientation:   Heterosexual      Exercise level:   Occasional      Diet:   Regular      General stress level:   Medium      Alcohol intake:   Occasional      Caffeine intake: Moderate      Chewing tobacco:   none      Illicit drugs:   Denied      Guns present in home:   No      Seat belts used routinely:   Yes      Sunscreen used routinely:   Yes      Smoke alarm in home:   Yes      Advance directive:   Yes      Salt Intake:   Normal     Would the patient like to schedule a Mammogram:   No      Is the patient interested in a colorectal cancer screening:   No     Last modified by zulma   2020, 15:03      Social Determinants of Health     Financial Resource Strain: Not on file   Food Insecurity: Not on file   Transportation Needs: Not on file   Physical Activity: Not on file   Stress: Not on file   Social Connections: Not on file   Intimate Partner Violence: Not on file   Housing Stability: Not on file        Review of Systems   Constitutional: Negative  HENT: Negative  Eyes: Negative  Respiratory: Negative  Cardiovascular: Negative  Endocrine: Negative  Musculoskeletal: Negative  Neurological: Negative  Hematological: Negative  Psychiatric/Behavioral: Negative  Objective:     Physical Exam  Vitals reviewed  Constitutional:       Appearance: Normal appearance  HENT:      Head: Normocephalic and atraumatic  Nose: Nose normal    Eyes:      Conjunctiva/sclera: Conjunctivae normal       Pupils: Pupils are equal, round, and reactive to light  Cardiovascular:      Pulses:           Dorsalis pedis pulses are 1+ on the right side and 1+ on the left side  Posterior tibial pulses are 0 on the right side and 0 on the left side  Pulmonary:      Effort: Pulmonary effort is normal    Musculoskeletal:      Right foot: Bunion present  Left foot: Bunion present  Feet:      Right foot:      Protective Sensation: 7 sites tested  5 sites sensed  Skin integrity: Callus present  Toenail Condition: Right toenails are abnormally thick and long  Fungal disease present  Left foot:      Protective Sensation: 7 sites tested  5 sites sensed  Toenail Condition: Left toenails are abnormally thick  Comments: The pedal nail plates are thickened and dystrophic with subungual debris and discoloration and brittleness consistent with onychomycosis x10; there is a tender callus beneath the right fifth metatarsal phalangeal joint; the interdigital spaces are clear and without maceration  Skin:     General: Skin is warm  Capillary Refill: Capillary refill takes less than 2 seconds  Neurological:      General: No focal deficit present  Mental Status: He is alert and oriented to person, place, and time  Psychiatric:         Mood and Affect: Mood normal          Behavior: Behavior normal          Thought Content:  Thought content normal

## 2023-01-13 ENCOUNTER — TELEPHONE (OUTPATIENT)
Dept: PODIATRY | Facility: CLINIC | Age: 88
End: 2023-01-13

## 2023-01-13 NOTE — TELEPHONE ENCOUNTER
Caller: Diane Sneed    Doctor/Office: Dr Ian Serna    CB#: [phone]    [facility]: Care/needs RX for shoes and ov notes faxed to facility @ [phone]  Please fax/no need for return call to pt   Thanks

## 2023-02-01 DIAGNOSIS — I10 ESSENTIAL HYPERTENSION: ICD-10-CM

## 2023-02-06 ENCOUNTER — APPOINTMENT (OUTPATIENT)
Dept: LAB | Facility: CLINIC | Age: 88
End: 2023-02-06

## 2023-02-06 DIAGNOSIS — E11.42 TYPE 2 DIABETES MELLITUS WITH DIABETIC POLYNEUROPATHY, WITHOUT LONG-TERM CURRENT USE OF INSULIN (HCC): ICD-10-CM

## 2023-02-06 LAB
ANION GAP SERPL CALCULATED.3IONS-SCNC: 5 MMOL/L (ref 4–13)
BUN SERPL-MCNC: 20 MG/DL (ref 5–25)
CALCIUM SERPL-MCNC: 9.2 MG/DL (ref 8.3–10.1)
CHLORIDE SERPL-SCNC: 108 MMOL/L (ref 96–108)
CHOLEST SERPL-MCNC: 116 MG/DL
CO2 SERPL-SCNC: 25 MMOL/L (ref 21–32)
CREAT SERPL-MCNC: 1.4 MG/DL (ref 0.6–1.3)
CREAT UR-MCNC: 183 MG/DL
EST. AVERAGE GLUCOSE BLD GHB EST-MCNC: 137 MG/DL
GFR SERPL CREATININE-BSD FRML MDRD: 45 ML/MIN/1.73SQ M
GLUCOSE P FAST SERPL-MCNC: 118 MG/DL (ref 65–99)
HBA1C MFR BLD: 6.4 %
HDLC SERPL-MCNC: 34 MG/DL
LDLC SERPL CALC-MCNC: 59 MG/DL (ref 0–100)
MICROALBUMIN UR-MCNC: 19.2 MG/L (ref 0–20)
MICROALBUMIN/CREAT 24H UR: 10 MG/G CREATININE (ref 0–30)
POTASSIUM SERPL-SCNC: 5.6 MMOL/L (ref 3.5–5.3)
SODIUM SERPL-SCNC: 138 MMOL/L (ref 135–147)
TRIGL SERPL-MCNC: 116 MG/DL

## 2023-02-08 ENCOUNTER — TELEPHONE (OUTPATIENT)
Dept: PODIATRY | Facility: CLINIC | Age: 88
End: 2023-02-08

## 2023-02-08 NOTE — TELEPHONE ENCOUNTER
Caller: Zandra/Pierre Espinoza    Doctor/Office: Dr Campbell Hartmann    #: [phone]    [facility]: Last office visit/faxed order for shoes to office to be signed 1/31/ and 2/3-have not received it back as of yet/also needs OV notes along with their paperwork  Call Zandra back if issues/never rec'd

## 2023-02-09 ENCOUNTER — TELEPHONE (OUTPATIENT)
Dept: FAMILY MEDICINE CLINIC | Facility: CLINIC | Age: 88
End: 2023-02-09

## 2023-02-09 ENCOUNTER — TELEPHONE (OUTPATIENT)
Dept: OBGYN CLINIC | Facility: HOSPITAL | Age: 88
End: 2023-02-09

## 2023-02-09 NOTE — TELEPHONE ENCOUNTER
Caller: Zandra from [place] [facility] [address] [facility]     Doctor: Dr Phil Henning    Reason for call: Shy Drummonders is calling in from [place] [facility] [address] [facility] stating that she received a fax from the office which was only 1 page and it looks like it was a total of 7 pages, it was the office notes from 1/9/2023  She is asking if this can be refaxed over to them at [phone]      Call back#: [phone]

## 2023-02-09 NOTE — TELEPHONE ENCOUNTER
Patient advised; he said he is not taking anything out of the ordinary but does take a multi vitamin

## 2023-02-09 NOTE — TELEPHONE ENCOUNTER
----- Message from Malinda Reynaga MD sent at 2/9/2023 11:37 AM EST -----  Sugar is stable   Cholesterol is fine   The potasium is still high again - are you taking anything that has potasium?    Kidneys are low but stable

## 2023-02-09 NOTE — TELEPHONE ENCOUNTER
Caller:  Zandra Diaz    Doctor/Office: Dr Samantha Robertson    Call regarding :  Fax from the other day     Call was transferred to: podiatry

## 2023-02-14 ENCOUNTER — RA CDI HCC (OUTPATIENT)
Dept: OTHER | Facility: HOSPITAL | Age: 88
End: 2023-02-14

## 2023-02-14 NOTE — PROGRESS NOTES
[facility]  coding opportunities          Chart Reviewed number of suggestions sent to Provider: 2  E11 [id number]  [id number]     Patients Insurance     Medicare Insurance: [employer]

## 2023-02-21 ENCOUNTER — OFFICE VISIT (OUTPATIENT)
Dept: FAMILY MEDICINE CLINIC | Facility: CLINIC | Age: 88
End: 2023-02-21

## 2023-02-21 VITALS
HEART RATE: 66 BPM | BODY MASS INDEX: 27.5 KG/M2 | SYSTOLIC BLOOD PRESSURE: 118 MMHG | DIASTOLIC BLOOD PRESSURE: 62 MMHG | HEIGHT: 72 IN | WEIGHT: 203 LBS | OXYGEN SATURATION: 97 %

## 2023-02-21 DIAGNOSIS — E78.5 HYPERLIPIDEMIA, UNSPECIFIED HYPERLIPIDEMIA TYPE: ICD-10-CM

## 2023-02-21 DIAGNOSIS — N18.31 TYPE 2 DIABETES MELLITUS WITH STAGE 3A CHRONIC KIDNEY DISEASE, WITHOUT LONG-TERM CURRENT USE OF INSULIN (HCC): ICD-10-CM

## 2023-02-21 DIAGNOSIS — I48.0 PAROXYSMAL ATRIAL FIBRILLATION (HCC): ICD-10-CM

## 2023-02-21 DIAGNOSIS — R26.2 AMBULATORY DYSFUNCTION: ICD-10-CM

## 2023-02-21 DIAGNOSIS — E87.5 HYPERKALEMIA: Primary | ICD-10-CM

## 2023-02-21 DIAGNOSIS — I10 ESSENTIAL HYPERTENSION: ICD-10-CM

## 2023-02-21 DIAGNOSIS — F41.9 ANXIETY: ICD-10-CM

## 2023-02-21 DIAGNOSIS — E11.22 TYPE 2 DIABETES MELLITUS WITH STAGE 3A CHRONIC KIDNEY DISEASE, WITHOUT LONG-TERM CURRENT USE OF INSULIN (HCC): ICD-10-CM

## 2023-02-21 NOTE — PATIENT INSTRUCTIONS
Medicare Preventive Visit Patient Instructions  Thank you for completing your Welcome to Medicare Visit or Medicare Annual Wellness Visit today  Your next wellness visit will be due in one year (2/22/2024)  The screening/preventive services that you may require over the next 5-10 years are detailed below  Some tests may not apply to you based off risk factors and/or age  Screening tests ordered at today's visit but not completed yet may show as past due  Also, please note that scanned in results may not display below  Preventive Screenings:  Service Recommendations Previous Testing/Comments   Colorectal Cancer Screening  · Colonoscopy    · Fecal Occult Blood Test (FOBT)/Fecal Immunochemical Test (FIT)  · Fecal DNA/Cologuard Test  · Flexible Sigmoidoscopy Age: 39-70 years old   Colonoscopy: every 10 years (May be performed more frequently if at higher risk)  OR  FOBT/FIT: every 1 year  OR  Cologuard: every 3 years  OR  Sigmoidoscopy: every 5 years  Screening may be recommended earlier than age 39 if at higher risk for colorectal cancer  Also, an individualized decision between you and your healthcare provider will decide whether screening between the ages of 74-80 would be appropriate   Colonoscopy: Not on file  FOBT/FIT: Not on file  Cologuard: Not on file  Sigmoidoscopy: Not on file    Screening Not Indicated     Prostate Cancer Screening Individualized decision between patient and health care provider in men between ages of 53-78   Medicare will cover every 12 months beginning on the day after your 50th birthday PSA: No results in last 5 years     Screening Not Indicated     Hepatitis C Screening Once for adults born between 1945 and 1965  More frequently in patients at high risk for Hepatitis C Hep C Antibody: Not on file        Diabetes Screening 1-2 times per year if you're at risk for diabetes or have pre-diabetes Fasting glucose: 118 mg/dL (2/6/2023)  A1C: 6 4 % (2/6/2023)  Screening Not Indicated  History Diabetes  Due for Blood Glucose   Cholesterol Screening Once every 5 years if you don't have a lipid disorder  May order more often based on risk factors  Lipid panel: 02/06/2023  Screening Not Indicated  History Lipid Disorder  Due for Lipid Panel      Other Preventive Screenings Covered by Medicare:  1  Abdominal Aortic Aneurysm (AAA) Screening: covered once if your at risk  You're considered to be at risk if you have a family history of AAA or a male between the age of 73-68 who smoking at least 100 cigarettes in your lifetime  2  Lung Cancer Screening: covers low dose CT scan once per year if you meet all of the following conditions: (1) Age 50-69; (2) No signs or symptoms of lung cancer; (3) Current smoker or have quit smoking within the last 15 years; (4) You have a tobacco smoking history of at least 20 pack years (packs per day x number of years you smoked); (5) You get a written order from a healthcare provider  3  Glaucoma Screening: covered annually if you're considered high risk: (1) You have diabetes OR (2) Family history of glaucoma OR (3)  aged 48 and older OR (3)  American aged 72 and older  3  Osteoporosis Screening: covered every 2 years if you meet one of the following conditions: (1) Have a vertebral abnormality; (2) On glucocorticoid therapy for more than 3 months; (3) Have primary hyperparathyroidism; (4) On osteoporosis medications and need to assess response to drug therapy  5  HIV Screening: covered annually if you're between the age of 12-76  Also covered annually if you are younger than 13 and older than 72 with risk factors for HIV infection  For pregnant patients, it is covered up to 3 times per pregnancy      Immunizations:  Immunization Recommendations   Influenza Vaccine Annual influenza vaccination during flu season is recommended for all persons aged >= 6 months who do not have contraindications   Pneumococcal Vaccine   * Pneumococcal conjugate vaccine = PCV13 (Prevnar 13), PCV15 (Vaxneuvance), PCV20 (Prevnar 20)  * Pneumococcal polysaccharide vaccine = PPSV23 (Pneumovax) Adults 2364 years old: 1-3 doses may be recommended based on certain risk factors  Adults 72 years old: 1-2 doses may be recommended based off what pneumonia vaccine you previously received   Hepatitis B Vaccine 3 dose series if at intermediate or high risk (ex: diabetes, end stage renal disease, liver disease)   Tetanus (Td) Vaccine - COST NOT COVERED BY MEDICARE PART B Following completion of primary series, a booster dose should be given every 10 years to maintain immunity against tetanus  Td may also be given as tetanus wound prophylaxis  Tdap Vaccine - COST NOT COVERED BY MEDICARE PART B Recommended at least once for all adults  For pregnant patients, recommended with each pregnancy  Shingles Vaccine (Shingrix) - COST NOT COVERED BY MEDICARE PART B  2 shot series recommended in those aged 48 and above     Health Maintenance Due:  There are no preventive care reminders to display for this patient  Immunizations Due:      Topic Date Due   • COVID-19 Vaccine (4 - Booster for [employer] series) 05/31/2022     Advance Directives   What are advance directives? Advance directives are legal documents that state your wishes and plans for medical care  These plans are made ahead of time in case you lose your ability to make decisions for yourself  Advance directives can apply to any medical decision, such as the treatments you want, and if you want to donate organs  What are the types of advance directives? There are many types of advance directives, and each state has rules about how to use them  You may choose a combination of any of the following:  · Living will: This is a written record of the treatment you want  You can also choose which treatments you do not want, which to limit, and which to stop at a certain time  This includes surgery, medicine, IV fluid, and tube feedings     · Durable power of  for healthcare [facility]): This is a written record that states who you want to make healthcare choices for you when you are unable to make them for yourself  This person, called a proxy, is usually a family member or a friend  You may choose more than 1 proxy  · Do not resuscitate (DNR) order:  A DNR order is used in case your heart stops beating or you stop breathing  It is a request not to have certain forms of treatment, such as CPR  A DNR order may be included in other types of advance directives  · Medical directive: This covers the care that you want if you are in a coma, near death, or unable to make decisions for yourself  You can list the treatments you want for each condition  Treatment may include pain medicine, surgery, blood transfusions, dialysis, IV or tube feedings, and a ventilator (breathing machine)  · Values history: This document has questions about your views, beliefs, and how you feel and think about life  This information can help others choose the care that you would choose  Why are advance directives important? An advance directive helps you control your care  Although spoken wishes may be used, it is better to have your wishes written down  Spoken wishes can be misunderstood, or not followed  Treatments may be given even if you do not want them  An advance directive may make it easier for your family to make difficult choices about your care  Weight Management   Why it is important to manage your weight:  Being overweight increases your risk of health conditions such as heart disease, high blood pressure, type 2 diabetes, and certain types of cancer  It can also increase your risk for osteoarthritis, sleep apnea, and other respiratory problems  Aim for a slow, steady weight loss  Even a small amount of weight loss can lower your risk of health problems    How to lose weight safely:  A safe and healthy way to lose weight is to eat fewer calories and get regular exercise  You can lose up about 1 pound a week by decreasing the number of calories you eat by 500 calories each day  Healthy meal plan for weight management:  A healthy meal plan includes a variety of foods, contains fewer calories, and helps you stay healthy  A healthy meal plan includes the following:  · Eat whole-grain foods more often  A healthy meal plan should contain fiber  Fiber is the part of grains, fruits, and vegetables that is not broken down by your body  Whole-grain foods are healthy and provide extra fiber in your diet  Some examples of whole-grain foods are whole-wheat breads and pastas, oatmeal, brown rice, and bulgur  · Eat a variety of vegetables every day  Include dark, leafy greens such as spinach, kale, qasim greens, and mustard greens  Eat yellow and orange vegetables such as carrots, sweet potatoes, and winter squash  · Eat a variety of fruits every day  Choose fresh or canned fruit (canned in its own juice or light syrup) instead of juice  Fruit juice has very little or no fiber  · Eat low-fat dairy foods  Drink fat-free (skim) milk or 1% milk  Eat fat-free yogurt and low-fat cottage cheese  Try low-fat cheeses such as mozzarella and other reduced-fat cheeses  · Choose meat and other protein foods that are low in fat  Choose beans or other legumes such as split peas or lentils  Choose fish, skinless poultry (chicken or turkey), or lean cuts of red meat (beef or pork)  Before you cook meat or poultry, cut off any visible fat  · Use less fat and oil  Try baking foods instead of frying them  Add less fat, such as margarine, sour cream, regular salad dressing and mayonnaise to foods  Eat fewer high-fat foods  Some examples of high-fat foods include french fries, doughnuts, ice cream, and cakes  · Eat fewer sweets  Limit foods and drinks that are high in sugar  This includes candy, cookies, regular soda, and sweetened drinks    Exercise:  Exercise at least 30 minutes per day on most days of the week  Some examples of exercise include walking, biking, dancing, and swimming  You can also fit in more physical activity by taking the stairs instead of the elevator or parking farther away from stores  Ask your healthcare provider about the best exercise plan for you  © Copyright [employer] 2018 Information is for End User's use only and may not be sold, redistributed or otherwise used for commercial purposes   All illustrations and images included in CareNotes® are the copyrighted property of A D A M , [employer]  or [address] [place] [address]

## 2023-02-21 NOTE — PROGRESS NOTES
Assessment and Plan:   I saw Angelica Risen today our plan moving forward is to hold off on that glipizide were going to drop his metformin from 2 pills twice to 1 pill twice so we can watch his kidney function with a GFR in the 40s also we will then send a message over to cardiology about his Holter which did show some SVT and VT with a predominantly sinus rhythm he is controlled on metoprolol 25 twice a day also we will monitor the potassium for which he has cut out his daily banana which of course will now decrease his sugar and his potassium check levels in 1 month  Problem List Items Addressed This Visit        Endocrine    Type 2 diabetes mellitus with stage 3a chronic kidney disease, without long-term current use of insulin ([facility] [address] )    Relevant Orders    Basic metabolic panel       Cardiovascular and Mediastinum    Atrial fibrillation ([facility] [address] )    Essential hypertension       Other    HLD (hyperlipidemia)    Ambulatory dysfunction    Anxiety   Other Visit Diagnoses     Hyperkalemia    -  Primary    Relevant Orders    Basic metabolic panel    BMI 50 5-72 7,[id number]            BMI Counseling: Body mass index is 27 53 kg/m²  The BMI is above normal  Nutrition recommendations include decreasing portion sizes, encouraging healthy choices of fruits and vegetables, decreasing fast food intake, consuming healthier snacks and limiting drinks that contain sugar  Exercise recommendations include exercising 3-5 times per week  No pharmacotherapy was ordered  Rationale for BMI follow-up plan is due to patient being overweight or obese  Depression Screening and Follow-up Plan: Patient was screened for depression during today's encounter  They screened negative with a PHQ-2 score of 0  Falls Plan of Care: balance, strength, and gait training instructions were provided  Medications that increase falls were reviewed         Preventive health issues were discussed with patient, and age appropriate screening tests were ordered as noted in patient's After Visit Summary  Personalized health advice and appropriate referrals for health education or preventive services given if needed, as noted in patient's After Visit Summary  History of Present Illness:     Patient presents for a Medicare Wellness Visit    Here to go over chronic issues and labs / imaging studies if applicable  stopped glipizide   Stopped banana a day        Patient Care Team:  Mirlande Daniels MD as PCP - General     Review of Systems:     Review of Systems   Constitutional: Negative for activity change, appetite change and fever  HENT: Negative for congestion, nosebleeds and trouble swallowing  Eyes: Negative for itching  Respiratory: Negative for cough and chest tightness  Cardiovascular: Negative for chest pain and palpitations  Gastrointestinal: Negative for abdominal pain, constipation, diarrhea and nausea  Endocrine: Negative for cold intolerance  Genitourinary: Negative for frequency  Musculoskeletal: Positive for arthralgias and back pain  Negative for gait problem and joint swelling  Skin: Negative for rash  Allergic/Immunologic: Negative for immunocompromised state  Neurological: Negative for dizziness, tremors, seizures, syncope and headaches  Psychiatric/Behavioral: Negative for hallucinations and suicidal ideas  The patient is nervous/anxious           Problem List:     Patient Active Problem List   Diagnosis   • Atrial fibrillation ([facility] [address] )   • Type 2 diabetes mellitus with stage 3a chronic kidney disease, without long-term current use of insulin ([facility])   • Essential hypertension   • HLD (hyperlipidemia)   • GERD (gastroesophageal reflux disease)   • CAD (coronary artery disease)   • Ambulatory dysfunction   • Frequent falls   • Closed T12 fracture ([facility])   • Chronic midline low back pain without sciatica   • Intervertebral disc disorder with radiculopathy of lumbar region   • Anxiety   • Hilar adenopathy   • CKD (chronic kidney disease)   • Type 2 diabetes mellitus with diabetic polyneuropathy, without long-term current use of insulin (HCC)      Past Medical and Surgical History:     Past Medical History:   Diagnosis Date   • Acute MI ([facility] [address] )     1991   • Ambulates with cane    • Anxiety    • Arthritis     hands   • At risk for falls    • Atrial fibrillation (HCC)    • Cholecystitis    • CKD (chronic kidney disease)    • Coronary artery disease    • Diabetes mellitus (HCC)    • GERD (gastroesophageal reflux disease)    • Heart murmur    • Hyperlipidemia    • Hypertension    • Low back pain    • Lumbar disc disease    • Umbilical hernia    • Wears dentures     full set   • Wears glasses      Past Surgical History:   Procedure Laterality Date   • CARDIAC CATHETERIZATION      s/p MI   • COLONOSCOPY     • HERNIA REPAIR Left     X5   • NC LAPAROSCOPY SURG CHOLECYSTECTOMY N/A 1/7/2022    Procedure: ROBOTIC ASSISTED LAPAROSCOPIC CHOLECYSTECTOMY;  Surgeon: Marcos Schuster MD;  [facility];  Service: General   • TOTAL HIP ARTHROPLASTY Left    • UMBILICAL HERNIA REPAIR LAPAROSCOPIC N/A 1/7/2022    Procedure: Open umbilical hernia repair;  Surgeon: Marcos Schuster MD;  [facility];  Service: General      Family History:     Family History   Problem Relation Age of Onset   • No Known Problems Mother    • No Known Problems Father       Social History:     Social History     Socioeconomic History   • Marital status: /Civil Union     Spouse name: Not on file   • Number of children: Not on file   • Years of education: Not on file   • Highest education level: Not on file   Occupational History   • Occupation: retired   Tobacco Use   • Smoking status: Never   • Smokeless tobacco: Never   Vaping Use   • Vaping Use: Never used   Substance and Sexual Activity   • Alcohol use: Yes     Comment: rare   • Drug use: No   • Sexual activity: Not Currently   Other Topics Concern   • Not on file   Social History Narrative    [place]:    Most recent tobacco use screenin2020      Do you currently or have you served in the [employer]:   No      Were you activated, into active duty, as a member of the [employer] or as a Reservist:   No      Occupation:   Retired      Marital status:         Sexual orientation:   Heterosexual      Exercise level:   Occasional      Diet:   Regular      General stress level:   Medium      Alcohol intake:   Occasional      Caffeine intake:    Moderate      Chewing tobacco:   none      Illicit drugs:   Denied      Guns present in home:   No      Seat belts used routinely:   Yes      Sunscreen used routinely:   Yes      Smoke alarm in home:   Yes      Advance directive:   Yes      Salt Intake:   Normal     Would the patient like to schedule a Mammogram:   No      Is the patient interested in a colorectal cancer screening:   No     Last modified by zulma   2020, 15:03      Social Determinants of Health     Financial Resource Strain: Unknown   • Difficulty of Paying Living Expenses: Patient refused   Food Insecurity: Not on file   Transportation Needs: Unknown   • Lack of Transportation (Medical): Patient refused   • Lack of Transportation (Non-Medical): Patient refused   Physical Activity: Not on file   Stress: Not on file   Social Connections: Not on file   Intimate Partner Violence: Not on file   Housing Stability: Not on file      Medications and Allergies:     Current Outpatient Medications   Medication Sig Dispense Refill   • acetaminophen (TYLENOL) 325 mg tablet Take 2 tablets (650 mg total) by mouth every 6 (six) hours (Patient taking differently: Take 650 mg by mouth every 6 (six) hours as needed) 30 tablet 0   • aspirin 81 mg chewable tablet Chew 81 mg daily Pt stopped      • cyanocobalamin (VITAMIN B-12) 500 mcg tablet Take 1,000 mcg by mouth daily     • docusate sodium (COLACE) 100 mg capsule Take 1 capsule (100 mg total) by mouth 2 (two) times a day (Patient taking differently: Take 100 mg by mouth every evening) 10 capsule 0   • escitalopram (LEXAPRO) 5 mg tablet TAKE 1 TABLET BY MOUTH EVERY DAY 90 tablet 1   • metFORMIN (GLUCOPHAGE) 500 mg tablet TAKE 2 TABLETS EVERY MORNING TAKE 2 TABLETS EVERY EVENING 360 tablet 1   • metoprolol tartrate (LOPRESSOR) 25 mg tablet TAKE 1 TABLET (25 MG TOTAL) BY MOUTH EVERY 12 (TWELVE) HOURS 180 tablet 1   • omeprazole (PriLOSEC) 40 MG capsule TAKE 1 CAPSULE BY MOUTH EVERY DAY BEFORE BREAKFAST 90 capsule 3   • simvastatin (ZOCOR) 40 mg tablet TAKE 1 TABLET BY MOUTH EVERY DAY 90 tablet 1     No current facility-administered medications for this visit  No Known Allergies   Immunizations:     Immunization History   Administered Date(s) Administered   • COVID-19 PFIZER VACCINE 0 3 ML IM 01/19/2021, 02/08/2021   • COVID-19 Pfizer vac (Bentley-sucrose, gray cap) 12 yr+ IM 04/05/2022   • H1N1, All Formulations 12/19/2009   • INFLUENZA 09/30/2010, 11/06/2013, 10/24/2014, 09/25/2015, 03/12/2018, 12/01/2018, 11/01/2019   • Influenza Split High Dose Preservative Free IM 11/04/2019   • Influenza, Seasonal Vaccine, Quadrivalent, Adjuvanted,   5e 10/26/2021   • Influenza, high dose seasonal 0 7 mL 10/21/2022   • Influenza, recombinant, quadrivalent,injectable, preservative free 11/01/2019   • Pneumococcal Conjugate 13-Valent 03/12/2018   • Pneumococcal Polysaccharide PPV23 01/17/2020      Health Maintenance: There are no preventive care reminders to display for this patient  Topic Date Due   • COVID-19 Vaccine (4 - Booster for Pfizer series) 05/31/2022      Medicare Screening Tests and Risk Assessments:     Roberto Faustin is here for his Subsequent Wellness visit  Last Medicare Wellness visit information reviewed, patient interviewed and updates made to the record today  Health Risk Assessment:   Patient rates overall health as good  Patient feels that their physical health rating is same  Patient is very satisfied with their life  Eyesight was rated as same  Hearing was rated as same  Patient feels that their emotional and mental health rating is same  Patients states they are never, rarely angry  Patient states they are never, rarely unusually tired/fatigued  Pain experienced in the last 7 days has been none  Patient states that he has experienced no weight loss or gain in last 6 months  Fall Risk Screening: In the past year, patient has experienced: no history of falling in past year      Home Safety:  Patient has trouble with stairs inside or outside of their home  Patient has working smoke alarms and has working carbon monoxide detector  Home safety hazards include: none  Nutrition:   Current diet is Regular  Medications:   Patient is currently taking over-the-counter supplements  OTC medications include: see medication list  Patient is able to manage medications  Activities of Daily Living (ADLs)/Instrumental Activities of Daily Living (IADLs):   Walk and transfer into and out of bed and chair?: Yes  Dress and groom yourself?: Yes    Bathe or shower yourself?: Yes    Feed yourself? Yes  Do your laundry/housekeeping?: Yes  Manage your money, pay your bills and track your expenses?: Yes  Make your own meals?: Yes    Do your own shopping?: Yes    Previous Hospitalizations:   Any hospitalizations or ED visits within the last 12 months?: Yes    How many hospitalizations have you had in the last year?: 1-2    Advance Care Planning:   Living will: Yes    Durable POA for healthcare:  Yes    Advanced directive: Yes    Five wishes given: No      Cognitive Screening:   Provider or family/friend/caregiver concerned regarding cognition?: No    PREVENTIVE SCREENINGS      Cardiovascular Screening:    General: Screening Not Indicated and History Lipid Disorder    Due for: Lipid Panel      Diabetes Screening:     General: Screening Not Indicated and History Diabetes    Due for: Blood Glucose      Colorectal Cancer Screening:     General: Screening Not Indicated      Prostate Cancer Screening:    General: Screening Not Indicated      Osteoporosis Screening:    General: Screening Not Indicated      Abdominal Aortic Aneurysm (AAA) Screening:        General: Screening Not Indicated      Lung Cancer Screening:     General: Screening Not Indicated      Hepatitis C Screening:      Hep C Screening Accepted: No     Screening, Brief Intervention, and Referral to Treatment (SBIRT)    Screening  Typical number of drinks in a day: 0  Typical number of drinks in a week: 0  Interpretation: Low risk drinking behavior  Single Item Drug Screening:  How often have you used an illegal drug (including marijuana) or a prescription medication for non-medical reasons in the past year? never    Single Item Drug Screen Score: 0  Interpretation: Negative screen for possible drug use disorder    Brief Intervention  Alcohol & drug use screenings were reviewed  No concerns regarding substance use disorder identified  No results found  Physical Exam:     /62   Pulse 66   Ht 6' (1 829 m)   Wt 92 1 kg (203 lb)   SpO2 97%   BMI 27 53 kg/m²     Physical Exam  Vitals and nursing note reviewed  Constitutional:       Appearance: He is well-developed  Comments: Reuben    HENT:      Head: Normocephalic and atraumatic  Eyes:      Conjunctiva/sclera: Conjunctivae normal       Pupils: Pupils are equal, round, and reactive to light  Cardiovascular:      Rate and Rhythm: Normal rate and regular rhythm  Heart sounds: Normal heart sounds  Pulmonary:      Effort: Pulmonary effort is normal       Breath sounds: Normal breath sounds  No wheezing or rales  Abdominal:      General: Bowel sounds are normal  There is no distension  Palpations: Abdomen is soft  Tenderness: There is no abdominal tenderness  Musculoskeletal:         General: No tenderness  Normal range of motion  Cervical back: Normal range of motion and neck supple  Skin:     General: Skin is warm and dry        Findings: No rash    Neurological:      Mental Status: He is alert and oriented to person, place, and time  Cranial Nerves: No cranial nerve deficit  Sensory: No sensory deficit  Coordination: Coordination normal    Psychiatric:         Behavior: Behavior normal          Thought Content:  Thought content normal          Judgment: Judgment normal           Noé Morgan MD

## 2023-03-13 ENCOUNTER — OFFICE VISIT (OUTPATIENT)
Dept: PODIATRY | Facility: CLINIC | Age: 88
End: 2023-03-13

## 2023-03-13 VITALS
WEIGHT: 205 LBS | OXYGEN SATURATION: 99 % | DIASTOLIC BLOOD PRESSURE: 85 MMHG | SYSTOLIC BLOOD PRESSURE: 136 MMHG | HEART RATE: 67 BPM | BODY MASS INDEX: 27.77 KG/M2 | HEIGHT: 72 IN

## 2023-03-13 DIAGNOSIS — E11.22 TYPE 2 DIABETES MELLITUS WITH STAGE 3A CHRONIC KIDNEY DISEASE, WITHOUT LONG-TERM CURRENT USE OF INSULIN (HCC): ICD-10-CM

## 2023-03-13 DIAGNOSIS — B35.1 ONYCHOMYCOSIS: Primary | ICD-10-CM

## 2023-03-13 DIAGNOSIS — N18.31 TYPE 2 DIABETES MELLITUS WITH STAGE 3A CHRONIC KIDNEY DISEASE, WITHOUT LONG-TERM CURRENT USE OF INSULIN (HCC): ICD-10-CM

## 2023-03-13 NOTE — PROGRESS NOTES
Assessment/Plan:     The patient's clinical examination today is consistent with onychomycosis of the pedal nail plates  A callus lesion is also noted to the distal medial aspect of the right great toe  The pedal nail plates are sharply debrided with a pair of sterile nail clippers without incident  The nails were then mechanically reduced in thickness and girth utilizing a rotary bur  There are no other acute pedal issues  Recommend follow-up in 2-3 months  Diagnoses and all orders for this visit:    Onychomycosis    Type 2 diabetes mellitus with stage 3a chronic kidney disease, without long-term current use of insulin (HCC)          Subjective:     Patient ID: Len Huizar is a 82 y o  male  The patient presents today with chief complaint of painful elongated pedal nail plates to his feet bilaterally  He states he has done well since his last visit  There are no other acute pedal issues noted today        PAST MEDICAL HISTORY:  Past Medical History:   Diagnosis Date   • Acute MI ([facility] [address] )     1991   • Ambulates with cane    • Anxiety    • Arthritis     hands   • At risk for falls    • Atrial fibrillation (HCC)    • Cholecystitis    • CKD (chronic kidney disease)    • Coronary artery disease    • Diabetes mellitus (HCC)    • GERD (gastroesophageal reflux disease)    • Heart murmur    • Hyperlipidemia    • Hypertension    • Low back pain    • Lumbar disc disease    • Umbilical hernia    • Wears dentures     full set   • Wears glasses        PAST SURGICAL HISTORY:  Past Surgical History:   Procedure Laterality Date   • CARDIAC CATHETERIZATION      s/p MI   • COLONOSCOPY     • HERNIA REPAIR Left     X5   • CT LAPAROSCOPY SURG CHOLECYSTECTOMY N/A 1/7/2022    Procedure: ROBOTIC ASSISTED LAPAROSCOPIC CHOLECYSTECTOMY;  Surgeon: Jensen Novoa MD;  [facility];  Service: General   • TOTAL HIP ARTHROPLASTY Left    • UMBILICAL HERNIA REPAIR LAPAROSCOPIC N/A 1/7/2022    Procedure: Open umbilical hernia repair;  Surgeon: Sharon Call MD;  [facility];  Service: General        ALLERGIES:  Patient has no known allergies  MEDICATIONS:  Current Outpatient Medications   Medication Sig Dispense Refill   • acetaminophen (TYLENOL) 325 mg tablet Take 2 tablets (650 mg total) by mouth every 6 (six) hours (Patient taking differently: Take 650 mg by mouth every 6 (six) hours as needed) 30 tablet 0   • aspirin 81 mg chewable tablet Chew 81 mg daily Pt stopped      • cyanocobalamin (VITAMIN B-12) 500 mcg tablet Take 1,000 mcg by mouth daily     • docusate sodium (COLACE) 100 mg capsule Take 1 capsule (100 mg total) by mouth 2 (two) times a day (Patient taking differently: Take 100 mg by mouth every evening) 10 capsule 0   • escitalopram (LEXAPRO) 5 mg tablet TAKE 1 TABLET BY MOUTH EVERY DAY 90 tablet 1   • metFORMIN (GLUCOPHAGE) 500 mg tablet TAKE 2 TABLETS EVERY MORNING TAKE 2 TABLETS EVERY EVENING (Patient taking differently: Take 500 mg by mouth 2 (two) times a day with meals TAKE 1 TABLETS EVERY MORNING TAKE 1 TABLETS EVERY EVENING) 360 tablet 1   • metoprolol tartrate (LOPRESSOR) 25 mg tablet TAKE 1 TABLET (25 MG TOTAL) BY MOUTH EVERY 12 (TWELVE) HOURS 180 tablet 1   • omeprazole (PriLOSEC) 40 MG capsule TAKE 1 CAPSULE BY MOUTH EVERY DAY BEFORE BREAKFAST 90 capsule 3   • simvastatin (ZOCOR) 40 mg tablet TAKE 1 TABLET BY MOUTH EVERY DAY 90 tablet 1     No current facility-administered medications for this visit         SOCIAL HISTORY:  Social History     Socioeconomic History   • Marital status: /Civil Union     Spouse name: None   • Number of children: None   • Years of education: None   • Highest education level: None   Occupational History   • Occupation: retired   Tobacco Use   • Smoking status: Never   • Smokeless tobacco: Never   Vaping Use   • Vaping Use: Never used   Substance and Sexual Activity   • Alcohol use: Yes     Comment: rare   • Drug use: No   • Sexual activity: Not Currently   Other Topics Concern   • None   Social History Narrative    [place]:    Most recent tobacco use screenin2020      Do you currently or have you served in the [employer]:   No      Were you activated, into active duty, as a member of the [employer] or as a Reservist:   No      Occupation:   Retired      Marital status:         Sexual orientation:   Heterosexual      Exercise level:   Occasional      Diet:   Regular      General stress level:   Medium      Alcohol intake:   Occasional      Caffeine intake: Moderate      Chewing tobacco:   none      Illicit drugs:   Denied      Guns present in home:   No      Seat belts used routinely:   Yes      Sunscreen used routinely:   Yes      Smoke alarm in home:   Yes      Advance directive:   Yes      Salt Intake:   Normal     Would the patient like to schedule a Mammogram:   No      Is the patient interested in a colorectal cancer screening:   No     Last modified by zulma   2020, 15:03      Social Determinants of Health     Financial Resource Strain: Unknown   • Difficulty of Paying Living Expenses: Patient refused   Food Insecurity: Not on file   Transportation Needs: Unknown   • Lack of Transportation (Medical): Patient refused   • Lack of Transportation (Non-Medical): Patient refused   Physical Activity: Not on file   Stress: Not on file   Social Connections: Not on file   Intimate Partner Violence: Not on file   Housing Stability: Not on file        Review of Systems   Constitutional: Negative  HENT: Negative  Eyes: Negative  Respiratory: Negative  Cardiovascular: Negative  Endocrine: Negative  Musculoskeletal: Negative  Skin: Negative  Neurological: Negative  Hematological: Negative  Psychiatric/Behavioral: Negative  Objective:     Physical Exam  Vitals reviewed  Constitutional:       Appearance: Normal appearance  HENT:      Head: Normocephalic and atraumatic        Nose: Nose normal    Eyes: Conjunctiva/sclera: Conjunctivae normal       Pupils: Pupils are equal, round, and reactive to light  Cardiovascular:      Pulses:           Dorsalis pedis pulses are 1+ on the right side and 1+ on the left side  Posterior tibial pulses are 1+ on the right side and 1+ on the left side  Pulmonary:      Effort: Pulmonary effort is normal    Feet:      Right foot:      Skin integrity: Callus present  Toenail Condition: Right toenails are abnormally thick and long  Fungal disease present  Left foot:      Skin integrity: Skin integrity normal       Toenail Condition: Left toenails are abnormally thick and long  Fungal disease present  Comments: The pedal nail plates are thickened and dystrophic with subungual debris consistent with onychomycosis x10; a callus lesion is noted to the distal medial aspect of the right great toe; there are no open lesions nor atrophic changes noted to either foot; the interdigital spaces are clear without maceration  Skin:     General: Skin is warm  Capillary Refill: Capillary refill takes less than 2 seconds  Neurological:      General: No focal deficit present  Mental Status: He is alert and oriented to person, place, and time  Psychiatric:         Mood and Affect: Mood normal          Behavior: Behavior normal          Thought Content:  Thought content normal

## 2023-03-21 ENCOUNTER — APPOINTMENT (OUTPATIENT)
Dept: LAB | Facility: CLINIC | Age: 88
End: 2023-03-21

## 2023-03-21 DIAGNOSIS — E87.5 HYPERKALEMIA: ICD-10-CM

## 2023-03-21 DIAGNOSIS — E11.22 TYPE 2 DIABETES MELLITUS WITH STAGE 3A CHRONIC KIDNEY DISEASE, WITHOUT LONG-TERM CURRENT USE OF INSULIN (HCC): ICD-10-CM

## 2023-03-21 DIAGNOSIS — N18.31 TYPE 2 DIABETES MELLITUS WITH STAGE 3A CHRONIC KIDNEY DISEASE, WITHOUT LONG-TERM CURRENT USE OF INSULIN (HCC): ICD-10-CM

## 2023-03-21 LAB
ANION GAP SERPL CALCULATED.3IONS-SCNC: 7 MMOL/L (ref 4–13)
BUN SERPL-MCNC: 30 MG/DL (ref 5–25)
CALCIUM SERPL-MCNC: 8.8 MG/DL (ref 8.3–10.1)
CHLORIDE SERPL-SCNC: 106 MMOL/L (ref 96–108)
CO2 SERPL-SCNC: 23 MMOL/L (ref 21–32)
CREAT SERPL-MCNC: 1.54 MG/DL (ref 0.6–1.3)
GFR SERPL CREATININE-BSD FRML MDRD: 40 ML/MIN/1.73SQ M
GLUCOSE SERPL-MCNC: 168 MG/DL (ref 65–140)
POTASSIUM SERPL-SCNC: 5.7 MMOL/L (ref 3.5–5.3)
SODIUM SERPL-SCNC: 136 MMOL/L (ref 135–147)

## 2023-03-23 ENCOUNTER — TELEPHONE (OUTPATIENT)
Dept: FAMILY MEDICINE CLINIC | Facility: CLINIC | Age: 88
End: 2023-03-23

## 2023-03-23 DIAGNOSIS — E87.5 HYPERKALEMIA: Primary | ICD-10-CM

## 2023-03-23 DIAGNOSIS — N17.9 AKI (ACUTE KIDNEY INJURY) (HCC): ICD-10-CM

## 2023-03-23 NOTE — TELEPHONE ENCOUNTER
----- Message from Noé Morgan MD sent at 3/23/2023 12:02 PM EDT -----  The potasium is still high and the kidneys are a but lower     Ill send you to nephro

## 2023-03-24 ENCOUNTER — TELEPHONE (OUTPATIENT)
Dept: NEPHROLOGY | Facility: CLINIC | Age: 88
End: 2023-03-24

## 2023-03-24 NOTE — TELEPHONE ENCOUNTER
New Patient Intake Form   Patient Details   Rodri Moya     1935     [id number]     Insurance Information   Name of [employer]   Does the patient need an insurance referral? no   If patient has [employer], please ask if they will be using their [employer]  Appointment Information   Who is calling to schedule? If not patient, what is callers name? [address]   Referring Provider  Dr Madelyn Stoner   Reason for Appt (Diagnosis) Hyperkalemia, CHARLES   Does Patient have labs/urine done at [place] [address]? If not, where do they go? List the date of last lab / urine yes   Has patient been hospitalized recently? If yes, list name and location of hospital they were in no   Has patient been seen by a Nephrologist before? If yes, list name, location and phone number no   Has the patient had renal imaging done? If so, list the most recent date and type of imaging no    Does patient have a history of Kidney Stones? no   Appointment Details   Is there a referral on file?  yes    Appointment Date 4/27    Location  [facility]

## 2023-04-27 ENCOUNTER — CONSULT (OUTPATIENT)
Dept: NEPHROLOGY | Facility: CLINIC | Age: 88
End: 2023-04-27

## 2023-04-27 VITALS
HEART RATE: 66 BPM | SYSTOLIC BLOOD PRESSURE: 115 MMHG | BODY MASS INDEX: 28.17 KG/M2 | WEIGHT: 208 LBS | DIASTOLIC BLOOD PRESSURE: 65 MMHG | HEIGHT: 72 IN

## 2023-04-27 DIAGNOSIS — N18.32 STAGE 3B CHRONIC KIDNEY DISEASE (HCC): Primary | ICD-10-CM

## 2023-04-27 DIAGNOSIS — N18.30 BENIGN HYPERTENSION WITH CKD (CHRONIC KIDNEY DISEASE) STAGE III (HCC): ICD-10-CM

## 2023-04-27 DIAGNOSIS — E11.22 TYPE 2 DIABETES MELLITUS WITH STAGE 3B CHRONIC KIDNEY DISEASE, WITHOUT LONG-TERM CURRENT USE OF INSULIN (HCC): ICD-10-CM

## 2023-04-27 DIAGNOSIS — I12.9 BENIGN HYPERTENSION WITH CKD (CHRONIC KIDNEY DISEASE) STAGE III (HCC): ICD-10-CM

## 2023-04-27 DIAGNOSIS — N18.32 TYPE 2 DIABETES MELLITUS WITH STAGE 3B CHRONIC KIDNEY DISEASE, WITHOUT LONG-TERM CURRENT USE OF INSULIN (HCC): ICD-10-CM

## 2023-04-27 DIAGNOSIS — E87.5 HYPERKALEMIA: ICD-10-CM

## 2023-04-27 RX ORDER — TORSEMIDE 5 MG/1
5 TABLET ORAL DAILY
Qty: 90 TABLET | Refills: 3 | Status: SHIPPED | OUTPATIENT
Start: 2023-04-27 | End: 2023-07-26

## 2023-04-27 NOTE — PROGRESS NOTES
NEPHROLOGY OUTPATIENT CONSULTATION   Franco Karimi 82 y o  male MRN: [id number]  Date: 04/27/23  Reason for consultation: Chronic kidney disease    ASSESSMENT and PLAN:  21-year-old male was seen in nephrology office today for evaluation of chronic kidney disease and hyperkalemia  # Chronic Kidney Disease Stage III-B  - Etiology/risk factors: Diabetes, hypertension, age-related nephron loss  - Baseline Cr: 1 2-1 55 since 2018  - Urinalysis: Historically trace proteinuria but otherwise bland, check urinalysis with microscopy  - Proteinuria: UACR 10 mg/g 02/2023, repeat UACR and check UPCR  - Imaging: Check renal ultrasound to look at renal parenchyma and to rule out obstructive etiologies  - Discussed natural history of progression of chronic kidney disease and need for renal replacement therapy in future  - Patient does not want to do renal replacement therapy if needed in future and would opt for conservative kidney management  - Discussed risk factor reduction to slow progression of chronic kidney disease  • Intensive blood pressure control as below  • Glycemic control as below  • Lipid control on Zocor 40 mg daily  • Avoidance of NSAIDs     # Hypertension/Volume   - Goal BP <120/80 per KDIGO guidelines   - Volume status: Hypervolemic/volume overload  - Status: Blood pressure currently well controlled  - Current antihypertensive regimen: Metoprolol 12 5 mg twice daily  - Changes:  Torsemide 5 mg daily added for diuresis and volume overload  - Patient was advised to check his blood pressure at home and report if he has any symptoms of lightheadedness/dizziness or symptomatic hypotension  - We will reach out to patient's cardiologist to see if dose of metoprolol can be decreased to adjust low-dose loop diuretic    # Screening for Anemia   - Target Hb: More than 10 g/dL  - Most recent hemoglobin: 12 1 g/dL    # Electrolytes/Acid Base status   >>Hyperkalemia  - Serum potassium 5 6-5 7 since 10/2022, previously high normal  - Most likely in setting of decreased potassium excretion from chronic kidney disease and increased dietary potassium intake  - Diabetics can also develop type IV renal tubular acidosis but he does not have metabolic acidosis at this time  - Check renal/bladder ultrasound to rule out urinary retention and obstructive etiology such as hydronephrosis  - Low potassium diet was discussed extensively  - Start low-dose loop diuretic as above for kaliuresis  - If low blood pressure becomes an issue to continue diuresis, may need to add potassium binder such as Veltassa or Lokelma    # CKD Mineral and Bone Disorder   - Goal Ca 8 5-10 mg/dL, goal Phos 2 7-4 6 mg/dL, goal iPTH 30-70 pg/mL  - Check 25 hydroxy vitamin-D and iPTH level    # Diabetes mellitus  - Most recent HbA1c 6 4 02/2023 overall improved from 7 0-7 5 in 7700-3727  - Currently on metformin 500 mg twice daily, dose appropriate for current level of GFR  - Discussed importance of good glycemic control in preventing progression of chronic kidney disease    # History of atrial fibrillation  - Currently rate controlled with metoprolol  - He is not on anticoagulation due to risk of falls and takes baby aspirin  - We will discuss with patient's cardiologist to see if metoprolol dose can be reduced to accommodate loop diuretics    # History of coronary artery disease  - Status post remote MI in 1991 treated with thrombolytics and rotational atherectomy of left anterior descending artery  - Follows with cardiology and remains on simvastatin and aspirin    HISTORY OF PRESENT ILLNESS:  Requesting Physician: Demi Duran MD    Roland Day is a 82 y o  male who has history of hypertension for last 30 years  He is currently on metoprolol  He also has history of diabetes for the last 30 years and is currently on metformin dose of which was recently decreased by PCP  He has history of atrial fibrillation and is currently on aspirin alone    For rate control he takes metoprolol 25 mg twice daily  He also has issues with his sacroiliac joint and is scheduled for steroid injection soon  He was recently noticed to have elevated potassium and is doing dietary modifications to help serum potassium  He has history of coronary artery disease and had myocardial infarction in 1990 status post thrombectomy  He currently feels well  He has leg swelling  He denies dyspnea  He denies lightheadedness or dizziness  He denies any trouble with urination  >> Major risk factors for CKD  - Diabetes: Yes for 30 years  - Hypertension: Yes for 30 years   - Age ?  56 years: Yes   - Family history of kidney disease: No    - Obesity or metabolic syndrome: Yes     >> Medical history evaluation   - Prior kidney disease or dialysis: No   - Incidental hematuria in the past: No   - Urinary symptoms: Stream is good, nocturia 2-3 times   - History of foamy or frothy urine: No  - History of nephrolithiasis: Yes in 1908's but passed it spontaneously   - Diseases that share risk factors with CKD: DM, HTN, CAD  - Systemic diseases that might affect kidney: No   - History of use of medications that might affect renal function: PPI for several years     PAST MEDICAL HISTORY:  Past Medical History:   Diagnosis Date   • Acute MI ([facility] [address] )     1991   • Ambulates with cane    • Anxiety    • Arthritis     hands   • At risk for falls    • Atrial fibrillation (HCC)    • Cholecystitis    • CKD (chronic kidney disease)    • Coronary artery disease    • Diabetes mellitus (HCC)    • GERD (gastroesophageal reflux disease)    • Heart murmur    • Hyperlipidemia    • Hypertension    • Low back pain    • Lumbar disc disease    • Umbilical hernia    • Wears dentures     full set   • Wears glasses        PAST SURGICAL HISTORY:  Past Surgical History:   Procedure Laterality Date   • CARDIAC CATHETERIZATION      s/p MI   • COLONOSCOPY     • HERNIA REPAIR Left     X5   • IA LAPAROSCOPY SURG CHOLECYSTECTOMY N/A 1/7/2022 Procedure: ROBOTIC ASSISTED LAPAROSCOPIC CHOLECYSTECTOMY;  Surgeon: Risa Leo MD;  Location: [facility];  Service: General   • TOTAL HIP ARTHROPLASTY Left    • UMBILICAL HERNIA REPAIR LAPAROSCOPIC N/A 1/7/2022    Procedure: Open umbilical hernia repair;  Surgeon: Risa Leo MD;  Location: [facility];  Service: General       ALLERGIES:  No Known Allergies    SOCIAL HISTORY:  Social History     Substance and Sexual Activity   Alcohol Use Yes    Comment: rare     Social History     Substance and Sexual Activity   Drug Use No     Social History     Tobacco Use   Smoking Status Never   Smokeless Tobacco Never       FAMILY HISTORY:  Family History   Problem Relation Age of Onset   • No Known Problems Mother    • No Known Problems Father        MEDICATIONS:    Current Outpatient Medications:   •  acetaminophen (TYLENOL) 325 mg tablet, Take 2 tablets (650 mg total) by mouth every 6 (six) hours (Patient taking differently: Take 650 mg by mouth every 6 (six) hours as needed), Disp: 30 tablet, Rfl: 0  •  aspirin 81 mg chewable tablet, Chew 81 mg daily Pt stopped , Disp: , Rfl:   •  cyanocobalamin (VITAMIN B-12) 500 mcg tablet, Take 1,000 mcg by mouth daily, Disp: , Rfl:   •  docusate sodium (COLACE) 100 mg capsule, Take 1 capsule (100 mg total) by mouth 2 (two) times a day (Patient taking differently: Take 100 mg by mouth every evening), Disp: 10 capsule, Rfl: 0  •  escitalopram (LEXAPRO) 5 mg tablet, TAKE 1 TABLET BY MOUTH EVERY DAY, Disp: 90 tablet, Rfl: 1  •  metFORMIN (GLUCOPHAGE) 500 mg tablet, TAKE 2 TABLETS EVERY MORNING TAKE 2 TABLETS EVERY EVENING (Patient taking differently: Take 500 mg by mouth 2 (two) times a day with meals TAKE 1 TABLETS EVERY MORNING TAKE 1 TABLETS EVERY EVENING), Disp: 360 tablet, Rfl: 1  •  metoprolol tartrate (LOPRESSOR) 25 mg tablet, TAKE 1 TABLET (25 MG TOTAL) BY MOUTH EVERY 12 (TWELVE) HOURS, Disp: 180 tablet, Rfl: 1  •  omeprazole (PriLOSEC) 40 MG capsule, TAKE 1 CAPSULE BY MOUTH EVERY DAY BEFORE BREAKFAST, Disp: 90 capsule, Rfl: 1  •  simvastatin (ZOCOR) 40 mg tablet, TAKE 1 TABLET BY MOUTH EVERY DAY, Disp: 90 tablet, Rfl: 1  •  torsemide (DEMADEX) 5 MG tablet, Take 1 tablet (5 mg total) by mouth daily, Disp: 90 tablet, Rfl: 3    REVIEW OF SYSTEMS:  Review of Systems   Constitutional: Negative for chills and fever  HENT: Negative for ear pain and sore throat  Eyes: Negative for pain and visual disturbance  Respiratory: Negative for cough and shortness of breath  Cardiovascular: Positive for leg swelling  Negative for chest pain and palpitations  Gastrointestinal: Negative for abdominal pain and vomiting  Genitourinary: Negative for dysuria and hematuria  Musculoskeletal: Negative for arthralgias and back pain  Skin: Negative for color change and rash  Neurological: Negative for seizures and syncope  All other systems reviewed and are negative  All the systems were reviewed and were negative except as documented on the HPI  PHYSICAL EXAMINATION:  /65   Pulse 66   Ht 6' (1 829 m)   Wt 94 3 kg (208 lb)   BMI 28 21 kg/m²   Current Weight: Weight - Scale: 94 3 kg (208 lb) Body mass index is 28 21 kg/m²  Physical Exam  Constitutional:       Appearance: Normal appearance  HENT:      Head: Normocephalic and atraumatic  Mouth/Throat:      Mouth: Mucous membranes are moist       Pharynx: Oropharynx is clear  Cardiovascular:      Rate and Rhythm: Normal rate and regular rhythm  Pulses: Normal pulses  Heart sounds: Normal heart sounds  Pulmonary:      Effort: Pulmonary effort is normal       Breath sounds: Normal breath sounds  Abdominal:      General: Bowel sounds are normal       Palpations: Abdomen is soft  Musculoskeletal:         General: Swelling present  Normal range of motion  Right lower leg: Edema present  Left lower leg: Edema present  Skin:     General: Skin is warm and dry     Neurological:      General: No focal deficit present  Mental Status: He is alert and oriented to person, place, and time  Mental status is at baseline  Psychiatric:         Mood and Affect: Mood normal          Behavior: Behavior normal          Thought Content:  Thought content normal          Judgment: Judgment normal          LABORATORY RESULTS:  Lab Results   Component Value Date    K 5 7 (H) 03/21/2023     03/21/2023    CO2 23 03/21/2023    BUN 30 (H) 03/21/2023    CREATININE 1 54 (H) 03/21/2023    GLUF 118 (H) 02/06/2023    CALCIUM 8 8 03/21/2023    CORRECTEDCA 9 6 06/28/2022    AST 18 10/12/2022    ALT 28 10/12/2022    ALKPHOS 86 10/12/2022    EGFR 40 03/21/2023     Lab Results   Component Value Date    WBC 7 83 10/12/2022    HGB 12 1 10/12/2022    HCT 40 3 10/12/2022    MCV 92 10/12/2022     10/12/2022     Lab Results   Component Value Date    CALCIUM 8 8 03/21/2023    PHOS 2 1 (L) 11/19/2021

## 2023-04-28 ENCOUNTER — TELEPHONE (OUTPATIENT)
Dept: NEPHROLOGY | Facility: CLINIC | Age: 88
End: 2023-04-28

## 2023-04-28 NOTE — TELEPHONE ENCOUNTER
Called pt to ask him which hospital he wants to go to for his ultrasound  Calling over to [employer] to help him schedule in [place]

## 2023-04-28 NOTE — TELEPHONE ENCOUNTER
----- Message from Harsha Mcgraw MD sent at 4/27/2023  4:32 PM EDT -----  Regarding: Kidney ultrasound  I ordered a kidney ultrasound for this patient  Can you please help arrange this  Thank you

## 2023-05-01 ENCOUNTER — APPOINTMENT (OUTPATIENT)
Dept: LAB | Facility: CLINIC | Age: 88
End: 2023-05-01

## 2023-05-01 DIAGNOSIS — E87.5 HYPERKALEMIA: ICD-10-CM

## 2023-05-01 DIAGNOSIS — N18.30 BENIGN HYPERTENSION WITH CKD (CHRONIC KIDNEY DISEASE) STAGE III (HCC): ICD-10-CM

## 2023-05-01 DIAGNOSIS — N18.32 TYPE 2 DIABETES MELLITUS WITH STAGE 3B CHRONIC KIDNEY DISEASE, WITHOUT LONG-TERM CURRENT USE OF INSULIN (HCC): ICD-10-CM

## 2023-05-01 DIAGNOSIS — I12.9 BENIGN HYPERTENSION WITH CKD (CHRONIC KIDNEY DISEASE) STAGE III (HCC): ICD-10-CM

## 2023-05-01 DIAGNOSIS — N18.32 STAGE 3B CHRONIC KIDNEY DISEASE (HCC): ICD-10-CM

## 2023-05-01 DIAGNOSIS — E11.22 TYPE 2 DIABETES MELLITUS WITH STAGE 3B CHRONIC KIDNEY DISEASE, WITHOUT LONG-TERM CURRENT USE OF INSULIN (HCC): ICD-10-CM

## 2023-05-01 LAB
25(OH)D3 SERPL-MCNC: 23.9 NG/ML (ref 30–100)
BACTERIA UR QL AUTO: ABNORMAL /HPF
BILIRUB UR QL STRIP: NEGATIVE
CLARITY UR: CLEAR
COLOR UR: COLORLESS
CREAT UR-MCNC: 31.8 MG/DL
CREAT UR-MCNC: 31.8 MG/DL
GLUCOSE UR STRIP-MCNC: NEGATIVE MG/DL
HGB UR QL STRIP.AUTO: NEGATIVE
HYALINE CASTS #/AREA URNS LPF: ABNORMAL /LPF
KETONES UR STRIP-MCNC: NEGATIVE MG/DL
LEUKOCYTE ESTERASE UR QL STRIP: NEGATIVE
MICROALBUMIN UR-MCNC: <5 MG/L (ref 0–20)
MICROALBUMIN/CREAT 24H UR: <16 MG/G CREATININE (ref 0–30)
NITRITE UR QL STRIP: NEGATIVE
NON-SQ EPI CELLS URNS QL MICRO: ABNORMAL /HPF
PH UR STRIP.AUTO: 5.5 [PH]
PROT UR STRIP-MCNC: NEGATIVE MG/DL
PROT UR-MCNC: <6 MG/DL
PTH-INTACT SERPL-MCNC: 95.3 PG/ML (ref 18.4–80.1)
RBC #/AREA URNS AUTO: ABNORMAL /HPF
SP GR UR STRIP.AUTO: 1.01 (ref 1–1.03)
UROBILINOGEN UR STRIP-ACNC: <2 MG/DL
WBC #/AREA URNS AUTO: ABNORMAL /HPF

## 2023-05-02 DIAGNOSIS — N18.32 STAGE 3B CHRONIC KIDNEY DISEASE (HCC): Primary | ICD-10-CM

## 2023-05-02 DIAGNOSIS — E55.9 VITAMIN D INSUFFICIENCY: Primary | ICD-10-CM

## 2023-05-02 LAB
ALBUMIN SERPL BCP-MCNC: 3.7 G/DL (ref 3.5–5)
ANION GAP SERPL CALCULATED.3IONS-SCNC: 7 MMOL/L (ref 4–13)
BUN SERPL-MCNC: 21 MG/DL (ref 5–25)
CALCIUM SERPL-MCNC: 9.2 MG/DL (ref 8.3–10.1)
CHLORIDE SERPL-SCNC: 107 MMOL/L (ref 96–108)
CO2 SERPL-SCNC: 19 MMOL/L (ref 21–32)
CREAT SERPL-MCNC: 1.54 MG/DL (ref 0.6–1.3)
GFR SERPL CREATININE-BSD FRML MDRD: 40 ML/MIN/1.73SQ M
GLUCOSE SERPL-MCNC: 137 MG/DL (ref 65–140)
PHOSPHATE SERPL-MCNC: 2.8 MG/DL (ref 2.3–4.1)
POTASSIUM SERPL-SCNC: 5.2 MMOL/L (ref 3.5–5.3)
SODIUM SERPL-SCNC: 133 MMOL/L (ref 135–147)

## 2023-05-02 RX ORDER — MELATONIN
1000 DAILY
Qty: 90 TABLET | Refills: 3 | Status: SHIPPED | OUTPATIENT
Start: 2023-05-02 | End: 2023-07-31

## 2023-05-04 ENCOUNTER — HOSPITAL ENCOUNTER (OUTPATIENT)
Dept: RADIOLOGY | Facility: CLINIC | Age: 88
End: 2023-05-04

## 2023-05-04 VITALS
DIASTOLIC BLOOD PRESSURE: 81 MMHG | HEART RATE: 62 BPM | OXYGEN SATURATION: 94 % | TEMPERATURE: 96.9 F | SYSTOLIC BLOOD PRESSURE: 143 MMHG | RESPIRATION RATE: 18 BRPM

## 2023-05-04 DIAGNOSIS — M46.1 SACROILIITIS (HCC): ICD-10-CM

## 2023-05-04 RX ORDER — BUPIVACAINE HCL/PF 2.5 MG/ML
3 VIAL (ML) INJECTION ONCE
Status: COMPLETED | OUTPATIENT
Start: 2023-05-04 | End: 2023-05-04

## 2023-05-04 RX ORDER — 0.9 % SODIUM CHLORIDE 0.9 %
2 VIAL (ML) INJECTION ONCE
Status: COMPLETED | OUTPATIENT
Start: 2023-05-04 | End: 2023-05-04

## 2023-05-04 RX ORDER — METHYLPREDNISOLONE ACETATE 80 MG/ML
80 INJECTION, SUSPENSION INTRA-ARTICULAR; INTRALESIONAL; INTRAMUSCULAR; PARENTERAL; SOFT TISSUE ONCE
Status: COMPLETED | OUTPATIENT
Start: 2023-05-04 | End: 2023-05-04

## 2023-05-04 RX ADMIN — BUPIVACAINE HYDROCHLORIDE 3 ML: 2.5 INJECTION, SOLUTION EPIDURAL; INFILTRATION; INTRACAUDAL at 10:45

## 2023-05-04 RX ADMIN — IOHEXOL 1 ML: 300 INJECTION, SOLUTION INTRAVENOUS at 10:45

## 2023-05-04 RX ADMIN — METHYLPREDNISOLONE ACETATE 80 MG: 80 INJECTION, SUSPENSION INTRA-ARTICULAR; INTRALESIONAL; INTRAMUSCULAR; PARENTERAL; SOFT TISSUE at 10:45

## 2023-05-04 RX ADMIN — SODIUM CHLORIDE 2 ML: 9 INJECTION INTRAMUSCULAR; INTRAVENOUS; SUBCUTANEOUS at 10:45

## 2023-05-04 RX ADMIN — Medication 2 ML: at 10:45

## 2023-05-04 NOTE — DISCHARGE INSTR - LAB
Steroid Joint Injection   WHAT YOU NEED TO KNOW:   A steroid joint injection is a procedure to inject steroid medicine into a joint  Steroid medicine decreases pain and inflammation  The injection may also contain an anesthetic (numbing medicine) to decrease pain  It may be done to treat conditions such as arthritis, gout, or carpal tunnel syndrome  The injections may be given in your knee, ankle, shoulder, elbow, wrist, ankle or sacroiliac joint  Do not apply heat to any area that is numb  If you have discomfort or soreness at the injection site, you may apply ice today, 20 minutes on and 20 minutes off  Tomorrow you may use ice or warm, moist heat  Do not apply ice or heat directly to the skin  You may have an increase or change in the discomfort for 36-48 hours after your treatment  Apply ice and continue with any pain medicine you have been prescribed  Do not do anything strenuous today  You may shower, but no tub baths or hot tubs today  You may resume your normal activities tomorrow, but do not overdo it  Resume normal activities slowly when you are feeling better  If you experience redness, drainage or swelling at the injection site, or if you develop a fever above 100 degrees, please call [facility] at [phone] or go to the Emergency Room  Continue to take all routine medicines prescribed by your primary care physician unless otherwise instructed by our staff  Most blood thinners should be started again according to your regularly scheduled dosing  If you have any questions, please give our office a call  As no general anesthesia was used in today's procedure, you should not experience any side effects related to anesthesia  If you are diabetic, the steroids used in today's injection may temporarily increase your blood sugar levels after the first few days after your injection   Please keep a close eye on your sugars and alert the doctor who manages your diabetes if your sugars are significantly high from your baseline or you are symptomatic  If you have a problem specifically related to your procedure, please call our office at [phone]  Problems not related to your procedure should be directed to your primary care physician

## 2023-05-04 NOTE — H&P
History of Present Illness:  The patient is a 82 y o  male who presents with complaints of right low back pain and is here today for right-sided sacroiliac joint injection    Past Medical History:   Diagnosis Date    Acute MI ([facility] [address] )     12    Ambulates with cane     Anxiety     Arthritis     hands    At risk for falls     Atrial fibrillation (HCC)     Cholecystitis     CKD (chronic kidney disease)     Coronary artery disease     Diabetes mellitus ([facility] [address] )     GERD (gastroesophageal reflux disease)     Heart murmur     Hyperlipidemia     Hypertension     Low back pain     Lumbar disc disease     Umbilical hernia     Wears dentures     full set    Wears glasses        Past Surgical History:   Procedure Laterality Date    CARDIAC CATHETERIZATION      s/p MI    COLONOSCOPY      HERNIA REPAIR Left     X5    [place] LAPAROSCOPY SURG CHOLECYSTECTOMY N/A 1/7/2022    Procedure: ROBOTIC ASSISTED LAPAROSCOPIC CHOLECYSTECTOMY;  Surgeon: Khushi Landers MD;  Location: [facility];  Service: General    TOTAL HIP ARTHROPLASTY Left     UMBILICAL HERNIA REPAIR LAPAROSCOPIC N/A 1/7/2022    Procedure: Open umbilical hernia repair;  Surgeon: Khushi Landers MD;  [facility];  Service: General         Current Outpatient Medications:     acetaminophen (TYLENOL) 325 mg tablet, Take 2 tablets (650 mg total) by mouth every 6 (six) hours (Patient taking differently: Take 650 mg by mouth every 6 (six) hours as needed), Disp: 30 tablet, Rfl: 0    aspirin 81 mg chewable tablet, Chew 81 mg daily Pt stopped , Disp: , Rfl:     cholecalciferol (VITAMIN D3) 1,000 units tablet, Take 1 tablet (1,000 Units total) by mouth daily, Disp: 90 tablet, Rfl: 3    cyanocobalamin (VITAMIN B-12) 500 mcg tablet, Take 1,000 mcg by mouth daily, Disp: , Rfl:     docusate sodium (COLACE) 100 mg capsule, Take 1 capsule (100 mg total) by mouth 2 (two) times a day (Patient taking differently: Take 100 mg by mouth every evening), Disp: 10 capsule, Rfl: 0    escitalopram (LEXAPRO) 5 mg tablet, TAKE 1 TABLET BY MOUTH EVERY DAY, Disp: 90 tablet, Rfl: 1    metFORMIN (GLUCOPHAGE) 500 mg tablet, TAKE 2 TABLETS EVERY MORNING TAKE 2 TABLETS EVERY EVENING (Patient taking differently: Take 500 mg by mouth 2 (two) times a day with meals TAKE 1 TABLETS EVERY MORNING TAKE 1 TABLETS EVERY EVENING), Disp: 360 tablet, Rfl: 1    metoprolol tartrate (LOPRESSOR) 25 mg tablet, TAKE 1 TABLET (25 MG TOTAL) BY MOUTH EVERY 12 (TWELVE) HOURS, Disp: 180 tablet, Rfl: 1    omeprazole (PriLOSEC) 40 MG capsule, TAKE 1 CAPSULE BY MOUTH EVERY DAY BEFORE BREAKFAST, Disp: 90 capsule, Rfl: 1    simvastatin (ZOCOR) 40 mg tablet, TAKE 1 TABLET BY MOUTH EVERY DAY, Disp: 90 tablet, Rfl: 1    torsemide (DEMADEX) 5 MG tablet, Take 1 tablet (5 mg total) by mouth daily, Disp: 90 tablet, Rfl: 3    Current Facility-Administered Medications:     bupivacaine (PF) (MARCAINE) 0 25 % injection 3 mL, 3 mL, Intra-articular, Once, Lexi Pacheco MD    iohexol (OMNIPAQUE) 300 mg/mL injection 1 mL, 1 mL, Intra-articular, Once, Lexi Pacheco MD    lidocaine (PF) (XYLOCAINE-MPF) 2 % injection 2 mL, 2 mL, Infiltration, Once, Lexi Pacheco MD    methylPREDNISolone acetate (DEPO-MEDROL) injection 80 mg, 80 mg, Intra-articular, Once, Lexi Pacheco MD    sodium chloride (PF) 0 9 % injection 2 mL, 2 mL, Infiltration, Once, Lexi Pacheco MD    No Known Allergies    Physical Exam:   Vitals:    05/04/23 1024   BP: 153/85   Pulse: 60   Resp: 18   Temp: (!) 96 9 °F (36 1 °C)   SpO2: 97%     General: Awake, Alert, Oriented x 3, Mood and affect appropriate  Respiratory: Respirations even and unlabored  Cardiovascular: Peripheral pulses intact; no edema  Musculoskeletal Exam: Right lower back pain    ASA Score: 3    Patient/Chart Verification  Patient ID Verified: Verbal  ID Band Applied: No  Consents Confirmed: Procedural, To be obtained in the Pre-Procedure area  H&P( within 30 days) Verified:  To be obtained in the Pre-Procedure area  Interval H&P(within 24 hr) Complete (required for Outpatients and Surgery Admit only): To be obtained in the Pre-Procedure area  Allergies Reviewed: Yes  Anticoag/NSAID held?: NA  Currently on antibiotics?: No    Assessment:   1   Sacroiliitis ([facility] [address] )        Plan: Right SI joint injection

## 2023-05-05 ENCOUNTER — HOSPITAL ENCOUNTER (OUTPATIENT)
Dept: ULTRASOUND IMAGING | Facility: HOSPITAL | Age: 88
End: 2023-05-05
Attending: INTERNAL MEDICINE

## 2023-05-05 DIAGNOSIS — E87.5 HYPERKALEMIA: ICD-10-CM

## 2023-05-05 DIAGNOSIS — N18.32 STAGE 3B CHRONIC KIDNEY DISEASE (HCC): ICD-10-CM

## 2023-05-06 DIAGNOSIS — E11.69 TYPE 2 DIABETES MELLITUS WITH OTHER SPECIFIED COMPLICATION, WITHOUT LONG-TERM CURRENT USE OF INSULIN (HCC): ICD-10-CM

## 2023-05-06 DIAGNOSIS — E78.5 HYPERLIPIDEMIA, UNSPECIFIED HYPERLIPIDEMIA TYPE: ICD-10-CM

## 2023-05-09 RX ORDER — SIMVASTATIN 40 MG
TABLET ORAL
Qty: 90 TABLET | Refills: 1 | Status: SHIPPED | OUTPATIENT
Start: 2023-05-09

## 2023-05-11 ENCOUNTER — TELEPHONE (OUTPATIENT)
Dept: PAIN MEDICINE | Facility: CLINIC | Age: 88
End: 2023-05-11

## 2023-05-15 DIAGNOSIS — N28.1 RENAL CYST: Primary | ICD-10-CM

## 2023-05-16 NOTE — TELEPHONE ENCOUNTER
S/W pt  He said since he spoke to someone 5 days ago he does feel improvement  Agreeable to give it a little longer    Follow up call 5/22

## 2023-05-17 ENCOUNTER — TELEPHONE (OUTPATIENT)
Dept: NEPHROLOGY | Facility: CLINIC | Age: 88
End: 2023-05-17

## 2023-05-17 ENCOUNTER — OFFICE VISIT (OUTPATIENT)
Dept: CARDIOLOGY CLINIC | Facility: CLINIC | Age: 88
End: 2023-05-17

## 2023-05-17 VITALS
HEART RATE: 70 BPM | OXYGEN SATURATION: 97 % | WEIGHT: 200 LBS | SYSTOLIC BLOOD PRESSURE: 110 MMHG | DIASTOLIC BLOOD PRESSURE: 64 MMHG | BODY MASS INDEX: 27.09 KG/M2 | HEIGHT: 72 IN

## 2023-05-17 DIAGNOSIS — I10 ESSENTIAL HYPERTENSION: ICD-10-CM

## 2023-05-17 DIAGNOSIS — I48.0 PAROXYSMAL ATRIAL FIBRILLATION (HCC): Primary | ICD-10-CM

## 2023-05-17 DIAGNOSIS — I25.10 CORONARY ARTERY DISEASE INVOLVING NATIVE CORONARY ARTERY OF NATIVE HEART WITHOUT ANGINA PECTORIS: ICD-10-CM

## 2023-05-17 NOTE — PATIENT INSTRUCTIONS
You were seen today in the Cardiology office for follow up  We discussed the risks versus benefits of starting an anticoagulant (blood thinner) for your paroxysmal atrial fibrillation       -Please discontinue your daily baby aspirin       -A new prescription was sent to your pharmacy for Apixaban 2 5 mg, take 1 tablet twice daily       -Please continue the rest of your medications as prescribed  Thank you for choosing [address]  Please call our office or use [employer] with any questions

## 2023-05-17 NOTE — PROGRESS NOTES
[facility]    CHIEF COMPLAINT:   Chief Complaint   Patient presents with   • Follow-up       HPI:  Rosita Colbert is a 82 y o  male with a past medical history significant for DM, hypertension, CKD 3B, coronary artery disease, history of MI (1991) and paroxysmal atrial fibrillation/flutter who presents for follow up  He was last seen for preoperative risk assessment in December 2021 for laparoscopic cholecystectomy and umbilical hernia repair after a recent admission on 11/18/2021 with sepsis due to acute cholecystitis  His hospital course was complicated by atrial flutter with RVR and a non-MI troponin elevation  He subsequently underwent laparoscopic cholecystectomy and umbilical herniorrhaphy on 1/7/22  Post op course was uncomplicated  Interval history: Generally not too active but he still ambulates with his cane  Reports that his balance has improved and he feels more steady on his feet  He continues to do home exercise  He denies any falls  Denies any lightheadedness, visual changes, chest pain, palpitations, shortness of breath, orthopnea, PND, leg edema      The following portions of the patient's history were reviewed and updated as appropriate: allergies, current medications, past family history, past medical history, past social history, past surgical history, and problem list     SINCE LAST OV I REVIEWED WITH THE PATIENT THE INTERIM LABS, TEST RESULTS, CONSULTANT(S) NOTES AND PERFORMED AN INTERIM REVIEW OF HISTORY    Past Medical History:   Diagnosis Date   • Acute MI ([address] )     1991   • Ambulates with cane    • Anxiety    • Arthritis     hands   • At risk for falls    • Atrial fibrillation ([address] )    • Cholecystitis    • CKD (chronic kidney disease)    • Coronary artery disease    • Diabetes mellitus ([address] )    • GERD (gastroesophageal reflux disease)    • Heart murmur    • Hyperlipidemia    • Hypertension    • Low back pain    • Lumbar disc disease    • Umbilical hernia    • Wears dentures     full set   • Wears glasses        Past Surgical History:   Procedure Laterality Date   • CARDIAC CATHETERIZATION      s/p MI   • COLONOSCOPY     • HERNIA REPAIR Left     X5   • MD LAPAROSCOPY SURG CHOLECYSTECTOMY N/A 2022    Procedure: ROBOTIC ASSISTED LAPAROSCOPIC CHOLECYSTECTOMY;  Surgeon: Jose Wilson MD;  [facility];  Service: General   • TOTAL HIP ARTHROPLASTY Left    • UMBILICAL HERNIA REPAIR LAPAROSCOPIC N/A 2022    Procedure: Open umbilical hernia repair;  Surgeon: Jose Wilson MD;  [facility];  Service: General       Social History     Socioeconomic History   • Marital status: /Civil Union     Spouse name: Not on file   • Number of children: Not on file   • Years of education: Not on file   • Highest education level: Not on file   Occupational History   • Occupation: retired   Tobacco Use   • Smoking status: Never   • Smokeless tobacco: Never   Vaping Use   • Vaping Use: Never used   Substance and Sexual Activity   • Alcohol use: Yes     Comment: rare   • Drug use: No   • Sexual activity: Not Currently   Other Topics Concern   • Not on file   Social History Narrative    Prabha:    Most recent tobacco use screenin2020      Do you currently or have you served in [employer]:   No      Were you activated, into active duty, as a member of the [employer] or as a Reservist:   No      Occupation:   Retired      Marital status:         Sexual orientation:   Heterosexual      Exercise level:   Occasional      Diet:   Regular      General stress level:   Medium      Alcohol intake:   Occasional      Caffeine intake:    Moderate      Chewing tobacco:   none      Illicit drugs:   Denied      Guns present in home:   No      Seat belts used routinely:   Yes      Sunscreen used routinely:   Yes      Smoke alarm in home:   Yes      Advance directive:   Yes      Salt Intake:   Normal     Would the patient like to schedule a Mammogram: No      Is the patient interested in a colorectal cancer screening:   No     Last modified by [email or web address]   01-, 15:03      Social Determinants of Health     Financial Resource Strain: Unknown   • Difficulty of Paying Living Expenses: Patient refused   Food Insecurity: Not on file   Transportation Needs: Unknown   • Lack of Transportation (Medical): Patient refused   • Lack of Transportation (Non-Medical): Patient refused   Physical Activity: Not on file   Stress: Not on file   Social Connections: Not on file   Intimate Partner Violence: Not on file   Housing Stability: Not on file       Family History   Problem Relation Age of Onset   • No Known Problems Mother    • No Known Problems Father        No Known Allergies    Current Outpatient Medications   Medication Sig Dispense Refill   • acetaminophen (TYLENOL) 325 mg tablet Take 2 tablets (650 mg total) by mouth every 6 (six) hours (Patient taking differently: Take 650 mg by mouth every 6 (six) hours as needed) 30 tablet 0   • apixaban (Eliquis) 2 5 mg Take 1 tablet (2 5 mg total) by mouth 2 (two) times a day 180 tablet 1   • cholecalciferol (VITAMIN D3) 1,000 units tablet Take 1 tablet (1,000 Units total) by mouth daily 90 tablet 3   • cyanocobalamin (VITAMIN B-12) 500 mcg tablet Take 1,000 mcg by mouth daily     • docusate sodium (COLACE) 100 mg capsule Take 1 capsule (100 mg total) by mouth 2 (two) times a day (Patient taking differently: Take 100 mg by mouth every evening) 10 capsule 0   • escitalopram (LEXAPRO) 5 mg tablet TAKE 1 TABLET BY MOUTH EVERY DAY 90 tablet 1   • metFORMIN (GLUCOPHAGE) 500 mg tablet Take 1 tablet (500 mg total) by mouth 2 (two) times a day with meals TAKE 1 TABLETS EVERY MORNING TAKE 1 TABLETS EVERY EVENING 180 tablet 1   • metoprolol tartrate (LOPRESSOR) 25 mg tablet Take 0 5 tablets (12 5 mg total) by mouth every 12 (twelve) hours 180 tablet 1   • omeprazole (PriLOSEC) 40 MG capsule TAKE 1 CAPSULE BY MOUTH EVERY DAY BEFORE BREAKFAST 90 capsule 1   • simvastatin (ZOCOR) 40 mg tablet TAKE 1 TABLET BY MOUTH EVERY DAY 90 tablet 1   • torsemide (DEMADEX) 5 MG tablet Take 1 tablet (5 mg total) by mouth daily 90 tablet 3     No current facility-administered medications for this visit  /64 (BP Location: Left arm, Patient Position: Sitting, Cuff Size: Standard)   Pulse 70   Ht 6' (1 829 m)   Wt 90 7 kg (200 lb)   SpO2 97%   BMI 27 12 kg/m²     Review of Systems   All other systems reviewed and are negative  Physical Exam  Vitals reviewed  Constitutional:       Appearance: He is not toxic-appearing  HENT:      Head: Normocephalic and atraumatic  Eyes:      General: No scleral icterus  Extraocular Movements: Extraocular movements intact  Cardiovascular:      Rate and Rhythm: Normal rate and regular rhythm  Pulses: Normal pulses  Heart sounds: Normal heart sounds  No murmur heard  No gallop  Pulmonary:      Effort: Pulmonary effort is normal  No respiratory distress  Breath sounds: Normal breath sounds  No wheezing or rales  Abdominal:      General: Abdomen is flat  Bowel sounds are normal  There is no distension  Palpations: Abdomen is soft  Tenderness: There is no abdominal tenderness  There is no guarding  Musculoskeletal:      Right lower leg: No edema  Left lower leg: No edema  Skin:     General: Skin is warm and dry  Coloration: Skin is not jaundiced or pale  Neurological:      Mental Status: He is alert     Psychiatric:         Mood and Affect: Mood normal          Behavior: Behavior normal         Lab Results   Component Value Date    K 5 2 05/01/2023     05/01/2023    CO2 19 (L) 05/01/2023    BUN 21 05/01/2023    CREATININE 1 54 (H) 05/01/2023    CALCIUM 9 2 05/01/2023    ALT 28 10/12/2022    AST 18 10/12/2022    INR 1 17 11/19/2021       Lab Results   Component Value Date    HDL 34 (L) 02/06/2023    LDLCALC 59 02/06/2023    TRIG 116 02/06/2023 Lab Results   Component Value Date    WBC 7 83 10/12/2022    HGB 12 1 10/12/2022    HCT 40 3 10/12/2022     10/12/2022       Lab Results   Component Value Date     02/06/2023    HGBA1C 6 4 (H) 02/06/2023     Cardiac studies:   Results for orders placed or performed in visit on 05/17/23   POCT ECG    Impression    Normal sinus rhythm  LAFB     Zio XT - 12/15/22-12/29/22: Patient had a min HR of 49 bpm, max HR of 160 bpm, and avg HR of 66 bpm  Predominant underlying rhythm was Sinus Rhythm  2 Ventricular Tachycardia runs occurred, the run with the fastest interval lasting 4 beats with a max rate of 139 bpm, the longest lasting 7 beats with an avg rate of 121 bpm  20 Supraventricular Tachycardia runs occurred, the run with the fastest interval lasting 2 mins 1 sec with a max rate of 160 bpm, the longest lasting 2 mins 38 secs with an avg rate of  126 bpm  Isolated SVEs were rare (<1 0%), SVE Couplets were rare (<1 0%), and SVE Triplets were rare (<1 0%)  Isolated VEs were occasional (1 1%, 89410), VE Couplets were rare (<1 0%, 33), and VE Triplets were rare (<1 0%, 3)  Ventricular Bigeminy and Trigeminy were present      TTE-2/10/2018: LVEF 55%, grade 2 DD  LA mild to moderately dilated  RA mildly dilated  Mild to moderate TR  PASP 45 mmHg  ASSESSMENT AND PLAN:  Angela Cr was seen today for follow-up  Diagnoses and all orders for this visit:    Paroxysmal atrial fibrillation [facility]): Atrial fibrillation and atrial flutter on ECGs from 2018 and 2021, respectively  Recent extended Holter monitor without evidence of either  He does have moderate left atrial enlargement on echo from 2018  Previously was on aspirin alone also for his coronary disease  He was previously not on systemic AC due to falls and LE weakness  Fortunately, no falls for many months and feels more steady on his feet  Does daily home exercises   ECG today SR, LAFB - no changes from prior    -High C2V score (age, HTN, CAD/prior MI, DM)  -We had a lengthy discussion about stroke risk and reviewed risks versus benefits of anticoagulation  He is open to trying Apixaban 2 5 mg BID adjusted for renal function and age  He will discontinue aspirin to reduced bleeding risk    -Will need close follow up and continued fall risk assessment  -     POCT ECG  -     apixaban (Eliquis) 2 5 mg; Take 1 tablet (2 5 mg total) by mouth 2 (two) times a day    Coronary artery disease involving native coronary artery of native heart without angina pectoris: History of remote MI 1991  He was treated with thrombolytics and rotational atherectomy of the LAD  He underwent cardiac catheterization in 2001 which revealed 50% LAD narrowing  He denies any anginal complaints at this time  His lipid panel from 2/6/2023 showed total cholesterol 116, TGs 116, HDL 34, LDL 59  His lipids are at goal     -Continue simvastatin   -Discontinue aspirin as above    Essential hypertension: Hypertension with chronic kidney disease stage IIIb  BP is at goal today  Serum creatinine 1 54 with EGFR 40  We will decrease his metoprolol tartrate to 12 5 mg BID with the recent addition of torsemide       Hugh Scott MD

## 2023-05-17 NOTE — TELEPHONE ENCOUNTER
LM for pt to call back so that we may assist in scheduling a 6 month US of Kidney and Bladder per Dr Keena Awad  We can schedule for patient now, provide phone number, or schedule in person at the check out of his upcoming appointment

## 2023-05-18 ENCOUNTER — TELEPHONE (OUTPATIENT)
Dept: CARDIOLOGY CLINIC | Facility: CLINIC | Age: 88
End: 2023-05-18

## 2023-05-18 NOTE — TELEPHONE ENCOUNTER
Pt left v/m re: medication Dr Barclay Meals prescribed yesterday  Cost is $1,180 and he will be continuing on aspirin  He is not going to pay that kind of money

## 2023-05-25 NOTE — TELEPHONE ENCOUNTER
Spoke to patient he was going by what the pharmacy quoted I ask him to call his insurance company get prices also on alternatives  Spoke to him about coumadin and weekly blood work  He will let us know

## 2023-06-13 ENCOUNTER — OFFICE VISIT (OUTPATIENT)
Dept: PODIATRY | Facility: CLINIC | Age: 88
End: 2023-06-13
Payer: MEDICARE

## 2023-06-13 VITALS
WEIGHT: 200 LBS | DIASTOLIC BLOOD PRESSURE: 81 MMHG | SYSTOLIC BLOOD PRESSURE: 120 MMHG | BODY MASS INDEX: 27.09 KG/M2 | HEIGHT: 72 IN | HEART RATE: 64 BPM | OXYGEN SATURATION: 99 %

## 2023-06-13 DIAGNOSIS — E11.42 TYPE 2 DIABETES MELLITUS WITH DIABETIC POLYNEUROPATHY, WITHOUT LONG-TERM CURRENT USE OF INSULIN (HCC): ICD-10-CM

## 2023-06-13 DIAGNOSIS — B35.1 ONYCHOMYCOSIS: Primary | ICD-10-CM

## 2023-06-13 PROCEDURE — 99212 OFFICE O/P EST SF 10 MIN: CPT | Performed by: PODIATRIST

## 2023-06-13 PROCEDURE — 11721 DEBRIDE NAIL 6 OR MORE: CPT | Performed by: PODIATRIST

## 2023-06-13 NOTE — PROGRESS NOTES
Assessment/Plan:     The patient's clinical examination today significant for thickened and dystrophic pedal nail plates with discoloration and subungual debris and brittleness with areas of lytic changes consistent with onychomycosis, right worse than the left  There are no open lesions nor calluses noted to either foot  The interdigital spaces are clear without maceration  Pedal pulses are palpable bilaterally but diminished  Epicritic sensation is diminished bilaterally secondary to peripheral neuropathy  The pedal nail plates are sharply debrided with a sterile nail clipper T91 without complication  The nails with a mechanically reduced in thickness and girth utilize a rotary bur without complication  She was noted today  Recommend follow-up in 3 months for continued at risk diabetic foot care  Diagnoses and all orders for this visit:    Onychomycosis    Type 2 diabetes mellitus with diabetic polyneuropathy, without long-term current use of insulin ([facility])          Subjective:     Patient ID: Sri Chun is a 82 y o  male  The patient presents today for follow-up at risk diabetic foot care  He does note that the nails are getting a bit too long and is starting to cause irritation and tenderness in close to shoe gear once again  He denies any other acute pedal issues today        PAST MEDICAL HISTORY:  Past Medical History:   Diagnosis Date   • Acute MI ([facility] [address] )     1991   • Ambulates with cane    • Anxiety    • Arthritis     hands   • At risk for falls    • Atrial fibrillation (HCC)    • Cholecystitis    • CKD (chronic kidney disease)    • Coronary artery disease    • Diabetes mellitus (HCC)    • GERD (gastroesophageal reflux disease)    • Heart murmur    • Hyperlipidemia    • Hypertension    • Low back pain    • Lumbar disc disease    • Umbilical hernia    • Wears dentures     full set   • Wears glasses        PAST SURGICAL HISTORY:  Past Surgical History:   Procedure Laterality Date   • CARDIAC CATHETERIZATION      s/p MI   • COLONOSCOPY     • HERNIA REPAIR Left     X5   • VT LAPAROSCOPY SURG CHOLECYSTECTOMY N/A 1/7/2022    Procedure: ROBOTIC ASSISTED LAPAROSCOPIC CHOLECYSTECTOMY;  Surgeon: Yumiko Hill MD;  [facility];  Service: General   • TOTAL HIP ARTHROPLASTY Left    • UMBILICAL HERNIA REPAIR LAPAROSCOPIC N/A 1/7/2022    Procedure: Open umbilical hernia repair;  Surgeon: Yumiko Hill MD;  [facility];  Service: General        ALLERGIES:  Patient has no known allergies      MEDICATIONS:  Current Outpatient Medications   Medication Sig Dispense Refill   • acetaminophen (TYLENOL) 325 mg tablet Take 2 tablets (650 mg total) by mouth every 6 (six) hours (Patient taking differently: Take 650 mg by mouth every 6 (six) hours as needed) 30 tablet 0   • apixaban (Eliquis) 2 5 mg Take 1 tablet (2 5 mg total) by mouth 2 (two) times a day 180 tablet 1   • cholecalciferol (VITAMIN D3) 1,000 units tablet Take 1 tablet (1,000 Units total) by mouth daily 90 tablet 3   • cyanocobalamin (VITAMIN B-12) 500 mcg tablet Take 1,000 mcg by mouth daily     • docusate sodium (COLACE) 100 mg capsule Take 1 capsule (100 mg total) by mouth 2 (two) times a day (Patient taking differently: Take 100 mg by mouth every evening) 10 capsule 0   • escitalopram (LEXAPRO) 5 mg tablet TAKE 1 TABLET BY MOUTH EVERY DAY 90 tablet 1   • metFORMIN (GLUCOPHAGE) 500 mg tablet Take 1 tablet (500 mg total) by mouth 2 (two) times a day with meals TAKE 1 TABLETS EVERY MORNING TAKE 1 TABLETS EVERY EVENING 180 tablet 1   • metoprolol tartrate (LOPRESSOR) 25 mg tablet Take 0 5 tablets (12 5 mg total) by mouth every 12 (twelve) hours 180 tablet 1   • omeprazole (PriLOSEC) 40 MG capsule TAKE 1 CAPSULE BY MOUTH EVERY DAY BEFORE BREAKFAST 90 capsule 1   • simvastatin (ZOCOR) 40 mg tablet TAKE 1 TABLET BY MOUTH EVERY DAY 90 tablet 1   • torsemide (DEMADEX) 5 MG tablet Take 1 tablet (5 mg total) by mouth daily 90 tablet 3     No current facility-administered medications for this visit  SOCIAL HISTORY:  Social History     Socioeconomic History   • Marital status: /Civil Union     Spouse name: None   • Number of children: None   • Years of education: None   • Highest education level: None   Occupational History   • Occupation: retired   Tobacco Use   • Smoking status: Never   • Smokeless tobacco: Never   Vaping Use   • Vaping Use: Never used   Substance and Sexual Activity   • Alcohol use: Yes     Comment: rare   • Drug use: No   • Sexual activity: Not Currently   Other Topics Concern   • None   Social History Narrative    [place]:    Most recent tobacco use screenin2020      Do you currently or have you served in the [employer]:   No      Were you activated, into active duty, as a member of the [employer] or as a Reservist:   No      Occupation:   Retired      Marital status:         Sexual orientation:   Heterosexual      Exercise level:   Occasional      Diet:   Regular      General stress level:   Medium      Alcohol intake:   Occasional      Caffeine intake:    Moderate      Chewing tobacco:   none      Illicit drugs:   Denied      Guns present in home:   No      Seat belts used routinely:   Yes      Sunscreen used routinely:   Yes      Smoke alarm in home:   Yes      Advance directive:   Yes      Salt Intake:   Normal     Would the patient like to schedule a Mammogram:   No      Is the patient interested in a colorectal cancer screening:   No     Last modified by [email or web address]   2020, 15:03      Social Determinants of Health     Financial Resource Strain: Unknown (2023)    Overall Financial Resource Strain (CARDIA)    • Difficulty of Paying Living Expenses: Patient refused   Food Insecurity: No Food Insecurity (2022)    Hunger Vital Sign    • Worried About Running Out of Food in the Last Year: Never true    • Ran Out of Food in the Last Year: Never true   Transportation Needs: Unknown (2/21/2023)    PRAPARE - Transportation    • Lack of Transportation (Medical): Patient refused    • Lack of Transportation (Non-Medical): Patient refused   Physical Activity: Not on file   Stress: Not on file   Social Connections: Not on file   Intimate Partner Violence: Not on file   Housing Stability: Unknown (1/8/2022)    Housing Stability Vital Sign    • Unable to Pay for Housing in the Last Year: No    • Number of Places Lived in the Last Year: 1    • Unstable Housing in the Last Year: Not on file        Review of Systems   Constitutional: Negative  HENT: Negative  Eyes: Negative  Respiratory: Negative  Cardiovascular: Negative  Endocrine: Negative  Musculoskeletal: Negative  Neurological: Negative  Hematological: Negative  Psychiatric/Behavioral: Negative  Objective:     Physical Exam  Vitals reviewed  Constitutional:       Appearance: Normal appearance  HENT:      Head: Normocephalic and atraumatic  Nose: Nose normal    Eyes:      Conjunctiva/sclera: Conjunctivae normal       Pupils: Pupils are equal, round, and reactive to light  Cardiovascular:      Pulses:           Dorsalis pedis pulses are 2+ on the right side and 2+ on the left side  Posterior tibial pulses are 1+ on the right side and 1+ on the left side  Pulmonary:      Effort: Pulmonary effort is normal    Feet:      Right foot:      Skin integrity: Skin integrity normal       Toenail Condition: Right toenails are abnormally thick and long  Fungal disease present  Left foot:      Skin integrity: Skin integrity normal       Toenail Condition: Left toenails are abnormally thick and long  Fungal disease present  Comments: The patient's clinical examination today significant for thickened and dystrophic pedal nail plates with discoloration and subungual debris and brittleness with areas of lytic changes consistent with onychomycosis, right worse than the left    There are no open lesions nor calluses noted to either foot  The interdigital spaces are clear without maceration  Pedal pulses are palpable bilaterally but diminished  Epicritic sensation is diminished bilaterally secondary to peripheral neuropathy  Skin:     General: Skin is warm  Capillary Refill: Capillary refill takes 2 to 3 seconds  Neurological:      General: No focal deficit present  Mental Status: He is alert and oriented to person, place, and time  Psychiatric:         Mood and Affect: Mood normal          Behavior: Behavior normal          Thought Content:  Thought content normal

## 2023-06-22 DIAGNOSIS — H91.93 DECREASED HEARING OF BOTH EARS: Primary | ICD-10-CM

## 2023-06-22 DIAGNOSIS — R26.2 AMBULATORY DYSFUNCTION: ICD-10-CM

## 2023-06-27 ENCOUNTER — OFFICE VISIT (OUTPATIENT)
Dept: AUDIOLOGY | Facility: CLINIC | Age: 88
End: 2023-06-27
Payer: MEDICARE

## 2023-06-27 DIAGNOSIS — H91.93 DECREASED HEARING OF BOTH EARS: ICD-10-CM

## 2023-06-27 DIAGNOSIS — H90.3 SENSORY HEARING LOSS, BILATERAL: Primary | ICD-10-CM

## 2023-06-27 DIAGNOSIS — R26.2 AMBULATORY DYSFUNCTION: ICD-10-CM

## 2023-06-27 PROCEDURE — 92567 TYMPANOMETRY: CPT | Performed by: AUDIOLOGIST

## 2023-06-27 PROCEDURE — 92557 COMPREHENSIVE HEARING TEST: CPT | Performed by: AUDIOLOGIST

## 2023-06-27 NOTE — PROGRESS NOTES
ADULT HEARING EVALUATION - [facility] AUDIOLOGY      Patient Name: Steve Soto   MRN:  [id number]   :  1935   Age: 82 y o  Gender: male   DOS: 2023     HISTORY:     Steve Soto, a 82 y o  male, was seen on 2023 at the referral of Dr Joey Fairbanks for an audiometric evaluation  Mr Rodrigo Galvin was accompanied by his daughter to today's visit  Mr Rodrigo Galvin is a new patient to our practice  Today, Mr Christen Rubinstein primary complaint(s) was difficulty hearing and tinnitus  Onset of symptoms reportedly began many years ago  Mr Rodrigo Galvin denied otalgia, otorrhea and aural fullness  History of otitis was negative  Mr Rodrigo Galvin has no history of ear surgeries  Family history of hearing loss was negative  Mr Rodrigo Galvin   had no reported communication difficulties  History of noise exposure is positive  Other documented medical history states that Mr Rodrigo Galvin  has a past medical history of Acute MI ([address] ), Ambulates with cane, Anxiety, Arthritis, At risk for falls, Atrial fibrillation ([address] ), Cholecystitis, CKD (chronic kidney disease), Coronary artery disease, Diabetes mellitus ([address] ), GERD (gastroesophageal reflux disease), Heart murmur, Hyperlipidemia, Hypertension, Low back pain, Lumbar disc disease, Umbilical hernia, Wears dentures, and Wears glasses  Mr Rodrigo Galvin has not had his hearing tested previously       RESULTS:    Otoscopic Evaluation:   Right Ear: Unremarkable, canal clear   Left Ear: Unremarkable, canal clear    Tympanometry:   Right Ear: Type A; normal middle ear pressure and static compliance    Left Ear: Type A; normal middle ear pressure and static compliance     Audiometry:  Conventional pure tone audiometry from 250 - 8000 Hz  obtained with good reliability and revealed the following:     Right Ear: mild to severe sensorineural hearing loss (SNHL)   Left Ear: mild to severe sensorineural hearing loss (SNHL)     Distortion Product Otoacoustic Emissions (DPOAEs)   Right Ear: DNT   Left Ear: DNT    Speech Audiometry:    Speech Reception (SRT)   Right Ear: 40 dB HL   Left Ear: 40 dB HL    Word Recognition Scores (WRS):  Right Ear: good (84 % correct)     Left Ear: excellent (96 % correct)   Stimuli: [id number]    IMPRESSIONS:  Mr Pamela Ansari has a bilateral sensorineural hearing loss sloping from mild to severe that will interfere with his everyday communication  He is an excellent candidate for hearing aids  The results of today's findings were reviewed with Mr Pamela Ansari and his hearing thresholds were explained at length  Treatment options, including amplification and communication strategies, were discussed as appropriate  Mr Pamela Ansari voiced understanding of his test results and had no further questions  RECOMMENDATIONS:    1 ) Follow-up with referring provider  2 ) Follow-up with ENT due to vertigo, tinnitus and slight asymmetry of the hearing loss with the left ear being worse than the right ear from 5325-6104 Hz    3 ) Hearing aid evaluation  *see attached audiogram*    It was a pleasure working with Mr Pamela Ansari today  Thank you for referring this patient       Dona Kilgore , [facility]  Clinical Audiologist    [id number] [address] [id number]

## 2023-06-28 DIAGNOSIS — F41.9 ANXIETY: ICD-10-CM

## 2023-06-28 RX ORDER — ESCITALOPRAM OXALATE 5 MG/1
TABLET ORAL
Qty: 90 TABLET | Refills: 1 | Status: SHIPPED | OUTPATIENT
Start: 2023-06-28

## 2023-07-19 DIAGNOSIS — K21.9 GASTROESOPHAGEAL REFLUX DISEASE WITHOUT ESOPHAGITIS: ICD-10-CM

## 2023-07-19 RX ORDER — OMEPRAZOLE 40 MG/1
CAPSULE, DELAYED RELEASE ORAL
Qty: 90 CAPSULE | Refills: 0 | Status: SHIPPED | OUTPATIENT
Start: 2023-07-19

## 2023-07-25 ENCOUNTER — OFFICE VISIT (OUTPATIENT)
Dept: OTOLARYNGOLOGY | Facility: CLINIC | Age: 88
End: 2023-07-25
Payer: MEDICARE

## 2023-07-25 ENCOUNTER — OFFICE VISIT (OUTPATIENT)
Dept: AUDIOLOGY | Facility: CLINIC | Age: 88
End: 2023-07-25

## 2023-07-25 VITALS — TEMPERATURE: 97.5 F | HEIGHT: 72 IN | WEIGHT: 195 LBS | BODY MASS INDEX: 26.41 KG/M2

## 2023-07-25 DIAGNOSIS — R26.89 IMBALANCE: Primary | ICD-10-CM

## 2023-07-25 DIAGNOSIS — H90.3 SENSORY HEARING LOSS, BILATERAL: Primary | ICD-10-CM

## 2023-07-25 DIAGNOSIS — H90.3 SENSORINEURAL HEARING LOSS (SNHL), BILATERAL: ICD-10-CM

## 2023-07-25 PROCEDURE — 99204 OFFICE O/P NEW MOD 45 MIN: CPT | Performed by: NURSE PRACTITIONER

## 2023-07-25 RX ORDER — GLIPIZIDE 5 MG/1
1 TABLET, FILM COATED, EXTENDED RELEASE ORAL DAILY
COMMUNITY

## 2023-07-25 NOTE — ASSESSMENT & PLAN NOTE
Audiogram completed today indicated bilateral mild sloping to severe near profound SNHL, with slight asymmetry on the left at [facility]. Word discrimination Right 84% Left 92%  Tymps Type A bilaterally    Typical causes of SNHL include maturity changes, noise exposure, and hereditary risk factors. Offered options for bilateral SNHL including acceptance, lifestyle changes such as moving closer to those who are speaking, or hearing amplification. He would qualify for hearing amplification based on audiogram.  Discussed hearing aid options including facilities to obtain a hearing aid from as well as costs and benefits. Discussed that quality of life may improve with hearing amplification. Offered Mri brain with IAC due to the very slight asymmetry on left however does not meet guidelines as necessary option.   Medically cleared to proceed with hearing aids with [facility] audiology

## 2023-07-25 NOTE — PROGRESS NOTES
Assessment/Plan:    Imbalance  Symptoms include sensation of imbalance then falls. Occurs when moving or moving from sitting to standing then turning. Denies occurrence when moving from lying to standing position. Discussed possible causes of imbalance including neurological, cardiac, autoimmune, Otitis media, sinusitis, chemical imbalance, and inner ear concerns. Reviewed results of prior Ct head from 2022 without any ENT related findings at that time. 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 There is a very slight asymmetry on the left at [facility] and 6K but it is not meeting guidelines to recommend MRI brain with IAC due to the asymmetry. Asymmetry unlikely impacting his imbalance concerns. Word discrimination 84% on right and 92% on left. Tymps type A bilaterally. Unlikely ear source based on pt report of symptoms, audiogram and longevity. Treatment options include at home epley's, nasal steroids, oral steroids, lab studies, vestibular therapy,  VNG testing, neurology consultation, MRI brain with IAC. Also may consider tilt table as symptoms occur when moving from sitting to standing position. Once he obtains hearing aids this may provide additional benefit for his balance but will not correct imbalance. After discussion agreed to return to balance PT. Consider further testing including labs or MRI or VNG testing next visit if needed  Follow up in 6 weeks if needed          Sensorineural hearing loss (SNHL), bilateral  Audiogram completed today indicated bilateral mild sloping to severe near profound SNHL, with slight asymmetry on the left at [facility]. Word discrimination Right 84% Left 92%  Tymps Type A bilaterally    Typical causes of SNHL include maturity changes, noise exposure, and hereditary risk factors.    Offered options for bilateral SNHL including acceptance, lifestyle changes such as moving closer to those who are speaking, or hearing amplification. He would qualify for hearing amplification based on audiogram.  Discussed hearing aid options including facilities to obtain a hearing aid from as well as costs and benefits. Discussed that quality of life may improve with hearing amplification. Offered Mri brain with IAC due to the very slight asymmetry on left however does not meet guidelines as necessary option. Medically cleared to proceed with hearing aids with [facility] audiology              Diagnoses and all orders for this visit:    Imbalance  -     Ambulatory Referral to Physical Therapy; Future    Sensorineural hearing loss (SNHL), bilateral    Other orders  -     glipiZIDE (GLUCOTROL XL) 5 mg 24 hr tablet; Take 1 tablet by mouth daily          Subjective:      Patient ID: Robert Ravi is a 82 y.o. male. Presents today as a new patient due to ear concerns. For past year Imbalance. Turning certain way feels legs freeze and falls. Increased frequency of falls. Hospital visits due to injuries from falling due to imbalance. No headaches. Hearing gradually worsening. Rigning tinnitus constant. No otalgia or otorrhea. No history of ear surgery. No current hearing aids. Balance therapy about one to two years ago. The following portions of the patient's history were reviewed and updated as appropriate: allergies, current medications, past family history, past medical history, past social history, past surgical history and problem list.    Review of Systems   Constitutional: Negative. HENT: Positive for hearing loss. Negative for congestion, ear discharge, ear pain, nosebleeds, postnasal drip, rhinorrhea, sinus pressure, sinus pain, sore throat, tinnitus and voice change. Respiratory: Negative for chest tightness and shortness of breath. Musculoskeletal: Positive for gait problem. Skin: Negative for color change. Neurological: Positive for dizziness and weakness.  Negative for numbness and headaches. Psychiatric/Behavioral: Negative. Objective:      Temp 97.5 °F (36.4 °C) (Temporal)   Ht 6' (1.829 m)   Wt 88.5 kg (195 lb)   BMI 26.45 kg/m²          Physical Exam  Constitutional:       Appearance: He is well-developed. HENT:      Head: Normocephalic. Right Ear: Hearing, tympanic membrane, ear canal and external ear normal. No decreased hearing noted. No drainage or tenderness. Tympanic membrane is not perforated or erythematous. Left Ear: Hearing, tympanic membrane, ear canal and external ear normal. No decreased hearing noted. No drainage or tenderness. Tympanic membrane is not perforated or erythematous. Nose: Nose normal. No nasal deformity or septal deviation. Mouth/Throat:      Mouth: Mucous membranes are not pale and not dry. No oral lesions. Dentition: Normal dentition. Pharynx: Uvula midline. No oropharyngeal exudate. Neck:      Trachea: No tracheal deviation. Pulmonary:      Effort: No accessory muscle usage or respiratory distress. Musculoskeletal:      Cervical back: Neck supple. Lymphadenopathy:      Cervical: No cervical adenopathy. Skin:     General: Skin is warm and dry. Neurological:      Mental Status: He is alert and oriented to person, place, and time. Cranial Nerves: No cranial nerve deficit. Sensory: No sensory deficit. Motor: Weakness present. Gait: Gait abnormal.   Psychiatric:         Behavior: Behavior is cooperative.

## 2023-07-25 NOTE — PROGRESS NOTES
HEARING AID EVALUTION - [address] AUDIOLOGY    Patient Name: Heaven Freeman   MRN:  [id number]   :  1935   Age: 82 y.o. Gender: male   DOS: 2023     Heaven Freeman was seen today for a hearing aid evaluation following his audiometric testing performed on 23. Mr. Chung King was accompanied by his daughter to today's visit. Mr. Chung King was referred by Dr. Gracia Arenas. The audiometric evaluation on 23 revealed mild to moderately-severe sensorineural hearing loss (SNHL) in the right ear and mild to severe sensorineural hearing loss (SNHL) in the left ear. Word recognition scores were good in the right ear and excellent in the left ear. This findings were reviewed with Mr. Chung King. All of his questions regarding his hearing status were addressed, and the importance of realistic expectations of hearing loss and amplification were emphasized. Hearing aids are assistive devices and are not designed to restore normal hearing. The difference between peripheral hearing loss and speech understanding (central hearing loss) was explained. While improvement with amplification is possible, there will always be word understanding problems due to the inherent nature of hearing loss. These problems will be greater in background noise than in quiet situations. Mr. Chung King was counseled on the importance of self-advocacy, motivation, as well as effective communication strategies that can be used to optimize hearing aid success. These effective communication strategies include:   1.) Maintaining face-to-face communication, allowing for speechreading of facial expressions, lips, and gestures. 2.) Reducing background noise and distance between communication partners. 3.) Having communication partners reduce their rate of speech when appropriate. 4.) Beginning conversation by getting communication partner's attention and stating the topic, allowing for context clues to help fill in gaps in comprehension.     5.) Asking for rephrasing of aspects of conversation that are missed when needed rather than asking for repetitions. To better determine which communication difficulties they are looking to improve upon, a client-oriented scale of improvement (COSI) questionnaire was completed. Based on these results, Mr. Scarlet Alonso prioritized communication difficulties include:     1.) Hear and understand his wife better at home  2.) Understand better on the TV   3.) Understand better at doctor appointments   4.) Understand easier on the telephone  5.) Hear and understand conversations better at the [facility]    Strengths and limitations of amplification, including various hearing aid styles, options, and circuitry were discussed at length with Mr. Osvaldo Nuno. Based on the degree of his hearing loss, preferences, and lifestyle needs, a trial with The Blaze REAL 3 was recommended with size 3 receivers and 10 mm power domes. Mr. Osvaldo Nuno preferred the Chroma Beige form factor color. [facility]'s office policies regarding our hearing aid program were reviewed, including the 45 day trial period, non-refundable fees, as well as the  warranties. At this time, Mr. Osvaldo Nuno wished to proceed with purchase of the above hearing aids. Devices ordered as specified (confirmation # [id number]). He will return for a hearing aid fitting on 8/15/23. It was a pleasure working with Mr. Osvaldo Nuno. They were encouraged to contact the clinic with any further questions or concerns in the meantime.      Dona Andre., [facility]  Clinical Audiologist    [address] [id number]

## 2023-07-25 NOTE — ASSESSMENT & PLAN NOTE
Symptoms include sensation of imbalance then falls. Occurs when moving or moving from sitting to standing then turning. Denies occurrence when moving from lying to standing position. Discussed possible causes of imbalance including neurological, cardiac, autoimmune, Otitis media, sinusitis, chemical imbalance, and inner ear concerns. Reviewed results of prior Ct head from 2022 without any ENT related findings at that time. 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 There is a very slight asymmetry on the left at [facility] and [facility] but it is not meeting guidelines to recommend MRI brain with IAC due to the asymmetry. Asymmetry unlikely impacting his imbalance concerns. Word discrimination 84% on right and 92% on left. Tymps type A bilaterally. Unlikely ear source based on pt report of symptoms, audiogram and longevity. Treatment options include at home epley's, nasal steroids, oral steroids, lab studies, vestibular therapy,  VNG testing, neurology consultation, MRI brain with IAC. Also may consider tilt table as symptoms occur when moving from sitting to standing position. Once he obtains hearing aids this may provide additional benefit for his balance but will not correct imbalance. After discussion agreed to return to balance PT.    Consider further testing including labs or MRI or VNG testing next visit if needed  Follow up in 6 weeks if needed

## 2023-07-30 DIAGNOSIS — I10 ESSENTIAL HYPERTENSION: ICD-10-CM

## 2023-08-04 ENCOUNTER — EVALUATION (OUTPATIENT)
Dept: PHYSICAL THERAPY | Facility: CLINIC | Age: 88
End: 2023-08-04
Payer: MEDICARE

## 2023-08-04 ENCOUNTER — APPOINTMENT (OUTPATIENT)
Dept: LAB | Facility: CLINIC | Age: 88
End: 2023-08-04
Payer: MEDICARE

## 2023-08-04 DIAGNOSIS — N18.32 TYPE 2 DIABETES MELLITUS WITH STAGE 3B CHRONIC KIDNEY DISEASE, WITHOUT LONG-TERM CURRENT USE OF INSULIN (HCC): ICD-10-CM

## 2023-08-04 DIAGNOSIS — N18.32 STAGE 3B CHRONIC KIDNEY DISEASE (HCC): ICD-10-CM

## 2023-08-04 DIAGNOSIS — R26.89 IMBALANCE: ICD-10-CM

## 2023-08-04 DIAGNOSIS — N18.30 BENIGN HYPERTENSION WITH CKD (CHRONIC KIDNEY DISEASE) STAGE III (HCC): ICD-10-CM

## 2023-08-04 DIAGNOSIS — I12.9 BENIGN HYPERTENSION WITH CKD (CHRONIC KIDNEY DISEASE) STAGE III (HCC): ICD-10-CM

## 2023-08-04 DIAGNOSIS — E87.5 HYPERKALEMIA: ICD-10-CM

## 2023-08-04 DIAGNOSIS — E11.22 TYPE 2 DIABETES MELLITUS WITH STAGE 3B CHRONIC KIDNEY DISEASE, WITHOUT LONG-TERM CURRENT USE OF INSULIN (HCC): ICD-10-CM

## 2023-08-04 LAB
ALBUMIN SERPL BCP-MCNC: 3.5 G/DL (ref 3.5–5)
ANION GAP SERPL CALCULATED.3IONS-SCNC: 6 MMOL/L
BUN SERPL-MCNC: 23 MG/DL (ref 5–25)
CALCIUM SERPL-MCNC: 9.2 MG/DL (ref 8.3–10.1)
CHLORIDE SERPL-SCNC: 106 MMOL/L (ref 96–108)
CO2 SERPL-SCNC: 26 MMOL/L (ref 21–32)
CREAT SERPL-MCNC: 1.69 MG/DL (ref 0.6–1.3)
GFR SERPL CREATININE-BSD FRML MDRD: 36 ML/MIN/1.73SQ M
GLUCOSE SERPL-MCNC: 128 MG/DL (ref 65–140)
MAGNESIUM SERPL-MCNC: 2.1 MG/DL (ref 1.6–2.6)
PHOSPHATE SERPL-MCNC: 2.7 MG/DL (ref 2.3–4.1)
POTASSIUM SERPL-SCNC: 5.1 MMOL/L (ref 3.5–5.3)
PTH-INTACT SERPL-MCNC: 80.7 PG/ML (ref 12–88)
SODIUM SERPL-SCNC: 138 MMOL/L (ref 135–147)

## 2023-08-04 PROCEDURE — 80069 RENAL FUNCTION PANEL: CPT

## 2023-08-04 PROCEDURE — 83735 ASSAY OF MAGNESIUM: CPT

## 2023-08-04 PROCEDURE — 82570 ASSAY OF URINE CREATININE: CPT

## 2023-08-04 PROCEDURE — 84156 ASSAY OF PROTEIN URINE: CPT

## 2023-08-04 PROCEDURE — 97162 PT EVAL MOD COMPLEX 30 MIN: CPT | Performed by: PHYSICAL THERAPIST

## 2023-08-04 PROCEDURE — 36415 COLL VENOUS BLD VENIPUNCTURE: CPT

## 2023-08-04 PROCEDURE — 83970 ASSAY OF PARATHORMONE: CPT

## 2023-08-04 NOTE — PROGRESS NOTES
PT Evaluation     Today's date: 2023  Patient name: Robert Ravi  : 1935  MRN: [id number]  Referring provider: SUSHIL Choi  Dx:   Encounter Diagnosis     ICD-10-CM    1. Imbalance  R26.89 Ambulatory Referral to Physical Therapy                     Assessment  Assessment details: The patient presents to PT following several falls over the past 4 years. He presents with BLE weakness, impaired gait, and severe proprioceptive deficits. The patient has difficulty with walking, making turns, and balancing and this is increasing his chance of falling again. As we begin PT we will work on specific muscle strengthening to help provide him more stability. As this improves we will then work on proprioceptive training to help improve his balance on multiple surfaces and when he is turning. The patient would benefit from PT to help restore his balance and prevent future falls. Impairments: abnormal coordination, abnormal gait, abnormal movement, activity intolerance, impaired balance, impaired physical strength, safety issue and poor body mechanics  Understanding of Dx/Px/POC: excellent  Goals  STG : (2 weeks) - Patient demonstrates independence with HEP to be able to transition to HEP in the long term. (4 weeks) - Decrease lateral sway during gait to better improve dynamic balance. .      LTG: (6 weeks) - Improve BLE strength to 4/5 to help improve stability during balance activities. (6weeks) - Improve TUG test to less than 15 sec. (8 weeks) - Improve patient's balance times by 10 sec or greater to help prevent future falls. (8 weeks) - Improve 5x sit to stand to less than 15sec.       Plan  Patient would benefit from: skilled physical therapy  Planned modality interventions: TENS, thermotherapy: hydrocollator packs and cryotherapy  Planned therapy interventions: therapeutic activities, therapeutic exercise, gait training, neuromuscular re-education and manual therapy  Frequency: 2x week  Duration in weeks: 8        Subjective Evaluation    History of Present Illness  Date of onset: 8/4/2019  Mechanism of injury: The patient reports that he has been having balance issues for about 4 years. He started having falls every 6 months or so. This continued to get worse and now he is falling once a week in the house. He is using a cane outside of the house and he is not using an AD inside the house. He reports that he has a walker in the car but he does not use it much. He has issues when he makes turns and his legs lock up and he can feel himself falling over and he can't step and catch himself. He also likes to put the chairs up against the walls to help himself stand up. When he stands up he has to stand still for a few seconds to make sure he is steady. He reports that he has railing in his house and he also has ramps to help get into the house. Patient Goals  Patient goal: The patient wants to improve his balance and prevent falls. Pain  Aggravating factors: stair climbing, walking and standing          Objective     Strength/Myotome Testing     Left Hip   Planes of Motion   Flexion: 3+  Abduction: 3  External rotation: 3    Right Hip   Planes of Motion   Flexion: 3+  Abduction: 3  External rotation: 3+    Left Knee   Flexion: 4-  Extension: 4    Right Knee   Flexion: 4  Extension: 4    Ambulation     Ambulation: Level Surfaces     Additional Level Surfaces Ambulation Details  Wide base of support, lateral sway, decreased stride length and gait speed.     Functional Assessment        Comments  Narrow base of support eye open - Fair  Narrow base of support eyes closed - 10 sec  Tandem R front -  7 sec  Tandem L front - 22 sec  SLS L -   SLS R -     TUG - 20 sec W/ BUE support to stand up    5x sit to stand - 23 sec W/ BUE support to stand up               Precautions:     Diagnosis:    Precautions:    Primary Goals:    *asterisks by exercise = given for HEP   Manuals There Ex                                                                                Neuro Re-Ed                                                                                                         Re-evaluation              Ther Act                                         Modalities

## 2023-08-05 LAB
CREAT UR-MCNC: 57 MG/DL
PROT UR-MCNC: <6 MG/DL

## 2023-08-09 ENCOUNTER — OFFICE VISIT (OUTPATIENT)
Dept: PHYSICAL THERAPY | Facility: CLINIC | Age: 88
End: 2023-08-09
Payer: MEDICARE

## 2023-08-09 ENCOUNTER — OFFICE VISIT (OUTPATIENT)
Dept: NEPHROLOGY | Facility: CLINIC | Age: 88
End: 2023-08-09

## 2023-08-09 VITALS
BODY MASS INDEX: 27.74 KG/M2 | SYSTOLIC BLOOD PRESSURE: 106 MMHG | HEIGHT: 72 IN | HEART RATE: 69 BPM | DIASTOLIC BLOOD PRESSURE: 60 MMHG | WEIGHT: 204.8 LBS

## 2023-08-09 DIAGNOSIS — R26.89 IMBALANCE: Primary | ICD-10-CM

## 2023-08-09 DIAGNOSIS — N25.81 SECONDARY HYPERPARATHYROIDISM (HCC): ICD-10-CM

## 2023-08-09 DIAGNOSIS — E55.9 VITAMIN D INSUFFICIENCY: ICD-10-CM

## 2023-08-09 DIAGNOSIS — Z86.39 HISTORY OF HYPERKALEMIA: ICD-10-CM

## 2023-08-09 DIAGNOSIS — N18.32 STAGE 3B CHRONIC KIDNEY DISEASE (HCC): Primary | ICD-10-CM

## 2023-08-09 DIAGNOSIS — N28.1 BILATERAL RENAL CYSTS: ICD-10-CM

## 2023-08-09 DIAGNOSIS — E11.22 TYPE 2 DIABETES MELLITUS WITH STAGE 3B CHRONIC KIDNEY DISEASE, WITHOUT LONG-TERM CURRENT USE OF INSULIN (HCC): ICD-10-CM

## 2023-08-09 DIAGNOSIS — N18.32 TYPE 2 DIABETES MELLITUS WITH STAGE 3B CHRONIC KIDNEY DISEASE, WITHOUT LONG-TERM CURRENT USE OF INSULIN (HCC): ICD-10-CM

## 2023-08-09 PROCEDURE — 97112 NEUROMUSCULAR REEDUCATION: CPT | Performed by: PHYSICAL THERAPIST

## 2023-08-09 PROCEDURE — 97110 THERAPEUTIC EXERCISES: CPT | Performed by: PHYSICAL THERAPIST

## 2023-08-09 NOTE — PATIENT INSTRUCTIONS
Your kidney numbers are slightly higher than your baseline but it is expected after addition of torsemide. Your blood pressure is currently running low and you can take torsemide 5 mg every other day instead of taking daily. After making the change, check your blood work in 1 month. If your blood work remains stable, we will repeat blood work and urine test in 6 months with follow-up in 6 months.

## 2023-08-09 NOTE — PROGRESS NOTES
NEPHROLOGY OFFICE VISIT   Edison Panchal 82 y.o. male MRN: [id number]  8/9/2023    Reason for Visit: Chronic kidney disease    ASSESSMENT and PLAN:  It was a pleasure evaluating your patient in the office today. Thank you for allowing our team to participate in the care of Mr. Edison Panchal. Please do not hesitate to contact our team if further issues/questions shall arise in the interim.      # Chronic Kidney Disease Stage III-B  - Etiology/risk factors: Diabetes, hypertension, age-related nephron loss  - Baseline Cr: 1.2-1.55 since 2018, most recently 1.69 08/2023  - Urinalysis: Historically trace proteinuria but otherwise bland, check urinalysis with microscopy  - Proteinuria: No, UACR less than 16 mg/g 05/2023, 10 mg/g 02/2023  - Imaging:  Right kidney 12.1 cm, left kidney 12.8 cm, normal echogenicity and contour 05/2023  - Slight increase in creatinine from baseline most likely in setting of addition of loop diuretic on last visit  - We will decrease torsemide to every other day instead of daily in setting of borderline blood pressure  - Patient to repeat BMP in 1 month and if kidney function remains stable to repeat blood work in 6 months  - Discussed natural history of progression of chronic kidney disease and need for renal replacement therapy in future  - Patient does not want to do renal replacement therapy if needed in future and would opt for conservative kidney management  - Discussed risk factor reduction to slow progression of chronic kidney disease  • Intensive blood pressure control as below  • Glycemic control as below  • Lipid control on Zocor 40 mg daily  • Avoidance of NSAIDs     # Bilateral renal cysts  - 1.3 cm right renal cyst  - 2.6 cm hypoechoic left renal cyst  - Bilobed cyst in the lower pole of left kidney containing a thin internal septation  - Repeat kidney ultrasound in December to follow-up on the cyst with internal septation    # BP/Volume   - Goal BP <120/80 per KDIGO guidelines   - Volume status: Euvolemic  - Status: Blood pressure currently borderline low  - Current antihypertensive regimen: Metoprolol 12.5 mg twice daily decreased on last cardiology visit to accommodate torsemide 5 mg daily  - Changes: Decrease torsemide to 5 mg every other day due to borderline blood pressure     # Screening for Anemia   - Target Hb: More than 10 g/dL  - Most recent hemoglobin: 12.1 g/dL    # Electrolytes/Acid Base status   >> History of hyperkalemia  - Serum potassium 5.6-5.7 since 10/2022, most recently 5.1 08/2023  - Most likely in setting of decreased potassium excretion from chronic kidney disease and increased dietary potassium intake  - Diabetics can also develop type IV renal tubular acidosis but he does not have metabolic acidosis   - No obstructive uropathy  - Low potassium diet   - Potassium now normalized with addition of low-dose loop diuretic for kaliuresis  - Torsemide 5 mg daily is being decreased to 5 mg every other day, repeat BMP in 1 month to follow-up on serum potassium    # CKD Mineral and Bone Disorder   - Goal Ca 8.5-10 mg/dL, goal Phos 2.7-4.6 mg/dL, goal iPTH 30-70 pg/mL  - PTH 95.3 05/2023 improved to 80.7 08/2023 after supplementation of cholecalciferol most likely secondary hyperparathyroidism in setting of vitamin D deficiency/insufficiency  - 25-hydroxy vitamin D level 23.9 in 05/2023 status post initiation of cholecalciferol 1000 units daily  - Given improvement in PTH, continue cholecalciferol 1000 units daily    # Diabetes mellitus  - Most recent HbA1c 6.4 02/2023 overall improved from 7.0-7.5 in [id number]  - Currently on metformin 500 mg twice daily, dose appropriate for current level of GFR  - Discussed importance of good glycemic control in preventing progression of chronic kidney disease    # History of atrial fibrillation  - Currently rate controlled with metoprolol  - Since last visit he has started anticoagulation with Eliquis 2.5 mg twice daily     # History of coronary artery disease  - Status post remote MI in 1991 treated with thrombolytics and rotational atherectomy of left anterior descending artery  - Follows with cardiology and remains on simvastatin and aspirin    HPI:  Since last visit: Patient has been doing well. He has started low-dose Eliquis for atrial fibrillation. He denies dyspnea. He denies leg swelling. He denies trouble with urination. Nohemy Hightower is a 82 y.o. male who has history of hypertension for last 30 years. He is currently on metoprolol. He also has history of diabetes for the last 30 years and is currently on metformin dose of which was recently decreased by PCP. He has history of atrial fibrillation and is currently on aspirin alone. For rate control he takes metoprolol 25 mg twice daily. He also has issues with his sacroiliac joint and is scheduled for steroid injection soon. He was recently noticed to have elevated potassium and is doing dietary modifications to help serum potassium. He has history of coronary artery disease and had myocardial infarction in 1990 status post thrombectomy.       >> Major risk factors for CKD  - Diabetes: Yes for 30 years  - Hypertension: Yes for 27 years   - Age ? 56 years: Yes   - Family history of kidney disease: No    - Obesity or metabolic syndrome: Yes      >> Medical history evaluation   - Prior kidney disease or dialysis: No   - Incidental hematuria in the past: No   - Urinary symptoms: Stream is good, nocturia 2-3 times   - History of foamy or frothy urine: No  - History of nephrolithiasis: Yes in 1908's but passed it spontaneously   - Diseases that share risk factors with CKD: DM, HTN, CAD  - Systemic diseases that might affect kidney: No   - History of use of medications that might affect renal function: PPI for several years     PATIENT INSTRUCTIONS:    Patient Instructions   Your kidney numbers are slightly higher than your baseline but it is expected after addition of torsemide. Your blood pressure is currently running low and you can take torsemide 5 mg every other day instead of taking daily. After making the change, check your blood work in 1 month. If your blood work remains stable, we will repeat blood work and urine test in 6 months with follow-up in 6 months. OBJECTIVE:  Current Weight: Weight - Scale: 92.9 kg (204 lb 12.8 oz)  Vitals:    08/09/23 1502   BP: 106/60   BP Location: Left arm   Patient Position: Sitting   Cuff Size: Standard   Pulse: 69   Weight: 92.9 kg (204 lb 12.8 oz)   Height: 6' (1.829 m)    Body mass index is 27.78 kg/m². REVIEW OF SYSTEMS:    Review of Systems   Constitutional: Negative for chills and fever. HENT: Negative for ear pain and sore throat. Eyes: Negative for pain and visual disturbance. Respiratory: Negative for cough and shortness of breath. Cardiovascular: Negative for chest pain and palpitations. Gastrointestinal: Negative for abdominal pain and vomiting. Genitourinary: Negative for dysuria and hematuria. Musculoskeletal: Negative for arthralgias and back pain. Skin: Negative for color change and rash. Neurological: Negative for seizures and syncope. All other systems reviewed and are negative.       Past Medical History:   Diagnosis Date   • Acute MI ([address])     1991   • Ambulates with cane    • Anxiety    • Arthritis     hands   • At risk for falls    • Atrial fibrillation (HCC)    • Cholecystitis    • CKD (chronic kidney disease)    • Coronary artery disease    • Diabetes mellitus (HCC)    • GERD (gastroesophageal reflux disease)    • Heart murmur    • Hyperlipidemia    • Hypertension    • Low back pain    • Lumbar disc disease    • Umbilical hernia    • Wears dentures     full set   • Wears glasses        Past Surgical History:   Procedure Laterality Date   • CARDIAC CATHETERIZATION      s/p MI   • COLONOSCOPY     • HERNIA REPAIR Left     X5   • NC LAPAROSCOPY SURG CHOLECYSTECTOMY N/A 1/7/2022    Procedure: ROBOTIC ASSISTED LAPAROSCOPIC CHOLECYSTECTOMY;  Surgeon: Joellen Morales MD;  Location: [facility];  Service: General   • TOTAL HIP ARTHROPLASTY Left    • UMBILICAL HERNIA REPAIR LAPAROSCOPIC N/A 1/7/2022    Procedure: Open umbilical hernia repair;  Surgeon: Joellen Morales MD;  Location: [facility];  Service: General       Family History   Problem Relation Age of Onset   • No Known Problems Mother    • No Known Problems Father         Social History     Substance and Sexual Activity   Alcohol Use Yes    Comment: rare     Social History     Substance and Sexual Activity   Drug Use No     Social History     Tobacco Use   Smoking Status Never   Smokeless Tobacco Never       PHYSICAL EXAM:      Physical Exam  Constitutional:       Appearance: Normal appearance. HENT:      Head: Normocephalic and atraumatic. Mouth/Throat:      Mouth: Mucous membranes are moist.      Pharynx: Oropharynx is clear. Cardiovascular:      Rate and Rhythm: Normal rate and regular rhythm. Pulses: Normal pulses. Heart sounds: Normal heart sounds. Pulmonary:      Effort: Pulmonary effort is normal.      Breath sounds: Normal breath sounds. Abdominal:      General: Bowel sounds are normal.      Palpations: Abdomen is soft. Musculoskeletal:         General: Normal range of motion. Right lower leg: No edema. Left lower leg: No edema. Skin:     General: Skin is warm and dry. Neurological:      General: No focal deficit present. Mental Status: He is alert and oriented to person, place, and time. Mental status is at baseline. Psychiatric:         Mood and Affect: Mood normal.         Behavior: Behavior normal.         Thought Content:  Thought content normal.         Judgment: Judgment normal.       Medications:    Current Outpatient Medications:   •  acetaminophen (TYLENOL) 325 mg tablet, Take 2 tablets (650 mg total) by mouth every 6 (six) hours (Patient taking differently: Take 650 mg by mouth every 6 (six) hours as needed), Disp: 30 tablet, Rfl: 0  •  apixaban (Eliquis) 2.5 mg, Take 1 tablet (2.5 mg total) by mouth 2 (two) times a day, Disp: 180 tablet, Rfl: 1  •  cholecalciferol (VITAMIN D3) 1,000 units tablet, Take 1 tablet (1,000 Units total) by mouth daily, Disp: 90 tablet, Rfl: 3  •  cyanocobalamin (VITAMIN B-12) 500 mcg tablet, Take 1,000 mcg by mouth daily, Disp: , Rfl:   •  docusate sodium (COLACE) 100 mg capsule, Take 1 capsule (100 mg total) by mouth 2 (two) times a day (Patient taking differently: Take 100 mg by mouth every evening), Disp: 10 capsule, Rfl: 0  •  escitalopram (LEXAPRO) 5 mg tablet, TAKE 1 TABLET BY MOUTH EVERY DAY, Disp: 90 tablet, Rfl: 1  •  glipiZIDE (GLUCOTROL XL) 5 mg 24 hr tablet, Take 1 tablet by mouth daily, Disp: , Rfl:   •  metFORMIN (GLUCOPHAGE) 500 mg tablet, Take 1 tablet (500 mg total) by mouth 2 (two) times a day with meals TAKE 1 TABLETS EVERY MORNING TAKE 1 TABLETS EVERY EVENING, Disp: 180 tablet, Rfl: 1  •  metoprolol tartrate (LOPRESSOR) 25 mg tablet, TAKE 1 TABLET (25 MG TOTAL) BY MOUTH EVERY 12 (TWELVE) HOURS (Patient taking differently: Take 12.5 mg by mouth every 12 (twelve) hours), Disp: 180 tablet, Rfl: 1  •  omeprazole (PriLOSEC) 40 MG capsule, TAKE 1 CAPSULE BY MOUTH EVERY DAY BEFORE BREAKFAST, Disp: 90 capsule, Rfl: 0  •  simvastatin (ZOCOR) 40 mg tablet, TAKE 1 TABLET BY MOUTH EVERY DAY, Disp: 90 tablet, Rfl: 1  •  torsemide (DEMADEX) 5 MG tablet, Take 1 tablet (5 mg total) by mouth daily, Disp: 90 tablet, Rfl: 3    Laboratory Results:  Results from last 7 days   Lab Units 08/04/23  1111   POTASSIUM mmol/L 5.1   CHLORIDE mmol/L 106   CO2 mmol/L 26   BUN mg/dL 23   CREATININE mg/dL 1.69*   CALCIUM mg/dL 9.2   MAGNESIUM mg/dL 2.1   PHOSPHORUS mg/dL 2.7       Results for orders placed or performed in visit on 08/04/23   Renal function panel   Result Value Ref Range    Albumin 3.5 3.5 - 5.0 g/dL    Calcium 9.2 8.3 - 10.1 mg/dL    Phosphorus 2.7 2.3 - 4.1 mg/dL Glucose 128 65 - 140 mg/dL    BUN 23 5 - 25 mg/dL    Creatinine 1.69 (H) 0.60 - 1.30 mg/dL    Sodium 138 135 - 147 mmol/L    Potassium 5.1 3.5 - 5.3 mmol/L    Chloride 106 96 - 108 mmol/L    CO2 26 21 - 32 mmol/L    ANION GAP 6 mmol/L    eGFR 36 ml/min/1.73sq m   PTH, intact   Result Value Ref Range    PTH 80.7 12.0 - 88.0 pg/mL   Protein / creatinine ratio, urine   Result Value Ref Range    Creatinine, Ur 57.0 mg/dL    Protein Urine Random <6 mg/dL    Prot/Creat Ratio, Ur     Magnesium   Result Value Ref Range    Magnesium 2.1 1.6 - 2.6 mg/dL

## 2023-08-09 NOTE — PROGRESS NOTES
Daily Note     Today's date: 2023  Patient name: Carol Keller  : 1935  MRN: [id number]  Referring provider: SUSHIL Sellers  Dx:   Encounter Diagnosis     ICD-10-CM    1. Imbalance  R26.89                      Subjective: The patient reports that he has been using the walker outside the house and the cane at home. Objective: See treatment diary below      Assessment: Tolerated treatment well. Patient demonstrated fatigue post treatment and would benefit from continued PT    The patient did very well for his first day of PT. He required minimal breaks and demonstrated good form with his strengthening exercises. His balance went fairly well besides the grid reach exercise. He had a lot of trouble with stepping backwards because his left hip is tight. We worked on stretching this and he tolerated it well. Plan: Continue per plan of care.       Precautions:     Diagnosis:    Precautions:    Primary Goals:    *asterisks by exercise = given for HEP   Manuals                                                There Ex        Calf raises x20       Toe raises x20       Bridges x20       SLR x20ea       S/L clams GTB x20ea       Standing hip flexor st 3x30"ea                               Neuro Re-Ed        [place] ec 3x30"       Tandem balance 3x30"       Grid reach x5ea                                                                                Re-evaluation              Ther Act              Lat walkouts @ railing  GTB x3 laps           Sit to stand                        Squat machine  85# x20           Modalities

## 2023-08-11 ENCOUNTER — OFFICE VISIT (OUTPATIENT)
Dept: PHYSICAL THERAPY | Facility: CLINIC | Age: 88
End: 2023-08-11
Payer: MEDICARE

## 2023-08-11 DIAGNOSIS — R26.89 IMBALANCE: Primary | ICD-10-CM

## 2023-08-11 PROCEDURE — 97110 THERAPEUTIC EXERCISES: CPT | Performed by: PHYSICAL THERAPIST

## 2023-08-11 PROCEDURE — 97112 NEUROMUSCULAR REEDUCATION: CPT | Performed by: PHYSICAL THERAPIST

## 2023-08-11 NOTE — PROGRESS NOTES
Daily Note     Today's date: 2023  Patient name: Pita Dumont  : 1935  MRN: [id number]  Referring provider: SUSHIL Bentley  Dx:   Encounter Diagnosis     ICD-10-CM    1. Imbalance  R26.89                      Subjective: The patient reports that he felt fine after last session. Objective: See treatment diary below      Assessment: Tolerated treatment well. Patient demonstrated fatigue post treatment and would benefit from continued PT    The patient continues to do great in PT. His grid reach did much better and he felt much more stable. Standing hip ABD and and hip ext was added in today and the patient demonstrated good hip activation. Plan: Continue per plan of care.       Precautions:     Diagnosis:    Precautions:    Primary Goals:    *asterisks by exercise = given for HEP   Manuals                                               There Ex        Calf raises x20 x20      Toe raises x20 x20      Bridges x20 x20      SLR x20ea X20ea      S/L clams GTB x20ea GTB x20ea      Standing hip flexor st 3x30"ea 3x30"      Standing hip ABD  GTB x20ea      Standing hip ext  GTB x20ea              Neuro Re-Ed        [place] ec 3x30" 3x30"      Tandem balance 2x30"ea 2x30"ea      Grid reach x5ea x5ea                                                                               Re-evaluation              Ther Act              Lat walkouts @ railing  GTB x3 laps           Sit to stand                        Squat machine  85# x20           Modalities

## 2023-08-14 ENCOUNTER — RA CDI HCC (OUTPATIENT)
Dept: OTHER | Facility: HOSPITAL | Age: 88
End: 2023-08-14

## 2023-08-14 NOTE — PROGRESS NOTES
[address] [facility] coding opportunities          Chart Reviewed number of suggestions sent to Provider: 1     Patients Insurance     Medicare Insurance: Medicare        E11.36

## 2023-08-15 ENCOUNTER — OFFICE VISIT (OUTPATIENT)
Dept: AUDIOLOGY | Facility: CLINIC | Age: 88
End: 2023-08-15
Payer: COMMERCIAL

## 2023-08-15 DIAGNOSIS — H90.3 SENSORY HEARING LOSS, BILATERAL: Primary | ICD-10-CM

## 2023-08-15 PROCEDURE — V5160 DISPENSING FEE BINAURAL: HCPCS | Performed by: AUDIOLOGIST

## 2023-08-15 PROCEDURE — V5261 HEARING AID, DIGIT, BIN, BTE: HCPCS | Performed by: AUDIOLOGIST

## 2023-08-15 NOTE — PROGRESS NOTES
[place] Hearing Aid Fitting    Sergei Sosa was seen today (8/15/2023) for a binaural hearing aid fitting of his [address]   in the [place] (SAAD) hearing aid(s). Mr. Jessica Rivas was unaccompanied to today's visit    Device Information    Hearing Aid Fitting Date: 8/15/23     Left Device Right Device   Hearing Aid Make: Guillermina Handpper   Hearing Aid Model: REAL 3 REAL 3   Serial Number: [id number]   Repair Warranty Expiration Date: 8/24/26 8/24/26   Loss/Damage Warranty Expiration Date:  8/24/26 8/24/26    Length: 3 3    Output: 85 85   Wax System: Pro Wax  Pro Wax   Dome Size/Style: 10 mm 10 mm   Battery: Lithium-ion Rechargeable Lithium-ion Rechargeable   [place] Serial Number: N/A N/A   Gwendlyn Drilling Date:  N/A N/A      Serial Number: [id number]  Accessories: N/A       Initial Hearing Aid Settings    Hearing aid(s) were programmed using [employer] software's NAL fitting formula and were validated by Mr. Jessica Rivas for perceived comfort levels. • [place] program set with neuro adaptive directional microphone input  • Manual control button was deactivated  • Hearing aids set to experience level 2 per Mr. Lomeli's subjective listening preference    Device Orientation    Mr. Jessica Rivas was counseled on device components and component function. Proper insertion and removal of the aid(s) were demonstrated. The importance of realistic expectations with expectation, especially in the presence of background noise, was emphasized. Hearing aids are assistive devices and are not designed to restore normal hearing sensitivity. The need for daily consistent usage (8-12 hours per day) for proper device acclimatization, as well as the importance of self-advocacy and practicing effective communication strategies was outlined. Effective communication strategies include:  1.) Maintaining face-to-face communication, allowing for speechreading of facial expressions, lips, and gestures.   2.) Reducing background noise and distance between communication partners. 3.) Having communication partners reduce their rate of speech when appropriate. 4.) Beginning conversation by getting communication partner's attention and stating the topic, allowing for context clues to help fill in gaps in comprehension. 5.) Asking for rephrasing of missed aspects of conversation rather than asking for repetitions. Mr. Yamilet Jackson was given the information booklet (or QR code pamphlet to the booklet) regarding hearing aid usage and operation, hearing aid cleaning tools, and hearing aid carrying case. Hearing aid repair and loss and damage warranties offered through the  were outlined thoroughly. Mr. Yamilet Jackson agreed to the terms of sale listed on the purchase agreement containing device specifications, warranties, pricing information, as well as [facility] 45-day trial period timeline. After this period has elapsed, hearing aids cannot be returned. Recommendations & Follow-up    Mr. Yamilet Jackson demonstrated understanding of the topics discussed. It appears that he has realistic expectations regarding the benefits and limitations with the use of amplification. The prognosis for successful hearing aid use is good. While improvement with amplification is expected, there will always be word understanding problems due to the inherent nature of hearing loss. These problems will be greater in background noise and adverse listening environments than in quiet situations. Mr. Yamilet Jackson is to follow-up in 2-3 weeks for a hearing aid check within the trial period as scheduled. At this time of Mr. Frederick Perone next visit, the following topics should be reviewed: wax guard removal/replacement, increasing adaptation/gain settings after initial acclimatization, activation of push button/toggle switches for manual volume control, as well as noise reductive features in [employer].      The above recommendations were discussed with Mr. Hector Miner. It was a pleasure working with Mr. Hector Miner today. He was encouraged to contact the clinic with any further questions/concerns in the meantime.     Dona Bush., CCC-A  Clinical Audiologist    [address] [address]

## 2023-08-16 ENCOUNTER — OFFICE VISIT (OUTPATIENT)
Dept: PHYSICAL THERAPY | Facility: CLINIC | Age: 88
End: 2023-08-16
Payer: MEDICARE

## 2023-08-16 DIAGNOSIS — R26.89 IMBALANCE: Primary | ICD-10-CM

## 2023-08-16 PROCEDURE — 97110 THERAPEUTIC EXERCISES: CPT | Performed by: PHYSICAL THERAPIST

## 2023-08-16 PROCEDURE — 97112 NEUROMUSCULAR REEDUCATION: CPT | Performed by: PHYSICAL THERAPIST

## 2023-08-16 PROCEDURE — 97530 THERAPEUTIC ACTIVITIES: CPT | Performed by: PHYSICAL THERAPIST

## 2023-08-16 NOTE — PROGRESS NOTES
Daily Note     Today's date: 2023  Patient name: Lenin Ford  : 1935  MRN: [id number]  Referring provider: SUSHIL Zhu  Dx:   Encounter Diagnosis     ICD-10-CM    1. Imbalance  R26.89                      Subjective: The patient reports that he is feeling alright today. Objective: See treatment diary below      Assessment: Tolerated treatment well. Patient demonstrated fatigue post treatment and would benefit from continued PT    The patient did very well during today's session. He continues to do better with his exercises and requires less breaks in between. Tandem walking was added in today and I was very surprised with how well he did. Plan: Continue per plan of care.       Precautions:     Diagnosis:    Precautions:    Primary Goals:    *asterisks by exercise = given for HEP   Manuals                                              There Ex        Calf raises x20 x20 x20     Toe raises x20 x20 x20     Bridges x20 x20 x20     SLR x20ea X20ea x20ea     S/L clams GTB x20ea GTB x20ea GTB x20ea     Standing hip flexor st 3x30"ea 3x30" 3x30"     Standing hip ABD  GTB x20ea GTB x20ea     Standing hip ext  GTB x20ea GTB x20ea             Neuro Re-Ed        [place] ec 3x30" 3x30" 3x30"     Tandem balance 2x30"ea 2x30"ea 2x30"ea     Grid reach x5ea x5ea x5ea     Tandem walking   W/ cane 40' 1 lap  No cane 40' 2 laps                                                                      Re-evaluation              Ther Act              Lat walkouts @ railing  GTB x3 laps   GTB x3 laps        Sit to stand        Step ups   4" x10ea             Squat machine  85# x20    85# x20       Modalities

## 2023-08-18 ENCOUNTER — OFFICE VISIT (OUTPATIENT)
Dept: PHYSICAL THERAPY | Facility: CLINIC | Age: 88
End: 2023-08-18
Payer: MEDICARE

## 2023-08-18 DIAGNOSIS — R26.89 IMBALANCE: Primary | ICD-10-CM

## 2023-08-18 PROCEDURE — 97110 THERAPEUTIC EXERCISES: CPT | Performed by: PHYSICAL THERAPIST

## 2023-08-18 PROCEDURE — 97530 THERAPEUTIC ACTIVITIES: CPT | Performed by: PHYSICAL THERAPIST

## 2023-08-18 PROCEDURE — 97112 NEUROMUSCULAR REEDUCATION: CPT | Performed by: PHYSICAL THERAPIST

## 2023-08-18 NOTE — PROGRESS NOTES
Daily Note     Today's date: 2023  Patient name: Aracely Garibay  : 1935  MRN: [id number]  Referring provider: SUSHIL Cesar  Dx:   Encounter Diagnosis     ICD-10-CM    1. Imbalance  R26.89                      Subjective: The patient reports that he is dong well today. Objective: See treatment diary below      Assessment: Tolerated treatment well. Patient demonstrated fatigue post treatment and would benefit from continued PT    Hurdles and cone taps were added in today. During cone taps it was evident that the patient has more issues balancing on his left leg so we will work on this as we move forward. Overall his balance is improving. Plan: Continue per plan of care.       Precautions:     Diagnosis:    Precautions:    Primary Goals:    *asterisks by exercise = given for HEP   Manuals                                             There Ex        Calf raises x20 x20 x20 x20    Toe raises x20 x20 x20 x20    Bridges x20 x20 x20 x20    SLR x20ea X20ea x20ea x20ea    S/L clams GTB x20ea GTB x20ea GTB x20ea GTB x20ea    Standing hip flexor st 3x30"ea 3x30" 3x30"     Standing hip ABD  GTB x20ea GTB x20ea     Standing hip ext  GTB x20ea GTB x20ea             Neuro Re-Ed        [place] ec 3x30" 3x30" 3x30" 3x30"    Tandem balance 2x30"ea 2x30"ea 2x30"ea 2x30"ea    Grid reach x5ea x5ea x5ea x5ea    Tandem walking   W/ cane 40' 1 lap  No cane 40' 2 laps No cane 40' 2 laps    Cone taps    x15ea                                                             Re-evaluation              Ther Act              Lat walkouts @ railing  GTB x3 laps   GTB x3 laps        Sit to stand    1 foam 2x10    Step ups   4" x10ea 4" x10ea    Hurdles    3 laps    Squat machine  85# x20    85# x20  85# x20     Modalities

## 2023-08-21 ENCOUNTER — OFFICE VISIT (OUTPATIENT)
Dept: FAMILY MEDICINE CLINIC | Facility: CLINIC | Age: 88
End: 2023-08-21
Payer: MEDICARE

## 2023-08-21 VITALS
OXYGEN SATURATION: 98 % | BODY MASS INDEX: 27.77 KG/M2 | DIASTOLIC BLOOD PRESSURE: 62 MMHG | WEIGHT: 205 LBS | SYSTOLIC BLOOD PRESSURE: 106 MMHG | HEIGHT: 72 IN | HEART RATE: 63 BPM | RESPIRATION RATE: 16 BRPM

## 2023-08-21 DIAGNOSIS — I95.2 HYPOTENSION DUE TO DRUGS: ICD-10-CM

## 2023-08-21 DIAGNOSIS — R26.2 AMBULATORY DYSFUNCTION: ICD-10-CM

## 2023-08-21 DIAGNOSIS — I48.0 PAROXYSMAL ATRIAL FIBRILLATION (HCC): Primary | ICD-10-CM

## 2023-08-21 DIAGNOSIS — F41.9 ANXIETY: ICD-10-CM

## 2023-08-21 DIAGNOSIS — I25.10 CORONARY ARTERY DISEASE INVOLVING NATIVE CORONARY ARTERY OF NATIVE HEART WITHOUT ANGINA PECTORIS: ICD-10-CM

## 2023-08-21 DIAGNOSIS — E11.69 TYPE 2 DIABETES MELLITUS WITH OTHER SPECIFIED COMPLICATION, WITHOUT LONG-TERM CURRENT USE OF INSULIN (HCC): ICD-10-CM

## 2023-08-21 PROBLEM — N18.9 CKD (CHRONIC KIDNEY DISEASE): Status: RESOLVED | Noted: 2021-11-18 | Resolved: 2023-08-21

## 2023-08-21 PROCEDURE — 99214 OFFICE O/P EST MOD 30 MIN: CPT | Performed by: FAMILY MEDICINE

## 2023-08-21 NOTE — PROGRESS NOTES
Assessment/Plan:  1. Paroxysmal atrial fibrillation (HCC)    2. Type 2 diabetes mellitus with other specified complication, without long-term current use of insulin (HCC)  -     metFORMIN (GLUCOPHAGE) 500 mg tablet; Take 1 tablet (500 mg total) by mouth daily with breakfast    3. Hypotension due to drugs    4. Ambulatory dysfunction    5. Anxiety    6. Coronary artery disease involving native coronary artery of native heart without angina pectoris    not quite at goal   But no change in plan         1. Low blood pressure:  I will send a message to Dr. Regino Jones to get his opinion on switching him to half a tablet of metoprolol succinate 12.5 mg once daily to see if maybe that is still able to control his rhythm and not be hard on his blood pressure. He will continue to take the medication as prescribed until indicated with further changes. 2. Diabetes:  He will be taking metformin once a day with breakfast.     The patient will follow up as scheduled or sooner if needed. Subjective:      Patient ID: Lesli Vogel is a 82 y.o. male. The patient presents in the clinic for follow-up of low blood pressure and diabetes. The patient had been put in hearing aids 2 weeks ago. The patient's blood pressure was 106/60 mmHg 2 weeks ago. His blood pressure today was 106/62 mmHg. The patient is taking metoprolol tartrate to 12.5 mg twice a day. The nephrology recently cut his torsemide to 5 mg every other day. He reports occasional orthostatic hypotension when transitioning from sit-to-stand. His minimum heart rate is 49 bpm and max of 160 bpm.    The patient is established with podiatry. He is due for follow up in 09/2023. His blood glucose was noted to be 6.4 percent in his previous blood work. He monitors his blood glucose at home, fasting, twice or thrice a week and his blood glucose ranges between 115 mg/dL and 130 mg/dL. He has been taking metformin twice a day, in the morning and at night.   He eats breakfast every day. Medications  metoprolol  torsemide  metformin    The following portions of the patient's history were reviewed and updated as appropriate: allergies, current medications, past family history, past medical history, past social history, past surgical history and problem list.    Review of Systems   Constitutional: Negative for activity change, appetite change and fever. HENT: Negative for congestion, nosebleeds and trouble swallowing. Positive for hearing aids. Eyes: Negative for itching. Respiratory: Negative for cough and chest tightness. Cardiovascular: Negative for chest pain and palpitations. Gastrointestinal: Negative for abdominal pain, constipation, diarrhea and nausea. Endocrine: Negative for cold intolerance. Genitourinary: Negative for frequency. Musculoskeletal: Negative for gait problem and joint swelling. Skin: Negative for rash. Allergic/Immunologic: Negative for immunocompromised state. Neurological: Positive for postural lightheadedness. Negative for dizziness, tremors, seizures, syncope and headaches. Psychiatric/Behavioral: Negative for hallucinations and suicidal ideas        Objective:     /62 (BP Location: Left arm, Patient Position: Sitting, Cuff Size: Large)   Pulse 63   Resp 16   Ht 6' (1.829 m)   Wt 93 kg (205 lb)   SpO2 98%   BMI 27.80 kg/m²    Physical Exam  Cardiovascular:      Pulses: no weak pulses          Dorsalis pedis pulses are 2+ on the right side and 2+ on the left side. Feet:      Right foot:      Skin integrity: Callus and dry skin present. No ulcer, skin breakdown, erythema or warmth. Left foot:      Skin integrity: Callus and dry skin present. No ulcer, skin breakdown, erythema or warmth. The patient presents to the clinic with the use of a walker. Vitals and nursing note reviewed. Constitutional:     General: Not in acute distress. Appearance: Well-developed.   HENT:     Head: Normocephalic and atraumatic. Cardiovascular:     Rate and Rhythm: Normal rate and regular rhythm. Heart sounds: Normal heart sounds. No murmur heard. Pulmonary:     Effort: Pulmonary effort is normal.     Breath sounds: Normal breath sounds. No wheezing or rales. Abdominal:     General: Bowel sounds are normal. There is no distension. Palpations: Abdomen is soft. Tenderness: There is no abdominal tenderness. There is no guarding or rebound. Musculoskeletal:     General: No tenderness. Normal range of motion. Cervical back: Normal range of motion and neck supple. Lymphadenopathy:     Cervical: No cervical adenopathy. Skin:     General: Skin is warm and dry. No open lesions. Capillary Refill: Capillary refill takes less than 2 seconds. Findings: No rash. Neurological:     Mental Status: Alert and oriented to person, place, and time. No focal deficit. Cranial Nerves: No cranial nerve deficit. Sensory: No sensory deficit. Motor: No abnormal muscle tone. Psychiatric:     Behavior: Behavior normal.     Thought Content: Thought content normal.     Judgment: Judgment normal.      No visits with results within 2 Week(s) from this visit. Latest known visit with results is:   Appointment on 08/04/2023   Component Date Value   • Albumin 08/04/2023 3.5    • Calcium 08/04/2023 9.2    • Phosphorus 08/04/2023 2.7    • Glucose 08/04/2023 128    • BUN 08/04/2023 23    • Creatinine 08/04/2023 1.69 (H)    • Sodium 08/04/2023 138    • Potassium 08/04/2023 5.1    • Chloride 08/04/2023 106    • CO2 08/04/2023 26    • ANION GAP 08/04/2023 6    • eGFR 08/04/2023 36    • PTH 08/04/2023 80.7    • Creatinine, Ur 08/04/2023 57.0    • Protein Urine Random 08/04/2023 <6    • Prot/Creat Ratio, Ur 08/04/2023     • Magnesium 08/04/2023 2.1      I have personally reviewed results with the patient. The patient's blood pressure was 106/60 mmHg 2 weeks ago. His blood pressure today was 106/62 mmHg.     His 2 ventricular tachycardia run fastest interval lasting for 4 beats, and max rate is 139 bpm.  The longest lasting 7 beats with 121 bpm.    He was noted to have a vitamin D level of 23 ng/mL in his previous blood work. Patient's shoes and socks removed. Right Foot/Ankle   Right Foot Inspection  Skin Exam: skin normal, skin intact, dry skin, callus and callus. No warmth, no erythema, no maceration, no abnormal color, no pre-ulcer and no ulcer. Toe Exam: ROM and strength within normal limits. Sensory   Monofilament testing: intact    Vascular  Capillary refills: < 3 seconds  The right DP pulse is 2+. Left Foot/Ankle  Left Foot Inspection  Skin Exam: skin normal, skin intact, dry skin and callus. No warmth, no erythema, no maceration, normal color, no pre-ulcer and no ulcer. Toe Exam: ROM and strength within normal limits. Sensory   Monofilament testing: intact    Vascular  Capillary refills: < 3 seconds  The left DP pulse is 2+. Assign Risk Category  No deformity present  No loss of protective sensation  No weak pulses  Risk: 0        BMI Counseling: Body mass index is 27.8 kg/m². The BMI is above normal. Nutrition recommendations include decreasing portion sizes, encouraging healthy choices of fruits and vegetables, decreasing fast food intake, consuming healthier snacks and limiting drinks that contain sugar. Exercise recommendations include exercising 3-5 times per week. No pharmacotherapy was ordered. Rationale for BMI follow-up plan is due to patient being overweight or obese. Depression Screening and Follow-up Plan: Patient was screened for depression during today's encounter. They screened negative with a PHQ-2 score of 0. Falls Plan of Care: balance, strength, and gait training instructions were provided. Medications that increase falls were reviewed.          Merary Esqueda MD   [address] [place] [address]     Transcribed for Merary Esqueda MD, by Kam Szymanski/Lesley Leal on 08/21/23 at 10:15 PM. Powered by [employer].

## 2023-08-23 ENCOUNTER — OFFICE VISIT (OUTPATIENT)
Dept: PHYSICAL THERAPY | Facility: CLINIC | Age: 88
End: 2023-08-23
Payer: MEDICARE

## 2023-08-23 DIAGNOSIS — R26.89 IMBALANCE: Primary | ICD-10-CM

## 2023-08-23 PROCEDURE — 97110 THERAPEUTIC EXERCISES: CPT | Performed by: PHYSICAL THERAPIST

## 2023-08-23 PROCEDURE — 97530 THERAPEUTIC ACTIVITIES: CPT | Performed by: PHYSICAL THERAPIST

## 2023-08-23 PROCEDURE — 97112 NEUROMUSCULAR REEDUCATION: CPT | Performed by: PHYSICAL THERAPIST

## 2023-08-23 NOTE — PROGRESS NOTES
Daily Note     Today's date: 2023  Patient name: Gilda Polanco  : 1935  MRN: [id number]  Referring provider: SUSHIL Gama Ala  Dx:   Encounter Diagnosis     ICD-10-CM    1. Imbalance  R26.89                      Subjective: The patient reports that he is doing pretty good. Objective: See treatment diary below      Assessment: Tolerated treatment well. Patient demonstrated fatigue post treatment and would benefit from continued PT    The patient was progressed today on his stair step ups and he continued to display good stability. His balance also looked much better today when we did the cone taps. The patient is starting to notice improvements in his balance. Plan: Continue per plan of care.       Precautions:     Diagnosis:    Precautions:    Primary Goals:    *asterisks by exercise = given for HEP   Manuals                                            There Ex        Calf raises x20 x20 x20 x20 SL 2x10   Toe raises x20 x20 x20 x20 x20   Bridges x20 x20 x20 x20 x20   SLR x20ea X20ea x20ea x20ea 2# x20ea   S/L clams GTB x20ea GTB x20ea GTB x20ea GTB x20ea GTB x20ea   Standing hip flexor st 3x30"ea 3x30" 3x30"     Standing hip ABD  GTB x20ea GTB x20ea  GTB x20ea   Standing hip ext  GTB x20ea GTB x20ea  GTB x20ea           Neuro Re-Ed        Rhomburg ec 3x30" 3x30" 3x30" 3x30" 3x30"   Tandem balance 2x30"ea 2x30"ea 2x30"ea 2x30"ea 2x30"ea   Grid reach x5ea x5ea x5ea x5ea    Tandem walking   W/ cane 40' 1 lap  No cane 40' 2 laps No cane 40' 2 laps No cane 40' 2 laps   Cone taps    x15ea x15ea                                                            Re-evaluation              Ther Act              Lat walkouts @ railing  GTB x3 laps   GTB x3 laps     GTB x3 laps    Sit to stand    1 foam 2x10 1 foam 2x10   Step ups   4" x10ea 4" x10ea 6" x10ea   Hurdles    3 laps 3 laps   Squat machine  85# x20    85# x20  85# x20  85# x20   Modalities

## 2023-08-25 ENCOUNTER — APPOINTMENT (OUTPATIENT)
Dept: PHYSICAL THERAPY | Facility: CLINIC | Age: 88
End: 2023-08-25
Payer: MEDICARE

## 2023-08-30 ENCOUNTER — OFFICE VISIT (OUTPATIENT)
Dept: PHYSICAL THERAPY | Facility: CLINIC | Age: 88
End: 2023-08-30
Payer: MEDICARE

## 2023-08-30 ENCOUNTER — OFFICE VISIT (OUTPATIENT)
Dept: AUDIOLOGY | Facility: CLINIC | Age: 88
End: 2023-08-30

## 2023-08-30 DIAGNOSIS — R26.89 IMBALANCE: Primary | ICD-10-CM

## 2023-08-30 DIAGNOSIS — H90.3 SENSORY HEARING LOSS, BILATERAL: Primary | ICD-10-CM

## 2023-08-30 PROCEDURE — 97112 NEUROMUSCULAR REEDUCATION: CPT | Performed by: PHYSICAL THERAPIST

## 2023-08-30 PROCEDURE — 97110 THERAPEUTIC EXERCISES: CPT | Performed by: PHYSICAL THERAPIST

## 2023-08-30 NOTE — PROGRESS NOTES
[facility] AUDIOLOGY HEARING AID CHECK    Cathy Barragan was seen today (8/30/2023) for a hearing aid check of his bilateral hearing aids. Today, Mr. Bailey Even reported that his hearing aids. Otoscopy revealed Unremarkable, canals clear. Device Information     Hearing Aid Fitting Date: 8/15/23       Left Device Right Device   Hearing Aid Make: Mayo Boxer   Hearing Aid Model: REAL 3 REAL 3   Serial Number: [id number]   Repair Warranty Expiration Date: 8/24/26 8/24/26   Loss/Damage Warranty Expiration Date:  8/24/26 8/24/26    Length: 3 3    Output: 85 85   Wax System: Pro Wax  Pro Wax   Dome Size/Style: 10 mm 10 mm   Battery: Lithium-ion Rechargeable Lithium-ion Rechargeable   [place] Serial Number: N/A N/A   Zeny Villatoro Date:  N/A N/A       Serial Number: [id number]  Accessories: N/A     Visual inspection of the device(s) revealed no noticeable damage or defects. A listening check revealed good sound quality from the device(s). Real Ear Measures indicated excellent benefit from the hearing aids at soft, moderate, and loud speech sounds. Changes to Device(s)    • Cleaned both hearing aids. • Changed wax filters   • Changed domes    Recommendations & Follow-up    Hearing aids(s) are in good working condition. Mr. Bailey Even stated that he has no further questions with his hearing aid(s) and does not feed that any other adjustments are needed at this time. He will follow-up in 4 months, sooner if other problems/concerns arise. It was a pleasure working with Mr. Bailey Even today. He was encouraged to contact the clinic with any further questions in the meantime.     Dona Singh., [facility]  Clinical Audiologist    [address] [id number]

## 2023-08-30 NOTE — PROGRESS NOTES
Daily Note     Today's date: 2023  Patient name: Lonny Mandujano  : 1935  MRN: [id number]  Referring provider: SUSHIL Hernandez  Dx:   Encounter Diagnosis     ICD-10-CM    1. Imbalance  R26.89                      Subjective: The patient had a fall on Sat but he was helping his wife into the wheel chair and she missed the chair. He was holding on to her and when she went down he went down also. Objective: See treatment diary below      Assessment: Tolerated treatment well. Patient demonstrated fatigue post treatment and would benefit from continued PT     Today we worked on taking longer strides when working on the hurdles. This challenged the patient and through off his balance. The patient was progress with the squat machine showing that his BLE strength is improving. Plan: Continue per plan of care.       Precautions:     Diagnosis:    Precautions:    Primary Goals:    *asterisks by exercise = given for HEP   Manuals                                            There Ex        Calf raises x20 x20 x20 x20 SL 2x10   Toe raises x20 x20 x20 x20 x20   Bridges x30 x20 x20 x20 x20   SLR 2# x20ea X20ea x20ea x20ea 2# x20ea   S/L clams GTB x20ea GTB x20ea GTB x20ea GTB x20ea GTB x20ea   Standing hip flexor st  3x30" 3x30"     Standing hip ABD GTB x20ea GTB x20ea GTB x20ea  GTB x20ea   Standing hip ext GTB x20ea GTB x20ea GTB x20ea  GTB x20ea           Neuro Re-Ed        Rhomburg ec 3x30" 3x30" 3x30" 3x30" 3x30"   Tandem balance 2x30"ea 2x30"ea 2x30"ea 2x30"ea 2x30"ea   Grid reach x5ea x5ea x5ea x5ea    Tandem walking No cane 40' 2 laps  W/ cane 40' 1 lap  No cane 40' 2 laps No cane 40' 2 laps No cane 40' 2 laps   Cone taps x15ea   x15ea x15ea                                                            Re-evaluation              Ther Act              Lat walkouts @ railing    GTB x3 laps     GTB x3 laps    Sit to stand    1 foam 2x10 1 foam 2x10   Step ups 6" x10ea  4" x10ea 4" x10ea 6" x10ea   Hurdles 3 laps   3 laps 3 laps   Squat machine 105# x20    85# x20  85# x20  85# x20   Modalities

## 2023-09-01 ENCOUNTER — OFFICE VISIT (OUTPATIENT)
Dept: PHYSICAL THERAPY | Facility: CLINIC | Age: 88
End: 2023-09-01
Payer: MEDICARE

## 2023-09-01 DIAGNOSIS — R26.89 IMBALANCE: Primary | ICD-10-CM

## 2023-09-01 PROCEDURE — 97530 THERAPEUTIC ACTIVITIES: CPT | Performed by: PHYSICAL THERAPIST

## 2023-09-01 PROCEDURE — 97112 NEUROMUSCULAR REEDUCATION: CPT | Performed by: PHYSICAL THERAPIST

## 2023-09-01 PROCEDURE — 97110 THERAPEUTIC EXERCISES: CPT | Performed by: PHYSICAL THERAPIST

## 2023-09-01 NOTE — PROGRESS NOTES
Daily Note     Today's date: 2023  Patient name: Shelby Murphy  : 1935  MRN: [id number]  Referring provider: SUSHIL Calvert  Dx:   Encounter Diagnosis     ICD-10-CM    1. Imbalance  R26.89                      Subjective: The patient reports that he has noticed that his balance is doing better. Objective: See treatment diary below      Assessment: Tolerated treatment well. Patient demonstrated fatigue post treatment and would benefit from continued PT    The patient demonstrated much improved balance today during cone taps, rhomburg ec and tandem balance today. He was complaining about having trouble making turns so I added two cone drills to work on this and the patient did excellent with this. Plan: Continue per plan of care.       Precautions:     Diagnosis:    Precautions:    Primary Goals:    *asterisks by exercise = given for HEP   Manuals                                            There Ex        Calf raises x20 x30 x20 x20 SL 2x10   Toe raises x20 x30 x20 x20 x20   Bridges x30 x30 x20 x20 x20   SLR 2# x20ea 2# x20ea x20ea x20ea 2# x20ea   S/L clams GTB x20ea BTB x20ea GTB x20ea GTB x20ea GTB x20ea   Standing hip flexor st   3x30"     Standing hip ABD GTB x20ea BTB x20ea GTB x20ea  GTB x20ea   Standing hip ext GTB x20ea BTB x20ea GTB x20ea  GTB x20ea           Neuro Re-Ed        Rhomburg ec 3x30" 3x30" 3x30" 3x30" 3x30"   Tandem balance 2x30"ea 2x30"ea 2x30"ea 2x30"ea 2x30"ea   Grid reach x5ea x5ea x5ea x5ea    Tandem walking No cane 40' 2 laps No cane 40' 2 laps W/ cane 40' 1 lap  No cane 40' 2 laps No cane 40' 2 laps No cane 40' 2 laps   Cone taps x15ea x15ea  x15ea x15ea                                                            Re-evaluation              Ther Act              Lat walkouts @ railing    GTB x3 laps     GTB x3 laps    Cone weave  40' 2 laps  Figure 8 5 laps      Sit to stand    1 foam 2x10 1 foam 2x10   Step ups   4" x10ea 4" x10ea 6" x10ea Hurdles    3 laps 3 laps   Squat machine 105# x20  105# x20  85# x20  85# x20  85# x20   Modalities

## 2023-09-06 ENCOUNTER — OFFICE VISIT (OUTPATIENT)
Dept: PHYSICAL THERAPY | Facility: CLINIC | Age: 88
End: 2023-09-06
Payer: MEDICARE

## 2023-09-06 DIAGNOSIS — R26.89 IMBALANCE: Primary | ICD-10-CM

## 2023-09-06 PROCEDURE — 97530 THERAPEUTIC ACTIVITIES: CPT | Performed by: PHYSICAL THERAPIST

## 2023-09-06 PROCEDURE — 97112 NEUROMUSCULAR REEDUCATION: CPT | Performed by: PHYSICAL THERAPIST

## 2023-09-06 NOTE — PROGRESS NOTES
Daily Note     Today's date: 2023  Patient name: Joel Hopkins  : 1935  MRN: [id number]  Referring provider: SUSHIL Villarreal  Dx:   Encounter Diagnosis     ICD-10-CM    1. Imbalance  R26.89                      Subjective: The patient reports that his balance is still doing pretty well. Objective: See treatment diary below      Assessment: Tolerated treatment well. Patient demonstrated fatigue post treatment and would benefit from continued PT    The patient had to take his wife to an appointment today so we had to cut the session short today. He did well with his balance exercises and he seems to have less sway than before. His strength has been steadily improving and this can be seen on the squat machine. Plan: Continue per plan of care.       Precautions:     Diagnosis:    Precautions:    Primary Goals:    *asterisks by exercise = given for HEP   Manuals                                            There Ex        Calf raises x20 x30 x30 x20 SL 2x10   Toe raises x20 x30 x30 x20 x20   Bridges x30 x30  x20 x20   SLR 2# x20ea 2# x20ea  x20ea 2# x20ea   S/L clams GTB x20ea BTB x20ea  GTB x20ea GTB x20ea   Standing hip flexor st        Standing hip ABD GTB x20ea BTB x20ea BTB x20ea  GTB x20ea   Standing hip ext GTB x20ea BTB x20ea BTB x20ea  GTB x20ea           Neuro Re-Ed        Rhomburg ec 3x30" 3x30" 3x30" 3x30" 3x30"   Tandem balance 2x30"ea 2x30"ea 2x30"ea 2x30"ea 2x30"ea   Grid reach x5ea x5ea  x5ea    Tandem walking No cane 40' 2 laps No cane 40' 2 laps  No cane 40' 2 laps No cane 40' 2 laps   Cone taps x15ea x15ea x15ea x15ea x15ea                                                            Re-evaluation              Ther Act              Lat walkouts @ railing        GTB x3 laps    Cone weave  40' 2 laps  Figure 8 5 laps      Sit to stand    1 foam 2x10 1 foam 2x10   Step ups   6" x10ea 4" x10ea 6" x10ea   Hurdles   3 laps 3 laps 3 laps   Squat machine 105# x20 105# x20 105# x20  85# x20  85# x20   Modalities

## 2023-09-08 ENCOUNTER — OFFICE VISIT (OUTPATIENT)
Dept: PHYSICAL THERAPY | Facility: CLINIC | Age: 88
End: 2023-09-08
Payer: MEDICARE

## 2023-09-08 DIAGNOSIS — R26.89 IMBALANCE: Primary | ICD-10-CM

## 2023-09-08 PROCEDURE — 97112 NEUROMUSCULAR REEDUCATION: CPT | Performed by: PHYSICAL THERAPIST

## 2023-09-08 NOTE — PROGRESS NOTES
PT Discharge    Today's date: 2023  Patient name: Heather Chandler  : 1935  MRN: [id number]  Referring provider: SUSHIL Aguila  Dx:   Encounter Diagnosis     ICD-10-CM    1. Imbalance  R26.89                      Assessment  Assessment details: The patient has done much better since the start of PT. His BLE strength is much improved and this is helping him maintain stability during functional activities. His balance is also much improved and this could be seen during his balance testing and the TUG test.  Overall the patient is doing much better and is much less of a fall risk. Me and the patient both agree that he is ready to be discharged. Impairments: abnormal coordination, abnormal gait, abnormal movement, activity intolerance, impaired balance, impaired physical strength, safety issue and poor body mechanics  Understanding of Dx/Px/POC: excellent  Goals  STG : (2 weeks) - Patient demonstrates independence with HEP to be able to transition to HEP in the long term. - 100%  (4 weeks) - Decrease lateral sway during gait to better improve dynamic balance. - 100%    LTG: (6 weeks) - Improve BLE strength to 4/5 to help improve stability during balance activities. - 80%  (6weeks) - Improve TUG test to less than 15 sec. - 100%  (8 weeks) - Improve patient's balance times by 10 sec or greater to help prevent future falls. - 100%  (8 weeks) - Improve 5x sit to stand to less than 15sec. Plan  Plan details: Discharge  Patient would benefit from: skilled physical therapy  Planned modality interventions: TENS, thermotherapy: hydrocollator packs and cryotherapy  Planned therapy interventions: therapeutic activities, therapeutic exercise, gait training, neuromuscular re-education and manual therapy        Subjective Evaluation    History of Present Illness  Date of onset: 2019  Mechanism of injury: The patient reports that he has been having balance issues for about 4 years.   He started having falls every 6 months or so. This continued to get worse and now he is falling once a week in the house. He is using a cane outside of the house and he is not using an AD inside the house. He reports that he has a walker in the car but he does not use it much. He has issues when he makes turns and his legs lock up and he can feel himself falling over and he can't step and catch himself. He also likes to put the chairs up against the walls to help himself stand up. When he stands up he has to stand still for a few seconds to make sure he is steady. He reports that he has railing in his house and he also has ramps to help get into the house. Patient Goals  Patient goal: The patient wants to improve his balance and prevent falls. Pain  Aggravating factors: stair climbing, walking and standing          Objective     Strength/Myotome Testing     Left Hip   Planes of Motion   Flexion: 4  Abduction: 4  External rotation: 4-    Right Hip   Planes of Motion   Flexion: 4-  Abduction: 4  External rotation: 4    Left Knee   Flexion: 4  Extension: 4+    Right Knee   Flexion: 4+  Extension: 4+    Ambulation     Ambulation: Level Surfaces     Additional Level Surfaces Ambulation Details  The patient no longer requires an assisted device for ambulation but will use a cane to help him feel comfortable. His lateral sway has decreased significantly and his gait speed has increased.       Functional Assessment        Comments  Narrow base of support eye open - Good  Narrow base of support eyes closed - Fair  Tandem R front -  Fair  Tandem L front - Fair +  SLS L - 10 sec  SLS R - 6 sec    TUG - 12 sec W/ BUE support to stand up    5x sit to stand - 23 sec W/ BUE support to stand up               Precautions:     Diagnosis:    Precautions:    Primary Goals:    *asterisks by exercise = given for HEP   Manuals                                                There Ex Neuro Re-Ed                                                                                                         Re-evaluation              Ther Act                                         Modalities

## 2023-09-13 ENCOUNTER — OFFICE VISIT (OUTPATIENT)
Dept: PODIATRY | Facility: CLINIC | Age: 88
End: 2023-09-13
Payer: MEDICARE

## 2023-09-13 VITALS
BODY MASS INDEX: 27.77 KG/M2 | DIASTOLIC BLOOD PRESSURE: 93 MMHG | OXYGEN SATURATION: 97 % | HEART RATE: 60 BPM | WEIGHT: 205 LBS | SYSTOLIC BLOOD PRESSURE: 130 MMHG | HEIGHT: 72 IN

## 2023-09-13 DIAGNOSIS — B35.1 ONYCHOMYCOSIS: Primary | ICD-10-CM

## 2023-09-13 DIAGNOSIS — E11.42 TYPE 2 DIABETES MELLITUS WITH DIABETIC POLYNEUROPATHY, WITHOUT LONG-TERM CURRENT USE OF INSULIN (HCC): ICD-10-CM

## 2023-09-13 DIAGNOSIS — M21.611 BUNION OF RIGHT FOOT: ICD-10-CM

## 2023-09-13 PROCEDURE — 11721 DEBRIDE NAIL 6 OR MORE: CPT | Performed by: PODIATRIST

## 2023-09-13 NOTE — PROGRESS NOTES
Assessment/Plan:        The patient's clinical examination today significant for thickened and dystrophic pedal nail plates with discoloration and subungual debris and brittleness with areas of lytic changes consistent with onychomycosis, right worse than the left. There are no open lesions nor calluses noted to either foot. The interdigital spaces are clear without maceration. Pedal pulses are palpable bilaterally but diminished. Epicritic sensation is diminished bilaterally secondary to peripheral neuropathy. Skin is thin with decreased turgor. There is absence of hair growth to the bilateral lower extremities.     The pedal nail plates are sharply debrided with a sterile nail clipper E94 without complication. The nails with a mechanically reduced in thickness and girth utilize a rotary bur without complication. She was noted today. Recommend follow-up in 3 months for continued at risk diabetic foot care.        Diagnoses and all orders for this visit:    Onychomycosis    Type 2 diabetes mellitus with diabetic polyneuropathy, without long-term current use of insulin (HCC)    Bunion of right foot          Subjective:     Patient ID: Dimitry Selby is a 82 y.o. male. The patient presents today for follow-up at risk diabetic foot care. He does note that the nails are getting too long and is starting to cause irritation and tenderness in close to shoe gear, as well as catching on his socks. He denies any other acute pedal issues today.       PAST MEDICAL HISTORY:  Past Medical History:   Diagnosis Date   • Acute MI ([address])     1991   • Ambulates with cane    • Anxiety    • Arthritis     hands   • At risk for falls    • Atrial fibrillation (HCC)    • Cholecystitis    • CKD (chronic kidney disease)    • Coronary artery disease    • Diabetes mellitus (HCC)    • GERD (gastroesophageal reflux disease)    • Heart murmur    • Hyperlipidemia    • Hypertension    • Low back pain    • Lumbar disc disease    • Umbilical hernia    • Wears dentures     full set   • Wears glasses        PAST SURGICAL HISTORY:  Past Surgical History:   Procedure Laterality Date   • CARDIAC CATHETERIZATION      s/p MI   • COLONOSCOPY     • HERNIA REPAIR Left     X5   • [place] LAPAROSCOPY SURG CHOLECYSTECTOMY N/A 1/7/2022    Procedure: ROBOTIC ASSISTED LAPAROSCOPIC CHOLECYSTECTOMY;  Surgeon: Junior Raven MD;  Location: [facility];  Service: General   • TOTAL HIP ARTHROPLASTY Left    • UMBILICAL HERNIA REPAIR LAPAROSCOPIC N/A 1/7/2022    Procedure: Open umbilical hernia repair;  Surgeon: Junior Raven MD;  Location: [facility];  Service: General        ALLERGIES:  Patient has no known allergies.     MEDICATIONS:  Current Outpatient Medications   Medication Sig Dispense Refill   • acetaminophen (TYLENOL) 325 mg tablet Take 2 tablets (650 mg total) by mouth every 6 (six) hours (Patient taking differently: Take 650 mg by mouth every 6 (six) hours as needed) 30 tablet 0   • apixaban (Eliquis) 2.5 mg Take 1 tablet (2.5 mg total) by mouth 2 (two) times a day 180 tablet 1   • cyanocobalamin (VITAMIN B-12) 500 mcg tablet Take 1,000 mcg by mouth daily     • docusate sodium (COLACE) 100 mg capsule Take 1 capsule (100 mg total) by mouth 2 (two) times a day (Patient taking differently: Take 100 mg by mouth every evening) 10 capsule 0   • escitalopram (LEXAPRO) 5 mg tablet TAKE 1 TABLET BY MOUTH EVERY DAY 90 tablet 1   • glipiZIDE (GLUCOTROL XL) 5 mg 24 hr tablet Take 1 tablet by mouth daily     • metFORMIN (GLUCOPHAGE) 500 mg tablet Take 1 tablet (500 mg total) by mouth daily with breakfast 90 tablet 1   • metoprolol tartrate (LOPRESSOR) 25 mg tablet TAKE 1 TABLET (25 MG TOTAL) BY MOUTH EVERY 12 (TWELVE) HOURS (Patient taking differently: Take 12.5 mg by mouth every 12 (twelve) hours) 180 tablet 1   • omeprazole (PriLOSEC) 40 MG capsule TAKE 1 CAPSULE BY MOUTH EVERY DAY BEFORE BREAKFAST 90 capsule 0   • simvastatin (ZOCOR) 40 mg tablet TAKE 1 TABLET BY MOUTH EVERY DAY 90 tablet 1   • cholecalciferol (VITAMIN D3) 1,000 units tablet Take 1 tablet (1,000 Units total) by mouth daily 90 tablet 3   • torsemide (DEMADEX) 5 MG tablet Take 1 tablet (5 mg total) by mouth daily 90 tablet 3     No current facility-administered medications for this visit. SOCIAL HISTORY:  Social History     Socioeconomic History   • Marital status: /Civil Union     Spouse name: None   • Number of children: None   • Years of education: None   • Highest education level: None   Occupational History   • Occupation: retired   Tobacco Use   • Smoking status: Never   • Smokeless tobacco: Never   Vaping Use   • Vaping Use: Never used   Substance and Sexual Activity   • Alcohol use: Yes     Comment: rare   • Drug use: No   • Sexual activity: Not Currently   Other Topics Concern   • None   Social History Narrative    [place]:    Most recent tobacco use screenin2020      Do you currently or have you served in the [address] [place] [address] [employer]:   No      Were you activated, into active duty, as a member of the [employer] or as a Reservist:   No      Occupation:   Retired      Marital status:         Sexual orientation:   Heterosexual      Exercise level:   Occasional      Diet:   Regular      General stress level:   Medium      Alcohol intake:   Occasional      Caffeine intake:    Moderate      Chewing tobacco:   none      Illicit drugs:   Denied      Guns present in home:   No      Seat belts used routinely:   Yes      Sunscreen used routinely:   Yes      Smoke alarm in home:   Yes      Advance directive:   Yes      Salt Intake:   Normal     Would the patient like to schedule a Mammogram:   No      Is the patient interested in a colorectal cancer screening:   No     Last modified by zulma   2020, 15:03      Social Determinants of Health     Financial Resource Strain: Unknown (2023)    Overall Financial Resource Strain (CARDIA)    • Difficulty of Paying Living Expenses: Patient refused   Food Insecurity: No Food Insecurity (1/8/2022)    Hunger Vital Sign    • Worried About Running Out of Food in the Last Year: Never true    • Ran Out of Food in the Last Year: Never true   Transportation Needs: Unknown (2/21/2023)    PRAPARE - Transportation    • Lack of Transportation (Medical): Patient refused    • Lack of Transportation (Non-Medical): Patient refused   Physical Activity: Not on file   Stress: Not on file   Social Connections: Not on file   Intimate Partner Violence: Not on file   Housing Stability: Unknown (1/8/2022)    Housing Stability Vital Sign    • Unable to Pay for Housing in the Last Year: No    • Number of Places Lived in the Last Year: 1    • Unstable Housing in the Last Year: Not on file        Review of Systems   Constitutional: Negative. HENT: Negative. Eyes: Negative. Respiratory: Negative. Cardiovascular: Negative. Endocrine: Negative. Musculoskeletal: Negative. Neurological: Negative. Hematological: Negative. Psychiatric/Behavioral: Negative. Objective:     Physical Exam  Vitals reviewed. Constitutional:       Appearance: Normal appearance. HENT:      Head: Normocephalic and atraumatic. Nose: Nose normal.   Eyes:      Conjunctiva/sclera: Conjunctivae normal.      Pupils: Pupils are equal, round, and reactive to light. Cardiovascular:      Pulses:           Dorsalis pedis pulses are 1+ on the right side and 1+ on the left side. Posterior tibial pulses are 0 on the right side and 0 on the left side. Pulmonary:      Effort: Pulmonary effort is normal.   Feet:      Right foot:      Toenail Condition: Right toenails are abnormally thick and long. Fungal disease present. Left foot:      Toenail Condition: Left toenails are abnormally thick and long. Fungal disease present. Comments:  The patient's clinical examination today significant for thickened and dystrophic pedal nail plates with discoloration and subungual debris and brittleness with areas of lytic changes consistent with onychomycosis, right worse than the left. There are no open lesions nor calluses noted to either foot. The interdigital spaces are clear without maceration. Pedal pulses are palpable bilaterally but diminished. Epicritic sensation is diminished bilaterally secondary to peripheral neuropathy. Skin is thin with decreased turgor. There is absence of hair growth to the bilateral lower extremities. Skin:     General: Skin is warm. Capillary Refill: Capillary refill takes 2 to 3 seconds. Neurological:      General: No focal deficit present. Mental Status: He is alert and oriented to person, place, and time. Psychiatric:         Mood and Affect: Mood normal.         Behavior: Behavior normal.         Thought Content:  Thought content normal.

## 2023-09-19 ENCOUNTER — TELEPHONE (OUTPATIENT)
Dept: NEPHROLOGY | Facility: CLINIC | Age: 88
End: 2023-09-19

## 2023-09-19 ENCOUNTER — OFFICE VISIT (OUTPATIENT)
Dept: OTOLARYNGOLOGY | Facility: CLINIC | Age: 88
End: 2023-09-19
Payer: MEDICARE

## 2023-09-19 VITALS — WEIGHT: 205 LBS | TEMPERATURE: 97.3 F | HEIGHT: 72 IN | BODY MASS INDEX: 27.77 KG/M2

## 2023-09-19 DIAGNOSIS — H90.3 SENSORINEURAL HEARING LOSS (SNHL), BILATERAL: ICD-10-CM

## 2023-09-19 DIAGNOSIS — R26.89 IMBALANCE: Primary | ICD-10-CM

## 2023-09-19 PROCEDURE — 99214 OFFICE O/P EST MOD 30 MIN: CPT | Performed by: NURSE PRACTITIONER

## 2023-09-19 NOTE — PROGRESS NOTES
Assessment/Plan:    Imbalance  On exam, bilateral minimal cerumen debris. Symptoms include sensation of imbalance then falls. Occurs when moving or moving from sitting to standing then turning. Denies occurrence when moving from lying to standing position. Improved after 4 to 5 weeks of balance therapy.       Discussed possible causes of imbalance including neurological, cardiac, autoimmune, Otitis media, sinusitis, chemical imbalance, and inner ear concerns.      Reviewed results of prior Ct head from 2022 without any ENT related findings at that time. 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 There is a very slight asymmetry on the left at [facility] and 6K but it is not meeting guidelines to recommend MRI brain with IAC due to the asymmetry. Asymmetry unlikely impacting his imbalance concerns. Word discrimination 84% on right and 92% on left. Tymps type A bilaterally.      Unlikely ear source based on pt report of symptoms, audiogram and longevity. Treatment options include at home epley's, nasal steroids, oral steroids, lab studies, vestibular therapy, VNG testing, neurology consultation, MRI brain with IAC. Also may consider tilt table as symptoms occur when moving from sitting to standing position.      After discussion agreed to observation  Follow up in 4 months to monitor for cerumen and reoccurrence of imbalance.      Sensorineural hearing loss (SNHL), bilateral  Audiogram completed last visit indicated bilateral mild sloping to severe near profound SNHL, with slight asymmetry on the left at [facility]. Word discrimination Right 84% Left 92%  Tymps Type A bilaterally     Typical causes of SNHL include maturity changes, noise exposure, and hereditary risk factors. Offered options for bilateral SNHL including acceptance, lifestyle changes such as moving closer to those who are speaking, or hearing amplification.   He would qualify for hearing amplification based on audiogram.  Discussed hearing aid options including facilities to obtain a hearing aid from as well as costs and benefits. Discussed that quality of life may improve with hearing amplification. Offered Mri brain with IAC due to the very slight asymmetry on left however does not meet guidelines as necessary option. Pt doing well with current hearing aids and finds them to be helpful         Diagnoses and all orders for this visit:    Imbalance    Sensorineural hearing loss (SNHL), bilateral          Subjective:      Patient ID: Madelyn Salas is a 82 y.o. male. Presents today as a follow up due to ear concerns. For past year Imbalance. Turning certain way feels legs freeze and falls. Increased frequency of falls. Hospital visits due to injuries from falling due to imbalance. No headaches. Hearing gradually worsening. Rigning tinnitus constant. No otalgia or otorrhea. No history of ear surgery. No current hearing aids. Balance therapy about one to two years ago. Since last visit, treated in PT. Obtained hearing aids. Improved with PT. No recent falls. The following portions of the patient's history were reviewed and updated as appropriate: allergies, current medications, past family history, past medical history, past social history, past surgical history and problem list.    Review of Systems   Constitutional: Negative. HENT: Positive for hearing loss. Negative for congestion, ear discharge, ear pain, nosebleeds, postnasal drip, rhinorrhea, sinus pressure, sinus pain, sore throat, tinnitus and voice change. Respiratory: Negative for chest tightness and shortness of breath. Musculoskeletal: Positive for gait problem. Skin: Negative for color change. Neurological: Positive for weakness. Negative for numbness and headaches. Psychiatric/Behavioral: Negative.           Objective:      Temp (!) 97.3 °F (36.3 °C) (Temporal)   Ht 6' (1.829 m)   Wt 93 kg (205 lb)   BMI 27.80 kg/m²          Physical Exam  Constitutional:       Appearance: He is well-developed. HENT:      Head: Normocephalic. Right Ear: Hearing, tympanic membrane, ear canal and external ear normal. No decreased hearing noted. No drainage or tenderness. Tympanic membrane is not perforated or erythematous. Left Ear: Hearing, tympanic membrane, ear canal and external ear normal. No decreased hearing noted. No drainage or tenderness. Tympanic membrane is not perforated or erythematous. Nose: Nose normal. No nasal deformity or septal deviation. Mouth/Throat:      Mouth: Mucous membranes are not pale and not dry. No oral lesions. Dentition: Normal dentition. Pharynx: Uvula midline. No oropharyngeal exudate. Neck:      Trachea: No tracheal deviation. Pulmonary:      Effort: No accessory muscle usage or respiratory distress. Musculoskeletal:      Cervical back: Neck supple. Lymphadenopathy:      Cervical: No cervical adenopathy. Skin:     General: Skin is warm and dry. Neurological:      Mental Status: He is alert and oriented to person, place, and time. Cranial Nerves: No cranial nerve deficit. Sensory: No sensory deficit. Gait: Gait abnormal.   Psychiatric:         Behavior: Behavior is cooperative.

## 2023-09-19 NOTE — ASSESSMENT & PLAN NOTE
On exam, bilateral minimal cerumen debris. Symptoms include sensation of imbalance then falls. Occurs when moving or moving from sitting to standing then turning. Denies occurrence when moving from lying to standing position. Improved after 4 to 5 weeks of balance therapy.       Discussed possible causes of imbalance including neurological, cardiac, autoimmune, Otitis media, sinusitis, chemical imbalance, and inner ear concerns.      Reviewed results of prior Ct head from 2022 without any ENT related findings at that time. 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 There is a very slight asymmetry on the left at [facility] and 6K but it is not meeting guidelines to recommend MRI brain with IAC due to the asymmetry. Asymmetry unlikely impacting his imbalance concerns. Word discrimination 84% on right and 92% on left. Tymps type A bilaterally.      Unlikely ear source based on pt report of symptoms, audiogram and longevity. Treatment options include at home epley's, nasal steroids, oral steroids, lab studies, vestibular therapy, VNG testing, neurology consultation, MRI brain with IAC. Also may consider tilt table as symptoms occur when moving from sitting to standing position.      After discussion agreed to observation  Follow up in 4 months to monitor for cerumen and reoccurrence of imbalance.      Sensorineural hearing loss (SNHL), bilateral  Audiogram completed last visit indicated bilateral mild sloping to severe near profound SNHL, with slight asymmetry on the left at [facility]. Word discrimination Right 84% Left 92%  Tymps Type A bilaterally     Typical causes of SNHL include maturity changes, noise exposure, and hereditary risk factors. Offered options for bilateral SNHL including acceptance, lifestyle changes such as moving closer to those who are speaking, or hearing amplification.   He would qualify for hearing amplification based on audiogram.  Discussed hearing aid options including facilities to obtain a hearing aid from as well as costs and benefits. Discussed that quality of life may improve with hearing amplification. Offered Mri brain with IAC due to the very slight asymmetry on left however does not meet guidelines as necessary option.   Pt doing well with current hearing aids and finds them to be helpful

## 2023-10-17 ENCOUNTER — OFFICE VISIT (OUTPATIENT)
Dept: FAMILY MEDICINE CLINIC | Facility: CLINIC | Age: 88
End: 2023-10-17
Payer: MEDICARE

## 2023-10-17 ENCOUNTER — TELEPHONE (OUTPATIENT)
Dept: FAMILY MEDICINE CLINIC | Facility: CLINIC | Age: 88
End: 2023-10-17

## 2023-10-17 VITALS
SYSTOLIC BLOOD PRESSURE: 126 MMHG | OXYGEN SATURATION: 96 % | WEIGHT: 205 LBS | HEIGHT: 72 IN | HEART RATE: 60 BPM | BODY MASS INDEX: 27.77 KG/M2 | DIASTOLIC BLOOD PRESSURE: 78 MMHG | RESPIRATION RATE: 16 BRPM

## 2023-10-17 DIAGNOSIS — E11.42 TYPE 2 DIABETES MELLITUS WITH DIABETIC POLYNEUROPATHY, WITHOUT LONG-TERM CURRENT USE OF INSULIN (HCC): ICD-10-CM

## 2023-10-17 DIAGNOSIS — N18.31 TYPE 2 DIABETES MELLITUS WITH STAGE 3A CHRONIC KIDNEY DISEASE, WITHOUT LONG-TERM CURRENT USE OF INSULIN (HCC): ICD-10-CM

## 2023-10-17 DIAGNOSIS — R26.89 IMBALANCE: ICD-10-CM

## 2023-10-17 DIAGNOSIS — I48.0 PAROXYSMAL ATRIAL FIBRILLATION (HCC): ICD-10-CM

## 2023-10-17 DIAGNOSIS — R29.6 FREQUENT FALLS: Primary | ICD-10-CM

## 2023-10-17 DIAGNOSIS — E11.22 TYPE 2 DIABETES MELLITUS WITH STAGE 3A CHRONIC KIDNEY DISEASE, WITHOUT LONG-TERM CURRENT USE OF INSULIN (HCC): ICD-10-CM

## 2023-10-17 LAB — SL AMB POCT HEMOGLOBIN AIC: 7.3 (ref ?–6.5)

## 2023-10-17 PROCEDURE — 83036 HEMOGLOBIN GLYCOSYLATED A1C: CPT | Performed by: FAMILY MEDICINE

## 2023-10-17 PROCEDURE — 99214 OFFICE O/P EST MOD 30 MIN: CPT | Performed by: FAMILY MEDICINE

## 2023-10-17 RX ORDER — GLIMEPIRIDE 1 MG/1
1 TABLET ORAL
Qty: 1080 TABLET | Refills: 0 | Status: SHIPPED | OUTPATIENT
Start: 2023-10-17 | End: 2024-10-11

## 2023-10-17 NOTE — TELEPHONE ENCOUNTER
Called patient to triage. He was set up an appointment to come in today with Dr. Alejandro Anderson for an ongoing issue with balance and falling (not acute). When I called patient he said he wanted to see Dr. Danny Gates so I moved around a patient to get him in today with him. Patient was in agreement and will be here.

## 2023-10-17 NOTE — PROGRESS NOTES
Assessment/Plan:  1. Frequent falls    2. Imbalance    3. Type 2 diabetes mellitus with diabetic polyneuropathy, without long-term current use of insulin (HCC)  -     POCT hemoglobin A1c    4. Type 2 diabetes mellitus with stage 3a chronic kidney disease, without long-term current use of insulin (HCC)  -     POCT hemoglobin A1c  -     glimepiride (AMARYL) 1 mg tablet; Take 1 tablet (1 mg total) by mouth 3 (three) times a day with meals    5. Paroxysmal atrial fibrillation (HCC)    Wellness. Advised the patient that he should not be taking aspirin. We are going to discontinue glipizide. Switched to glimepiride and to take one pill per meal.  Educated the patient to not take glimepiride if he is only having a cup of coffee and a slice of toast.  Provided a letter for him to be able to secure a part-time caregiver during the week. 7.1 a1c       Subjective:      Patient ID: Michaela Medina is a 82 y.o. male. Guillermo Priest is an 73-year-old adult male accompanied by his caregiver present for evaluation of his frequent falls. He consents to the use of LISSETH. Frequent falls  He sustained a right flank hematoma about a week ago. The patient reports that he fell four times after that and hit his knee and left shoulder. He confirms tenderness and mild soreness in his left shoulder. He reports inability to ambulate due to pain. The patient is taking Eliquis twice daily. He confirms that he falls either backwards or forwards. He is using a walker as his assistive device. Imbalance  He reports that he will lose his balance if he turns. The patient confirms that he followed his ENT doctor. He is wearing a hearing aid. He also confirms improvement after he had about 4 to 5 weeks of balance therapy. He has been doing exercises at home such as walking and side kick with no weights. The patient states he even fell once during the therapy.     He reports that he checked his hemoglobin A1c today, 10/17/2023 and it shows 122 mg/dL. He is taking metformin once daily in the morning. He is also taking glipizide. He denies experiencing lightheadedness but reports heavy breathing. He is still taking aspirin daily. The patient is convinced that he has arthritis in his toes. He confirms that he eats breakfast, lunch and dinner. During breakfast, he only consumes a cup of coffee and a slice of bread with peanut butter and jelly. The following portions of the patient's history were reviewed and updated as appropriate: allergies, current medications, past family history, past medical history, past social history, past surgical history and problem list.    Review of Systems   Constitutional: Negative for activity change, appetite change and fever. HENT: Negative for congestion, nosebleeds and trouble swallowing. Eyes: Negative for itching. Respiratory: Negative for cough and chest tightness. Cardiovascular: Negative for chest pain and palpitations. Gastrointestinal: Negative for abdominal pain, constipation, diarrhea and nausea. Endocrine: Negative for cold intolerance. Genitourinary: Negative for frequency. Musculoskeletal: +  for gait problem and joint swelling. Skin: Negative for rash. Allergic/Immunologic: Negative for immunocompromised state. Neurological: Negative for dizziness, tremors, seizures, syncope and headaches. Psychiatric/Behavioral: Negative for hallucinations and suicidal ideas. Objective:     /78 (BP Location: Right arm, Patient Position: Sitting, Cuff Size: Standard)   Pulse 60   Resp 16   Ht 6' (1.829 m)   Wt 93 kg (205 lb)   SpO2 96%   BMI 27.80 kg/m²    Physical Exam    Walker use       Vitals and nursing note reviewed. Constitutional:     Appearance: Well-developed. HENT:     Head: Normocephalic and atraumatic. Cardiovascular:     Rate and Rhythm: Normal rate and regular rhythm. Heart sounds: Normal heart sounds. No murmur heard.   Pulmonary:     Effort: Pulmonary effort is normal.     Breath sounds: Normal breath sounds. No wheezing or rales. Abdominal:     General: Bowel sounds are normal. There is no distension. Palpations: Abdomen is soft. Tenderness: There is no abdominal tenderness. Musculoskeletal:     General: No tenderness. Normal range of motion. Cervical back: Normal range of motion and neck supple. Lymphadenopathy:     Cervical: No cervical adenopathy. Skin:     General: Skin is warm and dry. Positive for right flank hematoma. Capillary Refill: Capillary refill takes less than 2 seconds. Findings: No rash. Neurological:     Mental Status: Alert and oriented to person, place, and time. Cranial Nerves: No cranial nerve deficit. Sensory: + sensory deficit. Motor: No abnormal muscle tone. Psychiatric:     Behavior: Behavior normal.     Thought Content: Thought content normal.     Judgment: Judgment normal.      Office Visit on 10/17/2023   Component Date Value   • Hemoglobin A1C 10/17/2023 7.3 (A)      I have personally reviewed results with the patient. His lab work done about 6 months ago shows:  Hemoglobin A1c was 6.4 percent. His current hemoglobin A1c is 7.3 percent. His lab work done about 2 months ago shows:  Blood glucose was 128 mg/dL. His kidney test was 36. Merary Esqueda MD   [facility]     Transcribed for Merary Esqueda MD, by Sandy Boyd on 10/17/23 at 7:34 PM. Powered by [employer].

## 2023-11-15 ENCOUNTER — HOSPITAL ENCOUNTER (OUTPATIENT)
Dept: ULTRASOUND IMAGING | Facility: HOSPITAL | Age: 88
Discharge: HOME | End: 2023-11-15
Attending: INTERNAL MEDICINE
Payer: MEDICARE

## 2023-11-15 DIAGNOSIS — N28.1 RENAL CYST: ICD-10-CM

## 2023-11-15 PROCEDURE — 76775 US EXAM ABDO BACK WALL LIM: CPT

## 2023-11-27 ENCOUNTER — OFFICE VISIT (OUTPATIENT)
Dept: CARDIOLOGY CLINIC | Facility: CLINIC | Age: 88
End: 2023-11-27
Payer: MEDICARE

## 2023-11-27 ENCOUNTER — TELEPHONE (OUTPATIENT)
Dept: NEPHROLOGY | Facility: CLINIC | Age: 88
End: 2023-11-27

## 2023-11-27 VITALS
OXYGEN SATURATION: 98 % | TEMPERATURE: 97.3 F | DIASTOLIC BLOOD PRESSURE: 70 MMHG | SYSTOLIC BLOOD PRESSURE: 118 MMHG | WEIGHT: 197 LBS | HEART RATE: 63 BPM | BODY MASS INDEX: 26.68 KG/M2 | HEIGHT: 72 IN

## 2023-11-27 DIAGNOSIS — I10 ESSENTIAL HYPERTENSION: ICD-10-CM

## 2023-11-27 DIAGNOSIS — N28.1 RENAL CYST: Primary | ICD-10-CM

## 2023-11-27 DIAGNOSIS — I25.10 CORONARY ARTERY DISEASE INVOLVING NATIVE CORONARY ARTERY OF NATIVE HEART WITHOUT ANGINA PECTORIS: ICD-10-CM

## 2023-11-27 DIAGNOSIS — I48.0 PAROXYSMAL ATRIAL FIBRILLATION (HCC): Primary | ICD-10-CM

## 2023-11-27 PROCEDURE — 93000 ELECTROCARDIOGRAM COMPLETE: CPT | Performed by: INTERNAL MEDICINE

## 2023-11-27 PROCEDURE — 99213 OFFICE O/P EST LOW 20 MIN: CPT | Performed by: INTERNAL MEDICINE

## 2023-11-27 NOTE — TELEPHONE ENCOUNTER
----- Message from Heidi Howard MD sent at 11/24/2023  6:24 PM EST -----  Kidneys look stable. Cysts look stable and benign. I will discuss with radiology if this needs further follow-up in a year. He will be notified accordingly.

## 2023-11-27 NOTE — PATIENT INSTRUCTIONS
You were seen today in the Cardiology office for follow up. No medication changes were made today. Please continue your current cardiac medications as prescribed. Thank you for choosing [facility].

## 2023-11-27 NOTE — PROGRESS NOTES
[facility]    CHIEF COMPLAINT:   No chief complaint on file. HPI:  Delta Cassidy is a 82 y.o. male with a past medical history significant for DM, hypertension, CKD 3B, coronary artery disease, history of MI (1991) and paroxysmal atrial fibrillation/flutter. He was last seen for preoperative risk assessment in December 2021 for laparoscopic cholecystectomy and umbilical hernia repair after a recent admission on 11/18/2021 with sepsis due to acute cholecystitis. His hospital course was complicated by atrial flutter with RVR and a non-MI troponin elevation. He subsequently underwent laparoscopic cholecystectomy and umbilical herniorrhaphy on 1/7/22. Post op course was uncomplicated. OV - 5/17/23: Generally not too active but he still ambulates with his cane. Reports that his balance has improved and he feels more steady on his feet. He continues to do home exercise. He denies any falls.      Interval history:   Apixaban was not affordable  He was not interested in warfarin   Still feeling off balance but denies any falls in the last 1.5 months  He is using his walker  Saw ENT and not felt to be ear course of imbalance  Denies lightheadedness, dizziness, chest pain, palpitations, shortness of breath, orthopnea, leg edema    The following portions of the patient's history were reviewed and updated as appropriate: allergies, current medications, past family history, past medical history, past social history, past surgical history, and problem list.    SINCE LAST OV I REVIEWED WITH THE PATIENT THE INTERIM LABS, TEST RESULTS, CONSULTANT(S) NOTES AND PERFORMED AN INTERIM REVIEW OF HISTORY    Past Medical History:   Diagnosis Date    Acute MI ([address])     1991    Ambulates with cane     Anxiety     Arthritis     hands    At risk for falls     Atrial fibrillation ([address])     Cholecystitis     CKD (chronic kidney disease)     Coronary artery disease     Diabetes mellitus ([address])     GERD (gastroesophageal reflux disease)     Heart murmur     Hyperlipidemia     Hypertension     Low back pain     Lumbar disc disease     Umbilical hernia     Wears dentures     full set    Wears glasses        Past Surgical History:   Procedure Laterality Date    CARDIAC CATHETERIZATION      s/p MI    COLONOSCOPY      HERNIA REPAIR Left     X5    RI LAPAROSCOPY SURG CHOLECYSTECTOMY N/A 2022    Procedure: ROBOTIC ASSISTED LAPAROSCOPIC CHOLECYSTECTOMY;  Surgeon: Sharmaine Galvan MD;  Location: [facility];  Service: General    TOTAL HIP ARTHROPLASTY Left     UMBILICAL HERNIA REPAIR LAPAROSCOPIC N/A 2022    Procedure: Open umbilical hernia repair;  Surgeon: Sharmaine Galvan MD;  Location: [facility];  Service: General       Social History     Socioeconomic History    Marital status: /Civil Union     Spouse name: Not on file    Number of children: Not on file    Years of education: Not on file    Highest education level: Not on file   Occupational History    Occupation: retired   Tobacco Use    Smoking status: Never    Smokeless tobacco: Never   Vaping Use    Vaping Use: Never used   Substance and Sexual Activity    Alcohol use: Yes     Comment: rare    Drug use: No    Sexual activity: Not Currently   Other Topics Concern    Not on file   Social History Narrative    [place]:    Most recent tobacco use screenin2020      Do you currently or have you served in the [address] [place] [address] [place]:   No      Were you activated, into active duty, as a member of the [employer] or as a Reservist:   No      Occupation:   Retired      Marital status:         Sexual orientation:   Heterosexual      Exercise level:   Occasional      Diet:   Regular      General stress level:   Medium      Alcohol intake:   Occasional      Caffeine intake:    Moderate      Chewing tobacco:   none      Illicit drugs:   Denied      Guns present in home:   No      Seat belts used routinely:   Yes      Sunscreen used routinely:   Yes Smoke alarm in home:   Yes      Advance directive:   Yes      Salt Intake:   Normal     Would the patient like to schedule a Mammogram:   No      Is the patient interested in a colorectal cancer screening:   No     Last modified by zulma   01-, 15:03      Social Determinants of Health     Financial Resource Strain: Unknown (2/21/2023)    Overall Financial Resource Strain (CARDIA)     Difficulty of Paying Living Expenses: Patient refused   Food Insecurity: No Food Insecurity (1/8/2022)    Hunger Vital Sign     Worried About Running Out of Food in the Last Year: Never true     [address] in the Last Year: Never true   Transportation Needs: Unknown (2/21/2023)    PRAPARE - Transportation     Lack of Transportation (Medical): Patient refused     Lack of Transportation (Non-Medical): Patient refused   Physical Activity: Not on file   Stress: Not on file   Social Connections: Not on file   Intimate Partner Violence: Not on file   Housing Stability: Unknown (1/8/2022)    Housing Stability Vital Sign     Unable to Pay for Housing in the Last Year: No     Number of [address] in the Last Year: 1     Unstable Housing in the Last Year: Not on file       Family History   Problem Relation Age of Onset    No Known Problems Mother     No Known Problems Father        No Known Allergies    Current Outpatient Medications   Medication Sig Dispense Refill    acetaminophen (TYLENOL) 325 mg tablet Take 2 tablets (650 mg total) by mouth every 6 (six) hours (Patient taking differently: Take 650 mg by mouth every 6 (six) hours as needed) 30 tablet 0    cholecalciferol (VITAMIN D3) 1,000 units tablet Take 1 tablet (1,000 Units total) by mouth daily 90 tablet 3    cyanocobalamin (VITAMIN B-12) 500 mcg tablet Take 1,000 mcg by mouth daily      docusate sodium (COLACE) 100 mg capsule Take 1 capsule (100 mg total) by mouth 2 (two) times a day (Patient taking differently: Take 100 mg by mouth every evening) 10 capsule 0 escitalopram (LEXAPRO) 5 mg tablet TAKE 1 TABLET BY MOUTH EVERY DAY 90 tablet 1    glimepiride (AMARYL) 1 mg tablet Take 1 tablet (1 mg total) by mouth 3 (three) times a day with meals 1080 tablet 0    metFORMIN (GLUCOPHAGE) 500 mg tablet Take 1 tablet (500 mg total) by mouth daily with breakfast 90 tablet 1    metoprolol tartrate (LOPRESSOR) 25 mg tablet TAKE 1 TABLET (25 MG TOTAL) BY MOUTH EVERY 12 (TWELVE) HOURS (Patient taking differently: Take 12.5 mg by mouth every 12 (twelve) hours) 180 tablet 1    omeprazole (PriLOSEC) 40 MG capsule TAKE 1 CAPSULE BY MOUTH EVERY DAY BEFORE BREAKFAST 90 capsule 0    simvastatin (ZOCOR) 40 mg tablet TAKE 1 TABLET BY MOUTH EVERY DAY 90 tablet 1    torsemide (DEMADEX) 5 MG tablet Take 1 tablet (5 mg total) by mouth daily 90 tablet 3     No current facility-administered medications for this visit. /70 (BP Location: Left arm, Patient Position: Sitting, Cuff Size: Standard)   Temp (!) 97.3 °F (36.3 °C) (Tympanic)   Ht 6' (1.829 m)   Wt 89.4 kg (197 lb)   SpO2 98%   BMI 26.72 kg/m²     Review of Systems   All other systems reviewed and are negative. Physical Exam  Vitals reviewed. Constitutional:       Appearance: He is not toxic-appearing. HENT:      Head: Normocephalic and atraumatic. Eyes:      General: No scleral icterus. Extraocular Movements: Extraocular movements intact. Cardiovascular:      Rate and Rhythm: Normal rate and regular rhythm. Pulses: Normal pulses. Heart sounds: Normal heart sounds. No murmur heard. No gallop. Pulmonary:      Effort: Pulmonary effort is normal. No respiratory distress. Breath sounds: Normal breath sounds. No wheezing or rales. Abdominal:      General: Abdomen is flat. Bowel sounds are normal. There is no distension. Palpations: Abdomen is soft. Tenderness: There is no abdominal tenderness. There is no guarding. Musculoskeletal:      Right lower leg: No edema.       Left lower leg: No edema. Skin:     General: Skin is warm and dry. Coloration: Skin is not jaundiced or pale. Neurological:      Mental Status: He is alert. Lab Results   Component Value Date    K 5.1 08/04/2023     08/04/2023    CO2 26 08/04/2023    BUN 23 08/04/2023    CREATININE 1.69 (H) 08/04/2023    CALCIUM 9.2 08/04/2023    ALT 28 10/12/2022    AST 18 10/12/2022    INR 1.17 11/19/2021       Lab Results   Component Value Date    HDL 34 (L) 02/06/2023    LDLCALC 59 02/06/2023    TRIG 116 02/06/2023       Lab Results   Component Value Date    WBC 7.83 10/12/2022    HGB 12.1 10/12/2022    HCT 40.3 10/12/2022     10/12/2022       Lab Results   Component Value Date     02/06/2023    HGBA1C 7.3 (A) 10/17/2023     Cardiac studies:   ECG - 11/27/23: NSR, LAFB    Zio XT - 12/15/22-12/29/22: Patient had a min HR of 49 bpm, max HR of 160 bpm, and avg HR of 66 bpm. Predominant underlying rhythm was Sinus Rhythm. 2 Ventricular Tachycardia runs occurred, the run with the fastest interval lasting 4 beats with a max rate of 139 bpm, the longest lasting 7 beats with an avg rate of 121 bpm. 20 Supraventricular Tachycardia runs occurred, the run with the fastest interval lasting 2 mins 1 sec with a max rate of 160 bpm, the longest lasting 2 mins 38 secs with an avg rate of  126 bpm. Isolated SVEs were rare (<1.0%), SVE Couplets were rare (<1.0%), and SVE Triplets were rare (<1.0%). Isolated VEs were occasional (1.1%, [id number]), VE Couplets were rare (<1.0%, 33), and VE Triplets were rare (<1.0%, 3). Ventricular Bigeminy and Trigeminy were present. TTE-2/10/2018: LVEF 55%, grade 2 DD. LA mild to moderately dilated. RA mildly dilated. Mild to moderate TR. PASP 45 mmHg. ASSESSMENT AND PLAN:  Gabby Hugo was seen today for follow-up. Diagnoses and all orders for this visit:    Paroxysmal atrial fibrillation [facility]): Atrial fibrillation and atrial flutter on ECGs from 2018 and 2021, respectively.  Recent extended Holter monitor without evidence of either. He does have moderate left atrial enlargement on echo from 2018. Previously was on aspirin alone also for his coronary disease. He was previously not on systemic AC due to falls and LE weakness. High C2V score (age, HTN, CAD/prior MI, DM). Prescribed Apixaban last visit but this was cost prohibitive. He was not interest in warfarin.  -We again spoke about anticoagulation. Apixaban was cost prohibitive. He has been having some more balance issues as of late but states he hasn't had any falls for 1.5 months. We reviewed warfarin again but he is not interested in being on anticoagulation. He would like to continue aspirin alone and understands that this does not protect against CVA from afib. ECG today with NSR, LAFB - unchanged from prior. Continue BB. Coronary artery disease involving native coronary artery of native heart without angina pectoris: History of remote MI 12. He was treated with thrombolytics and rotational atherectomy of the LAD. He underwent cardiac catheterization in 2001 which revealed 50% LAD narrowing. He denies any anginal complaints at this time. His lipid panel from 2/6/2023 showed total cholesterol 116, TGs 116, HDL 34, LDL 59.    -Lipids are at goal on simvastatin  -Continue aspirin 81 mg  -He hasn't had an echocardiogram since 2018 and should be repeated to assess LV function     Essential hypertension: Hypertension with chronic kidney disease stage IIIB. BP is at goal. Continue metoprolol tartrate 12.5 mg BID. He is also on torsemide 5 mg daily.     Carolina Ewing MD

## 2023-11-29 NOTE — TELEPHONE ENCOUNTER
Mariah Boyle MD  [facility] ([place]) Clinical  Please let the patient know that radiologist has recommended repeat kidney ultrasound in 1 year from now. Can you please also help arrange this. Thank you.

## 2023-12-11 DIAGNOSIS — K21.9 GASTROESOPHAGEAL REFLUX DISEASE WITHOUT ESOPHAGITIS: ICD-10-CM

## 2023-12-11 RX ORDER — OMEPRAZOLE 40 MG/1
CAPSULE, DELAYED RELEASE ORAL
Qty: 90 CAPSULE | Refills: 1 | Status: SHIPPED | OUTPATIENT
Start: 2023-12-11

## 2023-12-13 ENCOUNTER — OFFICE VISIT (OUTPATIENT)
Dept: PODIATRY | Facility: CLINIC | Age: 88
End: 2023-12-13
Payer: MEDICARE

## 2023-12-13 VITALS
HEART RATE: 67 BPM | HEIGHT: 72 IN | BODY MASS INDEX: 26.68 KG/M2 | DIASTOLIC BLOOD PRESSURE: 82 MMHG | SYSTOLIC BLOOD PRESSURE: 128 MMHG | WEIGHT: 197 LBS

## 2023-12-13 DIAGNOSIS — E11.42 TYPE 2 DIABETES MELLITUS WITH DIABETIC POLYNEUROPATHY, WITHOUT LONG-TERM CURRENT USE OF INSULIN (HCC): ICD-10-CM

## 2023-12-13 DIAGNOSIS — B35.1 ONYCHOMYCOSIS: Primary | ICD-10-CM

## 2023-12-13 PROCEDURE — 11721 DEBRIDE NAIL 6 OR MORE: CPT | Performed by: PODIATRIST

## 2023-12-13 NOTE — PROGRESS NOTES
Assessment/Plan:     The patient's clinical examination today significant for thickened and dystrophic pedal nail plates with discoloration and subungual debris and brittleness with areas of lytic changes consistent with onychomycosis, right worse than the left. There are no open lesions nor calluses noted to either foot. The interdigital spaces are clear without maceration. Pedal pulses are palpable bilaterally but diminished. Epicritic sensation is diminished bilaterally secondary to peripheral neuropathy. Skin is thin with decreased turgor. There is absence of hair growth to the bilateral lower extremities. The pedal nail plates are sharply debrided with a sterile nail clipper T14 without complication. The nails with a mechanically reduced in thickness and girth utilize a rotary bur without complication. She was noted today. Recommend follow-up in 3 months for continued at risk diabetic foot care. Diagnoses and all orders for this visit:    Onychomycosis    Type 2 diabetes mellitus with diabetic polyneuropathy, without long-term current use of insulin ([facility])          Subjective:     Patient ID: Marilin Reyes is a 82 y.o. male. The patient presents today for follow-up at risk diabetic foot care. He does note that the nails are getting too long and is starting to cause irritation and tenderness in close to shoe gear, as well as catching on his socks. He denies any other acute pedal issues today.            PAST MEDICAL HISTORY:  Past Medical History:   Diagnosis Date    Acute MI ([address])     1991    Ambulates with cane     Anxiety     Arthritis     hands    At risk for falls     Atrial fibrillation (HCC)     Cholecystitis     CKD (chronic kidney disease)     Coronary artery disease     Diabetes mellitus (HCC)     GERD (gastroesophageal reflux disease)     Heart murmur     Hyperlipidemia     Hypertension     Low back pain     Lumbar disc disease     Umbilical hernia     Wears dentures     full set    Wears glasses        PAST SURGICAL HISTORY:  Past Surgical History:   Procedure Laterality Date    CARDIAC CATHETERIZATION      s/p MI    COLONOSCOPY      HERNIA REPAIR Left     X5    NE LAPAROSCOPY SURG CHOLECYSTECTOMY N/A 1/7/2022    Procedure: ROBOTIC ASSISTED LAPAROSCOPIC CHOLECYSTECTOMY;  Surgeon: Iris Multani MD;  [facility];  Service: General    TOTAL HIP ARTHROPLASTY Left     UMBILICAL HERNIA REPAIR LAPAROSCOPIC N/A 1/7/2022    Procedure: Open umbilical hernia repair;  Surgeon: Iris Multani MD;  [facility];  Service: General        ALLERGIES:  Patient has no known allergies.     MEDICATIONS:  Current Outpatient Medications   Medication Sig Dispense Refill    acetaminophen (TYLENOL) 325 mg tablet Take 2 tablets (650 mg total) by mouth every 6 (six) hours (Patient taking differently: Take 650 mg by mouth every 6 (six) hours as needed) 30 tablet 0    aspirin (ECOTRIN LOW STRENGTH) 81 mg EC tablet Take 81 mg by mouth daily      cyanocobalamin (VITAMIN B-12) 500 mcg tablet Take 1,000 mcg by mouth daily      docusate sodium (COLACE) 100 mg capsule Take 1 capsule (100 mg total) by mouth 2 (two) times a day (Patient taking differently: Take 100 mg by mouth every evening) 10 capsule 0    escitalopram (LEXAPRO) 5 mg tablet TAKE 1 TABLET BY MOUTH EVERY DAY 90 tablet 1    glimepiride (AMARYL) 1 mg tablet Take 1 tablet (1 mg total) by mouth 3 (three) times a day with meals 1080 tablet 0    metFORMIN (GLUCOPHAGE) 500 mg tablet Take 1 tablet (500 mg total) by mouth daily with breakfast 90 tablet 1    metoprolol tartrate (LOPRESSOR) 25 mg tablet TAKE 1 TABLET (25 MG TOTAL) BY MOUTH EVERY 12 (TWELVE) HOURS (Patient taking differently: Take 12.5 mg by mouth every 12 (twelve) hours) 180 tablet 1    omeprazole (PriLOSEC) 40 MG capsule TAKE 1 CAPSULE BY MOUTH EVERY DAY BEFORE BREAKFAST 90 capsule 1    simvastatin (ZOCOR) 40 mg tablet TAKE 1 TABLET BY MOUTH EVERY DAY 90 tablet 1    cholecalciferol (VITAMIN D3) 1,000 units tablet Take 1 tablet (1,000 Units total) by mouth daily 90 tablet 3    torsemide (DEMADEX) 5 MG tablet Take 1 tablet (5 mg total) by mouth daily 90 tablet 3     No current facility-administered medications for this visit. SOCIAL HISTORY:  Social History     Socioeconomic History    Marital status: /Civil Union     Spouse name: None    Number of children: None    Years of education: None    Highest education level: None   Occupational History    Occupation: retired   Tobacco Use    Smoking status: Never    Smokeless tobacco: Never   Vaping Use    Vaping status: Never Used   Substance and Sexual Activity    Alcohol use: Yes     Comment: rare    Drug use: No    Sexual activity: Not Currently   Other Topics Concern    None   Social History Narrative    [place]:    Most recent tobacco use screenin2020      Do you currently or have you served in the [address] [place] [address] [employer]:   No      Were you activated, into active duty, as a member of the [employer] or as a Reservist:   No      Occupation:   Retired      Marital status:         Sexual orientation:   Heterosexual      Exercise level:   Occasional      Diet:   Regular      General stress level:   Medium      Alcohol intake:   Occasional      Caffeine intake:    Moderate      Chewing tobacco:   none      Illicit drugs:   Denied      Guns present in home:   No      Seat belts used routinely:   Yes      Sunscreen used routinely:   Yes      Smoke alarm in home:   Yes      Advance directive:   Yes      Salt Intake:   Normal     Would the patient like to schedule a Mammogram:   No      Is the patient interested in a colorectal cancer screening:   No     Last modified by zulma   2020, 15:03      Social Determinants of Health     Financial Resource Strain: Patient Declined (2023)    Overall Financial Resource Strain (CARDIA)     Difficulty of Paying Living Expenses: Patient declined   Food Insecurity: No Food Insecurity (1/8/2022)    Hunger Vital Sign     Worried About Running Out of Food in the Last Year: Never true     [address] in the Last Year: Never true   Transportation Needs: Patient Declined (2/21/2023)    PRAPARE - Transportation     Lack of Transportation (Medical): Patient declined     Lack of Transportation (Non-Medical): Patient declined   Physical Activity: Not on file   Stress: Not on file   Social Connections: Not on file   Intimate Partner Violence: Not on file   Housing Stability: Unknown (1/8/2022)    Housing Stability Vital Sign     Unable to Pay for Housing in the Last Year: No     Number of Places Lived in the Last Year: 1     Unstable Housing in the Last Year: Not on file        Review of Systems   Constitutional: Negative. Respiratory: Negative. Cardiovascular: Negative. Endocrine: Negative. Musculoskeletal: Negative. Neurological: Negative. Hematological: Negative. Psychiatric/Behavioral: Negative. Objective:     Physical Exam  Vitals reviewed. Constitutional:       Appearance: Normal appearance. HENT:      Head: Normocephalic and atraumatic. Nose: Nose normal.   Eyes:      Conjunctiva/sclera: Conjunctivae normal.      Pupils: Pupils are equal, round, and reactive to light. Cardiovascular:      Pulses:           Dorsalis pedis pulses are 1+ on the right side and 1+ on the left side. Posterior tibial pulses are 1+ on the right side and 1+ on the left side. Pulmonary:      Effort: Pulmonary effort is normal.   Feet:      Right foot:      Skin integrity: Skin integrity normal.      Left foot:      Skin integrity: Skin integrity normal.      Comments: The patient's clinical examination today significant for thickened and dystrophic pedal nail plates with discoloration and subungual debris and brittleness with areas of lytic changes consistent with onychomycosis, right worse than the left.   There are no open lesions nor calluses noted to either foot. The interdigital spaces are clear without maceration. Pedal pulses are palpable bilaterally but diminished. Epicritic sensation is diminished bilaterally secondary to peripheral neuropathy. Skin is thin with decreased turgor. There is absence of hair growth to the bilateral lower extremities. Skin:     General: Skin is warm. Capillary Refill: Capillary refill takes less than 2 seconds. Neurological:      General: No focal deficit present. Mental Status: He is alert and oriented to person, place, and time. Psychiatric:         Mood and Affect: Mood normal.         Behavior: Behavior normal.         Thought Content:  Thought content normal.

## 2023-12-14 ENCOUNTER — OFFICE VISIT (OUTPATIENT)
Dept: FAMILY MEDICINE CLINIC | Facility: CLINIC | Age: 88
End: 2023-12-14
Payer: MEDICARE

## 2023-12-14 ENCOUNTER — APPOINTMENT (OUTPATIENT)
Dept: LAB | Facility: CLINIC | Age: 88
End: 2023-12-14
Payer: MEDICARE

## 2023-12-14 VITALS
OXYGEN SATURATION: 96 % | DIASTOLIC BLOOD PRESSURE: 76 MMHG | SYSTOLIC BLOOD PRESSURE: 122 MMHG | WEIGHT: 203 LBS | HEART RATE: 70 BPM | BODY MASS INDEX: 27.5 KG/M2 | HEIGHT: 72 IN | RESPIRATION RATE: 16 BRPM

## 2023-12-14 DIAGNOSIS — I48.0 PAROXYSMAL ATRIAL FIBRILLATION (HCC): ICD-10-CM

## 2023-12-14 DIAGNOSIS — R26.2 AMBULATORY DYSFUNCTION: ICD-10-CM

## 2023-12-14 DIAGNOSIS — E11.22 TYPE 2 DIABETES MELLITUS WITH STAGE 3A CHRONIC KIDNEY DISEASE, WITHOUT LONG-TERM CURRENT USE OF INSULIN (HCC): Primary | ICD-10-CM

## 2023-12-14 DIAGNOSIS — R26.89 IMBALANCE: ICD-10-CM

## 2023-12-14 DIAGNOSIS — M54.50 CHRONIC MIDLINE LOW BACK PAIN WITHOUT SCIATICA: ICD-10-CM

## 2023-12-14 DIAGNOSIS — N18.31 TYPE 2 DIABETES MELLITUS WITH STAGE 3A CHRONIC KIDNEY DISEASE, WITHOUT LONG-TERM CURRENT USE OF INSULIN (HCC): Primary | ICD-10-CM

## 2023-12-14 DIAGNOSIS — E87.5 HYPERKALEMIA: ICD-10-CM

## 2023-12-14 DIAGNOSIS — E11.22 TYPE 2 DIABETES MELLITUS WITH STAGE 3A CHRONIC KIDNEY DISEASE, WITHOUT LONG-TERM CURRENT USE OF INSULIN (HCC): ICD-10-CM

## 2023-12-14 DIAGNOSIS — N18.31 TYPE 2 DIABETES MELLITUS WITH STAGE 3A CHRONIC KIDNEY DISEASE, WITHOUT LONG-TERM CURRENT USE OF INSULIN (HCC): ICD-10-CM

## 2023-12-14 DIAGNOSIS — G89.29 CHRONIC MIDLINE LOW BACK PAIN WITHOUT SCIATICA: ICD-10-CM

## 2023-12-14 LAB
ANION GAP SERPL CALCULATED.3IONS-SCNC: 7 MMOL/L
BUN SERPL-MCNC: 20 MG/DL (ref 5–25)
CALCIUM SERPL-MCNC: 9.2 MG/DL (ref 8.4–10.2)
CHLORIDE SERPL-SCNC: 105 MMOL/L (ref 96–108)
CO2 SERPL-SCNC: 27 MMOL/L (ref 21–32)
CREAT SERPL-MCNC: 1.49 MG/DL (ref 0.6–1.3)
GFR SERPL CREATININE-BSD FRML MDRD: 41 ML/MIN/1.73SQ M
GLUCOSE SERPL-MCNC: 86 MG/DL (ref 65–140)
POTASSIUM SERPL-SCNC: 5.6 MMOL/L (ref 3.5–5.3)
SODIUM SERPL-SCNC: 139 MMOL/L (ref 135–147)

## 2023-12-14 PROCEDURE — 36415 COLL VENOUS BLD VENIPUNCTURE: CPT

## 2023-12-14 PROCEDURE — 99214 OFFICE O/P EST MOD 30 MIN: CPT | Performed by: FAMILY MEDICINE

## 2023-12-14 PROCEDURE — 80048 BASIC METABOLIC PNL TOTAL CA: CPT

## 2023-12-14 NOTE — PROGRESS NOTES
Assessment/Plan:    1. Type 2 diabetes mellitus with stage 3a chronic kidney disease, without long-term current use of insulin (HCC)  -     Basic metabolic panel; Future    2. Ambulatory dysfunction    3. Chronic midline low back pain without sciatica    4. Paroxysmal atrial fibrillation (HCC)    5. Imbalance    6. Hyperkalemia         Subjective:      Patient ID: Juanis Chavez is a 82 y.o. male. HPI    Here to go over chronic issues and labs / imaging studies if applicable. Christa Merlinnt again   He can't move his legs   Has gone through physical therapy   Hit the right elbow   Swelling only     A1c 7.3     The following portions of the patient's history were reviewed and updated as appropriate: allergies, current medications, past family history, past medical history, past social history, past surgical history and problem list.    Review of Systems   Constitutional:  Negative for activity change, appetite change and fever. HENT:  Negative for congestion, nosebleeds and trouble swallowing. Eyes:  Negative for itching. Respiratory:  Negative for cough and chest tightness. Cardiovascular:  Negative for chest pain and palpitations. Gastrointestinal:  Negative for abdominal pain, constipation, diarrhea and nausea. Endocrine: Negative for cold intolerance. Genitourinary:  Negative for frequency. Musculoskeletal:  Positive for arthralgias and back pain. Negative for gait problem and joint swelling. Skin:  Negative for rash. Allergic/Immunologic: Negative for immunocompromised state. Neurological:  Negative for dizziness, tremors, seizures, syncope and headaches. Psychiatric/Behavioral:  Negative for hallucinations and suicidal ideas. The patient is nervous/anxious.           Objective:      /76 (BP Location: Left arm, Patient Position: Sitting, Cuff Size: Standard)   Pulse 70   Resp 16   Ht 6' (1.829 m)   Wt 92.1 kg (203 lb)   SpO2 96%   BMI 27.53 kg/m²     Appointment on 12/14/2023 Component Date Value   • Sodium 12/14/2023 139    • Potassium 12/14/2023 5.6 (H)    • Chloride 12/14/2023 105    • CO2 12/14/2023 27    • ANION GAP 12/14/2023 7    • BUN 12/14/2023 20    • Creatinine 12/14/2023 1.49 (H)    • Glucose 12/14/2023 86    • Calcium 12/14/2023 9.2    • eGFR 12/14/2023 41           Physical Exam  Vitals and nursing note reviewed. Constitutional:       General: He is not in acute distress. Appearance: He is well-developed. Comments: walker   HENT:      Head: Normocephalic and atraumatic. Cardiovascular:      Rate and Rhythm: Normal rate. Rhythm irregular. Heart sounds: Normal heart sounds. No murmur heard. Pulmonary:      Effort: Pulmonary effort is normal.      Breath sounds: Normal breath sounds. No wheezing or rales. Abdominal:      General: Bowel sounds are normal. There is no distension. Palpations: Abdomen is soft. Tenderness: There is no abdominal tenderness. There is no guarding or rebound. Musculoskeletal:         General: No tenderness. Normal range of motion. Cervical back: Normal range of motion and neck supple. Lymphadenopathy:      Cervical: No cervical adenopathy. Skin:     General: Skin is warm and dry. Capillary Refill: Capillary refill takes less than 2 seconds. Findings: No rash. Neurological:      Mental Status: He is alert and oriented to person, place, and time. Cranial Nerves: No cranial nerve deficit. Sensory: No sensory deficit. Motor: No abnormal muscle tone. Psychiatric:         Behavior: Behavior normal.         Thought Content:  Thought content normal.         Judgment: Judgment normal.             Wale Mcmullen MD  [address] [place] [address]

## 2023-12-15 ENCOUNTER — TELEPHONE (OUTPATIENT)
Dept: FAMILY MEDICINE CLINIC | Facility: CLINIC | Age: 88
End: 2023-12-15

## 2023-12-15 NOTE — TELEPHONE ENCOUNTER
----- Message from Juan Carlos Yao MD sent at 12/15/2023  5:42 AM EST -----  Ok so no change to the metformin but do you take a potasium? Or eat a lot of bananas? Your Potasium was pretty high this time.

## 2023-12-21 DIAGNOSIS — F41.9 ANXIETY: ICD-10-CM

## 2023-12-21 RX ORDER — ESCITALOPRAM OXALATE 5 MG/1
TABLET ORAL
Qty: 90 TABLET | Refills: 1 | Status: SHIPPED | OUTPATIENT
Start: 2023-12-21

## 2024-01-03 ENCOUNTER — HOSPITAL ENCOUNTER (OUTPATIENT)
Dept: NON INVASIVE DIAGNOSTICS | Facility: HOSPITAL | Age: 89
Discharge: HOME | End: 2024-01-03
Attending: INTERNAL MEDICINE
Payer: MEDICARE

## 2024-01-03 VITALS
HEIGHT: 72 IN | BODY MASS INDEX: 27.5 KG/M2 | WEIGHT: 203 LBS | HEART RATE: 70 BPM | DIASTOLIC BLOOD PRESSURE: 76 MMHG | SYSTOLIC BLOOD PRESSURE: 122 MMHG

## 2024-01-03 DIAGNOSIS — I25.10 CORONARY ARTERY DISEASE INVOLVING NATIVE CORONARY ARTERY OF NATIVE HEART WITHOUT ANGINA PECTORIS: ICD-10-CM

## 2024-01-03 DIAGNOSIS — I48.0 PAROXYSMAL ATRIAL FIBRILLATION (HCC): ICD-10-CM

## 2024-01-03 LAB
AORTIC ROOT: 3.8 CM
AORTIC VALVE MEAN VELOCITY: 9.8 M/S
APICAL FOUR CHAMBER EJECTION FRACTION: 62 %
ASCENDING AORTA: 3.9 CM
AV AREA BY CONTINUOUS VTI: 2.3 CM2
AV AREA PEAK VELOCITY: 1.9 CM2
AV LVOT MEAN GRADIENT: 2 MMHG
AV LVOT PEAK GRADIENT: 3 MMHG
AV MEAN GRADIENT: 5 MMHG
AV PEAK GRADIENT: 9 MMHG
AV VALVE AREA: 2.26 CM2
AV VELOCITY RATIO: 0.6
DOP CALC AO PEAK VEL: 1.51 M/S
DOP CALC AO VTI: 31.84 CM
DOP CALC LVOT AREA: 3.14 CM2
DOP CALC LVOT CARDIAC INDEX: 2.03 L/MIN/M2
DOP CALC LVOT CARDIAC OUTPUT: 4.34 L/MIN
DOP CALC LVOT DIAMETER: 2 CM
DOP CALC LVOT PEAK VEL VTI: 22.9 CM
DOP CALC LVOT PEAK VEL: 0.9 M/S
DOP CALC LVOT STROKE INDEX: 32.2 ML/M2
DOP CALC LVOT STROKE VOLUME: 71.91
E WAVE DECELERATION TIME: 417 MS
E/A RATIO: 0.67
FRACTIONAL SHORTENING: 24 (ref 28–44)
INTERVENTRICULAR SEPTUM IN DIASTOLE (PARASTERNAL SHORT AXIS VIEW): 1.6 CM
INTERVENTRICULAR SEPTUM: 1.6 CM (ref 0.6–1.1)
LAAS-AP2: 26.2 CM2
LAAS-AP4: 19.6 CM2
LEFT ATRIUM SIZE: 4.7 CM
LEFT ATRIUM VOLUME (MOD BIPLANE): 68 ML
LEFT ATRIUM VOLUME INDEX (MOD BIPLANE): 31.8 ML/M2
LEFT INTERNAL DIMENSION IN SYSTOLE: 3.9 CM (ref 2.1–4)
LEFT VENTRICULAR INTERNAL DIMENSION IN DIASTOLE: 5.1 CM (ref 3.5–6)
LEFT VENTRICULAR POSTERIOR WALL IN END DIASTOLE: 1.1 CM
LEFT VENTRICULAR STROKE VOLUME: 60 ML
LVSV (TEICH): 60 ML
MV E'TISSUE VEL-SEP: 5 CM/S
MV PEAK A VEL: 0.66 M/S
MV PEAK E VEL: 44 CM/S
MV STENOSIS PRESSURE HALF TIME: 121 MS
MV VALVE AREA P 1/2 METHOD: 1.82
RA PRESSURE ESTIMATED: 4 MMHG
RIGHT ATRIUM AREA SYSTOLE A4C: 16.2 CM2
RIGHT VENTRICLE ID DIMENSION: 2.4 CM
RV PSP: 36 MMHG
SL CV LEFT ATRIUM LENGTH A2C: 7.3 CM
SL CV LV EF: 60
SL CV PED ECHO LEFT VENTRICLE DIASTOLIC VOLUME (MOD BIPLANE) 2D: 126 ML
SL CV PED ECHO LEFT VENTRICLE SYSTOLIC VOLUME (MOD BIPLANE) 2D: 66 ML
TR MAX PG: 32 MMHG
TR PEAK VELOCITY: 2.9 M/S
TRICUSPID ANNULAR PLANE SYSTOLIC EXCURSION: 1.5 CM
TRICUSPID VALVE PEAK REGURGITATION VELOCITY: 2.85 M/S

## 2024-01-03 PROCEDURE — 93306 TTE W/DOPPLER COMPLETE: CPT

## 2024-01-03 PROCEDURE — 93306 TTE W/DOPPLER COMPLETE: CPT | Performed by: INTERNAL MEDICINE

## 2024-01-05 ENCOUNTER — TELEPHONE (OUTPATIENT)
Age: 89
End: 2024-01-05

## 2024-01-05 NOTE — TELEPHONE ENCOUNTER
Patient called to let the office know that [facility] [employer] will be faxing a form to the office for approval of the patient's lancets and test strips.

## 2024-01-08 DIAGNOSIS — E78.5 HYPERLIPIDEMIA, UNSPECIFIED HYPERLIPIDEMIA TYPE: ICD-10-CM

## 2024-01-09 RX ORDER — SIMVASTATIN 40 MG
TABLET ORAL
Qty: 90 TABLET | Refills: 1 | Status: SHIPPED | OUTPATIENT
Start: 2024-01-09

## 2024-01-22 ENCOUNTER — OFFICE VISIT (OUTPATIENT)
Dept: AUDIOLOGY | Facility: CLINIC | Age: 89
End: 2024-01-22

## 2024-01-22 DIAGNOSIS — H90.3 SENSORINEURAL HEARING LOSS (SNHL), BILATERAL: Primary | ICD-10-CM

## 2024-01-22 NOTE — PROGRESS NOTES
[facility] AUDIOLOGY HEARING AID CHECK    Jim Lomeli was seen today (1/22/2024) for a hearing aid check of his bilateral hearing aids. Today, Mr. Lomeli reported that his hearing aids.    Otoscopy revealed Unremarkable, canals clear.     Device Information     Hearing Aid Fitting Date: 8/15/23       Left Device Right Device   Hearing Aid Make: Oticon Oticon   Hearing Aid Model: REAL 3 REAL 3   Serial Number: [id number]   Repair Warranty Expiration Date: 8/24/26 8/24/26   Loss/Damage Warranty Expiration Date:  8/24/26 8/24/26    Length: 3 3    Output: 85 85   Wax System: Pro Wax  Pro Wax   Dome Size/Style: 10 mm 10 mm   Battery: Lithium-ion Rechargeable Lithium-ion Rechargeable   [employer] Serial Number: N/A N/A   [employer] Warranty Date:  N/A N/A       Serial Number: [id number]  Accessories: N/A     Visual inspection of the device(s) revealed no noticeable damage or defects.     A listening check revealed good sound quality from the device(s).    Real Ear Measures indicated excellent benefit from the hearing aids at soft, moderate, and loud speech sounds.    Changes to Device(s)    Cleaned both hearing aids.  Changed wax filters   Changed domes  Real Ear Measurements (REM) were performed bilaterally using NAL-NL2 prescriptive targets. Targets were approximated for soft, medium, and loud speech at 55, 65, 75 decibels sound pressure level (SPL), respectively. Maximum Power Output (MPO) was within the predicted range of comfort. Subjective listening comfort validated by Mr. Lomeli.     Recommendations & Follow-up    Hearing aids(s) are in good working condition. Mr. Lomeli stated that he has no further questions with his hearing aid(s) and does not feed that any other adjustments are needed at this time. He will follow-up in 3-6 months, sooner if other problems/concerns arise.    It was a pleasure working with Mr. Lomeli today. He was encouraged to contact the clinic with any further  questions in the meantime.    Dona Casey., CCC-A  Clinical Audiologist    [facility] AUDIOLOGY  [address] [facility] [id number]

## 2024-01-24 ENCOUNTER — OFFICE VISIT (OUTPATIENT)
Dept: OTOLARYNGOLOGY | Facility: CLINIC | Age: 89
End: 2024-01-24
Payer: MEDICARE

## 2024-01-24 VITALS — WEIGHT: 203 LBS | HEIGHT: 72 IN | TEMPERATURE: 97.5 F | BODY MASS INDEX: 27.5 KG/M2

## 2024-01-24 DIAGNOSIS — R26.89 IMBALANCE: ICD-10-CM

## 2024-01-24 DIAGNOSIS — H90.3 SENSORINEURAL HEARING LOSS (SNHL), BILATERAL: Primary | ICD-10-CM

## 2024-01-24 PROCEDURE — 99214 OFFICE O/P EST MOD 30 MIN: CPT | Performed by: NURSE PRACTITIONER

## 2024-01-24 NOTE — ASSESSMENT & PLAN NOTE
On exam, bilateral minimal cerumen debris. Removed with suction, no procedure.   Symptoms include sensation of imbalance then falls.  Occurs when moving or moving from sitting to standing then turning.  Denies occurrence when moving from lying to standing position. Improved after 4 to 5 weeks of balance therapy.  Currently using a walker.      Discussed possible causes of imbalance including neurological, cardiac, autoimmune, Otitis media, sinusitis, chemical imbalance, and inner ear concerns.      Reviewed results of prior Ct head from 2022 without any ENT related findings at that time.   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  There is a very slight asymmetry on the left at 4K and 6K but it is not meeting guidelines to recommend MRI brain with IAC due to the asymmetry.  Asymmetry unlikely impacting his imbalance concerns. Word discrimination 84% on right and 92% on left. Tymps type A bilaterally. Offered Mri brain with IAC due to the very slight asymmetry on left however does not meet guidelines as necessary option.  Pt doing well with current hearing aids and finds them to be helpful     Unlikely ear source of frequent falls based on pt report of symptoms, audiogram and longevity.      Treatment options include at home epley's, nasal steroids, oral steroids, lab studies, vestibular therapy, VNG testing, neurology consultation, MRI brain with IAC.  Also may consider tilt table as symptoms occur when moving from sitting to standing position and turning position      After discussion agreed to observation  Follow up in 6 months to monitor for cerumen and reoccurrence of imbalance. If doing well next visit then will change to annual visits

## 2024-02-08 ENCOUNTER — APPOINTMENT (OUTPATIENT)
Dept: LAB | Facility: CLINIC | Age: 89
End: 2024-02-08
Payer: MEDICARE

## 2024-02-08 DIAGNOSIS — N25.81 SECONDARY HYPERPARATHYROIDISM (HCC): ICD-10-CM

## 2024-02-08 DIAGNOSIS — N18.32 STAGE 3B CHRONIC KIDNEY DISEASE (HCC): ICD-10-CM

## 2024-02-08 DIAGNOSIS — N18.32 TYPE 2 DIABETES MELLITUS WITH STAGE 3B CHRONIC KIDNEY DISEASE, WITHOUT LONG-TERM CURRENT USE OF INSULIN (HCC): ICD-10-CM

## 2024-02-08 DIAGNOSIS — E55.9 VITAMIN D INSUFFICIENCY: ICD-10-CM

## 2024-02-08 DIAGNOSIS — Z86.39 HISTORY OF HYPERKALEMIA: ICD-10-CM

## 2024-02-08 DIAGNOSIS — E11.22 TYPE 2 DIABETES MELLITUS WITH STAGE 3B CHRONIC KIDNEY DISEASE, WITHOUT LONG-TERM CURRENT USE OF INSULIN (HCC): ICD-10-CM

## 2024-02-08 LAB
BASOPHILS # BLD AUTO: 0.06 THOUSANDS/ÂΜL (ref 0–0.1)
BASOPHILS NFR BLD AUTO: 1 % (ref 0–1)
CREAT UR-MCNC: 153.3 MG/DL
CREAT UR-MCNC: 153.3 MG/DL
EOSINOPHIL # BLD AUTO: 0.35 THOUSAND/ÂΜL (ref 0–0.61)
EOSINOPHIL NFR BLD AUTO: 4 % (ref 0–6)
ERYTHROCYTE [DISTWIDTH] IN BLOOD BY AUTOMATED COUNT: 15.9 % (ref 11.6–15.1)
HCT VFR BLD AUTO: 39.7 % (ref 36.5–49.3)
HGB BLD-MCNC: 12.3 G/DL (ref 12–17)
IMM GRANULOCYTES # BLD AUTO: 0.03 THOUSAND/UL (ref 0–0.2)
IMM GRANULOCYTES NFR BLD AUTO: 0 % (ref 0–2)
LEFT EYE DIABETIC RETINOPATHY: NORMAL
LYMPHOCYTES # BLD AUTO: 3.2 THOUSANDS/ÂΜL (ref 0.6–4.47)
LYMPHOCYTES NFR BLD AUTO: 40 % (ref 14–44)
MCH RBC QN AUTO: 26.8 PG (ref 26.8–34.3)
MCHC RBC AUTO-ENTMCNC: 31 G/DL (ref 31.4–37.4)
MCV RBC AUTO: 87 FL (ref 82–98)
MICROALBUMIN UR-MCNC: <7 MG/L
MICROALBUMIN/CREAT 24H UR: <5 MG/G CREATININE (ref 0–30)
MONOCYTES # BLD AUTO: 0.69 THOUSAND/ÂΜL (ref 0.17–1.22)
MONOCYTES NFR BLD AUTO: 9 % (ref 4–12)
NEUTROPHILS # BLD AUTO: 3.75 THOUSANDS/ÂΜL (ref 1.85–7.62)
NEUTS SEG NFR BLD AUTO: 46 % (ref 43–75)
NRBC BLD AUTO-RTO: 0 /100 WBCS
PLATELET # BLD AUTO: 247 THOUSANDS/UL (ref 149–390)
PMV BLD AUTO: 10.2 FL (ref 8.9–12.7)
PROT UR-MCNC: 11 MG/DL
PROT/CREAT UR: 0.07 MG/G{CREAT} (ref 0–0.1)
PTH-INTACT SERPL-MCNC: 74.4 PG/ML (ref 12–88)
RBC # BLD AUTO: 4.59 MILLION/UL (ref 3.88–5.62)
RIGHT EYE DIABETIC RETINOPATHY: NORMAL
WBC # BLD AUTO: 8.08 THOUSAND/UL (ref 4.31–10.16)

## 2024-02-08 PROCEDURE — 82043 UR ALBUMIN QUANTITATIVE: CPT

## 2024-02-08 PROCEDURE — 36415 COLL VENOUS BLD VENIPUNCTURE: CPT

## 2024-02-08 PROCEDURE — 84156 ASSAY OF PROTEIN URINE: CPT

## 2024-02-08 PROCEDURE — 83970 ASSAY OF PARATHORMONE: CPT

## 2024-02-08 PROCEDURE — 82570 ASSAY OF URINE CREATININE: CPT

## 2024-02-08 PROCEDURE — 85025 COMPLETE CBC W/AUTO DIFF WBC: CPT

## 2024-02-08 PROCEDURE — 80069 RENAL FUNCTION PANEL: CPT

## 2024-02-09 LAB
ALBUMIN SERPL BCP-MCNC: 4.2 G/DL (ref 3.5–5)
ANION GAP SERPL CALCULATED.3IONS-SCNC: 13 MMOL/L
BUN SERPL-MCNC: 22 MG/DL (ref 5–25)
CALCIUM SERPL-MCNC: 9.3 MG/DL (ref 8.4–10.2)
CHLORIDE SERPL-SCNC: 103 MMOL/L (ref 96–108)
CO2 SERPL-SCNC: 22 MMOL/L (ref 21–32)
CREAT SERPL-MCNC: 1.58 MG/DL (ref 0.6–1.3)
GFR SERPL CREATININE-BSD FRML MDRD: 39 ML/MIN/1.73SQ M
GLUCOSE SERPL-MCNC: 122 MG/DL (ref 65–140)
PHOSPHATE SERPL-MCNC: 3.3 MG/DL (ref 2.3–4.1)
POTASSIUM SERPL-SCNC: 5.5 MMOL/L (ref 3.5–5.3)
SODIUM SERPL-SCNC: 138 MMOL/L (ref 135–147)

## 2024-02-12 ENCOUNTER — OFFICE VISIT (OUTPATIENT)
Dept: NEPHROLOGY | Facility: CLINIC | Age: 89
End: 2024-02-12
Payer: MEDICARE

## 2024-02-12 VITALS
BODY MASS INDEX: 27.9 KG/M2 | DIASTOLIC BLOOD PRESSURE: 70 MMHG | HEART RATE: 59 BPM | WEIGHT: 206 LBS | HEIGHT: 72 IN | SYSTOLIC BLOOD PRESSURE: 133 MMHG

## 2024-02-12 DIAGNOSIS — E87.5 HYPERKALEMIA: ICD-10-CM

## 2024-02-12 DIAGNOSIS — N18.32 STAGE 3B CHRONIC KIDNEY DISEASE (HCC): Primary | ICD-10-CM

## 2024-02-12 DIAGNOSIS — N25.81 SECONDARY HYPERPARATHYROIDISM (HCC): ICD-10-CM

## 2024-02-12 DIAGNOSIS — N18.32 TYPE 2 DIABETES MELLITUS WITH STAGE 3B CHRONIC KIDNEY DISEASE, WITHOUT LONG-TERM CURRENT USE OF INSULIN (HCC): ICD-10-CM

## 2024-02-12 DIAGNOSIS — E11.22 TYPE 2 DIABETES MELLITUS WITH STAGE 3B CHRONIC KIDNEY DISEASE, WITHOUT LONG-TERM CURRENT USE OF INSULIN (HCC): ICD-10-CM

## 2024-02-12 DIAGNOSIS — N28.1 BILATERAL RENAL CYSTS: ICD-10-CM

## 2024-02-12 PROCEDURE — 99214 OFFICE O/P EST MOD 30 MIN: CPT | Performed by: INTERNAL MEDICINE

## 2024-02-12 RX ORDER — TORSEMIDE 10 MG/1
10 TABLET ORAL DAILY
Qty: 90 TABLET | Refills: 3 | Status: SHIPPED | OUTPATIENT
Start: 2024-02-12 | End: 2024-05-12

## 2024-02-12 NOTE — PROGRESS NOTES
NEPHROLOGY OFFICE VISIT   Jim Lomeli 88 y.o. male MRN: [id number]  2/12/2024    Reason for Visit: Chronic kidney disease    ASSESSMENT and PLAN:  It was a pleasure evaluating your patient in the office today. Thank you for allowing our team to participate in the care of Mr. Jim Lomeli. Please do not hesitate to contact our team if further issues/questions shall arise in the interim.     # Chronic Kidney Disease Stage III-B  - Etiology/risk factors: Diabetes, hypertension, age-related nephron loss  - Baseline Cr: 1.2-1.55 since 2018, most recently 1.58 02/2024  - Urinalysis: Negative protein, no RBC, no WBC 05/2023  - Proteinuria: No, UACR less than 5 mg/g, UPCR 70 mg 02/2024  - Imaging:  Right kidney 12.1 cm, left kidney 12.8 cm, normal echogenicity and contour  - Discussed natural history of progression of chronic kidney disease  - KFRE less than 5% risk of kidney failure needing dialysis in next 5 years  - Patient does not want to do dialysis if needed in future and would opt for conservative management  - Risk factor reduction to slow progression of chronic kidney disease  Intensive blood pressure control as below  Glycemic control as below  Lipid control on Zocor 40 mg daily  Avoidance of NSAIDs     # Bilateral renal cysts  - Right renal cyst minimally enlarged (1.4/1.7/1.5 cm)  - Left renal cyst 2.4/2.7/2.5 cm previously 2.0/2.3/2.6 cm  - Bilobed septated left renal cyst 2.9/2.6/5.7 cm previously 3.2/2.7/5.6 cm (larger component appears to be a renal sinus cyst when compared on comparison CT and smaller component appears to be exophytic with low internal level echoes)  - Discussed with radiology and the cysts look benign and a follow-up kidney ultrasound will be performed in 1 year 11/2024    # BP/Volume   - Goal BP <120/80 per KDIGO guidelines   - Volume status: Mild bilateral lower extremity swelling  - Status: Blood pressure currently close to goal  - Current antihypertensive regimen: Metoprolol 12.5 mg  twice daily, torsemide 5 mg daily  - Changes: Increase torsemide to 10 mg daily as below mostly for kaliuresis and volume control  - Patient was advised to monitor blood pressure at home after increasing the dose of torsemide     # Screening for Anemia   - Target Hb: More than 10 g/dL  - Most recent hemoglobin: 12.1 g/dL    # Electrolytes/Acid Base status   >> Hyperkalemia  - Serum potassium 5.6-5.7 since 10/2022, most recently 5.5 02/08/2024  - Most likely in setting of decreased potassium excretion from chronic kidney disease and increased dietary potassium intake  - Diabetics can also develop type IV renal tubular acidosis but he does not have metabolic acidosis   - No obstructive uropathy  - Low potassium diet   - Increase torsemide to 10 mg daily for ongoing kaliuresis  - Repeat BMP in 2 weeks    # CKD Mineral and Bone Disorder   - Goal Ca 8.5-10 mg/dL, goal Phos 2.7-4.6 mg/dL, goal iPTH 30-70 pg/mL  - PTH 74.4 02/2024 improved from 95.3 05/2023 after supplementation of cholecalciferol most likely secondary hyperparathyroidism in setting of vitamin D deficiency/insufficiency  - 25-hydroxy vitamin D level 23.9 in 05/2023 status post initiation of cholecalciferol 1000 units daily  - Given improvement in PTH, continue cholecalciferol 1000 units daily    # Diabetes mellitus  - Most recent HbA1c 7.3 10/2023 increased from 6.4 02/2023 overall improved from 7.0-7.5 in [id number]  - Currently on metformin 500 mg twice daily, dose appropriate for current level of GFR  - Discussed importance of good glycemic control in preventing progression of chronic kidney disease    # History of atrial fibrillation  - Currently rate controlled with metoprolol  - Since last visit he has started anticoagulation with Eliquis 2.5 mg twice daily     # History of coronary artery disease  - Status post remote MI in 1991 treated with thrombolytics and rotational atherectomy of left anterior descending artery  - Follows with cardiology and  remains on simvastatin and aspirin    HPI:  Since last visit:  Patient has been doing well.  He denies dyspnea.  He has some leg swelling.  He denies any trouble with urination.    Jim Lomeli is a 88 y.o. male who has history of hypertension for last 30 years.  He is currently on metoprolol.  He also has history of diabetes for the last 30 years and is currently on metformin dose of which was recently decreased by PCP.  He has history of atrial fibrillation and is currently on aspirin alone.  For rate control he takes metoprolol 25 mg twice daily.  He also has issues with his sacroiliac joint and is scheduled for steroid injection soon.  He was recently noticed to have elevated potassium and is doing dietary modifications to help serum potassium.  He has history of coronary artery disease and had myocardial infarction in 1990 status post thrombectomy.       >> Major risk factors for CKD  - Diabetes: Yes for 30 years  - Hypertension: Yes for 30 years   - Age ? 55 years: Yes   - Family history of kidney disease: No    - Obesity or metabolic syndrome: Yes      >> Medical history evaluation   - Prior kidney disease or dialysis: No   - Incidental hematuria in the past: No   - Urinary symptoms: Stream is good, nocturia 2-3 times   - History of foamy or frothy urine: No  - History of nephrolithiasis: Yes in 1908's but passed it spontaneously   - Diseases that share risk factors with CKD: DM, HTN, CAD  - Systemic diseases that might affect kidney: No   - History of use of medications that might affect renal function: PPI for several years     OBJECTIVE:  Current Weight: Weight - Scale: 93.4 kg (206 lb)  Vitals:    02/12/24 1027   BP: 133/70   BP Location: Left arm   Patient Position: Sitting   Cuff Size: Large   Pulse: 59   Weight: 93.4 kg (206 lb)   Height: 6' (1.829 m)      Body mass index is 27.94 kg/m².      REVIEW OF SYSTEMS:    Review of Systems   Constitutional:  Negative for chills and fever.   HENT:  Negative  for ear pain and sore throat.    Eyes:  Negative for pain and visual disturbance.   Respiratory:  Negative for cough and shortness of breath.    Cardiovascular:  Negative for chest pain and palpitations.   Gastrointestinal:  Negative for abdominal pain and vomiting.   Genitourinary:  Negative for dysuria and hematuria.   Musculoskeletal:  Negative for arthralgias and back pain.   Skin:  Negative for color change and rash.   Neurological:  Negative for seizures and syncope.   All other systems reviewed and are negative.      Past Medical History:   Diagnosis Date    Acute MI (HCC)     1991    Ambulates with cane     Anxiety     Arthritis     hands    At risk for falls     Atrial fibrillation (HCC)     Cholecystitis     CKD (chronic kidney disease)     Coronary artery disease     Diabetes mellitus (HCC)     GERD (gastroesophageal reflux disease)     Heart murmur     Hyperlipidemia     Hypertension     Low back pain     Lumbar disc disease     Umbilical hernia     Wears dentures     full set    Wears glasses        Past Surgical History:   Procedure Laterality Date    CARDIAC CATHETERIZATION      s/p MI    COLONOSCOPY      HERNIA REPAIR Left     X5    NH LAPAROSCOPY SURG CHOLECYSTECTOMY N/A 1/7/2022    Procedure: ROBOTIC ASSISTED LAPAROSCOPIC CHOLECYSTECTOMY;  Surgeon: Dagoberto Aguilar MD;  Location: [facility];  Service: General    TOTAL HIP ARTHROPLASTY Left     UMBILICAL HERNIA REPAIR LAPAROSCOPIC N/A 1/7/2022    Procedure: Open umbilical hernia repair;  Surgeon: Dagoberto Aguilar MD;  Location: [facility];  Service: General       Family History   Problem Relation Age of Onset    No Known Problems Mother     No Known Problems Father         Social History     Substance and Sexual Activity   Alcohol Use Yes    Comment: rare     Social History     Substance and Sexual Activity   Drug Use No     Social History     Tobacco Use   Smoking Status Never   Smokeless Tobacco Never       PHYSICAL EXAM:      Physical  Exam  Constitutional:       Appearance: Normal appearance.   HENT:      Head: Normocephalic and atraumatic.      Mouth/Throat:      Mouth: Mucous membranes are moist.      Pharynx: Oropharynx is clear.   Cardiovascular:      Rate and Rhythm: Normal rate and regular rhythm.      Pulses: Normal pulses.      Heart sounds: Normal heart sounds.   Pulmonary:      Effort: Pulmonary effort is normal.      Breath sounds: Normal breath sounds.   Abdominal:      General: Bowel sounds are normal.      Palpations: Abdomen is soft.   Musculoskeletal:         General: Normal range of motion.      Right lower leg: Edema present.      Left lower leg: Edema present.   Skin:     General: Skin is warm and dry.   Neurological:      General: No focal deficit present.      Mental Status: He is alert and oriented to person, place, and time. Mental status is at baseline.   Psychiatric:         Mood and Affect: Mood normal.         Behavior: Behavior normal.         Thought Content: Thought content normal.         Judgment: Judgment normal.       Medications:    Current Outpatient Medications:     acetaminophen (TYLENOL) 325 mg tablet, Take 2 tablets (650 mg total) by mouth every 6 (six) hours (Patient taking differently: Take 650 mg by mouth every 6 (six) hours as needed), Disp: 30 tablet, Rfl: 0    aspirin (ECOTRIN LOW STRENGTH) 81 mg EC tablet, Take 81 mg by mouth daily, Disp: , Rfl:     cholecalciferol (VITAMIN D3) 1,000 units tablet, Take 1 tablet (1,000 Units total) by mouth daily, Disp: 90 tablet, Rfl: 3    cyanocobalamin (VITAMIN B-12) 500 mcg tablet, Take 1,000 mcg by mouth daily, Disp: , Rfl:     docusate sodium (COLACE) 100 mg capsule, Take 1 capsule (100 mg total) by mouth 2 (two) times a day (Patient taking differently: Take 100 mg by mouth every evening), Disp: 10 capsule, Rfl: 0    escitalopram (LEXAPRO) 5 mg tablet, TAKE 1 TABLET BY MOUTH EVERY DAY, Disp: 90 tablet, Rfl: 1    glimepiride (AMARYL) 1 mg tablet, Take 1 tablet (1  mg total) by mouth 3 (three) times a day with meals, Disp: 1080 tablet, Rfl: 0    metFORMIN (GLUCOPHAGE) 500 mg tablet, Take 1 tablet (500 mg total) by mouth daily with breakfast, Disp: 90 tablet, Rfl: 1    metoprolol tartrate (LOPRESSOR) 25 mg tablet, TAKE 1 TABLET (25 MG TOTAL) BY MOUTH EVERY 12 (TWELVE) HOURS (Patient taking differently: Take 12.5 mg by mouth every 12 (twelve) hours), Disp: 180 tablet, Rfl: 1    omeprazole (PriLOSEC) 40 MG capsule, TAKE 1 CAPSULE BY MOUTH EVERY DAY BEFORE BREAKFAST, Disp: 90 capsule, Rfl: 1    simvastatin (ZOCOR) 40 mg tablet, TAKE 1 TABLET BY MOUTH EVERY DAY, Disp: 90 tablet, Rfl: 1    torsemide (DEMADEX) 10 mg tablet, Take 1 tablet (10 mg total) by mouth daily, Disp: 90 tablet, Rfl: 3    Laboratory Results:  Results from last 7 days   Lab Units 02/08/24  0754   WBC Thousand/uL 8.08   HEMOGLOBIN g/dL 12.3   HEMATOCRIT % 39.7   PLATELETS Thousands/uL 247   POTASSIUM mmol/L 5.5*   CHLORIDE mmol/L 103   CO2 mmol/L 22   BUN mg/dL 22   CREATININE mg/dL 1.58*   CALCIUM mg/dL 9.3   PHOSPHORUS mg/dL 3.3       Results for orders placed or performed in visit on 02/08/24   Renal function panel   Result Value Ref Range    Albumin 4.2 3.5 - 5.0 g/dL    Calcium 9.3 8.4 - 10.2 mg/dL    Phosphorus 3.3 2.3 - 4.1 mg/dL    Glucose 122 65 - 140 mg/dL    BUN 22 5 - 25 mg/dL    Creatinine 1.58 (H) 0.60 - 1.30 mg/dL    Sodium 138 135 - 147 mmol/L    Potassium 5.5 (H) 3.5 - 5.3 mmol/L    Chloride 103 96 - 108 mmol/L    CO2 22 21 - 32 mmol/L    ANION GAP 13 mmol/L    eGFR 39 ml/min/1.73sq m   Protein / creatinine ratio, urine   Result Value Ref Range    Creatinine, Ur 153.3 Reference range not established. mg/dL    Protein Urine Random 11 Reference range not established. mg/dL    Prot/Creat Ratio, Ur 0.07 0.00 - 0.10   PTH, intact   Result Value Ref Range    PTH 74.4 12.0 - 88.0 pg/mL   Albumin / creatinine urine ratio   Result Value Ref Range    Creatinine, Ur 153.3 Reference range not established.  mg/dL    Albumin,U,Random <7.0 <20.0 mg/L    Albumin Creat Ratio <5 0 - 30 mg/g creatinine   CBC and differential   Result Value Ref Range    WBC 8.08 4.31 - 10.16 Thousand/uL    RBC 4.59 3.88 - 5.62 Million/uL    Hemoglobin 12.3 12.0 - 17.0 g/dL    Hematocrit 39.7 36.5 - 49.3 %    MCV 87 82 - 98 fL    MCH 26.8 26.8 - 34.3 pg    MCHC 31.0 (L) 31.4 - 37.4 g/dL    RDW 15.9 (H) 11.6 - 15.1 %    MPV 10.2 8.9 - 12.7 fL    Platelets 247 149 - 390 Thousands/uL    nRBC 0 /100 WBCs    Neutrophils Relative 46 43 - 75 %    Immat GRANS % 0 0 - 2 %    Lymphocytes Relative 40 14 - 44 %    Monocytes Relative 9 4 - 12 %    Eosinophils Relative 4 0 - 6 %    Basophils Relative 1 0 - 1 %    Neutrophils Absolute 3.75 1.85 - 7.62 Thousands/µL    Immature Grans Absolute 0.03 0.00 - 0.20 Thousand/uL    Lymphocytes Absolute 3.20 0.60 - 4.47 Thousands/µL    Monocytes Absolute 0.69 0.17 - 1.22 Thousand/µL    Eosinophils Absolute 0.35 0.00 - 0.61 Thousand/µL    Basophils Absolute 0.06 0.00 - 0.10 Thousands/µL

## 2024-02-12 NOTE — PATIENT INSTRUCTIONS
Your kidney numbers are currently stable at stage III kidney disease.    Blood pressure is controlled.    Potassium level is still on the higher side and would recommend increasing torsemide to 10 mg daily.  This will also help take care of the swelling in the legs.    After making the change, check blood test in 2 weeks.    Check blood test in 3 months.    Check full panel of blood tests and urine test in 6 months before next office visit.

## 2024-02-27 ENCOUNTER — APPOINTMENT (OUTPATIENT)
Dept: LAB | Facility: CLINIC | Age: 89
End: 2024-02-27
Payer: MEDICARE

## 2024-02-27 DIAGNOSIS — N18.32 STAGE 3B CHRONIC KIDNEY DISEASE (HCC): ICD-10-CM

## 2024-02-27 DIAGNOSIS — E87.5 HYPERKALEMIA: ICD-10-CM

## 2024-02-27 LAB
ANION GAP SERPL CALCULATED.3IONS-SCNC: 8 MMOL/L
BUN SERPL-MCNC: 27 MG/DL (ref 5–25)
CALCIUM SERPL-MCNC: 9.5 MG/DL (ref 8.4–10.2)
CHLORIDE SERPL-SCNC: 103 MMOL/L (ref 96–108)
CO2 SERPL-SCNC: 27 MMOL/L (ref 21–32)
CREAT SERPL-MCNC: 1.56 MG/DL (ref 0.6–1.3)
GFR SERPL CREATININE-BSD FRML MDRD: 39 ML/MIN/1.73SQ M
GLUCOSE P FAST SERPL-MCNC: 120 MG/DL (ref 65–99)
POTASSIUM SERPL-SCNC: 4.9 MMOL/L (ref 3.5–5.3)
SODIUM SERPL-SCNC: 138 MMOL/L (ref 135–147)

## 2024-02-27 PROCEDURE — 80048 BASIC METABOLIC PNL TOTAL CA: CPT

## 2024-02-27 PROCEDURE — 36415 COLL VENOUS BLD VENIPUNCTURE: CPT

## 2024-02-28 ENCOUNTER — TELEPHONE (OUTPATIENT)
Dept: NEPHROLOGY | Facility: CLINIC | Age: 89
End: 2024-02-28

## 2024-02-28 DIAGNOSIS — I10 ESSENTIAL HYPERTENSION: Primary | ICD-10-CM

## 2024-02-28 NOTE — TELEPHONE ENCOUNTER
Left voicemail for the patient relaying the message above. Advised patient to call back with any questions or concerns.

## 2024-02-28 NOTE — TELEPHONE ENCOUNTER
----- Message from Michel Zimmer MD sent at 2/27/2024  4:23 PM EST -----  Serum potassium level has now normalized after increasing the dose of torsemide to 10 mg daily.  Kidney function remains stable at baseline.  Continue torsemide 10 mg daily and repeat BMP in 2 months.

## 2024-03-13 ENCOUNTER — OFFICE VISIT (OUTPATIENT)
Dept: PODIATRY | Facility: CLINIC | Age: 89
End: 2024-03-13
Payer: MEDICARE

## 2024-03-13 VITALS
DIASTOLIC BLOOD PRESSURE: 81 MMHG | HEIGHT: 72 IN | HEART RATE: 57 BPM | OXYGEN SATURATION: 96 % | WEIGHT: 206 LBS | BODY MASS INDEX: 27.9 KG/M2 | SYSTOLIC BLOOD PRESSURE: 129 MMHG

## 2024-03-13 DIAGNOSIS — M21.611 BUNION OF RIGHT FOOT: ICD-10-CM

## 2024-03-13 DIAGNOSIS — B35.1 ONYCHOMYCOSIS: Primary | ICD-10-CM

## 2024-03-13 DIAGNOSIS — L84 CORNS: ICD-10-CM

## 2024-03-13 DIAGNOSIS — E11.42 TYPE 2 DIABETES MELLITUS WITH DIABETIC POLYNEUROPATHY, WITHOUT LONG-TERM CURRENT USE OF INSULIN (HCC): ICD-10-CM

## 2024-03-13 PROCEDURE — 11055 PARING/CUTG B9 HYPRKER LES 1: CPT | Performed by: PODIATRIST

## 2024-03-13 PROCEDURE — 11721 DEBRIDE NAIL 6 OR MORE: CPT | Performed by: PODIATRIST

## 2024-03-13 NOTE — PROGRESS NOTES
Assessment/Plan:     The patient's clinical examination today significant for brittle, thickened and dystrophic pedal nail plates with discoloration and subungual debris and brittleness with areas of lytic changes consistent with onychomycosis, right worse than the left.  There is a callus lesion to the distal aspect of the right great toe.  The interdigital spaces are clear without maceration.  Pedal pulses are palpable bilaterally but diminished.  Epicritic sensation is diminished bilaterally secondary to peripheral neuropathy.  Skin is thin with decreased turgor.  There is absence of hair growth to the bilateral lower extremities.     The pedal nail plates are sharply debrided with a sterile nail clipper x10 without complication.  The nails with a mechanically reduced in thickness and girth utilize a rotary bur without complication.  The callus lesion to the right great toe was sharply debrided with a sterile #15 blade without complication.  She was noted today.    His most recent hemoglobin A1c from October 2023 was 7.3, prior to that it was 6.4 in February 2023.  His current diabetic shoes were evaluated and noted to be worn.  The shoes are over a-year-old.  A prescription for new diabetic shoes was entered into his chart.    Recommend follow-up in 3 months for continued at risk diabetic foot care.        Diagnoses and all orders for this visit:    Bunion of right foot  -     Diabetic Shoe    Type 2 diabetes mellitus with diabetic polyneuropathy, without long-term current use of insulin (HCC)  -     Diabetic Shoe    Corns  -     Diabetic Shoe    Onychomycosis  -     Diabetic Shoe          Subjective:     Patient ID: Jim Lomeli is a 88 y.o. male.    The patient presents today for continued at risk diabetic footcare with class findings.  The patient notes that his nails are becoming very long and catching on his shoes and socks.  He also notes that his current pair of diabetic shoes is started to become very  worn.  He is interested in a prescription for a new pair.      PAST MEDICAL HISTORY:  Past Medical History:   Diagnosis Date    Acute MI (HCC)     1991    Ambulates with cane     Anxiety     Arthritis     hands    At risk for falls     Atrial fibrillation (HCC)     Cholecystitis     CKD (chronic kidney disease)     Coronary artery disease     Diabetes mellitus (HCC)     GERD (gastroesophageal reflux disease)     Heart murmur     Hyperlipidemia     Hypertension     Low back pain     Lumbar disc disease     Umbilical hernia     Wears dentures     full set    Wears glasses        PAST SURGICAL HISTORY:  Past Surgical History:   Procedure Laterality Date    CARDIAC CATHETERIZATION      s/p MI    COLONOSCOPY      HERNIA REPAIR Left     X5    UT LAPAROSCOPY SURG CHOLECYSTECTOMY N/A 1/7/2022    Procedure: ROBOTIC ASSISTED LAPAROSCOPIC CHOLECYSTECTOMY;  Surgeon: Dagoberto Aguilar MD;  Location: [facility];  Service: General    TOTAL HIP ARTHROPLASTY Left     UMBILICAL HERNIA REPAIR LAPAROSCOPIC N/A 1/7/2022    Procedure: Open umbilical hernia repair;  Surgeon: Dagoberto Aguilar MD;  Location: [facility];  Service: General        ALLERGIES:  Patient has no known allergies.    MEDICATIONS:  Current Outpatient Medications   Medication Sig Dispense Refill    acetaminophen (TYLENOL) 325 mg tablet Take 2 tablets (650 mg total) by mouth every 6 (six) hours (Patient taking differently: Take 650 mg by mouth every 6 (six) hours as needed) 30 tablet 0    aspirin (ECOTRIN LOW STRENGTH) 81 mg EC tablet Take 81 mg by mouth daily      cholecalciferol (VITAMIN D3) 1,000 units tablet Take 1 tablet (1,000 Units total) by mouth daily 90 tablet 3    cyanocobalamin (VITAMIN B-12) 500 mcg tablet Take 1,000 mcg by mouth daily      docusate sodium (COLACE) 100 mg capsule Take 1 capsule (100 mg total) by mouth 2 (two) times a day (Patient taking differently: Take 100 mg by mouth every evening) 10 capsule 0    escitalopram (LEXAPRO) 5 mg tablet TAKE 1  TABLET BY MOUTH EVERY DAY 90 tablet 1    glimepiride (AMARYL) 1 mg tablet Take 1 tablet (1 mg total) by mouth 3 (three) times a day with meals 1080 tablet 0    metFORMIN (GLUCOPHAGE) 500 mg tablet Take 1 tablet (500 mg total) by mouth daily with breakfast 90 tablet 1    metoprolol tartrate (LOPRESSOR) 25 mg tablet TAKE 1 TABLET (25 MG TOTAL) BY MOUTH EVERY 12 (TWELVE) HOURS (Patient taking differently: Take 12.5 mg by mouth every 12 (twelve) hours) 180 tablet 1    omeprazole (PriLOSEC) 40 MG capsule TAKE 1 CAPSULE BY MOUTH EVERY DAY BEFORE BREAKFAST 90 capsule 1    simvastatin (ZOCOR) 40 mg tablet TAKE 1 TABLET BY MOUTH EVERY DAY 90 tablet 1    torsemide (DEMADEX) 10 mg tablet Take 1 tablet (10 mg total) by mouth daily 90 tablet 3     No current facility-administered medications for this visit.       SOCIAL HISTORY:  Social History     Socioeconomic History    Marital status: /Civil Union     Spouse name: None    Number of children: None    Years of education: None    Highest education level: None   Occupational History    Occupation: retired   Tobacco Use    Smoking status: Never    Smokeless tobacco: Never   Vaping Use    Vaping status: Never Used   Substance and Sexual Activity    Alcohol use: Yes     Comment: rare    Drug use: No    Sexual activity: Not Currently   Other Topics Concern    None   Social History Narrative    Prabha:    Most recent tobacco use screenin2020      Do you currently or have you served in the [place] [employer]:   No      Were you activated, into active duty, as a member of the [employer] or as a Reservist:   No      Occupation:   Retired      Marital status:         Sexual orientation:   Heterosexual      Exercise level:   Occasional      Diet:   Regular      General stress level:   Medium      Alcohol intake:   Occasional      Caffeine intake:   Moderate      Chewing tobacco:   none      Illicit drugs:   Denied      Guns present in home:   No      Seat belts  used routinely:   Yes      Sunscreen used routinely:   Yes      Smoke alarm in home:   Yes      Advance directive:   Yes      Salt Intake:   Normal     Would the patient like to schedule a Mammogram:   No      Is the patient interested in a colorectal cancer screening:   No     Last modified by zulma   01-, 15:03      Social Determinants of Health     Financial Resource Strain: Unknown (2/21/2023)    Overall Financial Resource Strain (CARDIA)     Difficulty of Paying Living Expenses: Patient declined   Food Insecurity: No Food Insecurity (1/8/2022)    Hunger Vital Sign     Worried About Running Out of Food in the Last Year: Never true     Ran Out of Food in the Last Year: Never true   Transportation Needs: Unknown (2/21/2023)    PRAPARE - Transportation     Lack of Transportation (Medical): Patient declined     Lack of Transportation (Non-Medical): Patient declined   Physical Activity: Not on file   Stress: Not on file   Social Connections: Not on file   Intimate Partner Violence: Not on file   Housing Stability: Unknown (1/8/2022)    Housing Stability Vital Sign     Unable to Pay for Housing in the Last Year: No     Number of Places Lived in the Last Year: 1     Unstable Housing in the Last Year: Not on file        Review of Systems   Constitutional: Negative.    HENT: Negative.     Eyes: Negative.    Respiratory: Negative.     Cardiovascular: Negative.    Endocrine: Negative.    Musculoskeletal: Negative.    Neurological: Negative.    Hematological: Negative.    Psychiatric/Behavioral: Negative.           Objective:     Physical Exam  Vitals reviewed.   Constitutional:       Appearance: Normal appearance.   HENT:      Head: Normocephalic and atraumatic.      Nose: Nose normal.   Eyes:      Conjunctiva/sclera: Conjunctivae normal.      Pupils: Pupils are equal, round, and reactive to light.   Pulmonary:      Effort: Pulmonary effort is normal.   Skin:     General: Skin is warm.      Capillary Refill:  "Capillary refill takes less than 2 seconds.   Neurological:      General: No focal deficit present.      Mental Status: He is alert and oriented to person, place, and time.   Psychiatric:         Mood and Affect: Mood normal.         Behavior: Behavior normal.         Thought Content: Thought content normal.           Lesion Destruction    Date/Time: 3/13/2024 10:45 AM    Performed by: Elkin Pfeiffer DPM  Authorized by: Elkin Pfeiffer DPM  Universal Protocol:  Consent: Verbal consent obtained.  Risks and benefits: risks, benefits and alternatives were discussed  Consent given by: patient  Time out: Immediately prior to procedure a \"time out\" was called to verify the correct patient, procedure, equipment, support staff and site/side marked as required.  Timeout called at: 3/13/2024 10:45 PM.  Patient understanding: patient states understanding of the procedure being performed  Patient consent: the patient's understanding of the procedure matches consent given  Patient identity confirmed: verbally with patient and provided demographic data    Procedure Details - Lesion Destruction:     Number of Lesions:  1  Lesion 1:     Body area:  Lower extremity    Lower extremity location:  R big toe    Malignancy: benign hyperkeratotic lesion      Destruction method: scissors used for extraction          "

## 2024-04-09 ENCOUNTER — RA CDI HCC (OUTPATIENT)
Dept: OTHER | Facility: HOSPITAL | Age: 89
End: 2024-04-09

## 2024-04-10 DIAGNOSIS — E55.9 VITAMIN D INSUFFICIENCY: ICD-10-CM

## 2024-04-10 RX ORDER — CHOLECALCIFEROL (VITAMIN D3) 25 MCG
1000 TABLET ORAL DAILY
Qty: 90 TABLET | Refills: 1 | Status: SHIPPED | OUTPATIENT
Start: 2024-04-10

## 2024-04-16 ENCOUNTER — OFFICE VISIT (OUTPATIENT)
Dept: FAMILY MEDICINE CLINIC | Facility: CLINIC | Age: 89
End: 2024-04-16
Payer: MEDICARE

## 2024-04-16 VITALS
OXYGEN SATURATION: 97 % | HEIGHT: 72 IN | DIASTOLIC BLOOD PRESSURE: 80 MMHG | BODY MASS INDEX: 28.04 KG/M2 | SYSTOLIC BLOOD PRESSURE: 124 MMHG | HEART RATE: 64 BPM | RESPIRATION RATE: 16 BRPM | WEIGHT: 207 LBS

## 2024-04-16 DIAGNOSIS — R29.6 FREQUENT FALLS: ICD-10-CM

## 2024-04-16 DIAGNOSIS — E78.2 MIXED HYPERLIPIDEMIA: ICD-10-CM

## 2024-04-16 DIAGNOSIS — Z79.899 OTHER LONG TERM (CURRENT) DRUG THERAPY: ICD-10-CM

## 2024-04-16 DIAGNOSIS — E11.22 TYPE 2 DIABETES MELLITUS WITH STAGE 3A CHRONIC KIDNEY DISEASE, WITHOUT LONG-TERM CURRENT USE OF INSULIN (HCC): ICD-10-CM

## 2024-04-16 DIAGNOSIS — E53.8 B12 DEFICIENCY: ICD-10-CM

## 2024-04-16 DIAGNOSIS — E55.9 VITAMIN D DEFICIENCY: ICD-10-CM

## 2024-04-16 DIAGNOSIS — R26.89 IMBALANCE: ICD-10-CM

## 2024-04-16 DIAGNOSIS — E11.42 DIABETIC POLYNEUROPATHY ASSOCIATED WITH TYPE 2 DIABETES MELLITUS (HCC): ICD-10-CM

## 2024-04-16 DIAGNOSIS — E53.9 VITAMIN B DEFICIENCY: ICD-10-CM

## 2024-04-16 DIAGNOSIS — Z00.00 WELL ADULT EXAM: Primary | ICD-10-CM

## 2024-04-16 DIAGNOSIS — E61.1 IRON DEFICIENCY: ICD-10-CM

## 2024-04-16 DIAGNOSIS — N18.31 TYPE 2 DIABETES MELLITUS WITH STAGE 3A CHRONIC KIDNEY DISEASE, WITHOUT LONG-TERM CURRENT USE OF INSULIN (HCC): ICD-10-CM

## 2024-04-16 DIAGNOSIS — R74.8 ABNORMAL LEVELS OF OTHER SERUM ENZYMES: ICD-10-CM

## 2024-04-16 PROCEDURE — G0439 PPPS, SUBSEQ VISIT: HCPCS | Performed by: FAMILY MEDICINE

## 2024-04-16 PROCEDURE — 99214 OFFICE O/P EST MOD 30 MIN: CPT | Performed by: FAMILY MEDICINE

## 2024-04-16 NOTE — PROGRESS NOTES
Assessment and Plan:     Problem List Items Addressed This Visit       Type 2 diabetes mellitus with stage 3a chronic kidney disease, without long-term current use of insulin (HCC)    Relevant Orders    Vitamin B12    Vitamin B6    Vitamin B1 (Thiamine), Serum/Plasma, LC/MS/MS    Vitamin D 25 hydroxy    TSH, 3rd generation with Free T4 reflex    Folate    Magnesium    Hemoglobin A1C    Iron Panel (Includes Ferritin, Iron Sat%, Iron, and TIBC)    Lipid Panel with Direct LDL reflex    HLD (hyperlipidemia)    Relevant Orders    Lipid Panel with Direct LDL reflex    Frequent falls - Primary    Relevant Orders    Ambulatory Referral to Neurology    Vitamin B12    Vitamin B6    Vitamin B1 (Thiamine), Serum/Plasma, LC/MS/MS    Vitamin D 25 hydroxy    TSH, 3rd generation with Free T4 reflex    Folate    Magnesium    Hemoglobin A1C    Iron Panel (Includes Ferritin, Iron Sat%, Iron, and TIBC)    Lipid Panel with Direct LDL reflex    Imbalance    Relevant Orders    Ambulatory Referral to Neurology    Vitamin B12    Vitamin B6    Vitamin B1 (Thiamine), Serum/Plasma, LC/MS/MS    Vitamin D 25 hydroxy    TSH, 3rd generation with Free T4 reflex    Folate    Magnesium    Hemoglobin A1C    Iron Panel (Includes Ferritin, Iron Sat%, Iron, and TIBC)    Lipid Panel with Direct LDL reflex     Other Visit Diagnoses       B12 deficiency        Relevant Orders    Vitamin B12    Vitamin B deficiency        Relevant Orders    Vitamin B6    Vitamin B1 (Thiamine), Serum/Plasma, LC/MS/MS    Vitamin D deficiency        Relevant Orders    Vitamin D 25 hydroxy    Other long term (current) drug therapy        Relevant Orders    Hemoglobin A1C    Iron deficiency        Relevant Orders    Iron Panel (Includes Ferritin, Iron Sat%, Iron, and TIBC)    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 They screened negative with a PHQ-2 score of 0.      Preventive health issues were discussed with patient, and age appropriate screening tests were ordered as noted in patient's After Visit Summary.  Personalized health advice and appropriate referrals for health education or preventive services given if needed, as noted in patient's After Visit Summary.     History of Present Illness:     Patient presents for a Medicare Wellness Visit    HPI   History of Present Illness  The patient is an 88-year-old male who presents to the office for follow-up of multiple medical concerns.    The patient utilizes a two-wheeled walker for mobility, while outside, and carries it in his vehicle. He reports no recent falls. He is under the care of a podiatrist and is due to acquire a new pair of shoes. His blood glucose levels have been elevated, with a reading of 97 yesterday and 147 today. His ophthalmological care is managed by Dr. Clements, with the last consultation occurring a month ago. He utilizes hearing aids from [facility] and is under the care of Aleisha Flannery, an otolaryngologist, who does not believe his falls are related to his ears, but rather in the brain. No additional diagnostic tests were ordered. He experiences overbalance, often falling forward upon standing and backwards, and often has to hold onto something to prevent falling. He reports persistent numbness in his nose. His bowel movements are normal. His mood is stable, and he denies experiencing anxiety or depression.    The patient reports minimal edema. His nephrologist,   Mesha, adjusted his torsemide dosage from 25 mg to 50 mg.      Patient Care Team:  Homer Nguyen MD as PCP - General  Michel Zimmer MD (Nephrology)  Romeo Phillip MD (Ophthalmology)     Review of Systems:     Review of Systems   Constitutional:  Negative for activity change, appetite change and fever.   HENT:  Negative for congestion, nosebleeds and trouble swallowing.    Eyes:  Negative for itching.   Respiratory:  Negative for cough and chest tightness.    Cardiovascular:  Negative for chest pain and palpitations.   Gastrointestinal:  Negative for abdominal pain, constipation, diarrhea and nausea.   Endocrine: Negative for cold intolerance.   Genitourinary:  Negative for frequency.   Musculoskeletal:  Positive for arthralgias and back pain. Negative for gait problem and joint swelling.   Skin:  Negative for rash.   Allergic/Immunologic: Negative for immunocompromised state.   Neurological:  Negative for dizziness, tremors, seizures, syncope and headaches.   Psychiatric/Behavioral:  Negative for hallucinations and suicidal ideas. The patient is nervous/anxious.         Problem List:     Patient Active Problem List   Diagnosis    Paroxysmal atrial fibrillation (HCC)    Type 2 diabetes mellitus with stage 3a chronic kidney disease, without long-term current use of insulin (HCC)    Essential hypertension    HLD (hyperlipidemia)    GERD (gastroesophageal reflux disease)    CAD (coronary artery disease)    Ambulatory dysfunction    Frequent falls    Closed T12 fracture (HCC)    Chronic midline low back pain without sciatica    Intervertebral disc disorder with radiculopathy of lumbar region    Anxiety    Hilar adenopathy    Type 2 diabetes mellitus with diabetic polyneuropathy, without long-term current use of insulin (HCC)    Imbalance    Sensorineural hearing loss (SNHL), bilateral    Onychomycosis    Corns    Bunion of right foot      Past Medical and Surgical History:     Past Medical History:   Diagnosis Date     Acute MI (HCC)         Ambulates with cane     Anxiety     Arthritis     hands    At risk for falls     Atrial fibrillation (HCC)     Cholecystitis     CKD (chronic kidney disease)     Coronary artery disease     Diabetes mellitus (HCC)     GERD (gastroesophageal reflux disease)     Heart murmur     Hyperlipidemia     Hypertension     Low back pain     Lumbar disc disease     Umbilical hernia     Wears dentures     full set    Wears glasses      Past Surgical History:   Procedure Laterality Date    CARDIAC CATHETERIZATION      s/p MI    COLONOSCOPY      HERNIA REPAIR Left     X5    NH LAPAROSCOPY SURG CHOLECYSTECTOMY N/A 2022    Procedure: ROBOTIC ASSISTED LAPAROSCOPIC CHOLECYSTECTOMY;  Surgeon: Dagoberto Aguilar MD;  Location: [facility];  Service: General    TOTAL HIP ARTHROPLASTY Left     UMBILICAL HERNIA REPAIR LAPAROSCOPIC N/A 2022    Procedure: Open umbilical hernia repair;  Surgeon: Dagoberto Aguilar MD;  Location: [facility];  Service: General      Family History:     Family History   Problem Relation Age of Onset    No Known Problems Mother     No Known Problems Father       Social History:     Social History     Socioeconomic History    Marital status: /Civil Union     Spouse name: None    Number of children: None    Years of education: None    Highest education level: None   Occupational History    Occupation: retired   Tobacco Use    Smoking status: Never    Smokeless tobacco: Never   Vaping Use    Vaping status: Never Used   Substance and Sexual Activity    Alcohol use: Yes     Comment: rare    Drug use: No    Sexual activity: Not Currently   Other Topics Concern    None   Social History Narrative    Prabha:    Most recent tobacco use screenin2020      Do you currently or have you served in the [place] [employer]:   No      Were you activated, into active duty, as a member of the [employer] or as a Reservist:   No      Occupation:   Retired      Marital status:          Sexual orientation:   Heterosexual      Exercise level:   Occasional      Diet:   Regular      General stress level:   Medium      Alcohol intake:   Occasional      Caffeine intake:   Moderate      Chewing tobacco:   none      Illicit drugs:   Denied      Guns present in home:   No      Seat belts used routinely:   Yes      Sunscreen used routinely:   Yes      Smoke alarm in home:   Yes      Advance directive:   Yes      Salt Intake:   Normal     Would the patient like to schedule a Mammogram:   No      Is the patient interested in a colorectal cancer screening:   No     Last modified by zulma   01-, 15:03      Social Determinants of Health     Financial Resource Strain: Unknown (2/21/2023)    Overall Financial Resource Strain (CARDIA)     Difficulty of Paying Living Expenses: Patient declined   Food Insecurity: No Food Insecurity (4/16/2024)    Hunger Vital Sign     Worried About Running Out of Food in the Last Year: Never true     Ran Out of Food in the Last Year: Never true   Transportation Needs: No Transportation Needs (4/16/2024)    PRAPARE - Transportation     Lack of Transportation (Medical): No     Lack of Transportation (Non-Medical): No   Physical Activity: Not on file   Stress: Not on file   Social Connections: Not on file   Intimate Partner Violence: Not on file   Housing Stability: Low Risk  (4/16/2024)    Housing Stability Vital Sign     Unable to Pay for Housing in the Last Year: No     Number of Places Lived in the Last Year: 1     Unstable Housing in the Last Year: No      Medications and Allergies:     Current Outpatient Medications   Medication Sig Dispense Refill    acetaminophen (TYLENOL) 325 mg tablet Take 2 tablets (650 mg total) by mouth every 6 (six) hours (Patient taking differently: Take 650 mg by mouth every 6 (six) hours as needed) 30 tablet 0    aspirin (ECOTRIN LOW STRENGTH) 81 mg EC tablet Take 81 mg by mouth daily      Cholecalciferol (Vitamin D3) 1,000 units tablet TAKE  1 TABLET BY MOUTH EVERY DAY 90 tablet 1    cyanocobalamin (VITAMIN B-12) 500 mcg tablet Take 1,000 mcg by mouth daily      docusate sodium (COLACE) 100 mg capsule Take 1 capsule (100 mg total) by mouth 2 (two) times a day (Patient taking differently: Take 100 mg by mouth every evening) 10 capsule 0    escitalopram (LEXAPRO) 5 mg tablet TAKE 1 TABLET BY MOUTH EVERY DAY 90 tablet 1    glimepiride (AMARYL) 1 mg tablet Take 1 tablet (1 mg total) by mouth 3 (three) times a day with meals 1080 tablet 0    metFORMIN (GLUCOPHAGE) 500 mg tablet Take 1 tablet (500 mg total) by mouth daily with breakfast 90 tablet 1    metoprolol tartrate (LOPRESSOR) 25 mg tablet TAKE 1 TABLET (25 MG TOTAL) BY MOUTH EVERY 12 (TWELVE) HOURS (Patient taking differently: Take 12.5 mg by mouth every 12 (twelve) hours) 180 tablet 1    omeprazole (PriLOSEC) 40 MG capsule TAKE 1 CAPSULE BY MOUTH EVERY DAY BEFORE BREAKFAST 90 capsule 1    simvastatin (ZOCOR) 40 mg tablet TAKE 1 TABLET BY MOUTH EVERY DAY 90 tablet 1    torsemide (DEMADEX) 10 mg tablet Take 1 tablet (10 mg total) by mouth daily 90 tablet 3     No current facility-administered medications for this visit.     No Known Allergies   Immunizations:     Immunization History   Administered Date(s) Administered    COVID-19 PFIZER VACCINE 0.3 ML IM 01/19/2021, 02/08/2021    COVID-19 Pfizer Vac BIVALENT Bentely-sucrose 12 Yr+ IM 11/17/2022    COVID-19 Pfizer mRNA vacc PF bentley-sucrose 12 yr and older (Comirnaty) 10/24/2023    COVID-19 Pfizer vac (Bentley-sucrose, gray cap) 12 yr+ IM 04/05/2022    H1N1, All Formulations 12/19/2009    INFLUENZA 09/30/2010, 11/06/2013, 10/24/2014, 09/25/2015, 03/12/2018, 12/01/2018, 11/01/2019    Influenza Split High Dose Preservative Free IM 11/04/2019    Influenza, Seasonal Vaccine, Quadrivalent, Adjuvanted, .5e 10/26/2021, 10/04/2023    Influenza, high dose seasonal 0.7 mL 10/21/2022, 11/13/2023    Influenza, recombinant, quadrivalent,injectable, preservative free  11/01/2019    Pneumococcal Conjugate 13-Valent 03/12/2018    Pneumococcal Polysaccharide PPV23 01/17/2020    Respiratory Syncytial Virus Vaccine (Recombinant, Adjuvanted) 10/24/2023      Health Maintenance:     There are no preventive care reminders to display for this patient.      Topic Date Due    COVID-19 Vaccine (6 - 2023-24 season) 12/19/2023      Medicare Screening Tests and Risk Assessments:     Jim is here for his Subsequent Wellness visit. Last Medicare Wellness visit information reviewed, patient interviewed and updates made to the record today.      Health Risk Assessment:   Patient rates overall health as good. Patient feels that their physical health rating is same. Patient is very satisfied with their life. Eyesight was rated as same. Hearing was rated as same. Patient feels that their emotional and mental health rating is same. Patients states they are never, rarely angry. Patient states they are never, rarely unusually tired/fatigued. Pain experienced in the last 7 days has been none. Patient states that he has experienced no weight loss or gain in last 6 months.     Depression Screening:   PHQ-2 Score: 0      Fall Risk Screening:   In the past year, patient has experienced: no history of falling in past year      Home Safety:  Patient has trouble with stairs inside or outside of their home. Patient has working smoke alarms and has working carbon monoxide detector. Home safety hazards include: none.     Nutrition:   Current diet is Regular.     Medications:   Patient is currently taking over-the-counter supplements. OTC medications include: see medication list. Patient is able to manage medications.     Activities of Daily Living (ADLs)/Instrumental Activities of Daily Living (IADLs):   Walk and transfer into and out of bed and chair?: Yes  Dress and groom yourself?: Yes    Bathe or shower yourself?: Yes    Feed yourself? Yes  Do your laundry/housekeeping?: Yes  Manage your money, pay your bills and  track your expenses?: Yes  Make your own meals?: Yes    Do your own shopping?: Yes    Previous Hospitalizations:   Any hospitalizations or ED visits within the last 12 months?: Yes    How many hospitalizations have you had in the last year?: 1-2    Advance Care Planning:   Living will: Yes    Durable POA for healthcare: Yes    Advanced directive: Yes    Five wishes given: No      Cognitive Screening:   Provider or family/friend/caregiver concerned regarding cognition?: No    PREVENTIVE SCREENINGS      Cardiovascular Screening:    General: Screening Not Indicated and History Lipid Disorder    Due for: Lipid Panel      Diabetes Screening:     General: Screening Not Indicated and History Diabetes    Due for: Blood Glucose      Colorectal Cancer Screening:     General: Screening Not Indicated      Prostate Cancer Screening:    General: Screening Not Indicated      Osteoporosis Screening:    General: Screening Not Indicated      Abdominal Aortic Aneurysm (AAA) Screening:        General: Screening Not Indicated      Lung Cancer Screening:     General: Screening Not Indicated      Hepatitis C Screening:      Hep C Screening Accepted: No     Screening, Brief Intervention, and Referral to Treatment (SBIRT)    Screening  Typical number of drinks in a day: 0  Typical number of drinks in a week: 0  Interpretation: Low risk drinking behavior.    AUDIT-C Screenin) How often did you have a drink containing alcohol in the past year? never  2) How many drinks did you have on a typical day when you were drinking in the past year? 0  3) How often did you have 6 or more drinks on one occasion in the past year? never    AUDIT-C Score: 0  Interpretation: Score 0-3 (male): Negative screen for alcohol misuse    Single Item Drug Screening:  How often have you used an illegal drug (including marijuana) or a prescription medication for non-medical reasons in the past year? never    Single Item Drug Screen Score: 0  Interpretation:  Negative screen for possible drug use disorder    Brief Intervention  Alcohol & drug use screenings were reviewed. No concerns regarding substance use disorder identified.     No results found.     Physical Exam:     /80 (BP Location: Left arm, Patient Position: Sitting, Cuff Size: Standard)   Pulse 64   Resp 16   Ht 6' (1.829 m)   Wt 93.9 kg (207 lb)   SpO2 97%   BMI 28.07 kg/m²     Physical Exam  Vitals and nursing note reviewed.   Constitutional:       Appearance: He is well-developed.      Comments: walker   HENT:      Head: Normocephalic and atraumatic.   Eyes:      Conjunctiva/sclera: Conjunctivae normal.      Pupils: Pupils are equal, round, and reactive to light.   Cardiovascular:      Rate and Rhythm: Normal rate and regular rhythm.      Heart sounds: Normal heart sounds.   Pulmonary:      Effort: Pulmonary effort is normal.      Breath sounds: Normal breath sounds. No wheezing or rales.   Abdominal:      General: Bowel sounds are normal. There is no distension.      Palpations: Abdomen is soft.      Tenderness: There is no abdominal tenderness.   Musculoskeletal:         General: No tenderness. Normal range of motion.      Cervical back: Normal range of motion and neck supple.   Skin:     General: Skin is warm and dry.      Findings: No rash.   Neurological:      Mental Status: He is alert and oriented to person, place, and time.      Cranial Nerves: No cranial nerve deficit.      Sensory: No sensory deficit.      Motor: Weakness present.      Coordination: Coordination normal.      Gait: Gait abnormal.   Psychiatric:         Behavior: Behavior normal.         Thought Content: Thought content normal.         Judgment: Judgment normal.          Homer Nguyen MD

## 2024-04-16 NOTE — PATIENT INSTRUCTIONS
Medicare Preventive Visit Patient Instructions  Thank you for completing your Welcome to Medicare Visit or Medicare Annual Wellness Visit today. Your next wellness visit will be due in one year (4/17/2025).  The screening/preventive services that you may require over the next 5-10 years are detailed below. Some tests may not apply to you based off risk factors and/or age. Screening tests ordered at today's visit but not completed yet may show as past due. Also, please note that scanned in results may not display below.  Preventive Screenings:  Service Recommendations Previous Testing/Comments   Colorectal Cancer Screening  Colonoscopy    Fecal Occult Blood Test (FOBT)/Fecal Immunochemical Test (FIT)  Fecal DNA/Cologuard Test  Flexible Sigmoidoscopy Age: 45-75 years old   Colonoscopy: every 10 years (May be performed more frequently if at higher risk)  OR  FOBT/FIT: every 1 year  OR  Cologuard: every 3 years  OR  Sigmoidoscopy: every 5 years  Screening may be recommended earlier than age 45 if at higher risk for colorectal cancer. Also, an individualized decision between you and your healthcare provider will decide whether screening between the ages of 76-85 would be appropriate. Colonoscopy: Not on file  FOBT/FIT: Not on file  Cologuard: Not on file  Sigmoidoscopy: Not on file    Screening Not Indicated     Prostate Cancer Screening Individualized decision between patient and health care provider in men between ages of 55-69   Medicare will cover every 12 months beginning on the day after your 50th birthday PSA: No results in last 5 years     Screening Not Indicated     Hepatitis C Screening Once for adults born between 1945 and 1965  More frequently in patients at high risk for Hepatitis C Hep C Antibody: Not on file        Diabetes Screening 1-2 times per year if you're at risk for diabetes or have pre-diabetes Fasting glucose: 120 mg/dL (2/27/2024)  A1C: 7.3 (10/17/2023)  Screening Not Indicated  History  Diabetes  Due for Blood Glucose   Cholesterol Screening Once every 5 years if you don't have a lipid disorder. May order more often based on risk factors. Lipid panel: 02/06/2023  Screening Not Indicated  History Lipid Disorder  Due for Lipid Panel      Other Preventive Screenings Covered by Medicare:  Abdominal Aortic Aneurysm (AAA) Screening: covered once if your at risk. You're considered to be at risk if you have a family history of AAA or a male between the age of 65-75 who smoking at least 100 cigarettes in your lifetime.  Lung Cancer Screening: covers low dose CT scan once per year if you meet all of the following conditions: (1) Age 55-77; (2) No signs or symptoms of lung cancer; (3) Current smoker or have quit smoking within the last 15 years; (4) You have a tobacco smoking history of at least 20 pack years (packs per day x number of years you smoked); (5) You get a written order from a healthcare provider.  Glaucoma Screening: covered annually if you're considered high risk: (1) You have diabetes OR (2) Family history of glaucoma OR (3)  aged 50 and older OR (4)  American aged 65 and older  Osteoporosis Screening: covered every 2 years if you meet one of the following conditions: (1) Have a vertebral abnormality; (2) On glucocorticoid therapy for more than 3 months; (3) Have primary hyperparathyroidism; (4) On osteoporosis medications and need to assess response to drug therapy.  HIV Screening: covered annually if you're between the age of 15-65. Also covered annually if you are younger than 15 and older than 65 with risk factors for HIV infection. For pregnant patients, it is covered up to 3 times per pregnancy.    Immunizations:  Immunization Recommendations   Influenza Vaccine Annual influenza vaccination during flu season is recommended for all persons aged >= 6 months who do not have contraindications   Pneumococcal Vaccine   * Pneumococcal conjugate vaccine = PCV13 (Prevnar  13), PCV15 (Vaxneuvance), PCV20 (Prevnar 20)  * Pneumococcal polysaccharide vaccine = PPSV23 (Pneumovax) Adults 19-63 yo with certain risk factors or if 65+ yo  If never received any pneumonia vaccine: recommend Prevnar 20 (PCV20)  Give PCV20 if previously received 1 dose of PCV13 or PPSV23   Hepatitis B Vaccine 3 dose series if at intermediate or high risk (ex: diabetes, end stage renal disease, liver disease)   Respiratory syncytial virus (RSV) Vaccine - COVERED BY MEDICARE PART D  * RSVPreF3 (Arexvy) CDC recommends that adults 60 years of age and older may receive a single dose of RSV vaccine using shared clinical decision-making (SCDM)   Tetanus (Td) Vaccine - COST NOT COVERED BY MEDICARE PART B Following completion of primary series, a booster dose should be given every 10 years to maintain immunity against tetanus. Td may also be given as tetanus wound prophylaxis.   Tdap Vaccine - COST NOT COVERED BY MEDICARE PART B Recommended at least once for all adults. For pregnant patients, recommended with each pregnancy.   Shingles Vaccine (Shingrix) - COST NOT COVERED BY MEDICARE PART B  2 shot series recommended in those 19 years and older who have or will have weakened immune systems or those 50 years and older     Health Maintenance Due:  There are no preventive care reminders to display for this patient.  Immunizations Due:      Topic Date Due   • COVID-19 Vaccine (6 - 2023-24 season) 12/19/2023     Advance Directives   What are advance directives?  Advance directives are legal documents that state your wishes and plans for medical care. These plans are made ahead of time in case you lose your ability to make decisions for yourself. Advance directives can apply to any medical decision, such as the treatments you want, and if you want to donate organs.   What are the types of advance directives?  There are many types of advance directives, and each state has rules about how to use them. You may choose a  combination of any of the following:  Living will:  This is a written record of the treatment you want. You can also choose which treatments you do not want, which to limit, and which to stop at a certain time. This includes surgery, medicine, IV fluid, and tube feedings.   Durable power of  for healthcare (DPAHC):  This is a written record that states who you want to make healthcare choices for you when you are unable to make them for yourself. This person, called a proxy, is usually a family member or a friend. You may choose more than 1 proxy.  Do not resuscitate (DNR) order:  A DNR order is used in case your heart stops beating or you stop breathing. It is a request not to have certain forms of treatment, such as CPR. A DNR order may be included in other types of advance directives.  Medical directive:  This covers the care that you want if you are in a coma, near death, or unable to make decisions for yourself. You can list the treatments you want for each condition. Treatment may include pain medicine, surgery, blood transfusions, dialysis, IV or tube feedings, and a ventilator (breathing machine).  Values history:  This document has questions about your views, beliefs, and how you feel and think about life. This information can help others choose the care that you would choose.  Why are advance directives important?  An advance directive helps you control your care. Although spoken wishes may be used, it is better to have your wishes written down. Spoken wishes can be misunderstood, or not followed. Treatments may be given even if you do not want them. An advance directive may make it easier for your family to make difficult choices about your care.   Weight Management   Why it is important to manage your weight:  Being overweight increases your risk of health conditions such as heart disease, high blood pressure, type 2 diabetes, and certain types of cancer. It can also increase your risk for  osteoarthritis, sleep apnea, and other respiratory problems. Aim for a slow, steady weight loss. Even a small amount of weight loss can lower your risk of health problems.  How to lose weight safely:  A safe and healthy way to lose weight is to eat fewer calories and get regular exercise. You can lose up about 1 pound a week by decreasing the number of calories you eat by 500 calories each day.   Healthy meal plan for weight management:  A healthy meal plan includes a variety of foods, contains fewer calories, and helps you stay healthy. A healthy meal plan includes the following:  Eat whole-grain foods more often.  A healthy meal plan should contain fiber. Fiber is the part of grains, fruits, and vegetables that is not broken down by your body. Whole-grain foods are healthy and provide extra fiber in your diet. Some examples of whole-grain foods are whole-wheat breads and pastas, oatmeal, brown rice, and bulgur.  Eat a variety of vegetables every day.  Include dark, leafy greens such as spinach, kale, qasim greens, and mustard greens. Eat yellow and orange vegetables such as carrots, sweet potatoes, and winter squash.   Eat a variety of fruits every day.  Choose fresh or canned fruit (canned in its own juice or light syrup) instead of juice. Fruit juice has very little or no fiber.  Eat low-fat dairy foods.  Drink fat-free (skim) milk or 1% milk. Eat fat-free yogurt and low-fat cottage cheese. Try low-fat cheeses such as mozzarella and other reduced-fat cheeses.  Choose meat and other protein foods that are low in fat.  Choose beans or other legumes such as split peas or lentils. Choose fish, skinless poultry (chicken or turkey), or lean cuts of red meat (beef or pork). Before you cook meat or poultry, cut off any visible fat.   Use less fat and oil.  Try baking foods instead of frying them. Add less fat, such as margarine, sour cream, regular salad dressing and mayonnaise to foods. Eat fewer high-fat foods. Some  examples of high-fat foods include french fries, doughnuts, ice cream, and cakes.  Eat fewer sweets.  Limit foods and drinks that are high in sugar. This includes candy, cookies, regular soda, and sweetened drinks.  Exercise:  Exercise at least 30 minutes per day on most days of the week. Some examples of exercise include walking, biking, dancing, and swimming. You can also fit in more physical activity by taking the stairs instead of the elevator or parking farther away from stores. Ask your healthcare provider about the best exercise plan for you.      © Copyright [employer] 2018 Information is for End User's use only and may not be sold, redistributed or otherwise used for commercial purposes. All illustrations and images included in CareNotes® are the copyrighted property of A.D.A.M., Inc. or [employer]

## 2024-04-23 ENCOUNTER — APPOINTMENT (OUTPATIENT)
Dept: LAB | Facility: CLINIC | Age: 89
End: 2024-04-23
Payer: MEDICARE

## 2024-04-23 ENCOUNTER — TELEPHONE (OUTPATIENT)
Dept: FAMILY MEDICINE CLINIC | Facility: CLINIC | Age: 89
End: 2024-04-23

## 2024-04-23 DIAGNOSIS — R26.89 IMBALANCE: ICD-10-CM

## 2024-04-23 DIAGNOSIS — N18.31 TYPE 2 DIABETES MELLITUS WITH STAGE 3A CHRONIC KIDNEY DISEASE, WITHOUT LONG-TERM CURRENT USE OF INSULIN (HCC): ICD-10-CM

## 2024-04-23 DIAGNOSIS — E11.42 DIABETIC POLYNEUROPATHY ASSOCIATED WITH TYPE 2 DIABETES MELLITUS (HCC): ICD-10-CM

## 2024-04-23 DIAGNOSIS — E11.22 TYPE 2 DIABETES MELLITUS WITH STAGE 3A CHRONIC KIDNEY DISEASE, WITHOUT LONG-TERM CURRENT USE OF INSULIN (HCC): ICD-10-CM

## 2024-04-23 DIAGNOSIS — N18.32 TYPE 2 DIABETES MELLITUS WITH STAGE 3B CHRONIC KIDNEY DISEASE, WITHOUT LONG-TERM CURRENT USE OF INSULIN (HCC): ICD-10-CM

## 2024-04-23 DIAGNOSIS — N18.32 STAGE 3B CHRONIC KIDNEY DISEASE (HCC): ICD-10-CM

## 2024-04-23 DIAGNOSIS — R29.6 FREQUENT FALLS: ICD-10-CM

## 2024-04-23 DIAGNOSIS — I10 ESSENTIAL HYPERTENSION: ICD-10-CM

## 2024-04-23 DIAGNOSIS — E11.22 TYPE 2 DIABETES MELLITUS WITH STAGE 3B CHRONIC KIDNEY DISEASE, WITHOUT LONG-TERM CURRENT USE OF INSULIN (HCC): ICD-10-CM

## 2024-04-23 LAB
25(OH)D3 SERPL-MCNC: 44.5 NG/ML (ref 30–100)
ANION GAP SERPL CALCULATED.3IONS-SCNC: 9 MMOL/L (ref 4–13)
BUN SERPL-MCNC: 32 MG/DL (ref 5–25)
CALCIUM SERPL-MCNC: 9.5 MG/DL (ref 8.4–10.2)
CHLORIDE SERPL-SCNC: 102 MMOL/L (ref 96–108)
CHOLEST SERPL-MCNC: 157 MG/DL
CO2 SERPL-SCNC: 26 MMOL/L (ref 21–32)
CREAT SERPL-MCNC: 1.78 MG/DL (ref 0.6–1.3)
CREAT UR-MCNC: 119.3 MG/DL
CREAT UR-MCNC: 119.3 MG/DL
EST. AVERAGE GLUCOSE BLD GHB EST-MCNC: 151 MG/DL
FERRITIN SERPL-MCNC: 13 NG/ML (ref 24–336)
FOLATE SERPL-MCNC: >22.3 NG/ML
GFR SERPL CREATININE-BSD FRML MDRD: 33 ML/MIN/1.73SQ M
GLUCOSE P FAST SERPL-MCNC: 86 MG/DL (ref 65–99)
HBA1C MFR BLD: 6.9 %
HDLC SERPL-MCNC: 33 MG/DL
IRON SATN MFR SERPL: 30 % (ref 15–50)
IRON SERPL-MCNC: 122 UG/DL (ref 50–212)
LDLC SERPL CALC-MCNC: 84 MG/DL (ref 0–100)
MAGNESIUM SERPL-MCNC: 2.2 MG/DL (ref 1.9–2.7)
MICROALBUMIN UR-MCNC: <7 MG/L
MICROALBUMIN/CREAT 24H UR: <6 MG/G CREATININE (ref 0–30)
POTASSIUM SERPL-SCNC: 4.9 MMOL/L (ref 3.5–5.3)
PROT UR-MCNC: 8 MG/DL
PROT/CREAT UR: 0.07 MG/G{CREAT} (ref 0–0.1)
SODIUM SERPL-SCNC: 137 MMOL/L (ref 135–147)
TIBC SERPL-MCNC: 410 UG/DL (ref 250–450)
TRIGL SERPL-MCNC: 201 MG/DL
TSH SERPL DL<=0.05 MIU/L-ACNC: 3.41 UIU/ML (ref 0.45–4.5)
UIBC SERPL-MCNC: 288 UG/DL (ref 155–355)
VIT B12 SERPL-MCNC: 1160 PG/ML (ref 180–914)

## 2024-04-23 PROCEDURE — 83550 IRON BINDING TEST: CPT | Performed by: FAMILY MEDICINE

## 2024-04-23 PROCEDURE — 82043 UR ALBUMIN QUANTITATIVE: CPT

## 2024-04-23 PROCEDURE — 82746 ASSAY OF FOLIC ACID SERUM: CPT

## 2024-04-23 PROCEDURE — 84443 ASSAY THYROID STIM HORMONE: CPT | Performed by: FAMILY MEDICINE

## 2024-04-23 PROCEDURE — 80048 BASIC METABOLIC PNL TOTAL CA: CPT

## 2024-04-23 PROCEDURE — 82306 VITAMIN D 25 HYDROXY: CPT | Performed by: FAMILY MEDICINE

## 2024-04-23 PROCEDURE — 83036 HEMOGLOBIN GLYCOSYLATED A1C: CPT | Performed by: FAMILY MEDICINE

## 2024-04-23 PROCEDURE — 80061 LIPID PANEL: CPT | Performed by: FAMILY MEDICINE

## 2024-04-23 PROCEDURE — 83735 ASSAY OF MAGNESIUM: CPT | Performed by: FAMILY MEDICINE

## 2024-04-23 PROCEDURE — 82728 ASSAY OF FERRITIN: CPT | Performed by: FAMILY MEDICINE

## 2024-04-23 PROCEDURE — 82607 VITAMIN B-12: CPT | Performed by: FAMILY MEDICINE

## 2024-04-23 PROCEDURE — 84156 ASSAY OF PROTEIN URINE: CPT

## 2024-04-23 PROCEDURE — 84425 ASSAY OF VITAMIN B-1: CPT | Performed by: FAMILY MEDICINE

## 2024-04-23 PROCEDURE — 36415 COLL VENOUS BLD VENIPUNCTURE: CPT | Performed by: FAMILY MEDICINE

## 2024-04-23 PROCEDURE — 82570 ASSAY OF URINE CREATININE: CPT

## 2024-04-23 PROCEDURE — 83540 ASSAY OF IRON: CPT | Performed by: FAMILY MEDICINE

## 2024-04-23 PROCEDURE — 84207 ASSAY OF VITAMIN B-6: CPT | Performed by: FAMILY MEDICINE

## 2024-04-23 NOTE — TELEPHONE ENCOUNTER
----- Message from Homer Nguyen MD sent at 4/23/2024  3:10 PM EDT -----  Your iron is quite low  Sugar came down a little bit which is great  Thyroid is okay  Vitamin D is good      Please take iron 325 mg daily watching for constipation

## 2024-04-24 ENCOUNTER — TELEPHONE (OUTPATIENT)
Dept: NEPHROLOGY | Facility: CLINIC | Age: 89
End: 2024-04-24

## 2024-04-24 NOTE — TELEPHONE ENCOUNTER
----- Message from Michel Zimmer MD sent at 4/24/2024  3:00 PM EDT -----  Kidney function looks stable.  Potassium level within normal limits on current dose of torsemide.  Continue current dose of torsemide as such.

## 2024-04-25 ENCOUNTER — TELEPHONE (OUTPATIENT)
Dept: FAMILY MEDICINE CLINIC | Facility: CLINIC | Age: 89
End: 2024-04-25

## 2024-04-25 NOTE — TELEPHONE ENCOUNTER
----- Message from Homer Nguyen MD sent at 4/25/2024  1:32 PM EDT -----  Cholesterol did worsen a little bit please be careful of the diet  Magnesium is good     Ate great dinner- Accucheck done earlier and covered per MAR. Incont of urine, changed with raz care. Remains up in recliner watching TV. Worked with him holding the cup and feeding self with the spoon- small progress made.

## 2024-04-26 LAB — VIT B6 SERPL-MCNC: 4.9 UG/L (ref 3.4–65.2)

## 2024-05-01 ENCOUNTER — TELEPHONE (OUTPATIENT)
Dept: FAMILY MEDICINE CLINIC | Facility: CLINIC | Age: 89
End: 2024-05-01

## 2024-05-01 LAB — MISCELLANEOUS LAB TEST RESULT: NORMAL

## 2024-05-01 NOTE — TELEPHONE ENCOUNTER
----- Message from Homer Nguyen MD sent at 5/1/2024 10:17 AM EDT -----  Please take a B complex daily

## 2024-05-16 ENCOUNTER — OFFICE VISIT (OUTPATIENT)
Dept: PAIN MEDICINE | Facility: CLINIC | Age: 89
End: 2024-05-16
Payer: MEDICARE

## 2024-05-16 VITALS
HEIGHT: 72 IN | SYSTOLIC BLOOD PRESSURE: 118 MMHG | DIASTOLIC BLOOD PRESSURE: 72 MMHG | BODY MASS INDEX: 28.04 KG/M2 | WEIGHT: 207 LBS | RESPIRATION RATE: 16 BRPM | HEART RATE: 68 BPM

## 2024-05-16 DIAGNOSIS — M46.1 SACROILIITIS (HCC): Primary | ICD-10-CM

## 2024-05-16 PROCEDURE — 99214 OFFICE O/P EST MOD 30 MIN: CPT

## 2024-05-16 NOTE — PROGRESS NOTES
Assessment:  1. Sacroiliitis (HCC)        Plan:  The patient is an 88-year-old male with a history of chronic pain secondary to low back pain, lumbar intervertebral disc disorder with radiculopathy and sacroiliitis who presents to the office with worsening bilateral low back pain.    On exam, the patient is tender with palpation over bilateral SI joints as well as having positive provocative maneuvers for sacroiliitis.  I instructed patient we can perform bilateral SI joint injections to decrease inflammation and provide them relief.  Patient would like to proceed.  I instructed our  will schedule them.  Complete risks and benefits including bleeding, infection, tissue reaction, nerve injury and allergic reaction were discussed.  The approach was demonstrated using models and literature was provided.  Verbal and written consent was obtained.    My impressions and treatment recommendations were discussed in detail with the patient who verbalized understanding and had no further questions.  Discharge instructions were provided. I personally saw and examined the patient and I agree with the above discussed plan of care.    Orders Placed This Encounter   Procedures    FL spine and pain procedure     Dr Clark     Standing Status:   Future     Standing Expiration Date:   5/16/2028     Order Specific Question:   Reason for Exam:     Answer:   Bilateral SI joint injections     Order Specific Question:   Anticoagulant hold needed?     Answer:   No     No orders of the defined types were placed in this encounter.      History of Present Illness:  Jim Lomeli is a 88 y.o. male with a history of chronic pain secondary to low back pain, lumbar intervertebral disc disorder with radiculopathy and sacroiliitis.  He was last seen on 5/4/2023 where he underwent a right SI joint injection which provided him greater than 50% relief of his pain for over 6 months.  He presents to the office with worsening bilateral low back  pain.    He states his pain is worse since the last office visit and constant.  He states his pain is worse with activity.  He rates quality of his pain as throbbing and is currently rating his pain a 9/10 on a numeric scale.    Current pain medications include Tylenol which he takes sparingly and is helpful.    I have personally reviewed and/or updated the patient's past medical history, past surgical history, family history, social history, current medications, allergies, and vital signs today.     Review of Systems   Respiratory:  Negative for shortness of breath.    Cardiovascular:  Negative for chest pain.   Gastrointestinal:  Negative for constipation, diarrhea, nausea and vomiting.   Musculoskeletal:  Positive for back pain, gait problem and joint swelling. Negative for arthralgias and myalgias.   Skin:  Negative for rash.   Neurological:  Negative for dizziness, seizures and weakness.   All other systems reviewed and are negative.      Patient Active Problem List   Diagnosis    Paroxysmal atrial fibrillation (HCC)    Type 2 diabetes mellitus with stage 3a chronic kidney disease, without long-term current use of insulin ([facility])    Essential hypertension    HLD (hyperlipidemia)    GERD (gastroesophageal reflux disease)    CAD (coronary artery disease)    Ambulatory dysfunction    Frequent falls    Closed T12 fracture ([facility])    Chronic midline low back pain without sciatica    Intervertebral disc disorder with radiculopathy of lumbar region    Anxiety    Hilar adenopathy    Type 2 diabetes mellitus with diabetic polyneuropathy, without long-term current use of insulin ([facility])    Imbalance    Sensorineural hearing loss (SNHL), bilateral    Onychomycosis    Corns    Bunion of right foot       Past Medical History:   Diagnosis Date    Acute MI (HCC)     1991    Ambulates with cane     Anxiety     Arthritis     hands    At risk for falls     Atrial fibrillation (HCC)     Cholecystitis     CKD (chronic kidney disease)      Coronary artery disease     Diabetes mellitus (HCC)     GERD (gastroesophageal reflux disease)     Heart murmur     Hyperlipidemia     Hypertension     Low back pain     Lumbar disc disease     Umbilical hernia     Wears dentures     full set    Wears glasses        Past Surgical History:   Procedure Laterality Date    CARDIAC CATHETERIZATION      s/p MI    COLONOSCOPY      HERNIA REPAIR Left     X5    IA LAPAROSCOPY SURG CHOLECYSTECTOMY N/A 1/7/2022    Procedure: ROBOTIC ASSISTED LAPAROSCOPIC CHOLECYSTECTOMY;  Surgeon: Dagoberto Aguilar MD;  Location: [facility];  Service: General    TOTAL HIP ARTHROPLASTY Left     UMBILICAL HERNIA REPAIR LAPAROSCOPIC N/A 1/7/2022    Procedure: Open umbilical hernia repair;  Surgeon: Dagoberto Aguilar MD;  Location: [facility];  Service: General       Family History   Problem Relation Age of Onset    No Known Problems Mother     No Known Problems Father        Social History     Occupational History    Occupation: retired   Tobacco Use    Smoking status: Never    Smokeless tobacco: Never   Vaping Use    Vaping status: Never Used   Substance and Sexual Activity    Alcohol use: Yes     Comment: rare    Drug use: No    Sexual activity: Not Currently       Current Outpatient Medications on File Prior to Visit   Medication Sig    acetaminophen (TYLENOL) 325 mg tablet Take 2 tablets (650 mg total) by mouth every 6 (six) hours (Patient taking differently: Take 650 mg by mouth every 6 (six) hours as needed)    aspirin (ECOTRIN LOW STRENGTH) 81 mg EC tablet Take 81 mg by mouth daily    Cholecalciferol (Vitamin D3) 1,000 units tablet TAKE 1 TABLET BY MOUTH EVERY DAY    cyanocobalamin (VITAMIN B-12) 500 mcg tablet Take 1,000 mcg by mouth daily    docusate sodium (COLACE) 100 mg capsule Take 1 capsule (100 mg total) by mouth 2 (two) times a day (Patient taking differently: Take 100 mg by mouth every evening)    escitalopram (LEXAPRO) 5 mg tablet TAKE 1 TABLET BY MOUTH EVERY DAY    glimepiride  (AMARYL) 1 mg tablet Take 1 tablet (1 mg total) by mouth 3 (three) times a day with meals    metFORMIN (GLUCOPHAGE) 500 mg tablet Take 1 tablet (500 mg total) by mouth daily with breakfast    metoprolol tartrate (LOPRESSOR) 25 mg tablet TAKE 1 TABLET (25 MG TOTAL) BY MOUTH EVERY 12 (TWELVE) HOURS (Patient taking differently: Take 12.5 mg by mouth every 12 (twelve) hours)    omeprazole (PriLOSEC) 40 MG capsule TAKE 1 CAPSULE BY MOUTH EVERY DAY BEFORE BREAKFAST    simvastatin (ZOCOR) 40 mg tablet TAKE 1 TABLET BY MOUTH EVERY DAY    torsemide (DEMADEX) 10 mg tablet Take 1 tablet (10 mg total) by mouth daily     No current facility-administered medications on file prior to visit.       No Known Allergies    Physical Exam:    /72   Pulse 68   Resp 16   Ht 6' (1.829 m)   Wt 93.9 kg (207 lb)   BMI 28.07 kg/m²     Constitutional:normal, well developed, well nourished, alert, in no distress and non-toxic and no overt pain behavior.  Eyes:anicteric  HEENT:grossly intact  Neck:supple, symmetric, trachea midline and no masses   Pulmonary:even and unlabored  Cardiovascular:No edema or pitting edema present  Skin:Normal without rashes or lesions and well hydrated  Psychiatric:Mood and affect appropriate  Neurologic:Cranial Nerves II-XII grossly intact  Musculoskeletal:antalgic and ambulates with a roller walker    Lumbar Spine Exam    Appearance:  Normal lordosis  Palpation/Tenderness:  left sacroiliac joint tenderness  right sacroiliac joint tenderness  Sensory:  no sensory deficits noted  Range of Motion:  Flexion:  Minimally limited  with pain  Extension:  Minimally limited  with pain  Motor Strength:  Left hip flexion:  5/5  Right hip flexion:  5/5  Left knee flexion:  5/5  Left knee extension:  5/5  Right knee flexion:  5/5  Right knee extension:  5/5  Left foot dorsiflexion:  5/5  Left foot plantar flexion:  5/5  Right foot dorsiflexion:  5/5  Right foot plantar flexion:  5/5  Special Tests:  Left Straight Leg  Test:  positive  Right Straight Leg Test:  positive  Left Spenser's Maneuver:  positive  Right Spenser's Maneuver:  positive  Left Gaenslen's Test:  positive  Right Gaenslen's Test:  positive  Left Pelvic Distraction Test:  positive  Right Pelvic Distraction Test:  positive      Imaging

## 2024-05-22 ENCOUNTER — TELEPHONE (OUTPATIENT)
Age: 89
End: 2024-05-22

## 2024-05-23 DIAGNOSIS — E11.69 TYPE 2 DIABETES MELLITUS WITH OTHER SPECIFIED COMPLICATION, WITHOUT LONG-TERM CURRENT USE OF INSULIN (HCC): ICD-10-CM

## 2024-06-03 ENCOUNTER — OFFICE VISIT (OUTPATIENT)
Dept: AUDIOLOGY | Facility: CLINIC | Age: 89
End: 2024-06-03

## 2024-06-03 DIAGNOSIS — H90.3 SENSORINEURAL HEARING LOSS (SNHL), BILATERAL: Primary | ICD-10-CM

## 2024-06-03 DIAGNOSIS — K21.9 GASTROESOPHAGEAL REFLUX DISEASE WITHOUT ESOPHAGITIS: ICD-10-CM

## 2024-06-03 RX ORDER — OMEPRAZOLE 40 MG/1
CAPSULE, DELAYED RELEASE ORAL
Qty: 90 CAPSULE | Refills: 1 | Status: SHIPPED | OUTPATIENT
Start: 2024-06-03

## 2024-06-04 NOTE — PROGRESS NOTES
Hearing Aid Visit:    Name:  Jim Lomeli  :  1935  Age:  88 y.o.  MRN:  [id number]  Date of Evaluation: 6/3/2024    HISTORY:    Jim Lomeli was seen today (6/3/2024) for a(n) in-warranty hearing aid check of his bilateral hearing aids. Today, Jim reports no difficulty with the devices.    DEVICE INFORMATION:    Hearing Aid Fitting Date: 8/15/23       Left Device Right Device   Hearing Aid Make: [employer]   Hearing Aid Model: REAL 3 REAL 3   Serial Number: [id number]   Repair Warranty Expiration Date: 26   Loss/Damage Warranty Expiration Date:  26    Length: 3 w/lock  3 w/lock     Output: 85 85   Wax System: Pro Wax  Pro Wax   Dome Size/Style: 10 mm power  10 mm power    Battery: Lithium-ion Rechargeable Lithium-ion Rechargeable   [employer] Serial Number: N/A N/A   [employer] Warranty Date:  N/A N/A       Serial Number: [id number]  Accessories: N/A    ACTION/ADJUSTMENTS:  Cleaned and checked both devices / changed wax guards, locks and domes   Canals were clear   Updated firmware to 1.1.1   No VC's on, he is happy where set, does not use cell phone or jaclyn     RECOMMENDATIONS:   RTO 24 for HA maintenance under warranty     Ramya Wall, CCC-A  Clinical Audiologist  [place] [id number]  [facility] PROFESSIONAL [facility] AUDIOLOGY  [address] [id number]

## 2024-06-06 ENCOUNTER — HOSPITAL ENCOUNTER (OUTPATIENT)
Dept: RADIOLOGY | Facility: CLINIC | Age: 89
Discharge: HOME | End: 2024-06-06
Payer: MEDICARE

## 2024-06-06 VITALS
RESPIRATION RATE: 18 BRPM | DIASTOLIC BLOOD PRESSURE: 73 MMHG | HEART RATE: 63 BPM | SYSTOLIC BLOOD PRESSURE: 136 MMHG | OXYGEN SATURATION: 98 % | TEMPERATURE: 98.2 F

## 2024-06-06 DIAGNOSIS — M46.1 SACROILIITIS (HCC): ICD-10-CM

## 2024-06-06 PROCEDURE — 27096 INJECT SACROILIAC JOINT: CPT | Performed by: ANESTHESIOLOGY

## 2024-06-06 RX ORDER — 0.9 % SODIUM CHLORIDE 0.9 %
4 VIAL (ML) INJECTION ONCE
Status: COMPLETED | OUTPATIENT
Start: 2024-06-06 | End: 2024-06-06

## 2024-06-06 RX ORDER — METHYLPREDNISOLONE ACETATE 80 MG/ML
80 INJECTION, SUSPENSION INTRA-ARTICULAR; INTRALESIONAL; INTRAMUSCULAR; PARENTERAL; SOFT TISSUE ONCE
Status: COMPLETED | OUTPATIENT
Start: 2024-06-06 | End: 2024-06-06

## 2024-06-06 RX ORDER — ROPIVACAINE HYDROCHLORIDE 2 MG/ML
7 INJECTION, SOLUTION EPIDURAL; INFILTRATION; PERINEURAL ONCE
Status: COMPLETED | OUTPATIENT
Start: 2024-06-06 | End: 2024-06-06

## 2024-06-06 RX ADMIN — METHYLPREDNISOLONE ACETATE 80 MG: 80 INJECTION, SUSPENSION INTRA-ARTICULAR; INTRALESIONAL; INTRAMUSCULAR; PARENTERAL; SOFT TISSUE at 13:47

## 2024-06-06 RX ADMIN — Medication 4 ML: at 13:45

## 2024-06-06 RX ADMIN — IOHEXOL 2 ML: 300 INJECTION, SOLUTION INTRAVENOUS at 13:47

## 2024-06-06 RX ADMIN — ROPIVACAINE HYDROCHLORIDE 7 ML: 2 INJECTION, SOLUTION EPIDURAL; INFILTRATION; PERINEURAL at 13:47

## 2024-06-06 NOTE — H&P
History of Present Illness: The patient is a 88 y.o. male who presents with complaints of lower back pain and is here today for bilateral sacroiliac joint injections    Past Medical History:   Diagnosis Date    Acute MI (HCC)     1991    Ambulates with cane     Anxiety     Arthritis     hands    At risk for falls     Atrial fibrillation (HCC)     Cholecystitis     CKD (chronic kidney disease)     Coronary artery disease     Diabetes mellitus (HCC)     GERD (gastroesophageal reflux disease)     Heart murmur     Hyperlipidemia     Hypertension     Low back pain     Lumbar disc disease     Umbilical hernia     Wears dentures     full set    Wears glasses        Past Surgical History:   Procedure Laterality Date    CARDIAC CATHETERIZATION      s/p MI    COLONOSCOPY      HERNIA REPAIR Left     X5    GA LAPAROSCOPY SURG CHOLECYSTECTOMY N/A 1/7/2022    Procedure: ROBOTIC ASSISTED LAPAROSCOPIC CHOLECYSTECTOMY;  Surgeon: Dagoberto Aguilar MD;  Location: [facility];  Service: General    TOTAL HIP ARTHROPLASTY Left     UMBILICAL HERNIA REPAIR LAPAROSCOPIC N/A 1/7/2022    Procedure: Open umbilical hernia repair;  Surgeon: Dagoberto Aguilar MD;  Location: [facility];  Service: General         Current Outpatient Medications:     acetaminophen (TYLENOL) 325 mg tablet, Take 2 tablets (650 mg total) by mouth every 6 (six) hours (Patient taking differently: Take 650 mg by mouth every 6 (six) hours as needed), Disp: 30 tablet, Rfl: 0    aspirin (ECOTRIN LOW STRENGTH) 81 mg EC tablet, Take 81 mg by mouth daily, Disp: , Rfl:     Cholecalciferol (Vitamin D3) 1,000 units tablet, TAKE 1 TABLET BY MOUTH EVERY DAY, Disp: 90 tablet, Rfl: 1    cyanocobalamin (VITAMIN B-12) 500 mcg tablet, Take 1,000 mcg by mouth daily, Disp: , Rfl:     docusate sodium (COLACE) 100 mg capsule, Take 1 capsule (100 mg total) by mouth 2 (two) times a day (Patient taking differently: Take 100 mg by mouth every evening), Disp: 10 capsule, Rfl: 0    escitalopram (LEXAPRO)  5 mg tablet, TAKE 1 TABLET BY MOUTH EVERY DAY, Disp: 90 tablet, Rfl: 1    glimepiride (AMARYL) 1 mg tablet, Take 1 tablet (1 mg total) by mouth 3 (three) times a day with meals, Disp: 1080 tablet, Rfl: 0    metFORMIN (GLUCOPHAGE) 500 mg tablet, TAKE 1 TABLET (500 MG TOTAL) BY MOUTH 2 (TWO) TIMES A DAY WITH MEALS TAKE 1 TABLETS EVERY MORNING TAKE 1 TABLETS EVERY EVENING, Disp: 180 tablet, Rfl: 1    metoprolol tartrate (LOPRESSOR) 25 mg tablet, TAKE 1 TABLET (25 MG TOTAL) BY MOUTH EVERY 12 (TWELVE) HOURS (Patient taking differently: Take 12.5 mg by mouth every 12 (twelve) hours), Disp: 180 tablet, Rfl: 1    omeprazole (PriLOSEC) 40 MG capsule, TAKE 1 CAPSULE BY MOUTH EVERY DAY BEFORE BREAKFAST, Disp: 90 capsule, Rfl: 1    simvastatin (ZOCOR) 40 mg tablet, TAKE 1 TABLET BY MOUTH EVERY DAY, Disp: 90 tablet, Rfl: 1    torsemide (DEMADEX) 10 mg tablet, Take 1 tablet (10 mg total) by mouth daily, Disp: 90 tablet, Rfl: 3    Current Facility-Administered Medications:     iohexol (OMNIPAQUE) 300 mg/mL injection 2 mL, 2 mL, Intra-articular, Once, Antoni Clark MD    lidocaine (PF) (XYLOCAINE-MPF) 2 % injection 4 mL, 4 mL, Infiltration, Once, Antoni Clark MD    methylPREDNISolone acetate (DEPO-MEDROL) injection 80 mg, 80 mg, Intra-articular, Once, Antoni Clark MD    ropivacaine (NAROPIN) injection 7 mL, 7 mL, Intra-articular, Once, Antoni Clark MD    sodium chloride (PF) 0.9 % injection 4 mL, 4 mL, Infiltration, Once, Antoni Clark MD    No Known Allergies    Physical Exam:   Vitals:    06/06/24 1309   BP: 135/78   Pulse: 61   Resp: 18   Temp: 98.2 °F (36.8 °C)   SpO2: 99%     General: Awake, Alert, Oriented x 3, Mood and affect appropriate  Respiratory: Respirations even and unlabored  Cardiovascular: Peripheral pulses intact; no edema  Musculoskeletal Exam: Lower back pain    ASA Score: 3    Patient/Chart Verification  Patient ID Verified: Verbal  ID Band Applied: No  Consents Confirmed: Procedural  H&P( within  30 days) Verified: To be obtained in the Pre-Procedure area  Allergies Reviewed: Yes  Anticoag/NSAID held?: No  Currently on antibiotics?: No    Assessment:   1. Sacroiliitis (HCC)        Plan: Bilateral SI joint injections

## 2024-06-06 NOTE — DISCHARGE INSTR - LAB

## 2024-06-08 DIAGNOSIS — F41.9 ANXIETY: ICD-10-CM

## 2024-06-08 RX ORDER — ESCITALOPRAM OXALATE 5 MG/1
TABLET ORAL
Qty: 90 TABLET | Refills: 1 | Status: SHIPPED | OUTPATIENT
Start: 2024-06-08

## 2024-06-12 ENCOUNTER — OFFICE VISIT (OUTPATIENT)
Dept: PODIATRY | Facility: CLINIC | Age: 89
End: 2024-06-12
Payer: MEDICARE

## 2024-06-12 VITALS
SYSTOLIC BLOOD PRESSURE: 133 MMHG | BODY MASS INDEX: 28.07 KG/M2 | OXYGEN SATURATION: 97 % | DIASTOLIC BLOOD PRESSURE: 87 MMHG | HEART RATE: 57 BPM | HEIGHT: 72 IN

## 2024-06-12 DIAGNOSIS — E11.42 DIABETIC POLYNEUROPATHY ASSOCIATED WITH TYPE 2 DIABETES MELLITUS (HCC): ICD-10-CM

## 2024-06-12 DIAGNOSIS — B35.1 ONYCHOMYCOSIS: ICD-10-CM

## 2024-06-12 DIAGNOSIS — E11.42 TYPE 2 DIABETES MELLITUS WITH DIABETIC POLYNEUROPATHY, WITHOUT LONG-TERM CURRENT USE OF INSULIN (HCC): ICD-10-CM

## 2024-06-12 DIAGNOSIS — L84 CORNS: Primary | ICD-10-CM

## 2024-06-12 PROCEDURE — 11721 DEBRIDE NAIL 6 OR MORE: CPT | Performed by: PODIATRIST

## 2024-06-12 PROCEDURE — 11055 PARING/CUTG B9 HYPRKER LES 1: CPT | Performed by: PODIATRIST

## 2024-06-12 NOTE — PROGRESS NOTES
Assessment/Plan:     The patient's clinical examination today significant for brittle, thickened and dystrophic pedal nail plates with discoloration and subungual debris and brittleness with areas of lytic changes consistent with onychomycosis, right worse than the left.  There is a callused lesion to the distal aspect of the right great toe.  The interdigital spaces are clear without maceration.  Pedal pulses are palpable bilaterally but diminished.  Epicritic sensation is diminished bilaterally secondary to peripheral neuropathy.  Skin is thin with decreased turgor.  There is absence of hair growth to the bilateral lower extremities.     The pedal nail plates are sharply debrided with a sterile nail clipper x10 without complication.  The nails with a mechanically reduced in thickness and girth utilize a rotary bur without complication.  The callus lesion to the right great toe was sharply debrided with a sterile #15 blade without complication.       His most recent hemoglobin A1c from April 2024 was 6.9, prior to that it was 7.3 in October 2023.     Recommend follow-up in 3 months for continued at risk diabetic foot care.     There are no diagnoses linked to this encounter.      Subjective:     Patient ID: Jim Lomeli is a 88 y.o. male.    The patient presents today for continued at risk diabetic footcare with class findings.  The patient notes that his nails are becoming very long and catching on his shoes and socks.  He also notes a callus lesion to the distal aspect of his right great toe.      PAST MEDICAL HISTORY:  Past Medical History:   Diagnosis Date    Acute MI (HCC)     1991    Ambulates with cane     Anxiety     Arthritis     hands    At risk for falls     Atrial fibrillation (HCC)     Cholecystitis     CKD (chronic kidney disease)     Coronary artery disease     Diabetes mellitus (HCC)     GERD (gastroesophageal reflux disease)     Heart murmur     Hyperlipidemia     Hypertension     Low back pain      Lumbar disc disease     Umbilical hernia     Wears dentures     full set    Wears glasses        PAST SURGICAL HISTORY:  Past Surgical History:   Procedure Laterality Date    CARDIAC CATHETERIZATION      s/p MI    COLONOSCOPY      HERNIA REPAIR Left     X5    [place] LAPAROSCOPY SURG CHOLECYSTECTOMY N/A 1/7/2022    Procedure: ROBOTIC ASSISTED LAPAROSCOPIC CHOLECYSTECTOMY;  Surgeon: Dagoberto Aguilar MD;  Location[facility];  Service: General    TOTAL HIP ARTHROPLASTY Left     UMBILICAL HERNIA REPAIR LAPAROSCOPIC N/A 1/7/2022    Procedure: Open umbilical hernia repair;  Surgeon: Dagoberto Aguilar MD;  Location[facility];  Service: General        ALLERGIES:  Patient has no known allergies.    MEDICATIONS:  Current Outpatient Medications   Medication Sig Dispense Refill    acetaminophen (TYLENOL) 325 mg tablet Take 2 tablets (650 mg total) by mouth every 6 (six) hours (Patient taking differently: Take 650 mg by mouth every 6 (six) hours as needed) 30 tablet 0    aspirin (ECOTRIN LOW STRENGTH) 81 mg EC tablet Take 81 mg by mouth daily      Cholecalciferol (Vitamin D3) 1,000 units tablet TAKE 1 TABLET BY MOUTH EVERY DAY 90 tablet 1    cyanocobalamin (VITAMIN B-12) 500 mcg tablet Take 1,000 mcg by mouth daily      docusate sodium (COLACE) 100 mg capsule Take 1 capsule (100 mg total) by mouth 2 (two) times a day (Patient taking differently: Take 100 mg by mouth every evening) 10 capsule 0    escitalopram (LEXAPRO) 5 mg tablet TAKE 1 TABLET BY MOUTH EVERY DAY 90 tablet 1    glimepiride (AMARYL) 1 mg tablet Take 1 tablet (1 mg total) by mouth 3 (three) times a day with meals 1080 tablet 0    metFORMIN (GLUCOPHAGE) 500 mg tablet TAKE 1 TABLET (500 MG TOTAL) BY MOUTH 2 (TWO) TIMES A DAY WITH MEALS TAKE 1 TABLETS EVERY MORNING TAKE 1 TABLETS EVERY EVENING 180 tablet 1    metoprolol tartrate (LOPRESSOR) 25 mg tablet TAKE 1 TABLET (25 MG TOTAL) BY MOUTH EVERY 12 (TWELVE) HOURS (Patient taking differently: Take 12.5 mg by mouth every  12 (twelve) hours) 180 tablet 1    omeprazole (PriLOSEC) 40 MG capsule TAKE 1 CAPSULE BY MOUTH EVERY DAY BEFORE BREAKFAST 90 capsule 1    simvastatin (ZOCOR) 40 mg tablet TAKE 1 TABLET BY MOUTH EVERY DAY 90 tablet 1    torsemide (DEMADEX) 10 mg tablet Take 1 tablet (10 mg total) by mouth daily 90 tablet 3     No current facility-administered medications for this visit.       SOCIAL HISTORY:  Social History     Socioeconomic History    Marital status: /Civil Union     Spouse name: None    Number of children: None    Years of education: None    Highest education level: None   Occupational History    Occupation: retired   Tobacco Use    Smoking status: Never    Smokeless tobacco: Never   Vaping Use    Vaping status: Never Used   Substance and Sexual Activity    Alcohol use: Yes     Comment: rare    Drug use: No    Sexual activity: Not Currently   Other Topics Concern    None   Social History Narrative    [place]:    Most recent tobacco use screenin2020      Do you currently or have you served in the [employer]:   No      Were you activated, into active duty, as a member of the [employer] or as a Reservist:   No      Occupation:   Retired      Marital status:         Sexual orientation:   Heterosexual      Exercise level:   Occasional      Diet:   Regular      General stress level:   Medium      Alcohol intake:   Occasional      Caffeine intake:   Moderate      Chewing tobacco:   none      Illicit drugs:   Denied      Guns present in home:   No      Seat belts used routinely:   Yes      Sunscreen used routinely:   Yes      Smoke alarm in home:   Yes      Advance directive:   Yes      Salt Intake:   Normal     Would the patient like to schedule a Mammogram:   No      Is the patient interested in a colorectal cancer screening:   No     Last modified by zulma   2020, 15:03      Social Determinants of Health     Financial Resource Strain: Unknown (2023)    Overall Financial  Resource Strain (CARDIA)     Difficulty of Paying Living Expenses: Patient declined   Food Insecurity: No Food Insecurity (4/16/2024)    Hunger Vital Sign     Worried About Running Out of Food in the Last Year: Never true     Ran Out of Food in the Last Year: Never true   Transportation Needs: No Transportation Needs (4/16/2024)    PRAPARE - Transportation     Lack of Transportation (Medical): No     Lack of Transportation (Non-Medical): No   Physical Activity: Not on file   Stress: Not on file   Social Connections: Not on file   Intimate Partner Violence: Not on file   Housing Stability: Low Risk  (4/16/2024)    Housing Stability Vital Sign     Unable to Pay for Housing in the Last Year: No     Number of Times Moved in the Last Year: 1     Homeless in the Last Year: No        Review of Systems   Constitutional: Negative.    HENT: Negative.     Eyes: Negative.    Respiratory: Negative.     Cardiovascular: Negative.    Endocrine: Negative.    Musculoskeletal: Negative.    Neurological: Negative.    Hematological: Negative.    Psychiatric/Behavioral: Negative.           Objective:     Physical Exam  Vitals reviewed.   Constitutional:       Appearance: Normal appearance.   HENT:      Head: Normocephalic and atraumatic.      Nose: Nose normal.   Eyes:      Conjunctiva/sclera: Conjunctivae normal.      Pupils: Pupils are equal, round, and reactive to light.   Cardiovascular:      Pulses:           Dorsalis pedis pulses are 1+ on the right side and 1+ on the left side.        Posterior tibial pulses are 1+ on the right side and 1+ on the left side.   Pulmonary:      Effort: Pulmonary effort is normal.   Feet:      Right foot:      Skin integrity: Skin integrity normal.      Toenail Condition: Right toenails are abnormally thick and long. Fungal disease present.     Left foot:      Skin integrity: Skin integrity normal.      Toenail Condition: Left toenails are abnormally thick and long. Fungal disease present.      Comments: The patient's clinical examination today significant for brittle, thickened and dystrophic pedal nail plates with discoloration and subungual debris and brittleness with areas of lytic changes consistent with onychomycosis, right worse than the left.  There is a callused lesion to the distal aspect of the right great toe.  The interdigital spaces are clear without maceration.  Pedal pulses are palpable bilaterally but diminished.  Epicritic sensation is diminished bilaterally secondary to peripheral neuropathy.  Skin is thin with decreased turgor.  There is absence of hair growth to the bilateral lower extremities.  Skin:     General: Skin is warm.      Capillary Refill: Capillary refill takes less than 2 seconds.   Neurological:      General: No focal deficit present.      Mental Status: He is alert and oriented to person, place, and time.   Psychiatric:         Mood and Affect: Mood normal.         Behavior: Behavior normal.         Thought Content: Thought content normal.         Lesion Destruction    Date/Time: 6/12/2024 11:30 AM    Performed by: Elkin Pfeiffer DPM  Authorized by: Elkin Pfeiffer DPM  Universal Protocol:  Consent: Verbal consent obtained.  Risks and benefits: risks, benefits and alternatives were discussed  Consent given by: patient  Timeout called at: 6/12/2024 11:30 AM.  Patient understanding: patient states understanding of the procedure being performed  Patient consent: the patient's understanding of the procedure matches consent given  Patient identity confirmed: verbally with patient and provided demographic data    Procedure Details - Lesion Destruction:     Number of Lesions:  1  Lesion 1:     Body area:  Lower extremity    Lower extremity location:  R big toe    Malignancy: benign hyperkeratotic lesion      Destruction method: scissors used for extraction

## 2024-06-17 ENCOUNTER — TELEPHONE (OUTPATIENT)
Dept: NEPHROLOGY | Facility: CLINIC | Age: 89
End: 2024-06-17

## 2024-06-20 ENCOUNTER — TELEPHONE (OUTPATIENT)
Dept: RADIOLOGY | Facility: MEDICAL CENTER | Age: 89
End: 2024-06-20

## 2024-06-20 NOTE — TELEPHONE ENCOUNTER
Patient Reports     50    %     improvement post injection    Pain Level    4-5 when he gets it /10

## 2024-07-03 DIAGNOSIS — E78.5 HYPERLIPIDEMIA, UNSPECIFIED HYPERLIPIDEMIA TYPE: ICD-10-CM

## 2024-07-03 RX ORDER — SIMVASTATIN 40 MG
TABLET ORAL
Qty: 90 TABLET | Refills: 1 | Status: SHIPPED | OUTPATIENT
Start: 2024-07-03

## 2024-07-09 ENCOUNTER — RA CDI HCC (OUTPATIENT)
Dept: OTHER | Facility: HOSPITAL | Age: 89
End: 2024-07-09

## 2024-07-09 NOTE — PROGRESS NOTES
HCC coding opportunities          Chart Reviewed number of suggestions sent to Provider: 2  E11.51  E11.36     Patients Insurance     Medicare Insurance: Medicare

## 2024-07-16 ENCOUNTER — OFFICE VISIT (OUTPATIENT)
Dept: FAMILY MEDICINE CLINIC | Facility: CLINIC | Age: 89
End: 2024-07-16
Payer: MEDICARE

## 2024-07-16 VITALS
WEIGHT: 208 LBS | RESPIRATION RATE: 16 BRPM | DIASTOLIC BLOOD PRESSURE: 62 MMHG | BODY MASS INDEX: 28.17 KG/M2 | OXYGEN SATURATION: 97 % | HEART RATE: 80 BPM | SYSTOLIC BLOOD PRESSURE: 120 MMHG | HEIGHT: 72 IN

## 2024-07-16 DIAGNOSIS — E61.1 IRON DEFICIENCY: ICD-10-CM

## 2024-07-16 DIAGNOSIS — E11.51 TYPE II DIABETES MELLITUS WITH PERIPHERAL CIRCULATORY DISORDER (HCC): ICD-10-CM

## 2024-07-16 DIAGNOSIS — I48.0 PAROXYSMAL ATRIAL FIBRILLATION (HCC): ICD-10-CM

## 2024-07-16 DIAGNOSIS — R26.2 AMBULATORY DYSFUNCTION: ICD-10-CM

## 2024-07-16 DIAGNOSIS — Z79.899 OTHER LONG TERM (CURRENT) DRUG THERAPY: ICD-10-CM

## 2024-07-16 DIAGNOSIS — E53.8 B12 DEFICIENCY: ICD-10-CM

## 2024-07-16 DIAGNOSIS — E11.42 TYPE 2 DIABETES MELLITUS WITH DIABETIC POLYNEUROPATHY, WITHOUT LONG-TERM CURRENT USE OF INSULIN (HCC): Primary | ICD-10-CM

## 2024-07-16 DIAGNOSIS — E78.2 MIXED HYPERLIPIDEMIA: ICD-10-CM

## 2024-07-16 DIAGNOSIS — M46.1 SACROILIITIS (HCC): ICD-10-CM

## 2024-07-16 PROCEDURE — 99214 OFFICE O/P EST MOD 30 MIN: CPT | Performed by: FAMILY MEDICINE

## 2024-07-16 PROCEDURE — G2211 COMPLEX E/M VISIT ADD ON: HCPCS | Performed by: FAMILY MEDICINE

## 2024-07-16 RX ORDER — FERROUS SULFATE 325(65) MG
325 TABLET, DELAYED RELEASE (ENTERIC COATED) ORAL
COMMUNITY

## 2024-07-16 NOTE — ASSESSMENT & PLAN NOTE
Lab Results   Component Value Date    HGBA1C 6.9 (H) 04/23/2024       Orders:    Iron Panel (Includes Ferritin, Iron Sat%, Iron, and TIBC); Future    Vitamin B12; Future    Hemoglobin A1C; Future    Lipid Panel with Direct LDL reflex; Future

## 2024-07-16 NOTE — PROGRESS NOTES
Ambulatory Visit  Name: Jim Lomeli      : 1935      MRN: [id number]  Encounter Provider: Homer Nguyen MD  Encounter Date: 2024   Encounter department: [facility]    Assessment & Plan  1. Back pain.  He was advised to perform stretching exercises to alleviate his back pain.    2. Heartburn.  He was advised to take Tums as needed.      Assessment & Plan  Type 2 diabetes mellitus with diabetic polyneuropathy, without long-term current use of insulin (HCC)    Lab Results   Component Value Date    HGBA1C 6.9 (H) 2024       Orders:    Iron Panel (Includes Ferritin, Iron Sat%, Iron, and TIBC); Future    Vitamin B12; Future    Hemoglobin A1C; Future    Lipid Panel with Direct LDL reflex; Future    Sacroiliitis (HCC)  -stable/controlled, continue same medication. Will evaluate again next visit          Paroxysmal atrial fibrillation (HCC)  -stable/controlled, continue same medication. Will evaluate again next visit          Ambulatory dysfunction  -stable/controlled, continue same medication. Will evaluate again next visit          Iron deficiency  -stable/controlled, continue same medication. Will evaluate again next visit     Orders:    Iron Panel (Includes Ferritin, Iron Sat%, Iron, and TIBC); Future    B12 deficiency    Orders:    Vitamin B12; Future    Other long term (current) drug therapy    Orders:    Hemoglobin A1C; Future    Mixed hyperlipidemia  -stable/controlled, continue same medication. Will evaluate again next visit     Orders:    Lipid Panel with Direct LDL reflex; Future    Type II diabetes mellitus with peripheral circulatory disorder (HCC)  -stable/controlled, continue same medication. Will evaluate again next visit     Lab Results   Component Value Date    HGBA1C 6.9 (H) 2024                 History of Present Illness     History of Present Illness  The patient is an 88-year-old male who presents for evaluation of multiple medical concerns.    He  reports no leg swelling. His bowel movements are regular, and he takes two stool softeners, one in the morning and one at night. He recounts an incident where he fell over a lift chair and injured his back. He performs self-directed exercises and stretches. He denies experiencing palpitations or chest pain, but occasionally experiences heartburn, which resolves when he sits down. He is currently taking omeprazole for heartburn and occasionally uses Tums. His cardiologist is Dr. Zimmer, who conducted an echocardiogram earlier this year. He denies any breathing difficulties. His blood sugar levels typically range from 94 to 120, with the highest recorded level being 140.     Review of Systems   Constitutional:  Negative for activity change, appetite change and fever.   HENT:  Negative for congestion, nosebleeds and trouble swallowing.    Eyes:  Negative for itching.   Respiratory:  Negative for cough and chest tightness.    Cardiovascular:  Negative for chest pain and palpitations.   Gastrointestinal:  Negative for abdominal pain, constipation, diarrhea and nausea.   Endocrine: Negative for cold intolerance.   Genitourinary:  Negative for frequency.   Musculoskeletal:  Positive for arthralgias and back pain. Negative for gait problem and joint swelling.   Skin:  Negative for rash.   Allergic/Immunologic: Negative for immunocompromised state.   Neurological:  Negative for dizziness, tremors, seizures, syncope and headaches.   Psychiatric/Behavioral:  Negative for hallucinations and suicidal ideas. The patient is nervous/anxious.      Objective     /62 (BP Location: Left arm, Patient Position: Sitting, Cuff Size: Standard)   Pulse 80   Resp 16   Ht 6' (1.829 m)   Wt 94.3 kg (208 lb)   SpO2 97%   BMI 28.21 kg/m²     Physical Exam    Physical Exam  Vitals and nursing note reviewed.   Constitutional:       Appearance: He is well-developed.      Comments: walker   HENT:      Head: Normocephalic and atraumatic.    Eyes:      Conjunctiva/sclera: Conjunctivae normal.      Pupils: Pupils are equal, round, and reactive to light.   Cardiovascular:      Rate and Rhythm: Normal rate and regular rhythm.      Pulses: no weak pulses.           Dorsalis pedis pulses are 2+ on the right side and 2+ on the left side.      Heart sounds: Normal heart sounds.   Pulmonary:      Effort: Pulmonary effort is normal.      Breath sounds: Normal breath sounds. No wheezing or rales.   Abdominal:      General: Bowel sounds are normal. There is no distension.      Palpations: Abdomen is soft.      Tenderness: There is no abdominal tenderness.   Musculoskeletal:         General: No tenderness. Normal range of motion.      Cervical back: Normal range of motion and neck supple.   Feet:      Right foot:      Skin integrity: Callus and dry skin present. No ulcer, skin breakdown, erythema or warmth.      Left foot:      Skin integrity: Callus and dry skin present. No ulcer, skin breakdown, erythema or warmth.   Skin:     General: Skin is warm and dry.      Findings: No rash.   Neurological:      Mental Status: He is alert and oriented to person, place, and time.      Cranial Nerves: No cranial nerve deficit.      Sensory: No sensory deficit.      Motor: Weakness present.      Coordination: Coordination normal.      Gait: Gait abnormal.   Psychiatric:         Behavior: Behavior normal.         Thought Content: Thought content normal.         Judgment: Judgment normal.         Patient's shoes and socks removed.    Right Foot/Ankle   Right Foot Inspection  Skin Exam: skin normal, skin intact, dry skin, callus and callus. No warmth, no erythema, no maceration, no abnormal color, no pre-ulcer and no ulcer.     Toe Exam: ROM and strength within normal limits.     Sensory   Monofilament testing: intact    Vascular  Capillary refills: < 3 seconds  The right DP pulse is 2+.     Left Foot/Ankle  Left Foot Inspection  Skin Exam: skin normal, skin intact, dry skin  and callus. No warmth, no erythema, no maceration, normal color, no pre-ulcer and no ulcer.     Toe Exam: ROM and strength within normal limits.     Sensory   Monofilament testing: intact    Vascular  Capillary refills: < 3 seconds  The left DP pulse is 2+.     Assign Risk Category  No deformity present  Loss of protective sensation  No weak pulses  Risk: 1

## 2024-07-16 NOTE — ASSESSMENT & PLAN NOTE
-stable/controlled, continue same medication. Will evaluate again next visit     Orders:    Lipid Panel with Direct LDL reflex; Future

## 2024-07-16 NOTE — ASSESSMENT & PLAN NOTE
-stable/controlled, continue same medication. Will evaluate again next visit     Lab Results   Component Value Date    HGBA1C 6.9 (H) 04/23/2024

## 2024-07-18 ENCOUNTER — TELEPHONE (OUTPATIENT)
Dept: NEPHROLOGY | Facility: CLINIC | Age: 89
End: 2024-07-18

## 2024-07-18 ENCOUNTER — TELEPHONE (OUTPATIENT)
Age: 89
End: 2024-07-18

## 2024-07-18 NOTE — TELEPHONE ENCOUNTER
LM for pt to reschedule 8/27 appointment with Dianna due to change in provider schedule. Dr. Zimmer has an opening 7/25 at 10:30 if still available. Patient would just need to complete labs

## 2024-07-19 NOTE — TELEPHONE ENCOUNTER
Called and spoke to the patient to relay Dr. Zimmer's message above. Patient expressed understanding and had no further questions or concerns at this time.

## 2024-07-22 ENCOUNTER — OFFICE VISIT (OUTPATIENT)
Dept: OTOLARYNGOLOGY | Facility: CLINIC | Age: 89
End: 2024-07-22
Payer: MEDICARE

## 2024-07-22 VITALS
SYSTOLIC BLOOD PRESSURE: 118 MMHG | RESPIRATION RATE: 18 BRPM | WEIGHT: 208.6 LBS | DIASTOLIC BLOOD PRESSURE: 82 MMHG | BODY MASS INDEX: 28.25 KG/M2 | OXYGEN SATURATION: 99 % | HEIGHT: 72 IN

## 2024-07-22 DIAGNOSIS — R26.89 IMBALANCE: ICD-10-CM

## 2024-07-22 DIAGNOSIS — H61.23 BILATERAL IMPACTED CERUMEN: ICD-10-CM

## 2024-07-22 DIAGNOSIS — H90.3 SENSORINEURAL HEARING LOSS (SNHL), BILATERAL: Primary | ICD-10-CM

## 2024-07-22 PROCEDURE — 69210 REMOVE IMPACTED EAR WAX UNI: CPT | Performed by: NURSE PRACTITIONER

## 2024-07-22 PROCEDURE — 99213 OFFICE O/P EST LOW 20 MIN: CPT | Performed by: NURSE PRACTITIONER

## 2024-07-22 NOTE — PROGRESS NOTES
Assessment/Plan:      Diagnoses and all orders for this visit:    Sensorineural hearing loss (SNHL), bilateral    Imbalance    Bilateral impacted cerumen    Other orders  -     Ear cerumen removal          On exam, bilateral minimal cerumen debris. Removed with suction and alligator forceps.   Symptoms include sensation of imbalance then falls.  Occurs when moving or moving from sitting to standing then turning.  Denies occurrence when moving from lying to standing position. Improved after 4 to 5 weeks of balance therapy.  Currently using a walker.      Discussed possible causes of imbalance including neurological, cardiac, autoimmune, Otitis media, sinusitis, chemical imbalance, and inner ear concerns.      Reviewed results of prior Ct head from 2022 without any ENT related findings at that time.   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  There is a very slight asymmetry on the left at 4K and 6K but it is not meeting guidelines to recommend MRI brain with IAC due to the asymmetry.  Asymmetry unlikely impacting his imbalance concerns. Word discrimination 84% on right and 92% on left. Tymps type A bilaterally. Offered Mri brain with IAC due to the very slight asymmetry on left however does not meet guidelines as necessary option.  Pt doing well with current hearing aids and finds them to be helpful     Unlikely ear source of frequent falls based on pt report of symptoms, audiogram and longevity.      Treatment options include at home epley's, nasal steroids, oral steroids, lab studies, vestibular therapy, VNG testing, neurology consultation, MRI brain with IAC.  Also may consider tilt table as symptoms occur when moving from sitting to standing position and turning position      After discussion agreed to observation  Follow up in 6 months to one year    Subjective:     Patient ID: Jim Lomeli is a 88 y.o. male.    Presents today as a follow up  due to ear concerns. For past year Imbalance. Turning certain way feels legs freeze and falls. Increased frequency of falls.  Hospital visits due to injuries from falling due to imbalance.  No headaches.  Hearing gradually worsening. Rigning tinnitus constant.  No otalgia or otorrhea.  No history of ear surgery.  No current hearing aids.  Balance therapy about one to two years ago.       Since last visit, treated in PT. Obtained hearing aids.  Improved with PT but is completed for now. Couple of falls since last visit.  If turns feels feet do not follow him, then falls.            Review of Systems   Constitutional: Negative.    HENT:  Negative for congestion, ear discharge, ear pain, hearing loss, nosebleeds, postnasal drip, rhinorrhea, sinus pressure, sinus pain, sore throat, tinnitus and voice change.    Respiratory:  Negative for chest tightness and shortness of breath.    Skin:  Negative for color change.   Neurological:  Negative for dizziness, numbness and headaches.   Psychiatric/Behavioral: Negative.           Objective:     Physical Exam  Constitutional:       Appearance: He is well-developed.   HENT:      Head: Normocephalic.      Right Ear: Hearing, tympanic membrane, ear canal and external ear normal. No decreased hearing noted. No drainage or tenderness. There is impacted cerumen. Tympanic membrane is not perforated or erythematous.      Left Ear: Hearing, tympanic membrane, ear canal and external ear normal. No decreased hearing noted. No drainage or tenderness. There is impacted cerumen. Tympanic membrane is not perforated or erythematous.      Nose: Nose normal. No nasal deformity or septal deviation.      Mouth/Throat:      Mouth: Mucous membranes are not pale and not dry. No oral lesions.      Dentition: Normal dentition.      Pharynx: Uvula midline. No oropharyngeal exudate.   Neck:      Trachea: No tracheal deviation.   Pulmonary:      Effort: No accessory muscle usage or respiratory distress.    Musculoskeletal:      Cervical back: Neck supple.   Lymphadenopathy:      Cervical: No cervical adenopathy.   Skin:     General: Skin is warm and dry.   Neurological:      Mental Status: He is alert and oriented to person, place, and time.      Cranial Nerves: No cranial nerve deficit.      Sensory: No sensory deficit.      Gait: Gait abnormal.   Psychiatric:         Behavior: Behavior is cooperative.         Ear cerumen removal    Date/Time: 7/22/2024 1:00 PM    Performed by: SUSHIL Garcia  Authorized by: SUSHIL Garcia  Universal Protocol:  Consent: Verbal consent obtained.  Risks and benefits: risks, benefits and alternatives were discussed  Consent given by: patient  Patient understanding: patient states understanding of the procedure being performed    Patient location:  Clinic  Procedure details:     Local anesthetic:  None    Location:  L ear and R ear    Approach:  External  Post-procedure details:     Complication:  None    Hearing quality:  Normal    Patient tolerance of procedure:  Tolerated well, no immediate complications

## 2024-07-26 ENCOUNTER — TELEPHONE (OUTPATIENT)
Dept: ADMINISTRATIVE | Facility: OTHER | Age: 89
End: 2024-07-26

## 2024-07-26 NOTE — TELEPHONE ENCOUNTER
----- Message from Misty LAWSON sent at 7/25/2024 10:16 AM EDT -----  Regarding: DM Eye Exam  07/25/24 10:16 AM    Hello, our patient attached above has had Diabetic Eye Exam completed/performed. Please assist in updating the patient chart by pulling the document from the Media Tab. The date of service is 02/08/2024. SCAN DATE 04/16/2024    Thank you,  Misty GARCIA FORKS

## 2024-07-26 NOTE — TELEPHONE ENCOUNTER
Upon review of the In Basket request we were able to locate, review, and update the patient chart as requested for Diabetic Eye Exam.    Any additional questions or concerns should be emailed to the Practice Liaisons via the appropriate education email address, please do not reply via In Basket.    Thank you  Jason Armenta   [facility]

## 2024-07-26 NOTE — TELEPHONE ENCOUNTER
Upon review of the In Basket request we have found that the patient has aged out of the measure.     Any additional questions or concerns should be emailed to the Practice Liaisons via the appropriate education email address, please do not reply via In Basket.    Thank you  Jason Armenta   [facility]

## 2024-08-22 NOTE — ED PROCEDURE NOTE
Procedure  Laceration repair  Date/Time: 3/26/2020 7:37 PM  Performed by: Paloma Lee MD  Authorized by: Paloma Lee MD   Consent: Verbal consent obtained    Consent given by: patient  Required items: required blood products, implants, devices, and special equipment available  Body area: head/neck  Location details: scalp  Laceration length: 3 cm    Wound Dehiscence:  Superficial Wound Dehiscence: simple closure      Procedure Details:  Skin closure: staples  Number of sutures: 3  Technique: simple  Approximation: close  Patient tolerance: Patient tolerated the procedure well with no immediate complications                       Paloma Lee MD  03/26/20 [id number] Pt notified of prescription called into pharmacy in response to culture results. Side effects reviewed including nausea, vomiting, loose stools and rare risk of tendon rupture.  Patient voiced understanding. No further questions or concerns at this time.

## 2024-09-12 ENCOUNTER — OFFICE VISIT (OUTPATIENT)
Dept: PODIATRY | Facility: CLINIC | Age: 89
End: 2024-09-12
Payer: MEDICARE

## 2024-09-12 VITALS — HEIGHT: 72 IN | BODY MASS INDEX: 28.17 KG/M2 | WEIGHT: 208 LBS

## 2024-09-12 DIAGNOSIS — E11.42 DIABETIC POLYNEUROPATHY ASSOCIATED WITH TYPE 2 DIABETES MELLITUS (HCC): ICD-10-CM

## 2024-09-12 DIAGNOSIS — B35.1 ONYCHOMYCOSIS: Primary | ICD-10-CM

## 2024-09-12 DIAGNOSIS — L84 CORNS: ICD-10-CM

## 2024-09-12 DIAGNOSIS — E11.42 TYPE 2 DIABETES MELLITUS WITH DIABETIC POLYNEUROPATHY, WITHOUT LONG-TERM CURRENT USE OF INSULIN (HCC): ICD-10-CM

## 2024-09-12 PROCEDURE — 11721 DEBRIDE NAIL 6 OR MORE: CPT | Performed by: PODIATRIST

## 2024-09-12 PROCEDURE — RECHECK: Performed by: PODIATRIST

## 2024-09-12 PROCEDURE — 11055 PARING/CUTG B9 HYPRKER LES 1: CPT | Performed by: PODIATRIST

## 2024-09-12 NOTE — PROGRESS NOTES
Assessment/Plan:     The patient's clinical examination today significant for brittle, thickened and dystrophic pedal nail plates with discoloration and subungual debris and brittleness with areas of lytic changes consistent with onychomycosis, right worse than the left.  There is a callused lesion to the distal aspect of the right great toe.  The interdigital spaces are clear without maceration.  Pedal pulses are palpable bilaterally but diminished.  Epicritic sensation is diminished bilaterally secondary to peripheral neuropathy.  Skin is thin with decreased turgor.  There is absence of hair growth to the bilateral lower extremities.     The pedal nail plates are sharply debrided with a sterile nail clipper x10 without complication.  The nails with a mechanically reduced in thickness and girth utilize a rotary bur without complication.  The callus lesion to the right great toe was sharply debrided with a sterile #15 blade without complication.       His most recent hemoglobin A1c from April 2024 was 6.9, prior to that it was 7.3 in October 2023.  Repeat labs are currently pending.    Recommend follow-up in 3 months for continued at risk diabetic foot care.     Diagnoses and all orders for this visit:    Onychomycosis    Corns    Type 2 diabetes mellitus with diabetic polyneuropathy, without long-term current use of insulin ([facility])    Diabetic polyneuropathy associated with type 2 diabetes mellitus ([facility])    Other orders  -     Lesion Destruction          Subjective:     Patient ID: Jim Lomeli is a 88 y.o. male.    The patient presents today for continued at risk diabetic footcare with class findings.  The patient notes that his nails are becoming very long and catching on his shoes and socks.  He also notes a callus lesion to the distal aspect of his right great toe.      PAST MEDICAL HISTORY:  Past Medical History:   Diagnosis Date    Acute MI ([facility])     1991    Ambulates with cane     Anxiety     Arthritis      hands    At risk for falls     Atrial fibrillation (HCC)     Cholecystitis     CKD (chronic kidney disease)     Coronary artery disease     Diabetes mellitus (HCC)     GERD (gastroesophageal reflux disease)     Heart murmur     Hyperlipidemia     Hypertension     Low back pain     Lumbar disc disease     Umbilical hernia     Wears dentures     full set    Wears glasses        PAST SURGICAL HISTORY:  Past Surgical History:   Procedure Laterality Date    CARDIAC CATHETERIZATION      s/p MI    COLONOSCOPY      HERNIA REPAIR Left     X5    WI LAPAROSCOPY SURG CHOLECYSTECTOMY N/A 1/7/2022    Procedure: ROBOTIC ASSISTED LAPAROSCOPIC CHOLECYSTECTOMY;  Surgeon: Dagoberto Aguilar MD;  Location: [facility];  Service: General    TOTAL HIP ARTHROPLASTY Left     UMBILICAL HERNIA REPAIR LAPAROSCOPIC N/A 1/7/2022    Procedure: Open umbilical hernia repair;  Surgeon: Dagoberto Aguilar MD;  Location: [facility];  Service: General        ALLERGIES:  Patient has no known allergies.    MEDICATIONS:  Current Outpatient Medications   Medication Sig Dispense Refill    acetaminophen (TYLENOL) 325 mg tablet Take 2 tablets (650 mg total) by mouth every 6 (six) hours (Patient taking differently: Take 650 mg by mouth every 6 (six) hours as needed) 30 tablet 0    aspirin (ECOTRIN LOW STRENGTH) 81 mg EC tablet Take 81 mg by mouth daily      Cholecalciferol (Vitamin D3) 1,000 units tablet TAKE 1 TABLET BY MOUTH EVERY DAY 90 tablet 1    cyanocobalamin (VITAMIN B-12) 500 mcg tablet Take 1,000 mcg by mouth daily      docusate sodium (COLACE) 100 mg capsule Take 1 capsule (100 mg total) by mouth 2 (two) times a day (Patient taking differently: Take 100 mg by mouth every evening) 10 capsule 0    escitalopram (LEXAPRO) 5 mg tablet TAKE 1 TABLET BY MOUTH EVERY DAY 90 tablet 1    ferrous sulfate 325 (65 FE) MG EC tablet Take 325 mg by mouth 3 (three) times a day with meals      glimepiride (AMARYL) 1 mg tablet Take 1 tablet (1 mg total) by mouth 3 (three)  times a day with meals 1080 tablet 0    metFORMIN (GLUCOPHAGE) 500 mg tablet TAKE 1 TABLET (500 MG TOTAL) BY MOUTH 2 (TWO) TIMES A DAY WITH MEALS TAKE 1 TABLETS EVERY MORNING TAKE 1 TABLETS EVERY EVENING (Patient taking differently: Take 500 mg by mouth daily with breakfast) 180 tablet 1    metoprolol tartrate (LOPRESSOR) 25 mg tablet TAKE 1 TABLET (25 MG TOTAL) BY MOUTH EVERY 12 (TWELVE) HOURS (Patient taking differently: Take 12.5 mg by mouth every 12 (twelve) hours) 180 tablet 1    omeprazole (PriLOSEC) 40 MG capsule TAKE 1 CAPSULE BY MOUTH EVERY DAY BEFORE BREAKFAST 90 capsule 1    simvastatin (ZOCOR) 40 mg tablet TAKE 1 TABLET BY MOUTH EVERY DAY (Patient taking differently: Take 40 mg by mouth daily at bedtime) 90 tablet 1    torsemide (DEMADEX) 10 mg tablet Take 1 tablet (10 mg total) by mouth daily (Patient taking differently: Take 5 mg by mouth daily) 90 tablet 3     No current facility-administered medications for this visit.       SOCIAL HISTORY:  Social History     Socioeconomic History    Marital status: /Civil Union     Spouse name: None    Number of children: None    Years of education: None    Highest education level: None   Occupational History    Occupation: retired   Tobacco Use    Smoking status: Never    Smokeless tobacco: Never   Vaping Use    Vaping status: Never Used   Substance and Sexual Activity    Alcohol use: Yes     Comment: rare    Drug use: No    Sexual activity: Not Currently   Other Topics Concern    None   Social History Narrative    [place]:    Most recent tobacco use screenin2020      Do you currently or have you served in the [employer]:   No      Were you activated, into active duty, as a member of the [employer] or as a Reservist:   No      Occupation:   Retired      Marital status:         Sexual orientation:   Heterosexual      Exercise level:   Occasional      Diet:   Regular      General stress level:   Medium      Alcohol intake:    Occasional      Caffeine intake:   Moderate      Chewing tobacco:   none      Illicit drugs:   Denied      Guns present in home:   No      Seat belts used routinely:   Yes      Sunscreen used routinely:   Yes      Smoke alarm in home:   Yes      Advance directive:   Yes      Salt Intake:   Normal     Would the patient like to schedule a Mammogram:   No      Is the patient interested in a colorectal cancer screening:   No     Last modified by zulma   01-, 15:03      Social Determinants of Health     Financial Resource Strain: Unknown (2/21/2023)    Overall Financial Resource Strain (CARDIA)     Difficulty of Paying Living Expenses: Patient declined   Food Insecurity: No Food Insecurity (4/16/2024)    Hunger Vital Sign     Worried About Running Out of Food in the Last Year: Never true     Ran Out of Food in the Last Year: Never true   Transportation Needs: No Transportation Needs (4/16/2024)    PRAPARE - Transportation     Lack of Transportation (Medical): No     Lack of Transportation (Non-Medical): No   Physical Activity: Not on file   Stress: Not on file   Social Connections: Not on file   Intimate Partner Violence: Not on file   Housing Stability: Low Risk  (4/16/2024)    Housing Stability Vital Sign     Unable to Pay for Housing in the Last Year: No     Number of Times Moved in the Last Year: 1     Homeless in the Last Year: No        Review of Systems   Constitutional: Negative.    HENT: Negative.     Eyes: Negative.    Respiratory: Negative.     Cardiovascular: Negative.    Endocrine: Negative.    Musculoskeletal: Negative.    Neurological: Negative.    Hematological: Negative.    Psychiatric/Behavioral: Negative.           Objective:     Physical Exam  Vitals reviewed.   Constitutional:       Appearance: Normal appearance.   HENT:      Head: Normocephalic and atraumatic.      Nose: Nose normal.   Eyes:      Conjunctiva/sclera: Conjunctivae normal.      Pupils: Pupils are equal, round, and reactive  "to light.   Cardiovascular:      Pulses:           Dorsalis pedis pulses are 1+ on the right side and 1+ on the left side.        Posterior tibial pulses are 1+ on the right side and 1+ on the left side.   Pulmonary:      Effort: Pulmonary effort is normal.   Feet:      Right foot:      Skin integrity: Callus present.      Toenail Condition: Right toenails are abnormally thick and long. Fungal disease present.     Left foot:      Skin integrity: Skin integrity normal.      Toenail Condition: Left toenails are abnormally thick and long. Fungal disease present.     Comments: The patient's clinical examination today significant for brittle, thickened and dystrophic pedal nail plates with discoloration and subungual debris and brittleness with areas of lytic changes consistent with onychomycosis, right worse than the left.  There is a callused lesion to the distal aspect of the right great toe.  The interdigital spaces are clear without maceration.  Pedal pulses are palpable bilaterally but diminished.  Epicritic sensation is diminished bilaterally secondary to peripheral neuropathy.  Skin is thin with decreased turgor.  There is absence of hair growth to the bilateral lower extremities.     Skin:     General: Skin is warm.      Capillary Refill: Capillary refill takes less than 2 seconds.   Neurological:      General: No focal deficit present.      Mental Status: He is alert and oriented to person, place, and time.   Psychiatric:         Mood and Affect: Mood normal.         Behavior: Behavior normal.         Thought Content: Thought content normal.         Lesion Destruction    Date/Time: 9/12/2024 11:00 AM    Performed by: Elkin Pfeiffer DPM  Authorized by: Elkin Pfeiffer DPM  [place] Protocol:  procedure performed by consultantConsent: Verbal consent obtained.  Risks and benefits: risks, benefits and alternatives were discussed  Consent given by: patient  Time out: Immediately prior to procedure a \"time out\" " was called to verify the correct patient, procedure, equipment, support staff and site/side marked as required.  Timeout called at: 9/12/2024 11:00 AM.  Patient understanding: patient states understanding of the procedure being performed  Patient identity confirmed: verbally with patient and provided demographic data    Procedure Details - Lesion Destruction:     Number of Lesions:  1  Lesion 1:     Body area:  Lower extremity    Lower extremity location:  R big toe    Malignancy: benign hyperkeratotic lesion      Destruction method: scissors used for extraction

## 2024-09-15 DIAGNOSIS — I10 ESSENTIAL HYPERTENSION: ICD-10-CM

## 2024-09-17 RX ORDER — METOPROLOL TARTRATE 25 MG/1
12.5 TABLET, FILM COATED ORAL EVERY 12 HOURS SCHEDULED
Qty: 45 TABLET | Refills: 1 | Status: SHIPPED | OUTPATIENT
Start: 2024-09-17

## 2024-09-23 ENCOUNTER — APPOINTMENT (OUTPATIENT)
Dept: LAB | Facility: CLINIC | Age: 89
End: 2024-09-23
Payer: MEDICARE

## 2024-09-23 DIAGNOSIS — E11.22 TYPE 2 DIABETES MELLITUS WITH STAGE 3B CHRONIC KIDNEY DISEASE, WITHOUT LONG-TERM CURRENT USE OF INSULIN (HCC): ICD-10-CM

## 2024-09-23 DIAGNOSIS — Z79.899 OTHER LONG TERM (CURRENT) DRUG THERAPY: ICD-10-CM

## 2024-09-23 DIAGNOSIS — E61.1 IRON DEFICIENCY: ICD-10-CM

## 2024-09-23 DIAGNOSIS — N18.32 STAGE 3B CHRONIC KIDNEY DISEASE (HCC): ICD-10-CM

## 2024-09-23 DIAGNOSIS — N25.81 SECONDARY HYPERPARATHYROIDISM (HCC): ICD-10-CM

## 2024-09-23 DIAGNOSIS — N18.32 TYPE 2 DIABETES MELLITUS WITH STAGE 3B CHRONIC KIDNEY DISEASE, WITHOUT LONG-TERM CURRENT USE OF INSULIN (HCC): ICD-10-CM

## 2024-09-23 DIAGNOSIS — E11.42 TYPE 2 DIABETES MELLITUS WITH DIABETIC POLYNEUROPATHY, WITHOUT LONG-TERM CURRENT USE OF INSULIN (HCC): ICD-10-CM

## 2024-09-23 DIAGNOSIS — E87.5 HYPERKALEMIA: ICD-10-CM

## 2024-09-23 DIAGNOSIS — E53.8 B12 DEFICIENCY: ICD-10-CM

## 2024-09-23 LAB
ALBUMIN SERPL BCG-MCNC: 4 G/DL (ref 3.5–5)
ANION GAP SERPL CALCULATED.3IONS-SCNC: 11 MMOL/L (ref 4–13)
BASOPHILS # BLD AUTO: 0.04 THOUSANDS/ΜL (ref 0–0.1)
BASOPHILS NFR BLD AUTO: 1 % (ref 0–1)
BUN SERPL-MCNC: 24 MG/DL (ref 5–25)
CALCIUM SERPL-MCNC: 9.2 MG/DL (ref 8.4–10.2)
CHLORIDE SERPL-SCNC: 103 MMOL/L (ref 96–108)
CO2 SERPL-SCNC: 24 MMOL/L (ref 21–32)
CREAT SERPL-MCNC: 1.56 MG/DL (ref 0.6–1.3)
EOSINOPHIL # BLD AUTO: 0.34 THOUSAND/ΜL (ref 0–0.61)
EOSINOPHIL NFR BLD AUTO: 5 % (ref 0–6)
ERYTHROCYTE [DISTWIDTH] IN BLOOD BY AUTOMATED COUNT: 15.5 % (ref 11.6–15.1)
EST. AVERAGE GLUCOSE BLD GHB EST-MCNC: 148 MG/DL
FERRITIN SERPL-MCNC: 19 NG/ML (ref 24–336)
GFR SERPL CREATININE-BSD FRML MDRD: 39 ML/MIN/1.73SQ M
GLUCOSE SERPL-MCNC: 118 MG/DL (ref 65–140)
HBA1C MFR BLD: 6.8 %
HCT VFR BLD AUTO: 39.7 % (ref 36.5–49.3)
HGB BLD-MCNC: 12.4 G/DL (ref 12–17)
IMM GRANULOCYTES # BLD AUTO: 0.04 THOUSAND/UL (ref 0–0.2)
IMM GRANULOCYTES NFR BLD AUTO: 1 % (ref 0–2)
IRON SATN MFR SERPL: 26 % (ref 15–50)
IRON SERPL-MCNC: 92 UG/DL (ref 50–212)
LYMPHOCYTES # BLD AUTO: 2.43 THOUSANDS/ΜL (ref 0.6–4.47)
LYMPHOCYTES NFR BLD AUTO: 33 % (ref 14–44)
MCH RBC QN AUTO: 28.7 PG (ref 26.8–34.3)
MCHC RBC AUTO-ENTMCNC: 31.2 G/DL (ref 31.4–37.4)
MCV RBC AUTO: 92 FL (ref 82–98)
MONOCYTES # BLD AUTO: 0.69 THOUSAND/ΜL (ref 0.17–1.22)
MONOCYTES NFR BLD AUTO: 9 % (ref 4–12)
NEUTROPHILS # BLD AUTO: 3.92 THOUSANDS/ΜL (ref 1.85–7.62)
NEUTS SEG NFR BLD AUTO: 51 % (ref 43–75)
NRBC BLD AUTO-RTO: 0 /100 WBCS
PHOSPHATE SERPL-MCNC: 2.7 MG/DL (ref 2.3–4.1)
PLATELET # BLD AUTO: 226 THOUSANDS/UL (ref 149–390)
PMV BLD AUTO: 10.3 FL (ref 8.9–12.7)
POTASSIUM SERPL-SCNC: 5.3 MMOL/L (ref 3.5–5.3)
PTH-INTACT SERPL-MCNC: 101.5 PG/ML (ref 12–88)
RBC # BLD AUTO: 4.32 MILLION/UL (ref 3.88–5.62)
SODIUM SERPL-SCNC: 138 MMOL/L (ref 135–147)
TIBC SERPL-MCNC: 357 UG/DL (ref 250–450)
UIBC SERPL-MCNC: 265 UG/DL (ref 155–355)
VIT B12 SERPL-MCNC: 993 PG/ML (ref 180–914)
WBC # BLD AUTO: 7.46 THOUSAND/UL (ref 4.31–10.16)

## 2024-09-23 PROCEDURE — 82607 VITAMIN B-12: CPT

## 2024-09-23 PROCEDURE — 83036 HEMOGLOBIN GLYCOSYLATED A1C: CPT

## 2024-09-23 PROCEDURE — 83540 ASSAY OF IRON: CPT

## 2024-09-23 PROCEDURE — 85025 COMPLETE CBC W/AUTO DIFF WBC: CPT

## 2024-09-23 PROCEDURE — 82728 ASSAY OF FERRITIN: CPT

## 2024-09-23 PROCEDURE — 83970 ASSAY OF PARATHORMONE: CPT

## 2024-09-23 PROCEDURE — 83550 IRON BINDING TEST: CPT

## 2024-09-23 PROCEDURE — 36415 COLL VENOUS BLD VENIPUNCTURE: CPT

## 2024-09-23 PROCEDURE — 80069 RENAL FUNCTION PANEL: CPT

## 2024-10-03 ENCOUNTER — OFFICE VISIT (OUTPATIENT)
Dept: NEPHROLOGY | Facility: CLINIC | Age: 89
End: 2024-10-03
Payer: MEDICARE

## 2024-10-03 VITALS
HEART RATE: 66 BPM | DIASTOLIC BLOOD PRESSURE: 68 MMHG | BODY MASS INDEX: 28.71 KG/M2 | HEIGHT: 72 IN | WEIGHT: 212 LBS | SYSTOLIC BLOOD PRESSURE: 117 MMHG

## 2024-10-03 DIAGNOSIS — E87.5 HYPERKALEMIA: ICD-10-CM

## 2024-10-03 DIAGNOSIS — I12.9 BENIGN HYPERTENSION WITH CKD (CHRONIC KIDNEY DISEASE) STAGE III (HCC): ICD-10-CM

## 2024-10-03 DIAGNOSIS — E61.1 IRON DEFICIENCY: ICD-10-CM

## 2024-10-03 DIAGNOSIS — N18.30 BENIGN HYPERTENSION WITH CKD (CHRONIC KIDNEY DISEASE) STAGE III (HCC): ICD-10-CM

## 2024-10-03 DIAGNOSIS — N28.1 BILATERAL RENAL CYSTS: ICD-10-CM

## 2024-10-03 DIAGNOSIS — N18.32 STAGE 3B CHRONIC KIDNEY DISEASE (HCC): Primary | ICD-10-CM

## 2024-10-03 DIAGNOSIS — N25.81 SECONDARY HYPERPARATHYROIDISM (HCC): ICD-10-CM

## 2024-10-03 PROCEDURE — 99214 OFFICE O/P EST MOD 30 MIN: CPT | Performed by: INTERNAL MEDICINE

## 2024-10-03 RX ORDER — CHOLECALCIFEROL (VITAMIN D3) 50 MCG
1 TABLET ORAL DAILY
Qty: 90 TABLET | Refills: 3 | Status: SHIPPED | OUTPATIENT
Start: 2024-10-03 | End: 2025-01-01

## 2024-10-03 RX ORDER — RIBOFLAVIN (VITAMIN B2) 100 MG
100 TABLET ORAL DAILY
Qty: 90 TABLET | Refills: 3 | Status: SHIPPED | OUTPATIENT
Start: 2024-10-03 | End: 2025-01-01

## 2024-10-03 RX ORDER — FERROUS SULFATE 324(65)MG
324 TABLET, DELAYED RELEASE (ENTERIC COATED) ORAL
Status: CANCELLED | OUTPATIENT
Start: 2024-10-03

## 2024-10-03 NOTE — PATIENT INSTRUCTIONS
Your kidney numbers are looking stable.    Blood pressure is well-controlled.    There is no protein in the urine which is reassuring.    Iron levels remain low and would recommend taking vitamin C with iron tablet to improve absorption.    Increase vitamin D to 2000 units, new prescription has been provided.

## 2024-10-03 NOTE — PROGRESS NOTES
NEPHROLOGY OFFICE VISIT   Jim Lomeli 88 y.o. male MRN: [id number]  10/3/2024    Reason for Visit: Chronic kidney disease    ASSESSMENT and PLAN:  It was a pleasure evaluating your patient in the office today. Thank you for allowing our team to participate in the care of Mr. Jim Lomeli. Please do not hesitate to contact our team if further issues/questions shall arise in the interim.     # Chronic Kidney Disease Stage III-B  - Etiology/risk factors: Diabetes, hypertension, age-related nephron loss  - Baseline Cr: 1.2-1.55 since 2018, most recently 1.56 09/2024   - Urinalysis: Negative protein, no RBC, no WBC 05/2023  - Proteinuria: No, UACR less than 6 mg/g, UPCR 70 mg 04/2024  - Imaging:  Right kidney 12.1 cm, left kidney 12.8 cm, normal echogenicity and contour  - KFRE 2-year 0.3% / 5-year 0.8% (low risk) as of 09/2024  - Patient opted for conservative kidney management based on previous discussion  - Repeat labs in 6 months and office visit in 6 months  - Risk factor reduction to slow progression of chronic kidney disease  Intensive blood pressure control as below  Glycemic control as below  Lipid control on Zocor 40 mg daily  Avoidance of NSAIDs     # Bilateral renal cysts  - Right renal cyst minimally enlarged (1.4/1.7/1.5 cm)  - Left renal cyst 2.4/2.7/2.5 cm previously 2.0/2.3/2.6 cm  - Bilobed septated left renal cyst 2.9/2.6/5.7 cm previously 3.2/2.7/5.6 cm (larger component appears to be a renal sinus cyst when compared on comparison CT and smaller component appears to be exophytic with low internal level echoes)  - Discussed with radiology and the cysts look benign and a follow-up kidney ultrasound will be performed in 1 year that will be next month 11/2024    # BP/Volume   - Goal BP <120/80 per KDIGO guidelines   - Volume status: Euvolemic  - Status: Blood pressure currently close to goal  - Current antihypertensive regimen: Metoprolol 12.5 mg twice daily, torsemide 10 mg daily  - Changes: No changes  today     # Screening for Anemia   - Target Hb: More than 10 g/dL  - Most recent hemoglobin: 12.4 g/dL  - Ferritin 19/iron saturation 26 consistent with absolute iron deficiency  - Current Rx: Ferrous sulfate 324 mg daily   - Changes: Add vitamin C 100 mg daily to increase oral iron absorption  - If no improvement or becomes symptomatic, will consider intravenous iron    # Electrolytes/Acid Base status   >> Hyperkalemia  - Serum potassium 5.6-5.7 since 10/2022, most recently 5.3 09/2024  - Most likely in setting of decreased potassium excretion from chronic kidney disease and increased dietary potassium intake  - Diabetics can also develop type IV renal tubular acidosis but he does not have metabolic acidosis   - No obstructive uropathy  - Low potassium diet   - Current Rx: Torsemide 10 mg daily  - Changes: Continue torsemide at current dose for kaliuresis  - Repeat lab work in 6 months as potassium level has been persistently stable on previous lab work    # CKD Mineral and Bone Disorder   - Goal Ca 8.5-10 mg/dL, goal Phos 2.7-4.6 mg/dL, goal iPTH 30-70 pg/mL  - .5 09/2024 increased from 95 in 05/2023   - 25-hydroxy vitamin D level 44.5 04/2024 improved from l 23.9 in 05/2023 status post initiation of cholecalciferol 1000 units daily  - Current Rx: Cholecalciferol 1000 units daily  - Changes: Increase cholecalciferol to 2000 units daily  - No indication for activated vitamin D, reserve for PTH more than 150    # Diabetes mellitus  - Most recent HbA1c 6.8 09/2024  - Currently on metformin 500 mg twice daily and Amaryl 3 times daily, metformin dose appropriate for current level of GFR  - Discussed importance of good glycemic control in preventing progression of chronic kidney disease    # History of atrial fibrillation  - Currently rate controlled with metoprolol  - Since last visit he has started anticoagulation with Eliquis 2.5 mg twice daily     # History of coronary artery disease  - Status post remote MI  in 1991 treated with thrombolytics and rotational atherectomy of left anterior descending artery  - Follows with cardiology and remains on simvastatin and aspirin    HPI:  Since last visit: He has been doing well.  He denies dyspnea.  He denies leg swelling.  He denies any trouble with urination.    Jim Lomeli is a 88 y.o. male who has history of hypertension for last 30 years.  He is currently on metoprolol.  He also has history of diabetes for the last 30 years and is currently on metformin dose of which was recently decreased by PCP.  He has history of atrial fibrillation and is currently on aspirin alone.  For rate control he takes metoprolol 25 mg twice daily.  He also has issues with his sacroiliac joint and is scheduled for steroid injection soon.  He was recently noticed to have elevated potassium and is doing dietary modifications to help serum potassium.  He has history of coronary artery disease and had myocardial infarction in 1990 status post thrombectomy.       >> Major risk factors for CKD  - Diabetes: Yes for 30 years  - Hypertension: Yes for 30 years   - Age >= 55 years: Yes   - Family history of kidney disease: No    - Obesity or metabolic syndrome: Yes      >> Medical history evaluation   - Prior kidney disease or dialysis: No   - Incidental hematuria in the past: No   - Urinary symptoms: Stream is good, nocturia 2-3 times   - History of foamy or frothy urine: No  - History of nephrolithiasis: Yes in 1908's but passed it spontaneously   - Diseases that share risk factors with CKD: DM, HTN, CAD  - Systemic diseases that might affect kidney: No   - History of use of medications that might affect renal function: PPI for several years     OBJECTIVE:  Current Weight: Weight - Scale: 96.2 kg (212 lb)  Vitals:    10/03/24 1226   BP: 117/68   BP Location: Left arm   Patient Position: Sitting   Cuff Size: Adult   Pulse: 66   Weight: 96.2 kg (212 lb)   Height: 6' (1.829 m)        Body mass index is 28.75  kg/m².      REVIEW OF SYSTEMS:    Review of Systems   Constitutional:  Negative for chills and fever.   HENT:  Negative for ear pain and sore throat.    Eyes:  Negative for pain and visual disturbance.   Respiratory:  Negative for cough and shortness of breath.    Cardiovascular:  Negative for chest pain and palpitations.   Gastrointestinal:  Negative for abdominal pain and vomiting.   Genitourinary:  Negative for dysuria and hematuria.   Musculoskeletal:  Negative for arthralgias and back pain.   Skin:  Negative for color change and rash.   Neurological:  Negative for seizures and syncope.   All other systems reviewed and are negative.      Past Medical History:   Diagnosis Date    Acute MI (HCC)     1991    Ambulates with cane     Anxiety     Arthritis     hands    At risk for falls     Atrial fibrillation (HCC)     Cholecystitis     CKD (chronic kidney disease)     Coronary artery disease     Diabetes mellitus (HCC)     GERD (gastroesophageal reflux disease)     Heart murmur     Hyperlipidemia     Hypertension     Low back pain     Lumbar disc disease     Umbilical hernia     Wears dentures     full set    Wears glasses        Past Surgical History:   Procedure Laterality Date    CARDIAC CATHETERIZATION      s/p MI    COLONOSCOPY      HERNIA REPAIR Left     X5    AZ LAPAROSCOPY SURG CHOLECYSTECTOMY N/A 1/7/2022    Procedure: ROBOTIC ASSISTED LAPAROSCOPIC CHOLECYSTECTOMY;  Surgeon: Dagoberto Aguilar MD;  Location: [facility];  Service: General    TOTAL HIP ARTHROPLASTY Left     UMBILICAL HERNIA REPAIR LAPAROSCOPIC N/A 1/7/2022    Procedure: Open umbilical hernia repair;  Surgeon: Dagoberto Aguilar MD;  Location: [facility];  Service: General       Family History   Problem Relation Age of Onset    No Known Problems Mother     No Known Problems Father         Social History     Substance and Sexual Activity   Alcohol Use Yes    Comment: rare     Social History     Substance and Sexual Activity   Drug Use No     Social  History     Tobacco Use   Smoking Status Never   Smokeless Tobacco Never       PHYSICAL EXAM:      Physical Exam  Constitutional:       Appearance: Normal appearance.   HENT:      Head: Normocephalic and atraumatic.   Cardiovascular:      Rate and Rhythm: Normal rate and regular rhythm.      Pulses: Normal pulses.      Heart sounds: Normal heart sounds.   Pulmonary:      Effort: Pulmonary effort is normal.      Breath sounds: Normal breath sounds.   Abdominal:      Tenderness: There is no right CVA tenderness or left CVA tenderness.   Musculoskeletal:         General: Normal range of motion.      Right lower leg: No edema.      Left lower leg: No edema.   Skin:     General: Skin is warm.   Neurological:      Mental Status: He is alert and oriented to person, place, and time. Mental status is at baseline.   Psychiatric:         Mood and Affect: Mood normal.       Medications:    Current Outpatient Medications:     acetaminophen (TYLENOL) 325 mg tablet, Take 2 tablets (650 mg total) by mouth every 6 (six) hours (Patient taking differently: Take 650 mg by mouth every 6 (six) hours as needed), Disp: 30 tablet, Rfl: 0    Ascorbic Acid (vitamin C) 100 MG tablet, Take 1 tablet (100 mg total) by mouth daily, Disp: 90 tablet, Rfl: 3    aspirin (ECOTRIN LOW STRENGTH) 81 mg EC tablet, Take 81 mg by mouth daily, Disp: , Rfl:     Cholecalciferol (D3) 50 MCG (2000 UT) TABS, Take 1 tablet (2,000 Units total) by mouth in the morning, Disp: 90 tablet, Rfl: 3    cyanocobalamin (VITAMIN B-12) 500 mcg tablet, Take 1,000 mcg by mouth daily, Disp: , Rfl:     docusate sodium (COLACE) 100 mg capsule, Take 1 capsule (100 mg total) by mouth 2 (two) times a day, Disp: 10 capsule, Rfl: 0    escitalopram (LEXAPRO) 5 mg tablet, TAKE 1 TABLET BY MOUTH EVERY DAY, Disp: 90 tablet, Rfl: 1    ferrous sulfate 325 (65 FE) MG EC tablet, Take 325 mg by mouth in the morning, Disp: , Rfl:     glimepiride (AMARYL) 1 mg tablet, Take 1 tablet (1 mg total) by  "mouth 3 (three) times a day with meals, Disp: 1080 tablet, Rfl: 0    metFORMIN (GLUCOPHAGE) 500 mg tablet, TAKE 1 TABLET (500 MG TOTAL) BY MOUTH 2 (TWO) TIMES A DAY WITH MEALS TAKE 1 TABLETS EVERY MORNING TAKE 1 TABLETS EVERY EVENING (Patient taking differently: Take 500 mg by mouth daily with breakfast), Disp: 180 tablet, Rfl: 1    metoprolol tartrate (LOPRESSOR) 25 mg tablet, Take 0.5 tablets (12.5 mg total) by mouth every 12 (twelve) hours, Disp: 45 tablet, Rfl: 1    omeprazole (PriLOSEC) 40 MG capsule, TAKE 1 CAPSULE BY MOUTH EVERY DAY BEFORE BREAKFAST, Disp: 90 capsule, Rfl: 1    simvastatin (ZOCOR) 40 mg tablet, TAKE 1 TABLET BY MOUTH EVERY DAY (Patient taking differently: Take 40 mg by mouth daily at bedtime), Disp: 90 tablet, Rfl: 1    torsemide (DEMADEX) 10 mg tablet, Take 1 tablet (10 mg total) by mouth daily, Disp: 90 tablet, Rfl: 3    Laboratory Results:        Invalid input(s): \"ALBUMIN\"      Results for orders placed or performed in visit on 09/23/24   Renal function panel    Collection Time: 09/23/24 11:04 AM   Result Value Ref Range    Albumin 4.0 3.5 - 5.0 g/dL    Calcium 9.2 8.4 - 10.2 mg/dL    Phosphorus 2.7 2.3 - 4.1 mg/dL    Glucose 118 65 - 140 mg/dL    BUN 24 5 - 25 mg/dL    Creatinine 1.56 (H) 0.60 - 1.30 mg/dL    Sodium 138 135 - 147 mmol/L    Potassium 5.3 3.5 - 5.3 mmol/L    Chloride 103 96 - 108 mmol/L    CO2 24 21 - 32 mmol/L    ANION GAP 11 4 - 13 mmol/L    eGFR 39 ml/min/1.73sq m   CBC and differential    Collection Time: 09/23/24 11:04 AM   Result Value Ref Range    WBC 7.46 4.31 - 10.16 Thousand/uL    RBC 4.32 3.88 - 5.62 Million/uL    Hemoglobin 12.4 12.0 - 17.0 g/dL    Hematocrit 39.7 36.5 - 49.3 %    MCV 92 82 - 98 fL    MCH 28.7 26.8 - 34.3 pg    MCHC 31.2 (L) 31.4 - 37.4 g/dL    RDW 15.5 (H) 11.6 - 15.1 %    MPV 10.3 8.9 - 12.7 fL    Platelets 226 149 - 390 Thousands/uL    nRBC 0 /100 WBCs    Segmented % 51 43 - 75 %    Immature Grans % 1 0 - 2 %    Lymphocytes % 33 14 - 44 %    " Monocytes % 9 4 - 12 %    Eosinophils Relative 5 0 - 6 %    Basophils Relative 1 0 - 1 %    Absolute Neutrophils 3.92 1.85 - 7.62 Thousands/µL    Absolute Immature Grans 0.04 0.00 - 0.20 Thousand/uL    Absolute Lymphocytes 2.43 0.60 - 4.47 Thousands/µL    Absolute Monocytes 0.69 0.17 - 1.22 Thousand/µL    Eosinophils Absolute 0.34 0.00 - 0.61 Thousand/µL    Basophils Absolute 0.04 0.00 - 0.10 Thousands/µL   PTH, intact    Collection Time: 09/23/24 11:04 AM   Result Value Ref Range    .5 (H) 12.0 - 88.0 pg/mL   Vitamin B12    Collection Time: 09/23/24 11:04 AM   Result Value Ref Range    Vitamin B-12 993 (H) 180 - 914 pg/mL   Hemoglobin A1C    Collection Time: 09/23/24 11:04 AM   Result Value Ref Range    Hemoglobin A1C 6.8 (H) Normal 4.0-5.6%; PreDiabetic 5.7-6.4%; Diabetic >=6.5%; Glycemic control for adults with diabetes <7.0% %     mg/dl   TIBC Panel (incl. Iron, TIBC, % Iron Saturation)    Collection Time: 09/23/24 11:04 AM   Result Value Ref Range    Iron Saturation 26 15 - 50 %    TIBC 357 250 - 450 ug/dL    Iron 92 50 - 212 ug/dL    UIBC 265 155 - 355 ug/dL   Ferritin    Collection Time: 09/23/24 11:04 AM   Result Value Ref Range    Ferritin 19 (L) 24 - 336 ng/mL

## 2024-10-06 DIAGNOSIS — N18.31 TYPE 2 DIABETES MELLITUS WITH STAGE 3A CHRONIC KIDNEY DISEASE, WITHOUT LONG-TERM CURRENT USE OF INSULIN (HCC): ICD-10-CM

## 2024-10-06 DIAGNOSIS — E11.22 TYPE 2 DIABETES MELLITUS WITH STAGE 3A CHRONIC KIDNEY DISEASE, WITHOUT LONG-TERM CURRENT USE OF INSULIN (HCC): ICD-10-CM

## 2024-10-07 RX ORDER — GLIMEPIRIDE 1 MG/1
1 TABLET ORAL
Qty: 270 TABLET | Refills: 1 | Status: SHIPPED | OUTPATIENT
Start: 2024-10-07 | End: 2025-04-05

## 2024-10-09 ENCOUNTER — TELEPHONE (OUTPATIENT)
Dept: NEPHROLOGY | Facility: CLINIC | Age: 89
End: 2024-10-09

## 2024-10-15 ENCOUNTER — APPOINTMENT (OUTPATIENT)
Dept: LAB | Facility: CLINIC | Age: 89
End: 2024-10-15
Payer: MEDICARE

## 2024-10-15 DIAGNOSIS — E78.2 MIXED HYPERLIPIDEMIA: ICD-10-CM

## 2024-10-15 DIAGNOSIS — E11.42 TYPE 2 DIABETES MELLITUS WITH DIABETIC POLYNEUROPATHY, WITHOUT LONG-TERM CURRENT USE OF INSULIN (HCC): ICD-10-CM

## 2024-10-15 LAB
CHOLEST SERPL-MCNC: 155 MG/DL
HDLC SERPL-MCNC: 28 MG/DL
LDLC SERPL CALC-MCNC: 83 MG/DL (ref 0–100)
TRIGL SERPL-MCNC: 219 MG/DL

## 2024-10-15 PROCEDURE — 80061 LIPID PANEL: CPT

## 2024-10-15 PROCEDURE — 36415 COLL VENOUS BLD VENIPUNCTURE: CPT

## 2024-10-16 DIAGNOSIS — F41.9 ANXIETY: ICD-10-CM

## 2024-10-16 DIAGNOSIS — E11.69 TYPE 2 DIABETES MELLITUS WITH OTHER SPECIFIED COMPLICATION, WITHOUT LONG-TERM CURRENT USE OF INSULIN (HCC): ICD-10-CM

## 2024-10-16 DIAGNOSIS — K21.9 GASTROESOPHAGEAL REFLUX DISEASE WITHOUT ESOPHAGITIS: ICD-10-CM

## 2024-10-16 DIAGNOSIS — E78.5 HYPERLIPIDEMIA, UNSPECIFIED HYPERLIPIDEMIA TYPE: ICD-10-CM

## 2024-10-17 RX ORDER — ESCITALOPRAM OXALATE 5 MG/1
TABLET ORAL
Qty: 90 TABLET | Refills: 1 | Status: SHIPPED | OUTPATIENT
Start: 2024-10-17

## 2024-10-17 RX ORDER — SIMVASTATIN 40 MG
TABLET ORAL
Qty: 90 TABLET | Refills: 1 | Status: SHIPPED | OUTPATIENT
Start: 2024-10-17

## 2024-10-17 RX ORDER — OMEPRAZOLE 40 MG/1
CAPSULE, DELAYED RELEASE ORAL
Qty: 90 CAPSULE | Refills: 1 | Status: SHIPPED | OUTPATIENT
Start: 2024-10-17

## 2024-10-21 ENCOUNTER — OFFICE VISIT (OUTPATIENT)
Dept: FAMILY MEDICINE CLINIC | Facility: CLINIC | Age: 89
End: 2024-10-21
Payer: MEDICARE

## 2024-10-21 VITALS
HEART RATE: 62 BPM | SYSTOLIC BLOOD PRESSURE: 116 MMHG | OXYGEN SATURATION: 96 % | HEIGHT: 72 IN | RESPIRATION RATE: 16 BRPM | WEIGHT: 208 LBS | BODY MASS INDEX: 28.17 KG/M2 | DIASTOLIC BLOOD PRESSURE: 62 MMHG

## 2024-10-21 DIAGNOSIS — N18.31 TYPE 2 DIABETES MELLITUS WITH STAGE 3A CHRONIC KIDNEY DISEASE, WITHOUT LONG-TERM CURRENT USE OF INSULIN (HCC): ICD-10-CM

## 2024-10-21 DIAGNOSIS — E11.42 TYPE 2 DIABETES MELLITUS WITH DIABETIC POLYNEUROPATHY, WITHOUT LONG-TERM CURRENT USE OF INSULIN (HCC): ICD-10-CM

## 2024-10-21 DIAGNOSIS — R26.2 AMBULATORY DYSFUNCTION: ICD-10-CM

## 2024-10-21 DIAGNOSIS — E61.1 IRON DEFICIENCY: ICD-10-CM

## 2024-10-21 DIAGNOSIS — H61.92 SKIN LESION OF LEFT EAR: Primary | ICD-10-CM

## 2024-10-21 DIAGNOSIS — E11.69 TYPE 2 DIABETES MELLITUS WITH OTHER SPECIFIED COMPLICATION, WITHOUT LONG-TERM CURRENT USE OF INSULIN (HCC): ICD-10-CM

## 2024-10-21 DIAGNOSIS — I48.0 PAROXYSMAL ATRIAL FIBRILLATION (HCC): ICD-10-CM

## 2024-10-21 DIAGNOSIS — E11.22 TYPE 2 DIABETES MELLITUS WITH STAGE 3A CHRONIC KIDNEY DISEASE, WITHOUT LONG-TERM CURRENT USE OF INSULIN (HCC): ICD-10-CM

## 2024-10-21 PROBLEM — R29.6 FREQUENT FALLS: Status: RESOLVED | Noted: 2020-03-21 | Resolved: 2024-10-21

## 2024-10-21 PROBLEM — R59.0 HILAR ADENOPATHY: Status: RESOLVED | Noted: 2021-11-18 | Resolved: 2024-10-21

## 2024-10-21 PROCEDURE — 99214 OFFICE O/P EST MOD 30 MIN: CPT | Performed by: FAMILY MEDICINE

## 2024-10-21 PROCEDURE — G2211 COMPLEX E/M VISIT ADD ON: HCPCS | Performed by: FAMILY MEDICINE

## 2024-10-21 NOTE — PROGRESS NOTES
Ambulatory Visit  Name: Jim Lomeli      : 1935      MRN: [id number]  Encounter Provider: Homer Nguyen MD  Encounter Date: 10/21/2024   Encounter department: [facility]    Assessment & Plan  Skin lesion of left ear    Orders:    Ambulatory Referral to Plastic Surgery; Future    Iron deficiency         Type 2 diabetes mellitus with other specified complication, without long-term current use of insulin (HCC)    Lab Results   Component Value Date    HGBA1C 6.8 (H) 2024       Orders:    metFORMIN (GLUCOPHAGE) 500 mg tablet; Take 1 tablet (500 mg total) by mouth daily with breakfast    Ambulatory dysfunction         Paroxysmal atrial fibrillation (HCC)         Type 2 diabetes mellitus with diabetic polyneuropathy, without long-term current use of insulin (HCC)    Lab Results   Component Value Date    HGBA1C 6.8 (H) 2024            Type 2 diabetes mellitus with stage 3a chronic kidney disease, without long-term current use of insulin (HCC)    Lab Results   Component Value Date    HGBA1C 6.8 (H) 2024               Assessment & Plan  1. Seborrheic keratosis.  The growth on his ear could potentially be seborrheic keratosis or skin cancer. A referral to a plastic surgeon will be made for further evaluation and possible biopsy. He reports the growth has been present for about 6 months and causes pain when he sleeps on that side.    2. Iron deficiency.  He has been taking iron supplements daily for the past 2-3 months but experiences occasional constipation, managed with docusate. His iron levels will be reassessed in 2024. If the iron levels remain low, an iron infusion will be considered to avoid gastrointestinal side effects.    3. Diabetes Mellitus.  His blood glucose levels are well-controlled with recent readings between 107 and 122. He is currently taking glimepiride twice a day and metformin once in the morning. He was advised to avoid taking glimepiride if he  skips a meal to prevent hypoglycemia. He should continue his current medication regimen without any changes.    4. Health Maintenance.  He received his flu shot and the first dose of the shingles vaccine in September 2024. He is scheduled to receive the second dose of the shingles vaccine in mid-November 2024.          History of Present Illness     History of Present Illness  The patient is an 88-year-old male who presents for evaluation of multiple medical concerns.    He reports a stable weight, currently at 208 pounds, with a recent loss of 4 pounds over the past 2 weeks. He attributes his weight gain to regular breakfast consumption, prepared by his caregiver who visits five days a week. His respiratory function is satisfactory.    He has been on iron supplements for approximately 3 months and occasionally experiences constipation, for which he takes docusate every 2 or 3 nights. He also takes vitamin C and vitamin D (2000 units) daily.    He uses a walker for mobility and has not experienced any falls. His mood is generally good. He experienced dizziness yesterday afternoon, which he managed by consuming chocolate and ham.    He has received both doses of the influenza vaccine and one dose of the shingles vaccine, with the second dose scheduled for mid-November 2024.    He has had a sore in his ear for the past 6 months, which causes discomfort when he sleeps on that side. He applies Davis's Liniment before bed, which seems to soften the sore.    He monitors his blood sugar levels regularly, which have been ranging from 107 to 122 recently. He takes glimepiride twice daily and metformin once in the morning.    He saw Dr. Zimmer for his kidney issues.     Review of Systems   Constitutional:  Negative for activity change, appetite change and fever.   HENT:  Negative for congestion, nosebleeds and trouble swallowing.    Eyes:  Negative for itching.   Respiratory:  Negative for cough and chest tightness.     Cardiovascular:  Negative for chest pain and palpitations.   Gastrointestinal:  Negative for abdominal pain, constipation, diarrhea and nausea.   Endocrine: Negative for cold intolerance.   Genitourinary:  Negative for frequency.   Musculoskeletal:  Positive for arthralgias and back pain. Negative for gait problem and joint swelling.   Skin:  Negative for rash.   Allergic/Immunologic: Negative for immunocompromised state.   Neurological:  Negative for dizziness, tremors, seizures, syncope and headaches.   Psychiatric/Behavioral:  Negative for hallucinations and suicidal ideas. The patient is nervous/anxious.      Objective     /62   Pulse 62   Resp 16   Ht 6' (1.829 m)   Wt 94.3 kg (208 lb)   SpO2 96%   BMI 28.21 kg/m²     Physical Exam  Vital Signs  Patient's weight is 208 pounds.  Physical Exam  Vitals and nursing note reviewed.   Constitutional:       Appearance: He is well-developed.      Comments: walker   HENT:      Head: Normocephalic and atraumatic.   Eyes:      Conjunctiva/sclera: Conjunctivae normal.      Pupils: Pupils are equal, round, and reactive to light.   Cardiovascular:      Rate and Rhythm: Normal rate and regular rhythm.      Pulses:           Dorsalis pedis pulses are 2+ on the right side and 2+ on the left side.      Heart sounds: Normal heart sounds.   Pulmonary:      Effort: Pulmonary effort is normal.      Breath sounds: Normal breath sounds. No wheezing or rales.   Abdominal:      General: Bowel sounds are normal. There is no distension.      Palpations: Abdomen is soft.      Tenderness: There is no abdominal tenderness.   Musculoskeletal:         General: No tenderness. Normal range of motion.      Cervical back: Normal range of motion and neck supple.   Feet:      Right foot:      Skin integrity: Callus and dry skin present. No ulcer, skin breakdown, erythema or warmth.      Left foot:      Skin integrity: Callus and dry skin present. No ulcer, skin breakdown, erythema or  warmth.   Skin:     General: Skin is warm and dry.      Findings: No rash.   Neurological:      Mental Status: He is alert and oriented to person, place, and time.      Cranial Nerves: No cranial nerve deficit.      Sensory: No sensory deficit.      Motor: Weakness present.      Coordination: Coordination normal.      Gait: Gait abnormal.   Psychiatric:         Behavior: Behavior normal.         Thought Content: Thought content normal.         Judgment: Judgment normal.

## 2024-10-22 ENCOUNTER — TELEPHONE (OUTPATIENT)
Age: 89
End: 2024-10-22

## 2024-10-22 NOTE — TELEPHONE ENCOUNTER
Patient called stating he called dermatologist to schedule appointment for spot on his ear as instructed by PCP. He states the next available is not until 10/2025. He is asking how he should proceed and is asking if PCP can help get him a sooner appointment.

## 2024-10-22 NOTE — TELEPHONE ENCOUNTER
Received call from patient to [facility] a new patient appt.     Patient has a routine referral for Skin lesion of left ear     I offered patient the next available in 2025.    Patient declined.    Referral was closed on this call

## 2024-10-23 NOTE — TELEPHONE ENCOUNTER
Pt called back in stating that they could not get him in until 3/14/2025. Pt declined the appt. Please advise.

## 2024-10-27 DIAGNOSIS — I10 ESSENTIAL HYPERTENSION: ICD-10-CM

## 2024-10-28 ENCOUNTER — TELEPHONE (OUTPATIENT)
Age: 89
End: 2024-10-28

## 2024-10-28 RX ORDER — METOPROLOL TARTRATE 25 MG/1
12.5 TABLET, FILM COATED ORAL EVERY 12 HOURS SCHEDULED
Qty: 90 TABLET | Refills: 1 | Status: SHIPPED | OUTPATIENT
Start: 2024-10-28

## 2024-11-04 ENCOUNTER — HOSPITAL ENCOUNTER (OUTPATIENT)
Dept: ULTRASOUND IMAGING | Facility: HOSPITAL | Age: 89
Discharge: HOME | End: 2024-11-04
Attending: INTERNAL MEDICINE
Payer: MEDICARE

## 2024-11-04 DIAGNOSIS — N28.1 BILATERAL RENAL CYSTS: ICD-10-CM

## 2024-11-04 PROCEDURE — 76775 US EXAM ABDO BACK WALL LIM: CPT

## 2024-11-08 ENCOUNTER — TELEPHONE (OUTPATIENT)
Dept: NEPHROLOGY | Facility: CLINIC | Age: 89
End: 2024-11-08

## 2024-11-08 NOTE — TELEPHONE ENCOUNTER
Spoke with patient about the following, he expressed understanding and thanked us for the call:    ----- Message from Michel Zimmer MD sent at 11/8/2024 10:28 AM EST -----  Please let the patient know that kidney cysts are looking simple and benign and no further imaging follow-up is needed at this time.  Thank you

## 2024-11-12 ENCOUNTER — OFFICE VISIT (OUTPATIENT)
Dept: AUDIOLOGY | Facility: CLINIC | Age: 89
End: 2024-11-12

## 2024-11-12 DIAGNOSIS — H90.3 SENSORINEURAL HEARING LOSS (SNHL), BILATERAL: Primary | ICD-10-CM

## 2024-11-12 NOTE — PROGRESS NOTES
"Hearing Aid Visit:    Name:  Jim Lomeli  :  1935  Age:  88 y.o.  MRN:  [id number]  Date of Evaluation:   24    HISTORY:    Jim Lomeli was seen today (2024) for a(n) in-warranty hearing aid check of his bilateral hearing aids. Today, Jim reports \"the left aid is not working\".      DEVICE INFORMATION:    Hearing Aid Fitting Date: 8/15/23       Left Device Right Device   Hearing Aid Make: OtAprimo Oticon   Hearing Aid Model: REAL 3 REAL 3   Serial Number: [id number]   Repair Warranty Expiration Date: 26   Loss/Damage Warranty Expiration Date:  26    Length: 3 no lock  3 no lock    Output: 85 85   Wax System: Pro Wax  Pro Wax   Dome Size/Style: 10 mm power  10 mm power    Battery: Lithium-ion Rechargeable Lithium-ion Rechargeable   [employer] Serial Number: N/A N/A   [employer] Warranty Date:  N/A N/A       Serial Number: [id number]  Accessories: N/A    ACTION/ADJUSTMENTS:  Cleaned and checked both devices / changed wax guards and domes (provided 10 mm Power, he had 12's on today; discussed)    Canals were clear     RECOMMENDATIONS:   RTO 2025 for HA maintenance under warranty     Ramya Wall, CCC-A  Clinical Audiologist  [place] [id number]  [facility] AUDIOLOGY  [address] [place] [phone]  "

## 2024-12-03 ENCOUNTER — OFFICE VISIT (OUTPATIENT)
Dept: CARDIOLOGY CLINIC | Facility: CLINIC | Age: 89
End: 2024-12-03
Payer: MEDICARE

## 2024-12-03 VITALS
BODY MASS INDEX: 27.6 KG/M2 | HEART RATE: 68 BPM | DIASTOLIC BLOOD PRESSURE: 62 MMHG | HEIGHT: 72 IN | WEIGHT: 203.8 LBS | SYSTOLIC BLOOD PRESSURE: 103 MMHG | TEMPERATURE: 97.6 F | OXYGEN SATURATION: 98 %

## 2024-12-03 DIAGNOSIS — I10 ESSENTIAL HYPERTENSION: ICD-10-CM

## 2024-12-03 DIAGNOSIS — I25.10 CORONARY ARTERY DISEASE INVOLVING NATIVE CORONARY ARTERY OF NATIVE HEART WITHOUT ANGINA PECTORIS: Primary | ICD-10-CM

## 2024-12-03 DIAGNOSIS — I48.0 PAROXYSMAL ATRIAL FIBRILLATION (HCC): ICD-10-CM

## 2024-12-03 PROCEDURE — 99214 OFFICE O/P EST MOD 30 MIN: CPT | Performed by: INTERNAL MEDICINE

## 2024-12-03 PROCEDURE — 93000 ELECTROCARDIOGRAM COMPLETE: CPT | Performed by: INTERNAL MEDICINE

## 2024-12-03 NOTE — PROGRESS NOTES
[facility]    CHIEF COMPLAINT:   No chief complaint on file.      HPI:  Jim Lomeli is a 88 y.o. male with a past medical history significant for DM, hypertension, CKD 3B, coronary artery disease, history of MI (1991) and paroxysmal atrial fibrillation/flutter.    He was last seen for preoperative risk assessment in December 2021 for laparoscopic cholecystectomy and umbilical hernia repair after a recent admission on 11/18/2021 with sepsis due to acute cholecystitis.  His hospital course was complicated by atrial flutter with RVR and a non-MI troponin elevation.  He subsequently underwent laparoscopic cholecystectomy and umbilical herniorrhaphy on 1/7/22. Post op course was uncomplicated.    OV - 5/17/23: Generally not too active but he still ambulates with his cane. Reports that his balance has improved and he feels more steady on his feet. He continues to do home exercise. He denies any falls.     OV - 11/27/23: Apixaban was not affordable. He was not interested in warfarin. Still feeling off balance but denies any falls in the last 1.5 months. He is using his walker. Saw ENT and not felt to be ear course of imbalance.    Interval history:   For about 1 week having some abdominal soreness and indigestion symptoms. Belching helps this. Taking tums as well. No nausea, vomiting, diarrhea. Abdominal discomfort has been improving. Takes stool softeners because of iron supplements. Has some difficultly with dizziness after standing up from a bending over position.  He denies any visual changes, syncope, chest pain, palpitations, shortness of breath, orthopnea, lower extremity swelling.    The following portions of the patient's history were reviewed and updated as appropriate: allergies, current medications, past family history, past medical history, past social history, past surgical history, and problem list.    SINCE LAST OV I REVIEWED WITH THE PATIENT THE INTERIM LABS, TEST RESULTS,  CONSULTANT(S) NOTES AND PERFORMED AN INTERIM REVIEW OF HISTORY    Past Medical History:   Diagnosis Date    Acute MI (HCC)         Ambulates with cane     Anxiety     Arthritis     hands    At risk for falls     Atrial fibrillation (HCC)     Cholecystitis     CKD (chronic kidney disease)     Coronary artery disease     Diabetes mellitus (HCC)     GERD (gastroesophageal reflux disease)     Heart murmur     Hyperlipidemia     Hypertension     Low back pain     Lumbar disc disease     Umbilical hernia     Wears dentures     full set    Wears glasses        Past Surgical History:   Procedure Laterality Date    CARDIAC CATHETERIZATION      s/p MI    COLONOSCOPY      HERNIA REPAIR Left     X5    ME LAPAROSCOPY SURG CHOLECYSTECTOMY N/A 2022    Procedure: ROBOTIC ASSISTED LAPAROSCOPIC CHOLECYSTECTOMY;  Surgeon: Dagoberto Aguilar MD;  Location: [facility];  Service: General    TOTAL HIP ARTHROPLASTY Left     UMBILICAL HERNIA REPAIR LAPAROSCOPIC N/A 2022    Procedure: Open umbilical hernia repair;  Surgeon: Dagoberto Aguilar MD;  Location: [facility];  Service: General       Social History     Socioeconomic History    Marital status: /Civil Union     Spouse name: Not on file    Number of children: Not on file    Years of education: Not on file    Highest education level: Not on file   Occupational History    Occupation: retired   Tobacco Use    Smoking status: Never    Smokeless tobacco: Never   Vaping Use    Vaping status: Never Used   Substance and Sexual Activity    Alcohol use: Yes     Comment: rare    Drug use: No    Sexual activity: Not Currently   Other Topics Concern    Not on file   Social History Narrative    Thermal:    Most recent tobacco use screenin2020      Do you currently or have you served in the [employer]:   No      Were you activated, into active duty, as a member of the [employer] or as a Reservist:   No      Occupation:   Retired      Marital status:         Sexual  orientation:   Heterosexual      Exercise level:   Occasional      Diet:   Regular      General stress level:   Medium      Alcohol intake:   Occasional      Caffeine intake:   Moderate      Chewing tobacco:   none      Illicit drugs:   Denied      Guns present in home:   No      Seat belts used routinely:   Yes      Sunscreen used routinely:   Yes      Smoke alarm in home:   Yes      Advance directive:   Yes      Salt Intake:   Normal     Would the patient like to schedule a Mammogram:   No      Is the patient interested in a colorectal cancer screening:   No     Last modified by zulma   01-, 15:03      Social Drivers of Health     Financial Resource Strain: Unknown (2/21/2023)    Overall Financial Resource Strain (CARDIA)     Difficulty of Paying Living Expenses: Patient declined   Food Insecurity: No Food Insecurity (4/16/2024)    Nursing - Inadequate Food Risk Classification     Worried About Running Out of Food in the Last Year: Never true     Ran Out of Food in the Last Year: Never true     Ran Out of Food in the Last Year: Not on file   Transportation Needs: No Transportation Needs (4/16/2024)    PRAPARE - Transportation     Lack of Transportation (Medical): No     Lack of Transportation (Non-Medical): No   Physical Activity: Not on file   Stress: Not on file   Social Connections: Not on file   Intimate Partner Violence: Not on file   Housing Stability: Low Risk  (4/16/2024)    Housing Stability Vital Sign     Unable to Pay for Housing in the Last Year: No     Number of Times Moved in the Last Year: 1     Homeless in the Last Year: No       Family History   Problem Relation Age of Onset    No Known Problems Mother     No Known Problems Father        No Known Allergies    Current Outpatient Medications   Medication Sig Dispense Refill    acetaminophen (TYLENOL) 325 mg tablet Take 2 tablets (650 mg total) by mouth every 6 (six) hours 30 tablet 0    Ascorbic Acid (vitamin C) 100 MG tablet Take 1  tablet (100 mg total) by mouth daily 90 tablet 3    aspirin (ECOTRIN LOW STRENGTH) 81 mg EC tablet Take 81 mg by mouth daily      Cholecalciferol (D3) 50 MCG (2000 UT) TABS Take 1 tablet (2,000 Units total) by mouth in the morning 90 tablet 3    cyanocobalamin (VITAMIN B-12) 500 mcg tablet Take 1,000 mcg by mouth daily      docusate sodium (COLACE) 100 mg capsule Take 1 capsule (100 mg total) by mouth 2 (two) times a day 10 capsule 0    escitalopram (LEXAPRO) 5 mg tablet TAKE 1 TABLET BY MOUTH EVERY DAY 90 tablet 1    ferrous sulfate 325 (65 FE) MG EC tablet Take 325 mg by mouth in the morning      glimepiride (AMARYL) 1 mg tablet TAKE 1 TABLET (1 MG TOTAL) BY MOUTH 3 (THREE) TIMES A DAY WITH MEALS (Patient taking differently: Take 1 mg by mouth 3 (three) times a day with meals 2-3 times a day) 270 tablet 1    metFORMIN (GLUCOPHAGE) 500 mg tablet Take 1 tablet (500 mg total) by mouth daily with breakfast 90 tablet 1    metoprolol tartrate (LOPRESSOR) 25 mg tablet TAKE 0.5 TABLETS (12.5 MG TOTAL) BY MOUTH EVERY 12 (TWELVE) HOURS 90 tablet 1    omeprazole (PriLOSEC) 40 MG capsule TAKE 1 CAPSULE BY MOUTH EVERY DAY BEFORE BREAKFAST 90 capsule 1    simvastatin (ZOCOR) 40 mg tablet TAKE 1 TABLET BY MOUTH EVERY DAY 90 tablet 1    torsemide (DEMADEX) 10 mg tablet Take 1 tablet (10 mg total) by mouth daily 90 tablet 3     No current facility-administered medications for this visit.       /62 (BP Location: Left arm, Patient Position: Sitting, Cuff Size: Adult)   Pulse 68   Temp 97.6 °F (36.4 °C) (Tympanic)   Ht 6' (1.829 m)   Wt 92.4 kg (203 lb 12.8 oz)   SpO2 98%   BMI 27.64 kg/m²     Review of Systems   All other systems reviewed and are negative.      Physical Exam  Vitals reviewed.   Constitutional:       Appearance: Normal appearance. He is not toxic-appearing.   HENT:      Head: Normocephalic and atraumatic.   Eyes:      General: No scleral icterus.     Extraocular Movements: Extraocular movements intact.    Cardiovascular:      Rate and Rhythm: Normal rate and regular rhythm.      Pulses: Normal pulses.      Heart sounds: Normal heart sounds. No murmur heard.     No gallop.   Pulmonary:      Effort: Pulmonary effort is normal. No respiratory distress.      Breath sounds: Normal breath sounds. No wheezing or rales.   Abdominal:      General: Abdomen is flat. Bowel sounds are normal. There is no distension.      Palpations: Abdomen is soft.      Tenderness: There is no abdominal tenderness. There is no guarding.   Musculoskeletal:      Right lower leg: No edema.      Left lower leg: No edema.   Skin:     General: Skin is warm and dry.      Coloration: Skin is not jaundiced.   Neurological:      Mental Status: He is alert.        Lab Results   Component Value Date    K 5.3 09/23/2024     09/23/2024    CO2 24 09/23/2024    BUN 24 09/23/2024    CREATININE 1.56 (H) 09/23/2024    CALCIUM 9.2 09/23/2024    ALT 28 10/12/2022    AST 18 10/12/2022    INR 1.17 11/19/2021       Lab Results   Component Value Date    HDL 28 (L) 10/15/2024    LDLCALC 83 10/15/2024    TRIG 219 (H) 10/15/2024       Lab Results   Component Value Date    WBC 7.46 09/23/2024    HGB 12.4 09/23/2024    HCT 39.7 09/23/2024     09/23/2024       Lab Results   Component Value Date     09/23/2024    HGBA1C 6.8 (H) 09/23/2024     Cardiac studies:   Results for orders placed or performed in visit on 12/03/24   POCT ECG    Impression    Sinus rhythm with sinus arrhythmia   LPFB     TTE - 1/3/24:    Left Ventricle: Left ventricular cavity size is normal. Wall thickness is mildly increased. The left ventricular ejection fraction is 60%. Systolic function is normal. Wall motion is normal. Diastolic function is mildly abnormal, consistent with grade I (abnormal) relaxation.    Left Atrium: The atrium is mildly dilated.    Aortic Valve: There is aortic valve sclerosis.    Mitral Valve: There is mild annular calcification.    Tricuspid Valve: There  is mild regurgitation.    Pulmonic Valve: There is mild regurgitation.    ECG - 11/27/23: NSR, LAFB    Zio XT - 12/15/22-12/29/22: Patient had a min HR of 49 bpm, max HR of 160 bpm, and avg HR of 66 bpm. Predominant underlying rhythm was Sinus Rhythm. 2 Ventricular Tachycardia runs occurred, the run with the fastest interval lasting 4 beats with a max rate of 139 bpm, the longest lasting 7 beats with an avg rate of 121 bpm. 20 Supraventricular Tachycardia runs occurred, the run with the fastest interval lasting 2 mins 1 sec with a max rate of 160 bpm, the longest lasting 2 mins 38 secs with an avg rate of  126 bpm. Isolated SVEs were rare (<1.0%), SVE Couplets were rare (<1.0%), and SVE Triplets were rare (<1.0%). Isolated VEs were occasional (1.1%, 29204), VE Couplets were rare (<1.0%, 33), and VE Triplets were rare (<1.0%, 3). Ventricular Bigeminy and Trigeminy were present.     TTE-2/10/2018: LVEF 55%, grade 2 DD. LA mild to moderately dilated. RA mildly dilated. Mild to moderate TR. PASP 45 mmHg.    ASSESSMENT AND PLAN:  Diagnoses and all orders for this visit:    Coronary artery disease involving native coronary artery of native heart without angina pectoris: History of remote MI 1991.  He was treated with thrombolytics and rotational atherectomy of the LAD.  He underwent cardiac catheterization in 2001 which revealed 50% LAD narrowing.  He denies any anginal complaints at this time.  His lipid panel from 2/6/2023 showed total cholesterol 116, TGs 116, HDL 34, LDL 59.  Repeat lipid panel from 10/2024 shows triglycerides 219 and LDL 83.  He has no anginal complaints.  Denies any changes in diet.  He is taking simvastatin as prescribed.  He will continue aspirin 81 mg daily.  -     POCT ECG    Paroxysmal atrial fibrillation (HCC): Atrial fibrillation and atrial flutter on ECGs from 2018 and 2021, respectively. Recent extended Holter monitor without evidence of either. He does have moderate left atrial enlargement  on echo from 2018. Previously was on aspirin alone also for his coronary disease. He was previously not on systemic AC due to falls and LE weakness. High C2V score (age, HTN, CAD/prior MI, DM). Prescribed Apixaban but this was cost prohibitive. We discussed warfarin which he was not interested.  -Anticoagulation recommends again discussed today, patient not currently interested - understands that aspirin does not protect against CVA from afib  -ECG shows normal sinus rhythm  -Continue metoprolol tartrate 12.5 mg BID    Essential hypertension: Blood pressure is well-controlled and at goal.  He has chronic kidney disease stage IIIb.  Currently taking metoprolol and torsemide for BP.  No dose adjustments made today.    Dragan Zimmer MD

## 2024-12-12 ENCOUNTER — OFFICE VISIT (OUTPATIENT)
Dept: PODIATRY | Facility: CLINIC | Age: 89
End: 2024-12-12
Payer: MEDICARE

## 2024-12-12 VITALS
SYSTOLIC BLOOD PRESSURE: 126 MMHG | DIASTOLIC BLOOD PRESSURE: 78 MMHG | WEIGHT: 203 LBS | OXYGEN SATURATION: 99 % | HEART RATE: 74 BPM | BODY MASS INDEX: 27.5 KG/M2 | HEIGHT: 72 IN

## 2024-12-12 DIAGNOSIS — E11.42 DIABETIC POLYNEUROPATHY ASSOCIATED WITH TYPE 2 DIABETES MELLITUS (HCC): ICD-10-CM

## 2024-12-12 DIAGNOSIS — E11.42 TYPE 2 DIABETES MELLITUS WITH DIABETIC POLYNEUROPATHY, WITHOUT LONG-TERM CURRENT USE OF INSULIN (HCC): ICD-10-CM

## 2024-12-12 DIAGNOSIS — B35.1 ONYCHOMYCOSIS: Primary | ICD-10-CM

## 2024-12-12 PROCEDURE — RECHECK: Performed by: PODIATRIST

## 2024-12-12 PROCEDURE — 11721 DEBRIDE NAIL 6 OR MORE: CPT | Performed by: PODIATRIST

## 2024-12-12 NOTE — PROGRESS NOTES
Assessment/Plan:     The patient's clinical examination today significant for brittle, thickened and dystrophic pedal nail plates with discoloration and subungual debris and brittleness with areas of lytic changes consistent with onychomycosis x 10.  The interdigital spaces are clear without maceration.  Pedal pulses are palpable bilaterally but diminished.  Epicritic sensation is diminished bilaterally secondary to peripheral neuropathy.  Skin is thin with decreased turgor.  There is absence of hair growth to the bilateral lower extremities.     The pedal nail plates are sharply debrided with a sterile nail clipper x10 without complication.  The nails with a mechanically reduced in thickness and girth utilize a rotary bur without complication.       His most recent hemoglobin A1c from September 2024 was 6.8, prior to that it was 6.9 in April 2024.     Recommend follow-up in 3 months for continued at risk diabetic foot care.     Diagnoses and all orders for this visit:    Onychomycosis    Type 2 diabetes mellitus with diabetic polyneuropathy, without long-term current use of insulin (HCC)    Diabetic polyneuropathy associated with type 2 diabetes mellitus (HCC)          Subjective:     Patient ID: Jim Lomeli is a 89 y.o. male.    The patient presents today for continued at risk diabetic footcare with class findings.  The patient notes that his nails are becoming very long and catching on his shoes and socks.  Notes no other issues today.      PAST MEDICAL HISTORY:  Past Medical History:   Diagnosis Date    Acute MI ([facility])     1991    Ambulates with cane     Anxiety     Arthritis     hands    At risk for falls     Atrial fibrillation (HCC)     Cholecystitis     CKD (chronic kidney disease)     Coronary artery disease     Diabetes mellitus (HCC)     GERD (gastroesophageal reflux disease)     Heart murmur     Hyperlipidemia     Hypertension     Low back pain     Lumbar disc disease     Umbilical hernia     Wears  dentures     full set    Wears glasses        PAST SURGICAL HISTORY:  Past Surgical History:   Procedure Laterality Date    CARDIAC CATHETERIZATION      s/p MI    COLONOSCOPY      HERNIA REPAIR Left     X5    [place] LAPAROSCOPY SURG CHOLECYSTECTOMY N/A 1/7/2022    Procedure: ROBOTIC ASSISTED LAPAROSCOPIC CHOLECYSTECTOMY;  Surgeon: Dagoberto Aguilar MD;  Location[facility];  Service: General    TOTAL HIP ARTHROPLASTY Left     UMBILICAL HERNIA REPAIR LAPAROSCOPIC N/A 1/7/2022    Procedure: Open umbilical hernia repair;  Surgeon: Dagoberto Aguilar MD;  [facility];  Service: General        ALLERGIES:  Patient has no known allergies.    MEDICATIONS:  Current Outpatient Medications   Medication Sig Dispense Refill    acetaminophen (TYLENOL) 325 mg tablet Take 2 tablets (650 mg total) by mouth every 6 (six) hours 30 tablet 0    Ascorbic Acid (vitamin C) 100 MG tablet Take 1 tablet (100 mg total) by mouth daily 90 tablet 3    aspirin (ECOTRIN LOW STRENGTH) 81 mg EC tablet Take 81 mg by mouth daily      Cholecalciferol (D3) 50 MCG (2000 UT) TABS Take 1 tablet (2,000 Units total) by mouth in the morning 90 tablet 3    cyanocobalamin (VITAMIN B-12) 500 mcg tablet Take 1,000 mcg by mouth daily      docusate sodium (COLACE) 100 mg capsule Take 1 capsule (100 mg total) by mouth 2 (two) times a day 10 capsule 0    escitalopram (LEXAPRO) 5 mg tablet TAKE 1 TABLET BY MOUTH EVERY DAY 90 tablet 1    ferrous sulfate 325 (65 FE) MG EC tablet Take 325 mg by mouth in the morning      glimepiride (AMARYL) 1 mg tablet TAKE 1 TABLET (1 MG TOTAL) BY MOUTH 3 (THREE) TIMES A DAY WITH MEALS 270 tablet 1    metFORMIN (GLUCOPHAGE) 500 mg tablet Take 1 tablet (500 mg total) by mouth daily with breakfast 90 tablet 1    metoprolol tartrate (LOPRESSOR) 25 mg tablet TAKE 0.5 TABLETS (12.5 MG TOTAL) BY MOUTH EVERY 12 (TWELVE) HOURS 90 tablet 1    omeprazole (PriLOSEC) 40 MG capsule TAKE 1 CAPSULE BY MOUTH EVERY DAY BEFORE BREAKFAST 90 capsule 1     simvastatin (ZOCOR) 40 mg tablet TAKE 1 TABLET BY MOUTH EVERY DAY 90 tablet 1    torsemide (DEMADEX) 10 mg tablet Take 1 tablet (10 mg total) by mouth daily 90 tablet 3     No current facility-administered medications for this visit.       SOCIAL HISTORY:  Social History     Socioeconomic History    Marital status: /Civil Union     Spouse name: None    Number of children: None    Years of education: None    Highest education level: None   Occupational History    Occupation: retired   Tobacco Use    Smoking status: Never    Smokeless tobacco: Never   Vaping Use    Vaping status: Never Used   Substance and Sexual Activity    Alcohol use: Yes     Comment: rare    Drug use: No    Sexual activity: Not Currently   Other Topics Concern    None   Social History Narrative    Prabha:    Most recent tobacco use screenin2020      Do you currently or have you served in the [employer]:   No      Were you activated, into active duty, as a member of the [employer] or as a Reservist:   No      Occupation:   Retired      Marital status:         Sexual orientation:   Heterosexual      Exercise level:   Occasional      Diet:   Regular      General stress level:   Medium      Alcohol intake:   Occasional      Caffeine intake:   Moderate      Chewing tobacco:   none      Illicit drugs:   Denied      Guns present in home:   No      Seat belts used routinely:   Yes      Sunscreen used routinely:   Yes      Smoke alarm in home:   Yes      Advance directive:   Yes      Salt Intake:   Normal     Would the patient like to schedule a Mammogram:   No      Is the patient interested in a colorectal cancer screening:   No     Last modified by zulma   2020, 15:03      Social Drivers of Health     Financial Resource Strain: Unknown (2023)    Overall Financial Resource Strain (CARDIA)     Difficulty of Paying Living Expenses: Patient declined   Food Insecurity: No Food Insecurity (2024)    Nursing  - Inadequate Food Risk Classification     Worried About Running Out of Food in the Last Year: Never true     Ran Out of Food in the Last Year: Never true     Ran Out of Food in the Last Year: Not on file   Transportation Needs: No Transportation Needs (4/16/2024)    PRAPARE - Transportation     Lack of Transportation (Medical): No     Lack of Transportation (Non-Medical): No   Physical Activity: Not on file   Stress: Not on file   Social Connections: Not on file   Intimate Partner Violence: Not on file   Housing Stability: Low Risk  (4/16/2024)    Housing Stability Vital Sign     Unable to Pay for Housing in the Last Year: No     Number of Times Moved in the Last Year: 1     Homeless in the Last Year: No        Review of Systems   Constitutional: Negative.    HENT: Negative.     Eyes: Negative.    Respiratory: Negative.     Cardiovascular: Negative.    Endocrine: Negative.    Musculoskeletal: Negative.    Neurological: Negative.    Hematological: Negative.    Psychiatric/Behavioral: Negative.           Objective:     Physical Exam  Vitals reviewed.   Constitutional:       Appearance: Normal appearance.   HENT:      Head: Normocephalic and atraumatic.      Nose: Nose normal.   Eyes:      Conjunctiva/sclera: Conjunctivae normal.      Pupils: Pupils are equal, round, and reactive to light.   Pulmonary:      Effort: Pulmonary effort is normal.   Skin:     General: Skin is warm.      Capillary Refill: Capillary refill takes 2 to 3 seconds.   Neurological:      General: No focal deficit present.      Mental Status: He is alert and oriented to person, place, and time.   Psychiatric:         Mood and Affect: Mood normal.         Behavior: Behavior normal.         Thought Content: Thought content normal.

## 2025-01-02 DIAGNOSIS — N18.32 STAGE 3B CHRONIC KIDNEY DISEASE (HCC): ICD-10-CM

## 2025-01-02 DIAGNOSIS — E87.5 HYPERKALEMIA: ICD-10-CM

## 2025-01-03 RX ORDER — TORSEMIDE 10 MG/1
10 TABLET ORAL DAILY
Qty: 90 TABLET | Refills: 1 | Status: SHIPPED | OUTPATIENT
Start: 2025-01-03

## 2025-01-10 ENCOUNTER — OFFICE VISIT (OUTPATIENT)
Dept: URGENT CARE | Facility: CLINIC | Age: 89
End: 2025-01-10
Payer: MEDICARE

## 2025-01-10 VITALS
TEMPERATURE: 97.9 F | BODY MASS INDEX: 27.5 KG/M2 | DIASTOLIC BLOOD PRESSURE: 60 MMHG | HEART RATE: 68 BPM | OXYGEN SATURATION: 98 % | SYSTOLIC BLOOD PRESSURE: 121 MMHG | WEIGHT: 203 LBS | RESPIRATION RATE: 16 BRPM | HEIGHT: 72 IN

## 2025-01-10 DIAGNOSIS — R10.84 GENERALIZED ABDOMINAL PAIN: Primary | ICD-10-CM

## 2025-01-10 PROCEDURE — G0463 HOSPITAL OUTPT CLINIC VISIT: HCPCS | Performed by: NURSE PRACTITIONER

## 2025-01-10 PROCEDURE — 99213 OFFICE O/P EST LOW 20 MIN: CPT | Performed by: NURSE PRACTITIONER

## 2025-01-10 NOTE — PATIENT INSTRUCTIONS
--Trial OTC Prilosec 1-2 times a day  --Continue stool softener  --Avoid potentially exacerbating foods.  Avoid alcohol.   --F/u with PCP in 3 days for further evaluation including possible labs, imaging.   --Go to ER if worsening symptoms in the interim.

## 2025-01-10 NOTE — PROGRESS NOTES
[facility]        NAME: Jim Lomeli is a 89 y.o. male  : 1935    MRN: [id number]  DATE: January 10, 2025  TIME: 12:30 PM    Assessment and Plan   Generalized abdominal pain [R10.84]  1. Generalized abdominal pain          --He is denying constipation at this time.  Past hx GERD.  Numerous other possibilities including GB, PUD, divertic, etc.  Advised will need further testing (labs, imaging) to rule out.  Has appointment with PCP in 3 days for further evaluation.  No red flags or signs of surgical abdomen at present, but was advised to go to ER should he worsen in any way.      Patient Instructions     Patient Instructions   --Trial OTC Prilosec 1-2 times a day  --Continue stool softener  --Avoid potentially exacerbating foods.  Avoid alcohol.   --F/u with PCP in 3 days for further evaluation including possible labs, imaging.   --Go to ER if worsening symptoms in the interim.       If tests have been performed at [facility], our office will contact you with results if changes need to be made to the care plan discussed with you at the visit.  You can review your full results on [facility] MyChart.    Chief Complaint     Chief Complaint   Patient presents with    Abdominal Pain     Pt reports he has stomach pain for about 3 weeks. Pt reports that he has been taking stool softeners and has trouble passing. Pt reports that his abdomen is tender.          History of Present Illness       Here with complaints of abdominal pain x 3 weeks.   Generalized, constant, cramping/bloated sensation.    Rates 4/10 at present.   No variation to chest, elsewhere.    No variation with eating, position, defecation, urination.   Some belching. No fever/chills, N/V, bowel changes including constipation, hematochezia, urinary changes including dysuria.   Stools darker colored at baseline (which he attributes to iron).  Daily bowel movements, normal consistency.    Normal appetite. No known food triggers.    Tums helps a  bit.  No burning sensation in chest.    Contacted PCP.  Appointment on Monday.  Told to come here in the interim.   No alcohol use.      Current medications, medical history, social history, and recent ER/office visit notes reviewed in EMR during today's visit.    Past hx GERD, repaired umbilical hernia (years ago).    States he had normal colonoscopy 4 years ago.  Denies hx diverticulosis/diverticulitis.   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           Review of Systems   Review of Systems   Constitutional:  Negative for fever.   Gastrointestinal:  Positive for abdominal distention and abdominal pain. Negative for anal bleeding, blood in stool, constipation, diarrhea, nausea and vomiting.   Genitourinary:  Negative for difficulty urinating and dysuria.         Current Medications       Current Outpatient Medications:     acetaminophen (TYLENOL) 325 mg tablet, Take 2 tablets (650 mg total) by mouth every 6 (six) hours, Disp: 30 tablet, Rfl: 0    Ascorbic Acid (vitamin C) 100 MG tablet, Take 1 tablet (100 mg total) by mouth daily, Disp: 90 tablet, Rfl: 3    aspirin (ECOTRIN LOW STRENGTH) 81 mg EC tablet, Take 81 mg by mouth daily, Disp: , Rfl:     Cholecalciferol (D3) 50 MCG (2000 UT) TABS, Take 1 tablet (2,000 Units total) by mouth in the morning, Disp: 90 tablet, Rfl: 3    cyanocobalamin (VITAMIN B-12) 500 mcg tablet, Take 1,000 mcg by mouth daily, Disp: , Rfl:     docusate sodium (COLACE) 100 mg capsule, Take 1 capsule (100 mg total) by mouth 2 (two) times a day, Disp: 10 capsule, Rfl: 0    escitalopram (LEXAPRO) 5 mg tablet, TAKE 1 TABLET BY MOUTH EVERY DAY, Disp: 90 tablet, Rfl: 1    ferrous sulfate 325 (65 FE) MG EC tablet, Take 325 mg by mouth in the morning, Disp: , Rfl:     glimepiride (AMARYL) 1 mg tablet, TAKE 1 TABLET (1 MG TOTAL) BY MOUTH 3 (THREE) TIMES A DAY WITH MEALS, Disp: 270 tablet, Rfl: 1    metFORMIN (GLUCOPHAGE) 500 mg tablet, Take 1 tablet (500 mg total) by mouth daily with breakfast,  Disp: 90 tablet, Rfl: 1    metoprolol tartrate (LOPRESSOR) 25 mg tablet, TAKE 0.5 TABLETS (12.5 MG TOTAL) BY MOUTH EVERY 12 (TWELVE) HOURS, Disp: 90 tablet, Rfl: 1    omeprazole (PriLOSEC) 40 MG capsule, TAKE 1 CAPSULE BY MOUTH EVERY DAY BEFORE BREAKFAST, Disp: 90 capsule, Rfl: 1    simvastatin (ZOCOR) 40 mg tablet, TAKE 1 TABLET BY MOUTH EVERY DAY, Disp: 90 tablet, Rfl: 1    torsemide (DEMADEX) 10 mg tablet, TAKE 1 TABLET BY MOUTH EVERY DAY, Disp: 90 tablet, Rfl: 1    Current Allergies     Allergies as of 01/10/2025    (No Known Allergies)            The following portions of the patient's history were reviewed and updated as appropriate: allergies, current medications, past family history, past medical history, past social history, past surgical history and problem list.     Past Medical History:   Diagnosis Date    Acute MI (HCC)     1991    Ambulates with cane     Anxiety     Arthritis     hands    At risk for falls     Atrial fibrillation (HCC)     Cholecystitis     CKD (chronic kidney disease)     Coronary artery disease     Diabetes mellitus (HCC)     GERD (gastroesophageal reflux disease)     Heart murmur     Hyperlipidemia     Hypertension     Low back pain     Lumbar disc disease     Umbilical hernia     Wears dentures     full set    Wears glasses        Past Surgical History:   Procedure Laterality Date    CARDIAC CATHETERIZATION      s/p MI    COLONOSCOPY      HERNIA REPAIR Left     X5    MS LAPAROSCOPY SURG CHOLECYSTECTOMY N/A 1/7/2022    Procedure: ROBOTIC ASSISTED LAPAROSCOPIC CHOLECYSTECTOMY;  Surgeon: Dagoberto Aguilar MD;  Location: [facility];  Service: General    TOTAL HIP ARTHROPLASTY Left     UMBILICAL HERNIA REPAIR LAPAROSCOPIC N/A 1/7/2022    Procedure: Open umbilical hernia repair;  Surgeon: Dagoberto Aguilar MD;  Location: [facility];  Service: General       Family History   Problem Relation Age of Onset    No Known Problems Mother     No Known Problems Father          Medications have been  verified.        Objective   /60   Pulse 68   Temp 97.9 °F (36.6 °C)   Resp 16   Ht 6' (1.829 m)   Wt 92.1 kg (203 lb)   SpO2 98%   BMI 27.53 kg/m²   No LMP for male patient.       Physical Exam     Physical Exam  Constitutional:       General: He is not in acute distress.     Appearance: He is not ill-appearing, toxic-appearing or diaphoretic.   Eyes:      General: No scleral icterus.  Cardiovascular:      Rate and Rhythm: Normal rate and regular rhythm.   Pulmonary:      Effort: Pulmonary effort is normal.      Breath sounds: Normal breath sounds.   Abdominal:      General: Abdomen is protuberant. Bowel sounds are normal. There is distension.      Palpations: Abdomen is soft.      Tenderness: There is generalized abdominal tenderness and tenderness in the right upper quadrant, epigastric area, periumbilical area and left lower quadrant. There is no guarding or rebound. Positive signs include Rodriguez's sign.      Hernia: No hernia is present. There is no hernia in the umbilical area.   Skin:     General: Skin is warm.   Neurological:      Mental Status: He is alert.

## 2025-01-13 ENCOUNTER — OFFICE VISIT (OUTPATIENT)
Dept: FAMILY MEDICINE CLINIC | Facility: CLINIC | Age: 89
End: 2025-01-13
Payer: MEDICARE

## 2025-01-13 VITALS
SYSTOLIC BLOOD PRESSURE: 118 MMHG | DIASTOLIC BLOOD PRESSURE: 76 MMHG | HEART RATE: 72 BPM | BODY MASS INDEX: 27.5 KG/M2 | RESPIRATION RATE: 16 BRPM | OXYGEN SATURATION: 97 % | WEIGHT: 203 LBS | HEIGHT: 72 IN

## 2025-01-13 DIAGNOSIS — R10.84 GENERALIZED ABDOMINAL PAIN: Primary | ICD-10-CM

## 2025-01-13 PROCEDURE — 99213 OFFICE O/P EST LOW 20 MIN: CPT | Performed by: FAMILY MEDICINE

## 2025-01-13 NOTE — PROGRESS NOTES
Name: Jim Lomeli      : 1935      MRN: [id number]  Encounter Provider: Homer Nguyen MD  Encounter Date: 2025   Encounter department: [facility]    Assessment & Plan  Generalized abdominal pain           Assessment & Plan  1. Abdominal pain.  He has been experiencing abdominal pain for 3 weeks, which has not improved despite taking heartburn medication prescribed by urgent care. He is advised to take MiraLAX, one capful in 8 to 12 ounces of water in the morning, another dose at 11 AM, one capful in 12 ounces of Gatorade at 3 PM, and another dose in water at 7 PM. This regimen should be followed for the next 2 days. If symptoms persist, he may need to repeat the process the following day. If he experiences dizziness or lightheadedness, he should increase his Gatorade intake.    2. Diabetes mellitus.  His blood sugar levels have been stable, ranging from 90 to 125. His last A1c was 6.8, indicating good control.    Follow-up  The patient will follow up in 3 months.       History of Present Illness     History of Present Illness  The patient presents for evaluation of abdominal pain.    He has been experiencing persistent abdominal discomfort for the past 3 weeks, with no signs of improvement. He sought medical attention at an urgent care facility on Friday, where he was prescribed a medication for heartburn, which he has been taking for the last 3 days. Despite this treatment, he reports no relief in his symptoms. His bowel movements are regular, occurring once daily, but he experiences difficulty during defecation due to straining. The consistency of his stools is not hard, but he describes a sensation of incomplete evacuation.    His blood sugar levels have been stable, ranging from 90 to 125.    MEDICATIONS  - MiraLAX (current)     Review of Systems   Constitutional:  Negative for activity change, appetite change and fever.   HENT:  Negative for congestion, nosebleeds and  trouble swallowing.    Eyes:  Negative for itching.   Respiratory:  Negative for cough and chest tightness.    Cardiovascular:  Negative for chest pain and palpitations.   Gastrointestinal:  Positive for abdominal pain. Negative for constipation, diarrhea and nausea.   Endocrine: Negative for cold intolerance.   Genitourinary:  Negative for frequency.   Musculoskeletal:  Positive for arthralgias and back pain. Negative for gait problem and joint swelling.   Skin:  Negative for rash.   Allergic/Immunologic: Negative for immunocompromised state.   Neurological:  Negative for dizziness, tremors, seizures, syncope and headaches.   Psychiatric/Behavioral:  Negative for hallucinations and suicidal ideas. The patient is nervous/anxious.      Objective   /76   Pulse 72   Resp 16   Ht 6' (1.829 m)   Wt 92.1 kg (203 lb)   SpO2 97%   BMI 27.53 kg/m²     Physical Exam    Physical Exam  Vitals and nursing note reviewed.   Constitutional:       Appearance: He is well-developed.      Comments: walker   HENT:      Head: Normocephalic and atraumatic.   Eyes:      Conjunctiva/sclera: Conjunctivae normal.      Pupils: Pupils are equal, round, and reactive to light.   Cardiovascular:      Rate and Rhythm: Normal rate and regular rhythm.      Heart sounds: Normal heart sounds.   Pulmonary:      Effort: Pulmonary effort is normal.      Breath sounds: Normal breath sounds. No wheezing or rales.   Abdominal:      General: Bowel sounds are normal. There is no distension.      Palpations: Abdomen is soft.      Tenderness: There is no abdominal tenderness.   Musculoskeletal:         General: No tenderness. Normal range of motion.      Cervical back: Normal range of motion and neck supple.   Skin:     General: Skin is warm and dry.      Findings: No rash.   Neurological:      Mental Status: He is alert and oriented to person, place, and time.      Cranial Nerves: No cranial nerve deficit.      Sensory: No sensory deficit.       Coordination: Coordination normal.   Psychiatric:         Behavior: Behavior normal.         Thought Content: Thought content normal.         Judgment: Judgment normal.

## 2025-01-21 ENCOUNTER — TELEPHONE (OUTPATIENT)
Age: 89
End: 2025-01-21

## 2025-01-21 DIAGNOSIS — R10.84 GENERALIZED ABDOMINAL PAIN: Primary | ICD-10-CM

## 2025-01-21 RX ORDER — DICYCLOMINE HCL 20 MG
20 TABLET ORAL 3 TIMES DAILY
Qty: 30 TABLET | Refills: 0 | Status: SHIPPED | OUTPATIENT
Start: 2025-01-21

## 2025-01-21 NOTE — TELEPHONE ENCOUNTER
Pt called, he stated the miralax did not help his abdominal pain at all.  He said his bowel movements have been regular but the abdominal pain is not any better.

## 2025-02-11 ENCOUNTER — PATIENT MESSAGE (OUTPATIENT)
Dept: CARDIOLOGY CLINIC | Facility: CLINIC | Age: 89
End: 2025-02-11

## 2025-02-20 ENCOUNTER — TELEPHONE (OUTPATIENT)
Age: 89
End: 2025-02-20

## 2025-02-20 NOTE — TELEPHONE ENCOUNTER
Pt said that every year the doctor provides him with a letter that he takes to the [facility] so he can get a caregiver. Pt would like this letter again and he would like it mailed to him. Address confirmed. Thank you.

## 2025-02-24 ENCOUNTER — OFFICE VISIT (OUTPATIENT)
Dept: CARDIOLOGY CLINIC | Facility: CLINIC | Age: 89
End: 2025-02-24
Payer: MEDICARE

## 2025-02-24 VITALS
TEMPERATURE: 97.9 F | WEIGHT: 203 LBS | HEART RATE: 63 BPM | HEIGHT: 72 IN | OXYGEN SATURATION: 97 % | BODY MASS INDEX: 27.5 KG/M2 | DIASTOLIC BLOOD PRESSURE: 80 MMHG | SYSTOLIC BLOOD PRESSURE: 132 MMHG

## 2025-02-24 DIAGNOSIS — N18.32 STAGE 3B CHRONIC KIDNEY DISEASE (HCC): ICD-10-CM

## 2025-02-24 DIAGNOSIS — E78.2 MIXED HYPERLIPIDEMIA: ICD-10-CM

## 2025-02-24 DIAGNOSIS — E11.42 DIABETIC POLYNEUROPATHY ASSOCIATED WITH TYPE 2 DIABETES MELLITUS (HCC): ICD-10-CM

## 2025-02-24 DIAGNOSIS — I25.10 CORONARY ARTERY DISEASE INVOLVING NATIVE CORONARY ARTERY OF NATIVE HEART WITHOUT ANGINA PECTORIS: ICD-10-CM

## 2025-02-24 DIAGNOSIS — I48.0 PAROXYSMAL ATRIAL FIBRILLATION (HCC): ICD-10-CM

## 2025-02-24 DIAGNOSIS — I10 ESSENTIAL HYPERTENSION: ICD-10-CM

## 2025-02-24 PROCEDURE — 99214 OFFICE O/P EST MOD 30 MIN: CPT

## 2025-02-24 PROCEDURE — 93000 ELECTROCARDIOGRAM COMPLETE: CPT

## 2025-02-24 NOTE — PROGRESS NOTES
Jim Cummingsstevenson  1935  [id number]  [facility] [address] [facility] [address] [phone]    1. Paroxysmal atrial fibrillation (HCC)  POCT ECG      2. Essential hypertension        3. Coronary artery disease involving native coronary artery of native heart without angina pectoris        4. Mixed hyperlipidemia        5. Diabetic polyneuropathy associated with type 2 diabetes mellitus (HCC)        6. Stage 3b chronic kidney disease (HCC)            Summary/Discussion:  Coronary artery disease:  - hx of remote MI in 1991. He was treated with thrombolytics and rotational atherectomy of the LAD. He underwent cardiac catheterization in 2001 which revealed 50% LAD narrowing   - without symptoms of angina  - continue aspirin, statin and beta blocker    Paroxysmal atrial fibrillation  - noted on EKGs from 2018 and 2021  - zio (1/2023): with evidence of atrial fibrillation/flutter   - EKG today demonstrating sinus rhythm   - echo (1/2024): LV wall thickness mildly increased, LFEV 60%, no WMA, G1DD, mildly dilated LA, aortic valve sclerosis, mild annular calcification of MR, mild TR/WY  - historically not on on systemic AC due to falls and LE weakness. High C2V score (age, HTN, CAD/prior MI, DM). Eliquis was previously prescribed by his primary cardiologist but this was cost prohibitive. Discussion was had on use of warfarin which he was not interested-- this was again discussed during our visit today and he continues to not be interested. He understands that aspirin does not protect against CVA from afib  - continue with aspirin alone   - continue metoprolol tartrate 12.5 mg twice daily     Hypertension:  - 132/80  - continue present medication regimen  - lifestyle modification   - close blood pressure monitoring     Dyslipidemia:  - Lipid Profile:    Latest Reference Range & Units 10/15/24 09:30   Cholesterol See Comment mg/dL 155   Triglycerides See Comment mg/dL 219  (H)   HDL >=40 mg/dL 28 (L)   LDL Calculated 0 - 100 mg/dL 83   - continue simvastatin 40 mg daily   - encouraged low cholesterol, mediterranean diet, and annual lipid follow up    CKD III:  - creatinine (9/23/2024): 1.56  - baseline appears to be around 1.2-1.55  - follows with nephrology    DM2:  - A1c: 6.8    Interval History: Jim Lomeli is a 89 y.o. year old male with history mentioned in problem list who presents to the office today for a follow up.    Since his last office visit he does not express any significant cardiac complaints. He denies any chest pain/pressure/discomfort or shortness of breath. He denies lower extremity edema, orthopnea, and PND. He denies lightheadedness, dizziness, and syncope. He denies palpitations. His function capacity is limited due to ambulatory dysfunction with use of a walker at baseline.       No further cardiac testing indicated at this time.    He will RTO in 6 months with Dr. Zimmer or sooner if necessary. He will call with any concerns.         Medical Problems       Problem List       Paroxysmal atrial fibrillation (HCC)    Type 2 diabetes mellitus with stage 3a chronic kidney disease, without long-term current use of insulin (HCC)      Lab Results   Component Value Date    HGBA1C 6.8 (H) 09/23/2024         Essential hypertension    HLD (hyperlipidemia)    GERD (gastroesophageal reflux disease)    CAD (coronary artery disease)    Ambulatory dysfunction    Closed T12 fracture (HCC)    Overview Signed 3/21/2020  8:01 AM by Reno Eduardo MD   T12 fracture through vertebral body noted on imaging  Neurosurgery consult pending         Chronic midline low back pain without sciatica    Intervertebral disc disorder with radiculopathy of lumbar region    Anxiety    Type 2 diabetes mellitus with diabetic polyneuropathy, without long-term current use of insulin (HCC)      Lab Results   Component Value Date    HGBA1C 6.8 (H) 09/23/2024         Sacroiliitis (HCC)    Imbalance     Sensorineural hearing loss (SNHL), bilateral    Onychomycosis    Corns    Bunion of right foot    Type II diabetes mellitus with peripheral circulatory disorder (HCC)      Lab Results   Component Value Date    HGBA1C 6.8 (H) 2024             Past Medical History:   Diagnosis Date    Acute MI (HCC)         Ambulates with cane     Anxiety     Arthritis     hands    At risk for falls     Atrial fibrillation (HCC)     Cholecystitis     CKD (chronic kidney disease)     Coronary artery disease     Diabetes mellitus (HCC)     GERD (gastroesophageal reflux disease)     Heart murmur     Hyperlipidemia     Hypertension     Low back pain     Lumbar disc disease     Umbilical hernia     Wears dentures     full set    Wears glasses      Social History     Socioeconomic History    Marital status: /Civil Union     Spouse name: Not on file    Number of children: Not on file    Years of education: Not on file    Highest education level: Not on file   Occupational History    Occupation: retired   Tobacco Use    Smoking status: Never    Smokeless tobacco: Never   Vaping Use    Vaping status: Never Used   Substance and Sexual Activity    Alcohol use: Yes     Comment: rare    Drug use: No    Sexual activity: Not Currently   Other Topics Concern    Not on file   Social History Narrative    Prabha:    Most recent tobacco use screenin2020      Do you currently or have you served in the [employer]:   No      Were you activated, into active duty, as a member of the [employer] or as a Reservist:   No      Occupation:   Retired      Marital status:         Sexual orientation:   Heterosexual      Exercise level:   Occasional      Diet:   Regular      General stress level:   Medium      Alcohol intake:   Occasional      Caffeine intake:   Moderate      Chewing tobacco:   none      Illicit drugs:   Denied      Guns present in home:   No      Seat belts used routinely:   Yes      Sunscreen used  routinely:   Yes      Smoke alarm in home:   Yes      Advance directive:   Yes      Salt Intake:   Normal     Would the patient like to schedule a Mammogram:   No      Is the patient interested in a colorectal cancer screening:   No     Last modified by zulma   01-, 15:03      Social Drivers of Health     Financial Resource Strain: Unknown (2/21/2023)    Overall Financial Resource Strain (CARDIA)     Difficulty of Paying Living Expenses: Patient declined   Food Insecurity: No Food Insecurity (4/16/2024)    Nursing - Inadequate Food Risk Classification     Worried About Running Out of Food in the Last Year: Never true     Ran Out of Food in the Last Year: Never true     Ran Out of Food in the Last Year: Not on file   Transportation Needs: No Transportation Needs (4/16/2024)    PRAPARE - Transportation     Lack of Transportation (Medical): No     Lack of Transportation (Non-Medical): No   Physical Activity: Not on file   Stress: Not on file   Social Connections: Not on file   Intimate Partner Violence: Not on file   Housing Stability: Low Risk  (4/16/2024)    Housing Stability Vital Sign     Unable to Pay for Housing in the Last Year: No     Number of Times Moved in the Last Year: 1     Homeless in the Last Year: No      Family History   Problem Relation Age of Onset    No Known Problems Mother     No Known Problems Father      Past Surgical History:   Procedure Laterality Date    CARDIAC CATHETERIZATION      s/p MI    COLONOSCOPY      HERNIA REPAIR Left     X5    MD LAPAROSCOPY SURG CHOLECYSTECTOMY N/A 1/7/2022    Procedure: ROBOTIC ASSISTED LAPAROSCOPIC CHOLECYSTECTOMY;  Surgeon: Dagoberto Aguilar MD;  Location: [facility];  Service: General    TOTAL HIP ARTHROPLASTY Left     UMBILICAL HERNIA REPAIR LAPAROSCOPIC N/A 1/7/2022    Procedure: Open umbilical hernia repair;  Surgeon: Dagoberto Aguilar MD;  Location: [facility];  Service: General       Current Outpatient Medications:     acetaminophen (TYLENOL) 325  mg tablet, Take 2 tablets (650 mg total) by mouth every 6 (six) hours, Disp: 30 tablet, Rfl: 0    Ascorbic Acid (vitamin C) 100 MG tablet, Take 1 tablet (100 mg total) by mouth daily, Disp: 90 tablet, Rfl: 3    aspirin (ECOTRIN LOW STRENGTH) 81 mg EC tablet, Take 81 mg by mouth daily, Disp: , Rfl:     Cholecalciferol (D3) 50 MCG (2000 UT) TABS, Take 1 tablet (2,000 Units total) by mouth in the morning, Disp: 90 tablet, Rfl: 3    cyanocobalamin (VITAMIN B-12) 500 mcg tablet, Take 1,000 mcg by mouth daily, Disp: , Rfl:     dicyclomine (BENTYL) 20 mg tablet, Take 1 tablet (20 mg total) by mouth 3 (three) times a day, Disp: 30 tablet, Rfl: 0    docusate sodium (COLACE) 100 mg capsule, Take 1 capsule (100 mg total) by mouth 2 (two) times a day, Disp: 10 capsule, Rfl: 0    escitalopram (LEXAPRO) 5 mg tablet, TAKE 1 TABLET BY MOUTH EVERY DAY, Disp: 90 tablet, Rfl: 1    ferrous sulfate 325 (65 FE) MG EC tablet, Take 325 mg by mouth in the morning, Disp: , Rfl:     glimepiride (AMARYL) 1 mg tablet, TAKE 1 TABLET (1 MG TOTAL) BY MOUTH 3 (THREE) TIMES A DAY WITH MEALS, Disp: 270 tablet, Rfl: 1    metFORMIN (GLUCOPHAGE) 500 mg tablet, Take 1 tablet (500 mg total) by mouth daily with breakfast, Disp: 90 tablet, Rfl: 1    metoprolol tartrate (LOPRESSOR) 25 mg tablet, TAKE 0.5 TABLETS (12.5 MG TOTAL) BY MOUTH EVERY 12 (TWELVE) HOURS, Disp: 90 tablet, Rfl: 1    omeprazole (PriLOSEC) 40 MG capsule, TAKE 1 CAPSULE BY MOUTH EVERY DAY BEFORE BREAKFAST, Disp: 90 capsule, Rfl: 1    simvastatin (ZOCOR) 40 mg tablet, TAKE 1 TABLET BY MOUTH EVERY DAY, Disp: 90 tablet, Rfl: 1    torsemide (DEMADEX) 10 mg tablet, TAKE 1 TABLET BY MOUTH EVERY DAY, Disp: 90 tablet, Rfl: 1  No Known Allergies    Labs:     Chemistry        Component Value Date/Time    K 5.3 09/23/2024 1104     09/23/2024 1104    CO2 24 09/23/2024 1104    BUN 24 09/23/2024 1104    CREATININE 1.56 (H) 09/23/2024 1104        Component Value Date/Time    CALCIUM 9.2 09/23/2024  "1104    ALKPHOS 86 10/12/2022 0809    AST 18 10/12/2022 0809    ALT 28 10/12/2022 0809            No results found for: \"CHOL\"  Lab Results   Component Value Date    HDL 28 (L) 10/15/2024    HDL 33 (L) 04/23/2024    HDL 34 (L) 02/06/2023     Lab Results   Component Value Date    LDLCALC 83 10/15/2024    LDLCALC 84 04/23/2024    LDLCALC 59 02/06/2023     Lab Results   Component Value Date    TRIG 219 (H) 10/15/2024    TRIG 201 (H) 04/23/2024    TRIG 116 02/06/2023     No results found for: \"CHOLHDL\"    Imaging: No results found.    ECG:  sinus rhythm, left anterior fascicular block     Review of Systems   Musculoskeletal:  Positive for muscle weakness.   All other systems reviewed and are negative.      Vitals:    02/24/25 0916   BP: 132/80   Pulse: 63   Temp: 97.9 °F (36.6 °C)   SpO2: 97%     Vitals:    02/24/25 0916   Weight: 92.1 kg (203 lb)     Height: 6' (182.9 cm)   Body mass index is 27.53 kg/m².    Physical Exam  Vitals and nursing note reviewed.   Constitutional:       General: He is not in acute distress.     Appearance: Normal appearance.   HENT:      Head: Normocephalic.      Nose: Nose normal.      Mouth/Throat:      Mouth: Mucous membranes are moist.      Pharynx: Oropharynx is clear.   Cardiovascular:      Rate and Rhythm: Normal rate and regular rhythm.      Pulses: Normal pulses.      Heart sounds: Normal heart sounds. No murmur heard.  Musculoskeletal:      Cervical back: Normal range of motion.      Right lower leg: No edema.      Left lower leg: No edema.   Skin:     General: Skin is warm and dry.   Neurological:      Mental Status: He is alert and oriented to person, place, and time.      Motor: Weakness present.      Gait: Gait abnormal.   Psychiatric:         Behavior: Behavior normal.        "

## 2025-03-04 ENCOUNTER — OFFICE VISIT (OUTPATIENT)
Dept: FAMILY MEDICINE CLINIC | Facility: CLINIC | Age: 89
End: 2025-03-04
Payer: MEDICARE

## 2025-03-04 VITALS
SYSTOLIC BLOOD PRESSURE: 120 MMHG | HEART RATE: 65 BPM | OXYGEN SATURATION: 97 % | HEIGHT: 72 IN | WEIGHT: 203 LBS | BODY MASS INDEX: 27.5 KG/M2 | DIASTOLIC BLOOD PRESSURE: 72 MMHG

## 2025-03-04 DIAGNOSIS — F41.9 ANXIETY: ICD-10-CM

## 2025-03-04 DIAGNOSIS — I25.10 CORONARY ARTERY DISEASE INVOLVING NATIVE CORONARY ARTERY OF NATIVE HEART WITHOUT ANGINA PECTORIS: ICD-10-CM

## 2025-03-04 DIAGNOSIS — I48.0 PAROXYSMAL ATRIAL FIBRILLATION (HCC): ICD-10-CM

## 2025-03-04 DIAGNOSIS — I10 ESSENTIAL HYPERTENSION: ICD-10-CM

## 2025-03-04 DIAGNOSIS — E11.22 TYPE 2 DIABETES MELLITUS WITH STAGE 3A CHRONIC KIDNEY DISEASE, WITHOUT LONG-TERM CURRENT USE OF INSULIN (HCC): Primary | ICD-10-CM

## 2025-03-04 DIAGNOSIS — N18.31 TYPE 2 DIABETES MELLITUS WITH STAGE 3A CHRONIC KIDNEY DISEASE, WITHOUT LONG-TERM CURRENT USE OF INSULIN (HCC): Primary | ICD-10-CM

## 2025-03-04 DIAGNOSIS — Z99.89 USES WALKER: ICD-10-CM

## 2025-03-04 DIAGNOSIS — E78.2 MIXED HYPERLIPIDEMIA: ICD-10-CM

## 2025-03-04 PROCEDURE — G2211 COMPLEX E/M VISIT ADD ON: HCPCS | Performed by: FAMILY MEDICINE

## 2025-03-04 PROCEDURE — 99214 OFFICE O/P EST MOD 30 MIN: CPT | Performed by: FAMILY MEDICINE

## 2025-03-04 NOTE — ASSESSMENT & PLAN NOTE
Lab Results   Component Value Date    HGBA1C 6.8 (H) 09/23/2024     Stable on current medications. Occasional hypoglycemic events - if persists or worsens, will plan to decrease glimepiride. Will recheck HgbA1c in 1 month.

## 2025-03-04 NOTE — PROGRESS NOTES
Name: Jim Lomeli      : 1935      MRN: [id number]  Encounter Provider: Homer Nguyen MD  Encounter Date: 3/4/2025   Encounter department: [facility]  :  Assessment & Plan  Uses walker         Type 2 diabetes mellitus with stage 3a chronic kidney disease, without long-term current use of insulin (HCC)    Lab Results   Component Value Date    HGBA1C 6.8 (H) 2024     Stable on current medications. Occasional hypoglycemic events - if persists or worsens, will plan to decrease glimepiride. Will recheck HgbA1c in 1 month.          Essential hypertension  BP reviewed and at goal. Follows with cardiology. Continue current medications.        Mixed hyperlipidemia  Labs reviewed - triglycerides and HDL slightly worsening. Continue simvastatin and will recheck lipid panel in 1 month.        Anxiety  Stable on Lexapro.        Paroxysmal atrial fibrillation (HCC)  Stable on current medications, follows with cardiology.        Coronary artery disease involving native coronary artery of native heart without angina pectoris  Stable on current medications, follows with cardiology.               History of Present Illness   Jim Lomeli is an 89 y.o. male with a significant PMH of HTN, HLD, DM2, CKD, and CAD who is presenting to the office for a follow up visit. Patient states he is doing well and reports no concerns. He gets intermittent lightheadedness 1-2x/mo that is associated with low blood sugar, and patient states it always resolves after eating a snack. He reports his average morning blood sugars are in the 110-120s. Patient reports his abdominal pain in January improved after a bowel cleanout with Miralax, and now only gets intermittent abdominal discomfort.       Review of Systems   Constitutional:  Negative for fatigue and fever.   Respiratory:  Negative for cough, shortness of breath and wheezing.    Cardiovascular:  Negative for chest pain, palpitations and leg swelling.    Gastrointestinal:  Negative for abdominal pain, constipation and diarrhea.   Neurological:  Positive for light-headedness. Negative for dizziness and headaches.   Psychiatric/Behavioral:  The patient is not nervous/anxious.        Objective   /72   Pulse 65   Ht 6' (1.829 m)   Wt 92.1 kg (203 lb)   SpO2 97%   BMI 27.53 kg/m²      Physical Exam  Constitutional:       Appearance: Normal appearance.   Cardiovascular:      Rate and Rhythm: Normal rate and regular rhythm.      Pulses: Normal pulses.      Heart sounds: Normal heart sounds. No murmur heard.  Pulmonary:      Effort: Pulmonary effort is normal.      Breath sounds: Normal breath sounds. No wheezing.   Abdominal:      General: Abdomen is flat. Bowel sounds are normal.      Palpations: Abdomen is soft.      Tenderness: There is no abdominal tenderness.   Musculoskeletal:      Right lower leg: No edema.      Left lower leg: No edema.   Neurological:      Mental Status: He is alert.

## 2025-03-04 NOTE — ASSESSMENT & PLAN NOTE
Labs reviewed - triglycerides and HDL slightly worsening. Continue simvastatin and will recheck lipid panel in 1 month.

## 2025-04-01 DIAGNOSIS — K21.9 GASTROESOPHAGEAL REFLUX DISEASE WITHOUT ESOPHAGITIS: ICD-10-CM

## 2025-04-01 DIAGNOSIS — I10 ESSENTIAL HYPERTENSION: ICD-10-CM

## 2025-04-01 DIAGNOSIS — E78.5 HYPERLIPIDEMIA, UNSPECIFIED HYPERLIPIDEMIA TYPE: ICD-10-CM

## 2025-04-01 DIAGNOSIS — F41.9 ANXIETY: ICD-10-CM

## 2025-04-02 RX ORDER — SIMVASTATIN 40 MG
40 TABLET ORAL DAILY
Qty: 90 TABLET | Refills: 1 | Status: SHIPPED | OUTPATIENT
Start: 2025-04-02

## 2025-04-02 RX ORDER — METOPROLOL TARTRATE 25 MG/1
12.5 TABLET, FILM COATED ORAL EVERY 12 HOURS SCHEDULED
Qty: 90 TABLET | Refills: 1 | Status: SHIPPED | OUTPATIENT
Start: 2025-04-02

## 2025-04-02 RX ORDER — OMEPRAZOLE 40 MG/1
40 CAPSULE, DELAYED RELEASE ORAL
Qty: 90 CAPSULE | Refills: 1 | Status: SHIPPED | OUTPATIENT
Start: 2025-04-02

## 2025-04-02 RX ORDER — ESCITALOPRAM OXALATE 5 MG/1
5 TABLET ORAL DAILY
Qty: 90 TABLET | Refills: 1 | Status: SHIPPED | OUTPATIENT
Start: 2025-04-02

## 2025-04-24 ENCOUNTER — OFFICE VISIT (OUTPATIENT)
Dept: AUDIOLOGY | Facility: CLINIC | Age: 89
End: 2025-04-24

## 2025-04-24 DIAGNOSIS — H90.3 SENSORINEURAL HEARING LOSS (SNHL), BILATERAL: Primary | ICD-10-CM

## 2025-04-24 NOTE — PROGRESS NOTES
Hearing Aid Visit:    Name:  Jim Lomeli  :  1935  Age:  89 y.o.  MRN:  [id number]  Date of Evaluation: 25     HISTORY:    Jim Lomeli was seen today (2025) for a(n) 6 month in-warranty hearing aid check of his bilateral hearing aids. Today, Jim reported no difficulties with the devices.   They were observed to be partially out of the canal upon arrival.  Discussed with the patient.   His niece accompanied him to today's appointment.       Most recent Audiogram: 2023    DEVICE INFORMATION:       Left Device Right Device   Hearing Aid Make: Oticon Oticon   Hearing Aid Model: REAL 3 REAL 3   Serial Number: [id number]   Repair Warranty Expiration Date: 26   Loss/Damage Warranty Expiration Date:  26    Length: 3 no lock  3 no lock    Output: 85 85   Wax System: Pro Wax  Pro Wax   Dome Size/Style: 10 mm power  10 mm power    Battery: Lithium-ion Rechargeable Lithium-ion Rechargeable   [place]old Serial Number: N/A N/A   [place] Warranty Date:  N/A N/A       Serial Number: [id number]  Accessories: N/A    ACTION/ADJUSTMENTS:  Cleaned and checked both devices.   The mics needed to be vacuumed, left greater than right.  The domes were changed.   Wax guards were clear (provided 2 packs)   Minimal cerumen removed, right canal via curette w/o incident   Firmware updated to 1.1.2  Data shows only 4.5 hours average / day.   He reported that he will not always wear them at home.  Advised to wear daily as this would assist with listening effort (even while watching TV)      RECOMMENDATIONS:   RTO 10/29/25 for 2 year audiological evaluation and 6 month HAIRSTON visit    Ramya Wall, CCC-A  Clinical Audiologist  [id number]  [facility]  [address] [id number]

## 2025-04-28 ENCOUNTER — TELEPHONE (OUTPATIENT)
Age: 89
End: 2025-04-28

## 2025-04-28 DIAGNOSIS — R26.2 AMBULATORY DYSFUNCTION: Primary | ICD-10-CM

## 2025-05-02 NOTE — TELEPHONE ENCOUNTER
Patient needs a script for balance and coordination. It was advised, that the patient would benefit from both  PT and OT.    Please call patient back when order is placed.    [phone]

## 2025-05-12 DIAGNOSIS — E11.22 TYPE 2 DIABETES MELLITUS WITH STAGE 3A CHRONIC KIDNEY DISEASE, WITHOUT LONG-TERM CURRENT USE OF INSULIN (HCC): ICD-10-CM

## 2025-05-12 DIAGNOSIS — R26.2 AMBULATORY DYSFUNCTION: Primary | ICD-10-CM

## 2025-05-12 DIAGNOSIS — N18.31 TYPE 2 DIABETES MELLITUS WITH STAGE 3A CHRONIC KIDNEY DISEASE, WITHOUT LONG-TERM CURRENT USE OF INSULIN (HCC): ICD-10-CM

## 2025-05-12 DIAGNOSIS — Z99.89 USES WALKER: ICD-10-CM

## 2025-06-02 ENCOUNTER — PROCEDURE VISIT (OUTPATIENT)
Dept: PODIATRY | Facility: CLINIC | Age: 89
End: 2025-06-02
Payer: MEDICARE

## 2025-06-02 VITALS — BODY MASS INDEX: 26.95 KG/M2 | WEIGHT: 199 LBS | OXYGEN SATURATION: 100 % | HEIGHT: 72 IN | HEART RATE: 62 BPM

## 2025-06-02 DIAGNOSIS — E11.42 TYPE 2 DIABETES MELLITUS WITH DIABETIC POLYNEUROPATHY, WITHOUT LONG-TERM CURRENT USE OF INSULIN (HCC): ICD-10-CM

## 2025-06-02 DIAGNOSIS — B35.1 ONYCHOMYCOSIS: Primary | ICD-10-CM

## 2025-06-02 DIAGNOSIS — E11.42 DIABETIC POLYNEUROPATHY ASSOCIATED WITH TYPE 2 DIABETES MELLITUS (HCC): ICD-10-CM

## 2025-06-02 DIAGNOSIS — M21.611 BUNION OF RIGHT FOOT: ICD-10-CM

## 2025-06-02 PROCEDURE — 11721 DEBRIDE NAIL 6 OR MORE: CPT | Performed by: PODIATRIST

## 2025-06-02 NOTE — ASSESSMENT & PLAN NOTE
Lab Results   Component Value Date    HGBA1C 6.8 (H) 09/23/2024       Orders:    Diabetic Shoe

## 2025-06-02 NOTE — PROGRESS NOTES
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  The interdigital spaces are clear without maceration.  Pedal pulses are palpable bilaterally but diminished.  Epicritic sensation is diminished bilaterally secondary to peripheral neuropathy.  Skin is thin with decreased turgor.  There is absence of hair growth to the bilateral lower extremities.     The pedal nail plates are sharply debrided with a sterile nail clipper x10 without complication.  The nails with a mechanically reduced in thickness and girth utilize a rotary bur without complication.  A prescription for new diabetic shoes was provided to the patient.     His most recent hemoglobin A1c from September 2024 was 6.8, prior to that it was 6.9 in April 2024.  Repeat labs are currently pending.    Recommend follow-up in 3 months for continued at risk diabetic foot care.    History of Present Illness     Jim Lomeli is a 89 y.o. male   The patient presents today for continued at risk diabetic footcare with class findings.  He also notes that he is in need of a new prescription for diabetic shoes as his current pair is very worn.  Jim notes no other issues today.           PAST MEDICAL HISTORY:  Past Medical History[1]    PAST SURGICAL HISTORY:  Past Surgical History[2]     ALLERGIES:  Patient has no known  allergies.    MEDICATIONS:  Current Medications[3]    SOCIAL HISTORY:  Social History[4]   Review of Systems   Constitutional: Negative.    Respiratory: Negative.     Cardiovascular: Negative.    Skin: Negative.    Psychiatric/Behavioral: Negative.       Objective   Pulse 62   Ht 6' (1.829 m)   Wt 90.3 kg (199 lb)   SpO2 100%   BMI 26.99 kg/m²      Physical Exam  Constitutional:       Appearance: Normal appearance.   HENT:      Head: Normocephalic and atraumatic.     Cardiovascular:      Pulses: no weak pulses.           Dorsalis pedis pulses are 1+ on the right side and 1+ on the left side.        Posterior tibial pulses are 1+ on the right side and 1+ on the left side.   Pulmonary:      Effort: Pulmonary effort is normal.   Feet:      Right foot:      Skin integrity: Skin integrity normal. No ulcer, skin breakdown, erythema, warmth, callus or dry skin.      Toenail Condition: Right toenails are abnormally thick and long. Fungal disease present.     Left foot:      Skin integrity: Skin integrity normal. No ulcer, skin breakdown, erythema, warmth, callus or dry skin.      Toenail Condition: Left toenails are abnormally thick and long. Fungal disease present.     Comments: The patient's clinical examination today was significant for brittle, thickened and dystrophic pedal nail plates with brittleness, discoloration and subungual debris consistent with onychomycosis x 10.  The interdigital spaces are clear without maceration.  Pedal pulses are palpable bilaterally but diminished.  Epicritic sensation is diminished bilaterally secondary to peripheral neuropathy.  Skin is thin with decreased turgor.  There is absence of hair growth to the bilateral lower extremities.    Skin:     General: Skin is warm and dry.      Capillary Refill: Capillary refill takes less than 2 seconds.     Neurological:      General: No focal deficit present.      Mental Status: He is alert and oriented to person, place, and time.      Psychiatric:         Mood and Affect: Mood normal.     Diabetic Foot Exam    Patient's shoes and socks removed.    Right Foot/Ankle   Right Foot Inspection  Skin Exam: skin normal and skin intact. No dry skin, no warmth, no callus, no erythema, no maceration, no abnormal color, no pre-ulcer, no ulcer and no callus.     Toe Exam: ROM and strength within normal limits.     Sensory   Vibration: diminished  Monofilament testing: diminished    Vascular  Capillary refills: < 3 seconds  The right DP pulse is 1+. The right PT pulse is 1+.     Left Foot/Ankle  Left Foot Inspection  Skin Exam: skin normal and skin intact. No dry skin, no warmth, no erythema, no maceration, normal color, no pre-ulcer, no ulcer and no callus.     Toe Exam: ROM and strength within normal limits.     Sensory   Vibration: diminished  Monofilament testing: diminished    Vascular  Capillary refills: < 3 seconds  The left DP pulse is 1+. The left PT pulse is 1+.     Assign Risk Category  No deformity present  Loss of protective sensation  No weak pulses  Risk: 1             [1]   Past Medical History:  Diagnosis Date    Acute MI (HCC)     1991    Ambulates with cane     Anxiety     Arthritis     hands    At risk for falls     Atrial fibrillation (HCC)     Cholecystitis     CKD (chronic kidney disease)     Coronary artery disease     Diabetes mellitus (HCC)     GERD (gastroesophageal reflux disease)     Heart murmur     Hyperlipidemia     Hypertension     Low back pain     Lumbar disc disease     Umbilical hernia     Wears dentures     full set    Wears glasses    [2]   Past Surgical History:  Procedure Laterality Date    CARDIAC CATHETERIZATION      s/p MI    COLONOSCOPY      HERNIA REPAIR Left     X5    MS LAPAROSCOPY SURG CHOLECYSTECTOMY N/A 1/7/2022    Procedure: ROBOTIC ASSISTED LAPAROSCOPIC CHOLECYSTECTOMY;  Surgeon: Dagoberto Aguilar MD;  Location: [facility];  Service: General    TOTAL HIP ARTHROPLASTY Left     UMBILICAL HERNIA REPAIR  LAPAROSCOPIC N/A 1/7/2022    Procedure: Open umbilical hernia repair;  Surgeon: Dagoberto Aguilar MD;  Location: [facility];  Service: General   [3]   Current Outpatient Medications   Medication Sig Dispense Refill    acetaminophen (TYLENOL) 325 mg tablet Take 2 tablets (650 mg total) by mouth every 6 (six) hours 30 tablet 0    Ascorbic Acid (vitamin C) 100 MG tablet Take 1 tablet (100 mg total) by mouth daily 90 tablet 3    aspirin (ECOTRIN LOW STRENGTH) 81 mg EC tablet Take 81 mg by mouth in the morning.      Cholecalciferol (D3) 50 MCG (2000 UT) TABS Take 1 tablet (2,000 Units total) by mouth in the morning 90 tablet 3    cyanocobalamin (VITAMIN B-12) 500 mcg tablet Take 1,000 mcg by mouth in the morning.      docusate sodium (COLACE) 100 mg capsule Take 1 capsule (100 mg total) by mouth 2 (two) times a day 10 capsule 0    escitalopram (LEXAPRO) 5 mg tablet TAKE 1 TABLET BY MOUTH EVERY DAY 90 tablet 1    ferrous sulfate 325 (65 FE) MG EC tablet Take 325 mg by mouth in the morning      glimepiride (AMARYL) 1 mg tablet TAKE 1 TABLET (1 MG TOTAL) BY MOUTH 3 (THREE) TIMES A DAY WITH MEALS 270 tablet 1    metFORMIN (GLUCOPHAGE) 500 mg tablet Take 1 tablet (500 mg total) by mouth daily with breakfast 90 tablet 1    metoprolol tartrate (LOPRESSOR) 25 mg tablet TAKE 0.5 TABLETS (12.5 MG TOTAL) BY MOUTH EVERY 12 (TWELVE) HOURS 90 tablet 1    omeprazole (PriLOSEC) 40 MG capsule TAKE 1 CAPSULE BY MOUTH EVERY DAY BEFORE BREAKFAST 90 capsule 1    simvastatin (ZOCOR) 40 mg tablet TAKE 1 TABLET BY MOUTH EVERY DAY 90 tablet 1    torsemide (DEMADEX) 10 mg tablet TAKE 1 TABLET BY MOUTH EVERY DAY 90 tablet 1     No current facility-administered medications for this visit.   [4]   Social History  Socioeconomic History    Marital status: /Civil Union   Occupational History    Occupation: retired   Tobacco Use    Smoking status: Never    Smokeless tobacco: Never   Vaping Use    Vaping status: Never Used   Substance and Sexual  Activity    Alcohol use: Yes     Comment: rare    Drug use: No    Sexual activity: Not Currently   Social History Narrative    Prabha:    Most recent tobacco use screenin2020      Do you currently or have you served in the  Armed Forces:   No      Were you activated, into active duty, as a member of the [employer] or as a Reservist:   No      Occupation:   Retired      Marital status:         Sexual orientation:   Heterosexual      Exercise level:   Occasional      Diet:   Regular      General stress level:   Medium      Alcohol intake:   Occasional      Caffeine intake:   Moderate      Chewing tobacco:   none      Illicit drugs:   Denied      Guns present in home:   No      Seat belts used routinely:   Yes      Sunscreen used routinely:   Yes      Smoke alarm in home:   Yes      Advance directive:   Yes      Salt Intake:   Normal     Would the patient like to schedule a Mammogram:   No      Is the patient interested in a colorectal cancer screening:   No     Last modified by zulma   2020, 15:03      Social Drivers of Health     Financial Resource Strain: Unknown (2023)    Overall Financial Resource Strain (CARDIA)     Difficulty of Paying Living Expenses: Patient declined   Food Insecurity: No Food Insecurity (2024)    Nursing - Inadequate Food Risk Classification     Worried About Running Out of Food in the Last Year: Never true     Ran Out of Food in the Last Year: Never true   Transportation Needs: No Transportation Needs (2024)    PRAPARE - Transportation     Lack of Transportation (Medical): No     Lack of Transportation (Non-Medical): No   Housing Stability: Low Risk  (2024)    Housing Stability Vital Sign     Unable to Pay for Housing in the Last Year: No     Number of Times Moved in the Last Year: 1     Homeless in the Last Year: No

## 2025-06-27 ENCOUNTER — NURSE TRIAGE (OUTPATIENT)
Age: 89
End: 2025-06-27

## 2025-06-27 NOTE — TELEPHONE ENCOUNTER
Good question actually lets change things around and continue the metformin and cut out the dinnertime glimepiride

## 2025-06-27 NOTE — TELEPHONE ENCOUNTER
Regarding: low blood sugar  ----- Message from Susan BAKER sent at 6/27/2025 10:06 AM EDT -----  [phone]     Jim is calling stating his blood sugar is 68.  He stated he only takes one metformin a day.  He stated is feels ok but a little dizzy.    All nurse busy at time of call.    Please call patient back. Thank you.  [facility]

## 2025-06-27 NOTE — TELEPHONE ENCOUNTER
Patients last BMP was in 04/2024 and his fasting blood sugar was 86 his last A1c was 6.8 . Patient is taking Metformin 500 mg in the am.   Should he hold the Metformin ?

## 2025-06-27 NOTE — TELEPHONE ENCOUNTER
"REASON FOR CONVERSATION: Hypoglycemia    SYMPTOMS: Patient reports for past week his morning fasting glucose level has been 68-90. Reports he wakes up slightly dizzy. After he eats breakfast it usually goes back up. Today he was 68 so he ate breakfast and had 2 pieces of candy and glucose is now 90. Reports he feels fine and denies any symptoms presently. Held off on metformin (will continue to hold until hear from provider). \    OTHER HEALTH INFORMATION: Patient takes 500 mg metformin once in the morning and 1 mg glimepiride 2 times a day (prescribed 3).     PROTOCOL DISPOSITION: Discuss With PCP and Callback by Nurse Today    CARE ADVICE PROVIDED: Continue to hold medications for diabetes untuk hear back from provider. Continue to monitor glucose every couple of hours or if starts to feel symptomatic. Call back if anything changes.    PRACTICE FOLLOW-UP: Please reach out to patient with provider response for possible medication adjustment.       Reason for Disposition   Morning (before breakfast) blood glucose < 80 mg/dL (4.4 mmol/L) and more than once in past week    Answer Assessment - Initial Assessment Questions  1. SYMPTOMS: \"What symptoms are you concerned about?\"      Dizzy when wakes up  2. ONSET:  \"When did the symptoms start?\"      Ongoing for a about a week  3. BLOOD GLUCOSE: \"What is your blood glucose level?\"       68-90  4. USUAL RANGE: \"What is your blood glucose level usually?\" (e.g., usual fasting morning value, usual evening value)      68-90  5. TYPE 1 or 2:  \"Do you know what type of diabetes you have?\"  (e.g., Type 1, Type 2, Gestational; doesn't know)       Type 2   6. INSULIN: \"Do you take insulin?\" \"What type of insulin(s) do you use? What is the mode of delivery? (syringe, pen; injection or pump) \"When did you last give yourself an insulin dose?\" (i.e., time or hours/minutes ago) \"How much did you give?\" (i.e., how many units)      NA   7. DIABETES PILLS: \"Do you take any pills for your " "diabetes?\" If Yes, ask: \"What is the name of the medicine(s) that you take for high blood sugar?   metFORMIN (GLUCOPHAGE) 500 mg tablet   glimepiride (AMARYL) 1 mg tablet   8. OTHER SYMPTOMS: \"Do you have any symptoms?\" (e.g., fever, frequent urination, difficulty breathing, vomiting)      Denies   9. LOW BLOOD GLUCOSE TREATMENT: \"What have you done so far to treat the low blood glucose level?\"      Had breakfast, ate a candy   10. FOOD: \"When did you last eat or drink?\"        Ate breakfast 0900- and a candy  11. ALONE: \"Are you alone right now or is someone with you?\"         Lives with wife and has a caretaker   12. PREGNANCY: \"Is there any chance you are pregnant?\" \"When was your last menstrual period?\"        NA    Protocols used: Diabetes - Low Blood Sugar-Adult-[place]    "

## 2025-07-08 ENCOUNTER — TELEPHONE (OUTPATIENT)
Age: 89
End: 2025-07-08

## 2025-07-08 NOTE — TELEPHONE ENCOUNTER
Called patient regarding the form he called over regarding the shoes they did receive our fax and he will get the shoes next week

## 2025-07-08 NOTE — TELEPHONE ENCOUNTER
Patient is calling to check on his form that he dropped off on a Thurs about 2 weeks ago when Dr. Nguyen was on vacation. It is for his diabetic shoes. Please, call patient with an update. Thank you.

## 2025-07-16 DIAGNOSIS — E87.5 HYPERKALEMIA: ICD-10-CM

## 2025-07-16 DIAGNOSIS — N18.32 STAGE 3B CHRONIC KIDNEY DISEASE (HCC): ICD-10-CM

## 2025-07-17 RX ORDER — TORSEMIDE 10 MG/1
10 TABLET ORAL DAILY
Qty: 90 TABLET | Refills: 0 | Status: SHIPPED | OUTPATIENT
Start: 2025-07-17

## 2025-07-22 ENCOUNTER — OFFICE VISIT (OUTPATIENT)
Dept: OTOLARYNGOLOGY | Facility: CLINIC | Age: 89
End: 2025-07-22
Payer: MEDICARE

## 2025-07-22 VITALS
HEIGHT: 72 IN | WEIGHT: 194 LBS | OXYGEN SATURATION: 98 % | HEART RATE: 71 BPM | BODY MASS INDEX: 26.28 KG/M2 | TEMPERATURE: 98 F

## 2025-07-22 DIAGNOSIS — R26.89 IMBALANCE: ICD-10-CM

## 2025-07-22 DIAGNOSIS — H90.3 SENSORINEURAL HEARING LOSS (SNHL), BILATERAL: Primary | ICD-10-CM

## 2025-07-22 DIAGNOSIS — H61.23 BILATERAL IMPACTED CERUMEN: ICD-10-CM

## 2025-07-22 PROCEDURE — 99213 OFFICE O/P EST LOW 20 MIN: CPT | Performed by: NURSE PRACTITIONER

## 2025-07-22 NOTE — PROGRESS NOTES
:  Assessment & Plan  Sensorineural hearing loss (SNHL), bilateral         Imbalance         Bilateral impacted cerumen           On exam, bilateral minimal cerumen debris. Removed with suction. Not significant enough to warrant routine visits at this time. No procedure charge either.     Symptoms include sensation of imbalance then falls.  Occurs when moving or moving from sitting to standing then turning.  Denies occurrence when moving from lying to standing position. Previously Improved after 4 to 5 weeks of balance therapy.  Currently using a walker.      Discussed possible causes of imbalance including neurological, cardiac, autoimmune, Otitis media, sinusitis, chemical imbalance, and inner ear concerns.      Reviewed results of prior Ct head from 2022 without any ENT related findings at that time.   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  There is a very slight asymmetry on the left at 4K and 6K but it is not meeting guidelines to recommend MRI brain with IAC due to the asymmetry.  Asymmetry unlikely impacting his imbalance concerns. Word discrimination 84% on right and 92% on left. Tymps type A bilaterally. Previously Offered Mri brain with IAC due to the very slight asymmetry on left however does not meet guidelines as necessary option.  Pt doing well with current hearing aids and finds them to be helpful     Unlikely ear source of frequent falls based on pt report of symptoms, audiogram and longevity. Recommend he consider return to PT and OT for imbalance.       After discussion agreed to observation  Follow up in one year or as needed       History of Present Illness     Jim Lomeli is a 89 y.o. male   Presents today as a follow up due to ear concerns. For past year Imbalance. Turning certain way feels legs freeze and falls. Increased frequency of falls.  Hospital visits due to injuries from falling due to imbalance.  No headaches.   Hearing gradually worsening. Rigning tinnitus constant.  No otalgia or otorrhea.  No history of ear surgery.  No current hearing aids.  Balance therapy about one to two years ago.       Since last visit, treated in PT. Obtained hearing aids.  Improved with PT but is completed for now. Couple of falls since last visit.  If turns feels feet do not follow him, then falls.          Review of Systems   Constitutional: Negative.    HENT:  Positive for hearing loss. Negative for congestion, ear discharge, ear pain, nosebleeds, postnasal drip, rhinorrhea, sinus pressure, sinus pain, sore throat, tinnitus and voice change.    Respiratory:  Negative for chest tightness and shortness of breath.    Musculoskeletal:  Positive for gait problem.   Skin:  Negative for color change.   Neurological:  Negative for dizziness, numbness and headaches.   Psychiatric/Behavioral: Negative.       Objective   Pulse 71   Temp 98 °F (36.7 °C) (Temporal)   Ht 6' (1.829 m)   Wt 88 kg (194 lb)   SpO2 98%   BMI 26.31 kg/m²      Physical Exam  Vitals and nursing note reviewed.   Constitutional:       General: He is not in acute distress.     Appearance: He is well-developed.   HENT:      Head: Normocephalic and atraumatic.      Right Ear: Tympanic membrane, ear canal and external ear normal. There is impacted cerumen.      Left Ear: Tympanic membrane, ear canal and external ear normal. There is impacted cerumen.      Nose: Nose normal.      Mouth/Throat:      Mouth: Mucous membranes are moist.   Pulmonary:      Effort: Pulmonary effort is normal.     Musculoskeletal:      Cervical back: Neck supple.     Skin:     General: Skin is warm and dry.     Neurological:      Mental Status: He is alert.     Psychiatric:         Mood and Affect: Mood normal.

## 2025-07-29 ENCOUNTER — TELEPHONE (OUTPATIENT)
Age: 89
End: 2025-07-29

## 2025-07-29 ENCOUNTER — OFFICE VISIT (OUTPATIENT)
Dept: FAMILY MEDICINE CLINIC | Facility: CLINIC | Age: 89
End: 2025-07-29
Payer: MEDICARE

## 2025-07-29 ENCOUNTER — APPOINTMENT (OUTPATIENT)
Dept: RADIOLOGY | Facility: CLINIC | Age: 89
End: 2025-07-29
Payer: MEDICARE

## 2025-07-29 VITALS — HEART RATE: 80 BPM | BODY MASS INDEX: 26.28 KG/M2 | OXYGEN SATURATION: 98 % | HEIGHT: 72 IN | WEIGHT: 194 LBS

## 2025-07-29 DIAGNOSIS — R07.81 RIB PAIN ON LEFT SIDE: Primary | ICD-10-CM

## 2025-07-29 DIAGNOSIS — R07.81 RIB PAIN ON LEFT SIDE: ICD-10-CM

## 2025-07-29 DIAGNOSIS — R26.2 AMBULATORY DYSFUNCTION: ICD-10-CM

## 2025-07-29 DIAGNOSIS — Z99.89 USES WALKER: ICD-10-CM

## 2025-07-29 PROCEDURE — 71101 X-RAY EXAM UNILAT RIBS/CHEST: CPT

## 2025-07-29 PROCEDURE — 99213 OFFICE O/P EST LOW 20 MIN: CPT | Performed by: FAMILY MEDICINE

## 2025-07-29 PROCEDURE — G2211 COMPLEX E/M VISIT ADD ON: HCPCS | Performed by: FAMILY MEDICINE

## 2025-08-18 ENCOUNTER — TELEPHONE (OUTPATIENT)
Dept: OTHER | Facility: OTHER | Age: 89
End: 2025-08-18

## 2025-08-19 ENCOUNTER — OFFICE VISIT (OUTPATIENT)
Dept: FAMILY MEDICINE CLINIC | Facility: CLINIC | Age: 89
End: 2025-08-19
Payer: MEDICARE

## 2025-08-19 VITALS
HEART RATE: 80 BPM | SYSTOLIC BLOOD PRESSURE: 126 MMHG | DIASTOLIC BLOOD PRESSURE: 76 MMHG | OXYGEN SATURATION: 96 % | BODY MASS INDEX: 26.41 KG/M2 | HEIGHT: 72 IN | WEIGHT: 195 LBS

## 2025-08-19 DIAGNOSIS — E11.69 TYPE 2 DIABETES MELLITUS WITH OTHER SPECIFIED COMPLICATION, WITHOUT LONG-TERM CURRENT USE OF INSULIN (HCC): ICD-10-CM

## 2025-08-19 DIAGNOSIS — N18.31 TYPE 2 DIABETES MELLITUS WITH STAGE 3A CHRONIC KIDNEY DISEASE, WITHOUT LONG-TERM CURRENT USE OF INSULIN (HCC): ICD-10-CM

## 2025-08-19 DIAGNOSIS — I10 ESSENTIAL HYPERTENSION: ICD-10-CM

## 2025-08-19 DIAGNOSIS — K21.9 GASTROESOPHAGEAL REFLUX DISEASE WITHOUT ESOPHAGITIS: ICD-10-CM

## 2025-08-19 DIAGNOSIS — N25.81 SECONDARY HYPERPARATHYROIDISM (HCC): ICD-10-CM

## 2025-08-19 DIAGNOSIS — N18.32 STAGE 3B CHRONIC KIDNEY DISEASE (HCC): ICD-10-CM

## 2025-08-19 DIAGNOSIS — J01.90 ACUTE SINUSITIS, RECURRENCE NOT SPECIFIED, UNSPECIFIED LOCATION: Primary | ICD-10-CM

## 2025-08-19 DIAGNOSIS — F41.9 ANXIETY: ICD-10-CM

## 2025-08-19 DIAGNOSIS — E11.22 TYPE 2 DIABETES MELLITUS WITH STAGE 3A CHRONIC KIDNEY DISEASE, WITHOUT LONG-TERM CURRENT USE OF INSULIN (HCC): ICD-10-CM

## 2025-08-19 DIAGNOSIS — E87.5 HYPERKALEMIA: ICD-10-CM

## 2025-08-19 PROCEDURE — 99213 OFFICE O/P EST LOW 20 MIN: CPT | Performed by: FAMILY MEDICINE

## 2025-08-19 PROCEDURE — G2211 COMPLEX E/M VISIT ADD ON: HCPCS | Performed by: FAMILY MEDICINE

## 2025-08-19 RX ORDER — AZITHROMYCIN 250 MG/1
TABLET, FILM COATED ORAL
Qty: 6 TABLET | Refills: 0 | Status: SHIPPED | OUTPATIENT
Start: 2025-08-19 | End: 2025-08-24

## 2025-08-19 RX ORDER — TORSEMIDE 10 MG/1
10 TABLET ORAL DAILY
Qty: 90 TABLET | Refills: 1 | Status: SHIPPED | OUTPATIENT
Start: 2025-08-19

## 2025-08-19 RX ORDER — CHOLECALCIFEROL (VITAMIN D3) 50 MCG
TABLET ORAL
Qty: 90 TABLET | Refills: 1 | Status: SHIPPED | OUTPATIENT
Start: 2025-08-19

## 2025-08-19 RX ORDER — BROMPHENIRAMINE MALEATE, PSEUDOEPHEDRINE HYDROCHLORIDE, AND DEXTROMETHORPHAN HYDROBROMIDE 2; 30; 10 MG/5ML; MG/5ML; MG/5ML
5 SYRUP ORAL 4 TIMES DAILY PRN
Qty: 160 ML | Refills: 0 | Status: SHIPPED | OUTPATIENT
Start: 2025-08-19

## 2025-08-21 RX ORDER — ESCITALOPRAM OXALATE 5 MG/1
5 TABLET ORAL DAILY
Qty: 90 TABLET | Refills: 1 | OUTPATIENT
Start: 2025-08-21

## 2025-08-21 RX ORDER — OMEPRAZOLE 40 MG/1
40 CAPSULE, DELAYED RELEASE ORAL
Qty: 90 CAPSULE | Refills: 1 | OUTPATIENT
Start: 2025-08-21

## 2025-08-21 RX ORDER — METOPROLOL TARTRATE 25 MG/1
12.5 TABLET, FILM COATED ORAL EVERY 12 HOURS SCHEDULED
Qty: 90 TABLET | Refills: 1 | Status: SHIPPED | OUTPATIENT
Start: 2025-08-21

## (undated) DEVICE — SYRINGE 10ML LL

## (undated) DEVICE — LUBRICANT INST ELECTROLUBE ANTISTK WO PAD

## (undated) DEVICE — TIP COVER ACCESSORY

## (undated) DEVICE — VISUALIZATION SYSTEM: Brand: CLEARIFY

## (undated) DEVICE — STAPLER CANNULA SEAL: Brand: ENDOWRIST

## (undated) DEVICE — DRAPE SHEET X-LG

## (undated) DEVICE — INTENDED FOR TISSUE SEPARATION, AND OTHER PROCEDURES THAT REQUIRE A SHARP SURGICAL BLADE TO PUNCTURE OR CUT.: Brand: BARD-PARKER SAFETY BLADES SIZE 15, STERILE

## (undated) DEVICE — CANNULA SEAL

## (undated) DEVICE — 3000CC GUARDIAN II: Brand: GUARDIAN

## (undated) DEVICE — TROCAR: Brand: KII FIOS FIRST ENTRY

## (undated) DEVICE — JACKSON-PRATT 100CC BULB RESERVOIR: Brand: CARDINAL HEALTH

## (undated) DEVICE — ARM DRAPE

## (undated) DEVICE — REDUCER: Brand: ENDOWRIST

## (undated) DEVICE — SUT MONOCRYL 4-0 PS-2 27 IN Y426H

## (undated) DEVICE — GLOVE SRG BIOGEL 7.5

## (undated) DEVICE — SURGICEL 4 X 8

## (undated) DEVICE — HEM-O-LOK CLIP CARTRIDGE MED/LARGE DA VINCI SI/XI

## (undated) DEVICE — DRAPE LAPAROTOMY W/POUCHES

## (undated) DEVICE — PERMANENT CAUTERY HOOK: Brand: ENDOWRIST

## (undated) DEVICE — PACK PBDS LAP CHOLE RF

## (undated) DEVICE — SUT VICRYL 0 UR-6 27 IN J603H

## (undated) DEVICE — [HIGH FLOW INSUFFLATOR,  DO NOT USE IF PACKAGE IS DAMAGED,  KEEP DRY,  KEEP AWAY FROM SUNLIGHT,  PROTECT FROM HEAT AND RADIOACTIVE SOURCES.]: Brand: PNEUMOSURE

## (undated) DEVICE — NEEDLE 25G X 1 1/2

## (undated) DEVICE — CHLORAPREP HI-LITE 26ML ORANGE

## (undated) DEVICE — BLADELESS OBTURATOR: Brand: WECK VISTA

## (undated) DEVICE — PENCIL ELECTROSURG E-Z CLEAN -0035H

## (undated) DEVICE — MEDIUM-LARGE CLIP APPLIER: Brand: ENDOWRIST

## (undated) DEVICE — PMI DISPOSABLE PUNCTURE CLOSURE DEVICE / SUTURE GRASPER: Brand: PMI

## (undated) DEVICE — CADIERE FORCEPS: Brand: ENDOWRIST

## (undated) DEVICE — BETHLEHEM UNIVERSAL MINOR GEN: Brand: CARDINAL HEALTH

## (undated) DEVICE — ADHESIVE SKIN HIGH VISCOSITY EXOFIN 1ML

## (undated) DEVICE — GLOVE INDICATOR PI UNDERGLOVE SZ 8 BLUE

## (undated) DEVICE — FORCE BIPOLAR: Brand: ENDOWRIST

## (undated) DEVICE — MONOPOLAR CURVED SCISSORS: Brand: ENDOWRIST

## (undated) DEVICE — LARGE NEEDLE DRIVER: Brand: ENDOWRIST

## (undated) DEVICE — JP CHAN DRN SIL HUBLESS 15FR W/TRO: Brand: CARDINAL HEALTH

## (undated) DEVICE — COLUMN DRAPE

## (undated) DEVICE — SUT VICRYL PLUS 0 UR-6 27IN VCP603H